# Patient Record
Sex: MALE | Race: WHITE | Employment: OTHER | URBAN - METROPOLITAN AREA
[De-identification: names, ages, dates, MRNs, and addresses within clinical notes are randomized per-mention and may not be internally consistent; named-entity substitution may affect disease eponyms.]

---

## 2017-04-05 ENCOUNTER — HOSPITAL ENCOUNTER (EMERGENCY)
Age: 62
Discharge: HOME | End: 2017-04-05
Attending: EMERGENCY MEDICINE
Payer: MEDICARE

## 2017-04-05 ENCOUNTER — DOCUMENTATION ONLY (OUTPATIENT)
Dept: ONCOLOGY | Age: 62
End: 2017-04-05

## 2017-04-05 VITALS
DIASTOLIC BLOOD PRESSURE: 83 MMHG | OXYGEN SATURATION: 98 % | RESPIRATION RATE: 19 BRPM | SYSTOLIC BLOOD PRESSURE: 142 MMHG | HEART RATE: 97 BPM | TEMPERATURE: 98 F

## 2017-04-05 DIAGNOSIS — F41.8 DEPRESSION WITH ANXIETY: ICD-10-CM

## 2017-04-05 DIAGNOSIS — F41.8 ANXIETY ASSOCIATED WITH DEPRESSION: Primary | ICD-10-CM

## 2017-04-05 DIAGNOSIS — C67.9 MALIGNANT NEOPLASM OF URINARY BLADDER, UNSPECIFIED SITE (HCC): ICD-10-CM

## 2017-04-05 LAB
ALBUMIN SERPL BCP-MCNC: 4.9 G/DL (ref 3.2–4.6)
ALBUMIN/GLOB SERPL: 1.1 {RATIO} (ref 1.2–3.5)
ALP SERPL-CCNC: 112 U/L (ref 50–136)
ALT SERPL-CCNC: 26 U/L (ref 12–65)
AMORPH CRY URNS QL MICRO: ABNORMAL
ANION GAP BLD CALC-SCNC: 14 MMOL/L (ref 7–16)
APPEARANCE UR: ABNORMAL
AST SERPL W P-5'-P-CCNC: 23 U/L (ref 15–37)
ATRIAL RATE: 100 BPM
BACTERIA URNS QL MICRO: ABNORMAL /HPF
BILIRUB SERPL-MCNC: 0.9 MG/DL (ref 0.2–1.1)
BILIRUB UR QL: ABNORMAL
BUN SERPL-MCNC: 15 MG/DL (ref 8–23)
CALCIUM SERPL-MCNC: 9.9 MG/DL (ref 8.3–10.4)
CALCULATED P AXIS, ECG09: 79 DEGREES
CALCULATED R AXIS, ECG10: 77 DEGREES
CALCULATED T AXIS, ECG11: 79 DEGREES
CASTS URNS QL MICRO: 0 /LPF
CHLORIDE SERPL-SCNC: 97 MMOL/L (ref 98–107)
CO2 SERPL-SCNC: 19 MMOL/L (ref 21–32)
COLOR UR: YELLOW
CREAT SERPL-MCNC: 1.25 MG/DL (ref 0.8–1.5)
CRYSTALS URNS QL MICRO: ABNORMAL /LPF
DIAGNOSIS, 93000: NORMAL
EPI CELLS #/AREA URNS HPF: ABNORMAL /HPF
ERYTHROCYTE [DISTWIDTH] IN BLOOD BY AUTOMATED COUNT: 13 % (ref 11.9–14.6)
GLOBULIN SER CALC-MCNC: 4.4 G/DL (ref 2.3–3.5)
GLUCOSE SERPL-MCNC: 86 MG/DL (ref 65–100)
GLUCOSE UR STRIP.AUTO-MCNC: NEGATIVE MG/DL
HCT VFR BLD AUTO: 47.9 % (ref 41.1–50.3)
HGB BLD-MCNC: 17 G/DL (ref 13.6–17.2)
HGB UR QL STRIP: ABNORMAL
KETONES UR QL STRIP.AUTO: 40 MG/DL
LEUKOCYTE ESTERASE UR QL STRIP.AUTO: ABNORMAL
MCH RBC QN AUTO: 35.3 PG (ref 26.1–32.9)
MCHC RBC AUTO-ENTMCNC: 35.5 G/DL (ref 31.4–35)
MCV RBC AUTO: 99.4 FL (ref 79.6–97.8)
MUCOUS THREADS URNS QL MICRO: 0 /LPF
NITRITE UR QL STRIP.AUTO: POSITIVE
OTHER OBSERVATIONS,UCOM: ABNORMAL
P-R INTERVAL, ECG05: 112 MS
PH UR STRIP: >9 [PH] (ref 5–9)
PLATELET # BLD AUTO: 347 K/UL (ref 150–450)
PMV BLD AUTO: 9.6 FL (ref 10.8–14.1)
POTASSIUM SERPL-SCNC: 4.3 MMOL/L (ref 3.5–5.1)
PROT SERPL-MCNC: 9.3 G/DL (ref 6.3–8.2)
PROT UR STRIP-MCNC: 30 MG/DL
Q-T INTERVAL, ECG07: 320 MS
QRS DURATION, ECG06: 76 MS
QTC CALCULATION (BEZET), ECG08: 412 MS
RBC # BLD AUTO: 4.82 M/UL (ref 4.23–5.67)
RBC #/AREA URNS HPF: ABNORMAL /HPF
SODIUM SERPL-SCNC: 130 MMOL/L (ref 136–145)
SP GR UR REFRACTOMETRY: <1.005 (ref 1–1.02)
TROPONIN I BLD-MCNC: 0 NG/ML (ref 0–0.08)
UROBILINOGEN UR QL STRIP.AUTO: 0.2 EU/DL (ref 0.2–1)
VENTRICULAR RATE, ECG03: 100 BPM
WBC # BLD AUTO: 9.2 K/UL (ref 4.3–11.1)
WBC URNS QL MICRO: ABNORMAL /HPF

## 2017-04-05 PROCEDURE — 96375 TX/PRO/DX INJ NEW DRUG ADDON: CPT | Performed by: EMERGENCY MEDICINE

## 2017-04-05 PROCEDURE — 99285 EMERGENCY DEPT VISIT HI MDM: CPT | Performed by: EMERGENCY MEDICINE

## 2017-04-05 PROCEDURE — 87186 SC STD MICRODIL/AGAR DIL: CPT | Performed by: EMERGENCY MEDICINE

## 2017-04-05 PROCEDURE — 74011250636 HC RX REV CODE- 250/636: Performed by: EMERGENCY MEDICINE

## 2017-04-05 PROCEDURE — 87086 URINE CULTURE/COLONY COUNT: CPT | Performed by: EMERGENCY MEDICINE

## 2017-04-05 PROCEDURE — 96361 HYDRATE IV INFUSION ADD-ON: CPT | Performed by: EMERGENCY MEDICINE

## 2017-04-05 PROCEDURE — 85027 COMPLETE CBC AUTOMATED: CPT | Performed by: EMERGENCY MEDICINE

## 2017-04-05 PROCEDURE — 80053 COMPREHEN METABOLIC PANEL: CPT | Performed by: EMERGENCY MEDICINE

## 2017-04-05 PROCEDURE — 96374 THER/PROPH/DIAG INJ IV PUSH: CPT | Performed by: EMERGENCY MEDICINE

## 2017-04-05 PROCEDURE — 93005 ELECTROCARDIOGRAM TRACING: CPT | Performed by: EMERGENCY MEDICINE

## 2017-04-05 PROCEDURE — 87088 URINE BACTERIA CULTURE: CPT | Performed by: EMERGENCY MEDICINE

## 2017-04-05 PROCEDURE — 81003 URINALYSIS AUTO W/O SCOPE: CPT | Performed by: EMERGENCY MEDICINE

## 2017-04-05 PROCEDURE — 84484 ASSAY OF TROPONIN QUANT: CPT

## 2017-04-05 PROCEDURE — 81015 MICROSCOPIC EXAM OF URINE: CPT | Performed by: EMERGENCY MEDICINE

## 2017-04-05 RX ORDER — SODIUM CHLORIDE 9 MG/ML
1000 INJECTION, SOLUTION INTRAVENOUS ONCE
Status: COMPLETED | OUTPATIENT
Start: 2017-04-05 | End: 2017-04-05

## 2017-04-05 RX ORDER — ONDANSETRON 2 MG/ML
4 INJECTION INTRAMUSCULAR; INTRAVENOUS
Status: COMPLETED | OUTPATIENT
Start: 2017-04-05 | End: 2017-04-05

## 2017-04-05 RX ORDER — SODIUM CHLORIDE 0.9 % (FLUSH) 0.9 %
5-10 SYRINGE (ML) INJECTION EVERY 8 HOURS
Status: DISCONTINUED | OUTPATIENT
Start: 2017-04-05 | End: 2017-04-05 | Stop reason: HOSPADM

## 2017-04-05 RX ORDER — LORAZEPAM 1 MG/1
1 TABLET ORAL
Qty: 21 TAB | Refills: 0 | Status: SHIPPED | OUTPATIENT
Start: 2017-04-05 | End: 2017-04-05 | Stop reason: SDUPTHER

## 2017-04-05 RX ORDER — SODIUM CHLORIDE 0.9 % (FLUSH) 0.9 %
5-10 SYRINGE (ML) INJECTION AS NEEDED
Status: DISCONTINUED | OUTPATIENT
Start: 2017-04-05 | End: 2017-04-05 | Stop reason: HOSPADM

## 2017-04-05 RX ORDER — LORAZEPAM 2 MG/ML
1 INJECTION INTRAMUSCULAR
Status: COMPLETED | OUTPATIENT
Start: 2017-04-05 | End: 2017-04-05

## 2017-04-05 RX ADMIN — LORAZEPAM 1 MG: 2 INJECTION, SOLUTION INTRAMUSCULAR; INTRAVENOUS at 10:47

## 2017-04-05 RX ADMIN — ONDANSETRON 4 MG: 2 INJECTION, SOLUTION INTRAMUSCULAR; INTRAVENOUS at 10:47

## 2017-04-05 RX ADMIN — SODIUM CHLORIDE 1000 ML: 900 INJECTION, SOLUTION INTRAVENOUS at 10:47

## 2017-04-05 NOTE — DISCHARGE INSTRUCTIONS

## 2017-04-05 NOTE — ED TRIAGE NOTES
Pt. Seen at his doctors office today. Pt. States he has severe anxiety problems and has been out of his PO ativan. Pt. States hx of Cancer, has a urostomy. States since chemo it \"messed with my brain. \" EMS reports extreme anxiety with hyperventilation and rapid muscle movements on their arrival. EMS gave 1mg Ativan. Pt. Still somewhat anxious in triage but states he feels better than he did. Pt. Calm and cooperative.

## 2017-04-05 NOTE — ED PROVIDER NOTES
HPI Comments: Ptaient with sever anxiety, worse today as his wife is admitted at this time and preparing for surgery. Sent from [facility] onc clinic for severe anxiety and treated with ativan pta. Also drinks daily-no etoh in 1 week but denies SI/HI or AVH. Patient is a 64 y.o. male presenting with anxiety. The history is provided by the patient. Anxiety    This is a recurrent problem. The current episode started 1 to 2 hours ago. The problem has been gradually improving. The problem occurs daily. The patient is experiencing no pain. Associated symptoms include shortness of breath. Pertinent negatives include no abdominal pain, no back pain, no cough, no fever, no headaches, no nausea, no numbness, no vomiting and no weakness. He has tried nothing for the symptoms. His past medical history does not include DVT or PE. Pertinent negatives include no cardiac stents and no CABG. Past Medical History:   Diagnosis Date    Bladder cancer ([facility] [address] 7/2014    Chronic neck pain     Chronic obstructive pulmonary disease (HCC)     Depression with anxiety     Hypertension     Osteoarthritis     Tobacco abuse        Past Surgical History:   Procedure Laterality Date    HX CERVICAL FUSION  4/14    Dr. 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       Social History Main Topics    Smoking status: Current Every Day Smoker     Packs/day: 1.00     Years: 45.00    Smokeless tobacco: Never Used    Alcohol use 14.0 oz/week     28 Cans of beer per week      Comment: 4/night of beer    Drug use: Yes     Special: Marijuana    Sexual activity: Not on file      Comment: recentlyey     Other Topics Concern    Not on file     Social History Narrative    Lives with girlfriend. His father is living in [place]. ALLERGIES: Codeine and Penicillins    Review of Systems   Constitutional: Negative for chills and fever. HENT: Negative for congestion, rhinorrhea and sore throat. Eyes: Negative for photophobia and redness. Respiratory: Positive for shortness of breath. Negative for cough. Cardiovascular: Negative for chest pain and leg swelling. Gastrointestinal: Negative for abdominal pain, diarrhea, nausea and vomiting. Endocrine: Negative for polydipsia and polyuria. Musculoskeletal: Negative for back pain and myalgias. Neurological: Negative for weakness, numbness and headaches. Psychiatric/Behavioral: Positive for dysphoric mood and sleep disturbance. Negative for hallucinations and suicidal ideas. The patient is nervous/anxious. Vitals:    04/05/17 1028   BP: (!) 157/100   Pulse: (!) 112   Resp: 20   Temp: 97.7 °F (36.5 °C)   SpO2: 98%            Physical Exam   Constitutional: He is oriented to person, place, and time. He appears well-developed and well-nourished. HENT:   Mouth/Throat: Oropharynx is clear and moist. No oropharyngeal exudate. Eyes: Conjunctivae are normal. Pupils are equal, round, and reactive to light. Neck: Neck supple. Cardiovascular: Normal rate, regular rhythm and normal heart sounds. Pulmonary/Chest: Effort normal and breath sounds normal.   Abdominal: Soft. Bowel sounds are normal. He exhibits no distension. There is no tenderness. There is no rebound and no guarding. Musculoskeletal: Normal range of motion. He exhibits no edema or tenderness. Lymphadenopathy:     He has no cervical adenopathy.    Neurological: He is alert and oriented to person, place, and time. Skin: Skin is warm and dry. Psychiatric: His speech is normal and behavior is normal. Judgment and thought content normal. His mood appears anxious. Nursing note and vitals reviewed. MDM  Number of Diagnoses or Management Options  Diagnosis management comments: Patient's urine is dark and concentrated. He reports not eating or drinking for several days. Patient has been hydrated. Ileostomy bag urine is nitrite positive aspect. The minimal leukocytosis or red blood cell presence. Will treat based on culture in the next couple of days. No fever or chills. Anxiety improved. EKG normal and nonischemic. Doubt acute coronary syndrome or pulmonary embolism. Doubt aortic dissection. Lungs are clear and no pneumothorax clinically.        Amount and/or Complexity of Data Reviewed  Clinical lab tests: ordered and reviewed (Results for orders placed or performed during the hospital encounter of 04/05/17  -CBC W/O DIFF       Result                                            Value                         Ref Range                       WBC                                               9.2                           4.3 - 11.1 K/uL                 RBC                                               4.82                          4.23 - 5.67 M/uL                HGB                                               17.0                          13.6 - 17.2 g/dL                HCT                                               47.9                          41.1 - 50.3 %                   MCV                                               99.4 (H)                      79.6 - 97.8 FL                  MCH                                               35.3 (H)                      26.1 - 32.9 PG                  MCHC                                              35.5 (H)                      31.4 - 35.0 g/dL                RDW                                               13.0 11.9 - 14.6 %                   PLATELET                                          347                           150 - 450 K/uL                  MPV                                               9.6 (L)                       10.8 - 14.1 FL             -METABOLIC PANEL, COMPREHENSIVE       Result                                            Value                         Ref Range                       Sodium                                            130 (L)                       136 - 145 mmol/L                Potassium                                         4.3                           3.5 - 5.1 mmol/L                Chloride                                          97 (L)                        98 - 107 mmol/L                 CO2                                               19 (L)                        21 - 32 mmol/L                  Anion gap                                         14                            7 - 16 mmol/L                   Glucose                                           86                            65 - 100 mg/dL                  BUN                                               15                            8 - 23 MG/DL                    Creatinine                                        1.25                          0.8 - 1.5 MG/DL                 GFR est AA                                        >60                           >60 ml/min/1.73m2               GFR est non-AA                                    >60                           >60 ml/min/1.73m2               Calcium                                           9.9                           8.3 - 10.4 MG/DL                Bilirubin, total                                  0.9                           0.2 - 1.1 MG/DL                 ALT (SGPT)                                        26                            12 - 65 U/L                     AST (SGOT)                                        23 15 - 37 U/L                     Alk. phosphatase                                  112                           50 - 136 U/L                    Protein, total                                    9.3 (H)                       6.3 - 8.2 g/dL                  Albumin                                           4.9 (H)                       3.2 - 4.6 g/dL                  Globulin                                          4.4 (H)                       2.3 - 3.5 g/dL                  A-G Ratio                                         1.1 (L)                       1.2 - 3.5                  -URINALYSIS W/ RFLX MICROSCOPIC       Result                                            Value                         Ref Range                       Color                                             YELLOW                                                        Appearance                                        HAZY                                                          Specific gravity                                  <1.005                        1.001 - 1.023                   pH (UA)                                           >9.0 (H)                      5.0 - 9.0                       Protein                                           30 (A)                        NEG mg/dL                       Glucose                                           NEGATIVE                      NEG mg/dL                       Ketone                                            40 (A)                        NEG mg/dL                       Bilirubin                                         SMALL (A)                     NEG                             Blood                                             TRACE (A)                     NEG                             Urobilinogen                                      0.2                           0.2 - 1.0 EU/dL                 Nitrites                                          POSITIVE (A)                  NEG Leukocyte Esterase                                SMALL (A)                     NEG                        -URINE MICROSCOPIC       Result                                            Value                         Ref Range                       WBC                                               3-5                           0 /hpf                          RBC                                               3-5                           0 /hpf                          Epithelial cells                                  0-3                           0 /hpf                          Bacteria                                          4+ (H)                        0 /hpf                          Casts                                             0                             0 /lpf                          Crystals                                          TRIPLE PHOSPHATE              0 /LPF                          Amorphous Crystals                                2+ (H)                        0                               Mucus                                             0                             0 /lpf                          Other observations                                RESULTS VERIFIED MANUALLY                                -POC TROPONIN-I       Result                                            Value                         Ref Range                       Troponin-I (POC)                                  0                             0.0 - 0.08 ng/ml           -EKG, 12 LEAD, INITIAL       Result                                            Value                         Ref Range                       Ventricular Rate                                  100                           BPM                             Atrial Rate                                       100                           BPM                             P-R Interval                                      112 ms                              QRS Duration                                      76                            ms                              Q-T Interval                                      320                           ms                              QTC Calculation (Bezet)                           412                           ms                              Calculated P Axis                                 79                            degrees                         Calculated R Axis                                 77                            degrees                         Calculated T Axis                                 79                            degrees                         Diagnosis                                                                                                   !! AGE AND GENDER SPECIFIC ECG ANALYSIS !!    Normal sinus rhythm   Normal ECG   No previous ECGs available     )      ED Course       Procedures

## 2017-04-08 LAB
BACTERIA SPEC CULT: ABNORMAL
SERVICE CMNT-IMP: ABNORMAL

## 2017-04-09 RX ORDER — SULFAMETHOXAZOLE AND TRIMETHOPRIM 800; 160 MG/1; MG/1
1 TABLET ORAL 2 TIMES DAILY
Qty: 20 TAB | Refills: 0 | Status: SHIPPED | OUTPATIENT
Start: 2017-04-09 | End: 2017-04-19

## 2017-04-09 NOTE — PROGRESS NOTES
Patient takes Celexa and Cipro can prolong the QT interval, so, I changed it to Bactrim DS one po bid x 10 days.

## 2017-04-09 NOTE — PROGRESS NOTES
I called patient and informed him of test results. He uses [facility] in [facility]. He is feeling pretty good other than having some diarrhea. Will e-scribe Cipro 500 mg one po bid x 10 days now.

## 2017-04-11 ENCOUNTER — HOSPITAL ENCOUNTER (OUTPATIENT)
Dept: CT IMAGING | Age: 62
Discharge: HOME | End: 2017-04-11
Attending: INTERNAL MEDICINE
Payer: MEDICARE

## 2017-04-11 DIAGNOSIS — C67.9 MALIGNANT NEOPLASM OF URINARY BLADDER, UNSPECIFIED SITE (HCC): ICD-10-CM

## 2017-04-11 PROCEDURE — 74011636320 HC RX REV CODE- 636/320: Performed by: INTERNAL MEDICINE

## 2017-04-11 PROCEDURE — 74177 CT ABD & PELVIS W/CONTRAST: CPT

## 2017-04-11 PROCEDURE — 74011000258 HC RX REV CODE- 258: Performed by: INTERNAL MEDICINE

## 2017-04-11 RX ORDER — SODIUM CHLORIDE 0.9 % (FLUSH) 0.9 %
10 SYRINGE (ML) INJECTION
Status: COMPLETED | OUTPATIENT
Start: 2017-04-11 | End: 2017-04-11

## 2017-04-11 RX ADMIN — DIATRIZOATE MEGLUMINE AND DIATRIZOATE SODIUM 30 ML: 660; 100 LIQUID ORAL; RECTAL at 11:45

## 2017-04-11 RX ADMIN — IOPAMIDOL 100 ML: 755 INJECTION, SOLUTION INTRAVENOUS at 11:45

## 2017-04-11 RX ADMIN — Medication 10 ML: at 11:45

## 2017-04-11 RX ADMIN — SODIUM CHLORIDE 100 ML: 900 INJECTION, SOLUTION INTRAVENOUS at 11:45

## 2017-04-19 ENCOUNTER — DOCUMENTATION ONLY (OUTPATIENT)
Dept: ONCOLOGY | Age: 62
End: 2017-04-19

## 2017-04-19 NOTE — PROGRESS NOTES
SW received referral from yD Dr. Shasta Litten to facilitate pt referral to psychiatrist. Phi Dwyer met with pt to introduce self and services. Pt verbalized agreement to referral for psychiatrist. Phi Dwyer completed referral form for [facility] and faxed it to [phone] with pt's treatment summary. SW provided pt with contact information for [address] services. Pt requested his appt information for MD appt. SW provided pt with copy of his upcoming appts. Pt verbalized appreciation. No other needs identified. SW provided pt with SW contact information and encouraged pt to call should any needs arise and pt verbalized understanding. SW intends to follow up PRN.

## 2017-04-20 ENCOUNTER — TELEPHONE (OUTPATIENT)
Dept: ONCOLOGY | Age: 62
End: 2017-04-20

## 2017-04-20 NOTE — TELEPHONE ENCOUNTER
SW received fax stating that [facility] was unable to accommodate pt referral. Ignacio Ilirrik called pt and relayed this information and offered referral to [facility]. Pt verbalized agreement and appreciation. SW coordinated with pt to meet with him at his next appt to provide contact information for [facility] and pt verbalized understanding. SW completed referral form and faxed it to [facility] at [phone]. MARLIN intends to follow up with pt at his appt on 4/26/17.

## 2017-04-26 ENCOUNTER — HOSPITAL ENCOUNTER (OUTPATIENT)
Dept: LAB | Age: 62
Discharge: HOME | End: 2017-04-26
Payer: MEDICARE

## 2017-04-26 ENCOUNTER — DOCUMENTATION ONLY (OUTPATIENT)
Dept: ONCOLOGY | Age: 62
End: 2017-04-26

## 2017-04-26 DIAGNOSIS — C67.9 MALIGNANT NEOPLASM OF URINARY BLADDER, UNSPECIFIED SITE (HCC): ICD-10-CM

## 2017-04-26 LAB
ALBUMIN SERPL BCP-MCNC: 3.7 G/DL (ref 3.2–4.6)
ALBUMIN/GLOB SERPL: 1 {RATIO} (ref 1.2–3.5)
ALP SERPL-CCNC: 99 U/L (ref 50–136)
ALT SERPL-CCNC: 27 U/L (ref 12–65)
ANION GAP BLD CALC-SCNC: 7 MMOL/L (ref 7–16)
AST SERPL W P-5'-P-CCNC: 18 U/L (ref 15–37)
BASOPHILS # BLD AUTO: 0.1 K/UL (ref 0–0.2)
BASOPHILS # BLD: 1 % (ref 0–2)
BILIRUB SERPL-MCNC: 0.2 MG/DL (ref 0.2–1.1)
BUN SERPL-MCNC: 8 MG/DL (ref 8–23)
CALCIUM SERPL-MCNC: 8.8 MG/DL (ref 8.3–10.4)
CHLORIDE SERPL-SCNC: 101 MMOL/L (ref 98–107)
CO2 SERPL-SCNC: 26 MMOL/L (ref 21–32)
CREAT SERPL-MCNC: 0.98 MG/DL (ref 0.8–1.5)
DIFFERENTIAL METHOD BLD: ABNORMAL
EOSINOPHIL # BLD: 0 K/UL (ref 0–0.8)
EOSINOPHIL NFR BLD: 0 % (ref 0.5–7.8)
ERYTHROCYTE [DISTWIDTH] IN BLOOD BY AUTOMATED COUNT: 12.9 % (ref 11.9–14.6)
GLOBULIN SER CALC-MCNC: 3.7 G/DL (ref 2.3–3.5)
GLUCOSE SERPL-MCNC: 105 MG/DL (ref 65–100)
HCT VFR BLD AUTO: 40.2 % (ref 41.1–50.3)
HGB BLD-MCNC: 13.5 G/DL (ref 13.6–17.2)
LYMPHOCYTES # BLD AUTO: 25 % (ref 13–44)
LYMPHOCYTES # BLD: 1.9 K/UL (ref 0.5–4.6)
MAGNESIUM SERPL-MCNC: 2.5 MG/DL (ref 1.8–2.4)
MCH RBC QN AUTO: 34.7 PG (ref 26.1–32.9)
MCHC RBC AUTO-ENTMCNC: 33.6 G/DL (ref 31.4–35)
MCV RBC AUTO: 103.3 FL (ref 79.6–97.8)
MONOCYTES # BLD: 0.4 K/UL (ref 0.1–1.3)
MONOCYTES NFR BLD AUTO: 6 % (ref 4–12)
NEUTS SEG # BLD: 4.9 K/UL (ref 1.7–8.2)
NEUTS SEG NFR BLD AUTO: 68 % (ref 43–78)
NRBC # BLD: 0 K/UL (ref 0–0.2)
PLATELET # BLD AUTO: 403 K/UL (ref 150–450)
PMV BLD AUTO: 8.7 FL (ref 10.8–14.1)
POTASSIUM SERPL-SCNC: 4.2 MMOL/L (ref 3.5–5.1)
PROT SERPL-MCNC: 7.4 G/DL (ref 6.3–8.2)
RBC # BLD AUTO: 3.89 M/UL (ref 4.23–5.67)
SODIUM SERPL-SCNC: 134 MMOL/L (ref 136–145)
WBC # BLD AUTO: 7.3 K/UL (ref 4.3–11.1)

## 2017-04-26 PROCEDURE — 80053 COMPREHEN METABOLIC PANEL: CPT | Performed by: INTERNAL MEDICINE

## 2017-04-26 PROCEDURE — 85025 COMPLETE CBC W/AUTO DIFF WBC: CPT | Performed by: INTERNAL MEDICINE

## 2017-04-26 PROCEDURE — 83735 ASSAY OF MAGNESIUM: CPT | Performed by: INTERNAL MEDICINE

## 2017-04-26 PROCEDURE — 36415 COLL VENOUS BLD VENIPUNCTURE: CPT | Performed by: INTERNAL MEDICINE

## 2017-04-26 NOTE — PROGRESS NOTES
SW followed up with pt regarding psychiatrist referral. Roberto Woods relayed to pt that [facility] was unable to accept patients at this time. Pt verbalized understanding. SW offered to facilitate scheduling a psychiatrist appt with [facility] Dr. Isidra Bradshaw and pt verbalized agreement. SW observed pt's psychotherapy appt with Pina and EDYTA. MARLIN called Dr. Rossana Sol office and facilitated scheduling a new patient appt for pt on 4/28/17 at 4:30 pm. SW relayed this to the pt and provided appt contact information. SW encouraged pt to call Dr. Rossana Sol office should he need to reschedule. Pt verbalized understanding. No other needs identified. SW intends to follow up PRN.

## 2017-05-03 ENCOUNTER — DOCUMENTATION ONLY (OUTPATIENT)
Dept: ONCOLOGY | Age: 62
End: 2017-05-03

## 2017-05-03 NOTE — PROGRESS NOTES
SW observed psychotherapy session with pt, Jonathan Hendricks and PsAdelina Torrez. Pt stated he did not attend his appt with psychiatrist Dr. Chloe Michael and did not request assistance rescheduling. SW encouraged pt to call should any needs arise. Pt verbalized understanding. SW intends to assist pt PRN.

## 2017-05-17 ENCOUNTER — DOCUMENTATION ONLY (OUTPATIENT)
Dept: ONCOLOGY | Age: 62
End: 2017-05-17

## 2017-05-17 NOTE — PROGRESS NOTES
SW completed psychotherapy visit with pt, Arlene Li and PsAdelina Hill. Pt had previously stated he had not attended his 2 scheduled appointments with psychiatrist Dr. Moise Carter. Pt did not attend his schedule psychotherapy appointment on 5/10/17 with Pina and LMFT. PsyD reviewed pts care plan and pt verbalized agreement to establish and continue treatment with Dr. Moise Carter as well as addiction treatment at the [facility]. SW presented pt treatment plan with contact information for these agencies and pt verbalized agreement to making his own appointments and attending those appointments as a condition of continuing psychotherapy at the [facility] with Pina, [facility] and [address] [facility] provided contact information for [facility] for psychosocial emergencies and pt verbalized understanding. Pt completed release of information for exchange of records between [facility] and Dr. Montey Runner office and between [facility] and [facility] verifying pt initial and ongoing engagement. Pt verbalized his intention and desire to meet psychotherapy treatment goals by engaging with additional resources. Pt verbalized understanding that scheduling his next psychotherapy appointment at the [facility] is dependent on him engaging with psychiatry and addiction treatment and verbalized agreement. SW provided pt with Triage number should he need any additional prescription refills. No other needs identified. Pt stated he would notify  Iman Huber when he has established care with psychiatry and addiction treatment. SW intends to follow up PRN. This note will not be viewable in [address]

## 2017-05-18 ENCOUNTER — DOCUMENTATION ONLY (OUTPATIENT)
Dept: ONCOLOGY | Age: 62
End: 2017-05-18

## 2017-05-18 NOTE — PROGRESS NOTES
PSYCHOLOGY PROGRESS NOTE      Name:  Bravo Frankel    Date of Service: 5/17/2017  Location of Service:   [facility]  Type of Service: Individual Psychotherapy  Duration:  60 minutes  Primary Diagnosis: Adjustment     Summary of Service: Blenda Claude Dr. Homero Mari,  [address] [place] [address], and Therapist Cinthya Bran met with the patient, reviewed the course of his treatment to date, and outlined a treatment plan for the patient going forward. The treatment team identified that the patient has benefitted to some degree from his ongoing therapy; however, the team acknowledged the necessity of using additional resources in order to progress in his treatment. Dr. Homero Mari, together with Margaret Kerr, informed the patient that in order to have another appointment here and to continue with productive therapy at the [facility] he must meet two conditions. First, he must initiate and attend appointments with a psychiatrist to assess and manage his medication needs. Psychiatrist Dr. Jackie Street contact information was provided to patient for that purpose. Second, the patient must initiate and attend appointments at an addiction treatment center. The [facility] contact information was also provided to the patient for that purpose. The treatment team stressed that the patient must provide verification to the [facility] that he has fulfilled these two conditions before he can make another therapy appointment with his [facility] treatment team.    Dr. Homero Mari indicated to the patient that he would like to continue to work with him and that he hopes that the patient elects to fulfill these necessary steps to permit their ongoing work. Will continue to meet with and be available to patient as scheduled and per patient's request.     Fanny Winchester. Tiffany Frankel, LMFT-STEVE  Marriage and Family Therapist    This note will not be viewable in [address]

## 2017-10-23 ENCOUNTER — HOSPITAL ENCOUNTER (OUTPATIENT)
Dept: CT IMAGING | Age: 62
Discharge: HOME | End: 2017-10-23
Attending: INTERNAL MEDICINE
Payer: MEDICARE

## 2017-10-23 DIAGNOSIS — C67.9 MALIGNANT NEOPLASM OF URINARY BLADDER, UNSPECIFIED SITE (HCC): ICD-10-CM

## 2017-10-23 LAB — CREAT BLD-MCNC: 0.9 MG/DL (ref 0.8–1.5)

## 2017-10-23 PROCEDURE — 74011636320 HC RX REV CODE- 636/320: Performed by: INTERNAL MEDICINE

## 2017-10-23 PROCEDURE — 74011000258 HC RX REV CODE- 258: Performed by: INTERNAL MEDICINE

## 2017-10-23 PROCEDURE — 74177 CT ABD & PELVIS W/CONTRAST: CPT

## 2017-10-23 PROCEDURE — 82565 ASSAY OF CREATININE: CPT

## 2017-10-23 RX ORDER — SODIUM CHLORIDE 0.9 % (FLUSH) 0.9 %
10 SYRINGE (ML) INJECTION
Status: COMPLETED | OUTPATIENT
Start: 2017-10-23 | End: 2017-10-23

## 2017-10-23 RX ADMIN — Medication 10 ML: at 09:49

## 2017-10-23 RX ADMIN — IOPAMIDOL 100 ML: 755 INJECTION, SOLUTION INTRAVENOUS at 09:49

## 2017-10-23 RX ADMIN — SODIUM CHLORIDE 100 ML: 900 INJECTION, SOLUTION INTRAVENOUS at 09:49

## 2017-10-23 RX ADMIN — DIATRIZOATE MEGLUMINE AND DIATRIZOATE SODIUM 15 ML: 660; 100 LIQUID ORAL; RECTAL at 09:49

## 2017-10-25 ENCOUNTER — HOSPITAL ENCOUNTER (OUTPATIENT)
Dept: LAB | Age: 62
Discharge: HOME | End: 2017-10-25
Payer: MEDICARE

## 2017-10-25 DIAGNOSIS — C67.9 MALIGNANT NEOPLASM OF URINARY BLADDER, UNSPECIFIED SITE (HCC): ICD-10-CM

## 2017-10-25 LAB
ALBUMIN SERPL-MCNC: 3.9 G/DL (ref 3.2–4.6)
ALBUMIN/GLOB SERPL: 0.9 {RATIO} (ref 1.2–3.5)
ALP SERPL-CCNC: 121 U/L (ref 50–136)
ALT SERPL-CCNC: 27 U/L (ref 12–65)
ANION GAP SERPL CALC-SCNC: 8 MMOL/L (ref 7–16)
AST SERPL-CCNC: 19 U/L (ref 15–37)
BASOPHILS # BLD: 0.1 K/UL (ref 0–0.2)
BASOPHILS NFR BLD: 1 % (ref 0–2)
BILIRUB SERPL-MCNC: 0.2 MG/DL (ref 0.2–1.1)
BUN SERPL-MCNC: 8 MG/DL (ref 8–23)
CALCIUM SERPL-MCNC: 9.2 MG/DL (ref 8.3–10.4)
CHLORIDE SERPL-SCNC: 97 MMOL/L (ref 98–107)
CO2 SERPL-SCNC: 24 MMOL/L (ref 21–32)
CREAT SERPL-MCNC: 0.88 MG/DL (ref 0.8–1.5)
DIFFERENTIAL METHOD BLD: ABNORMAL
EOSINOPHIL # BLD: 0.1 K/UL (ref 0–0.8)
EOSINOPHIL NFR BLD: 1 % (ref 0.5–7.8)
ERYTHROCYTE [DISTWIDTH] IN BLOOD BY AUTOMATED COUNT: 13.7 % (ref 11.9–14.6)
GLOBULIN SER CALC-MCNC: 4.5 G/DL (ref 2.3–3.5)
GLUCOSE SERPL-MCNC: 93 MG/DL (ref 65–100)
HCT VFR BLD AUTO: 42.6 % (ref 41.1–50.3)
HGB BLD-MCNC: 15 G/DL (ref 13.6–17.2)
LYMPHOCYTES # BLD: 2.4 K/UL (ref 0.5–4.6)
LYMPHOCYTES NFR BLD: 32 % (ref 13–44)
MAGNESIUM SERPL-MCNC: 2.3 MG/DL (ref 1.8–2.4)
MCH RBC QN AUTO: 35 PG (ref 26.1–32.9)
MCHC RBC AUTO-ENTMCNC: 35.2 G/DL (ref 31.4–35)
MCV RBC AUTO: 99.5 FL (ref 79.6–97.8)
MONOCYTES # BLD: 0.5 K/UL (ref 0.1–1.3)
MONOCYTES NFR BLD: 7 % (ref 4–12)
NEUTS SEG # BLD: 4.5 K/UL (ref 1.7–8.2)
NEUTS SEG NFR BLD: 59 % (ref 43–78)
NRBC # BLD: 0 K/UL (ref 0–0.2)
PLATELET # BLD AUTO: 477 K/UL (ref 150–450)
PMV BLD AUTO: 8.2 FL (ref 10.8–14.1)
POTASSIUM SERPL-SCNC: 4 MMOL/L (ref 3.5–5.1)
PROT SERPL-MCNC: 8.4 G/DL (ref 6.3–8.2)
RBC # BLD AUTO: 4.28 M/UL (ref 4.23–5.67)
SODIUM SERPL-SCNC: 129 MMOL/L (ref 136–145)
WBC # BLD AUTO: 7.5 K/UL (ref 4.3–11.1)

## 2017-10-25 PROCEDURE — 80053 COMPREHEN METABOLIC PANEL: CPT | Performed by: INTERNAL MEDICINE

## 2017-10-25 PROCEDURE — 85025 COMPLETE CBC W/AUTO DIFF WBC: CPT | Performed by: INTERNAL MEDICINE

## 2017-10-25 PROCEDURE — 36415 COLL VENOUS BLD VENIPUNCTURE: CPT | Performed by: INTERNAL MEDICINE

## 2017-10-25 PROCEDURE — 83735 ASSAY OF MAGNESIUM: CPT | Performed by: INTERNAL MEDICINE

## 2018-04-19 ENCOUNTER — HOSPITAL ENCOUNTER (OUTPATIENT)
Dept: CT IMAGING | Age: 63
Discharge: HOME | End: 2018-04-19
Attending: INTERNAL MEDICINE
Payer: MEDICARE

## 2018-04-19 VITALS — BODY MASS INDEX: 19.66 KG/M2 | WEIGHT: 118 LBS | HEIGHT: 65 IN

## 2018-04-19 DIAGNOSIS — C67.9 MALIGNANT NEOPLASM OF URINARY BLADDER, UNSPECIFIED SITE (HCC): ICD-10-CM

## 2018-04-19 LAB — CREAT BLD-MCNC: 0.9 MG/DL (ref 0.8–1.5)

## 2018-04-19 PROCEDURE — 74177 CT ABD & PELVIS W/CONTRAST: CPT

## 2018-04-19 PROCEDURE — 74011000258 HC RX REV CODE- 258: Performed by: INTERNAL MEDICINE

## 2018-04-19 PROCEDURE — 74011636320 HC RX REV CODE- 636/320: Performed by: INTERNAL MEDICINE

## 2018-04-19 PROCEDURE — 82565 ASSAY OF CREATININE: CPT

## 2018-04-19 RX ORDER — SODIUM CHLORIDE 0.9 % (FLUSH) 0.9 %
10 SYRINGE (ML) INJECTION
Status: COMPLETED | OUTPATIENT
Start: 2018-04-19 | End: 2018-04-19

## 2018-04-19 RX ADMIN — Medication 10 ML: at 09:25

## 2018-04-19 RX ADMIN — IOPAMIDOL 100 ML: 755 INJECTION, SOLUTION INTRAVENOUS at 09:25

## 2018-04-19 RX ADMIN — DIATRIZOATE MEGLUMINE AND DIATRIZOATE SODIUM 15 ML: 660; 100 LIQUID ORAL; RECTAL at 09:25

## 2018-04-19 RX ADMIN — SODIUM CHLORIDE 100 ML: 900 INJECTION, SOLUTION INTRAVENOUS at 09:25

## 2018-04-23 PROBLEM — N20.0 KIDNEY STONE ON LEFT SIDE: Status: ACTIVE | Noted: 2018-04-23

## 2018-04-24 ENCOUNTER — HOSPITAL ENCOUNTER (OUTPATIENT)
Dept: LAB | Age: 63
Discharge: HOME | End: 2018-04-24
Payer: MEDICARE

## 2018-04-24 DIAGNOSIS — C67.9 MALIGNANT NEOPLASM OF URINARY BLADDER, UNSPECIFIED SITE (HCC): ICD-10-CM

## 2018-04-24 LAB
ALBUMIN SERPL-MCNC: 3.7 G/DL (ref 3.2–4.6)
ALBUMIN/GLOB SERPL: 0.9 {RATIO} (ref 1.2–3.5)
ALP SERPL-CCNC: 94 U/L (ref 50–136)
ALT SERPL-CCNC: 28 U/L (ref 12–65)
ANION GAP SERPL CALC-SCNC: 8 MMOL/L (ref 7–16)
AST SERPL-CCNC: 19 U/L (ref 15–37)
BASOPHILS # BLD: 0.1 K/UL (ref 0–0.2)
BASOPHILS NFR BLD: 1 % (ref 0–2)
BILIRUB SERPL-MCNC: 0.2 MG/DL (ref 0.2–1.1)
BUN SERPL-MCNC: 9 MG/DL (ref 8–23)
CALCIUM SERPL-MCNC: 9.4 MG/DL (ref 8.3–10.4)
CHLORIDE SERPL-SCNC: 100 MMOL/L (ref 98–107)
CO2 SERPL-SCNC: 24 MMOL/L (ref 21–32)
CREAT SERPL-MCNC: 0.92 MG/DL (ref 0.8–1.5)
DIFFERENTIAL METHOD BLD: ABNORMAL
EOSINOPHIL # BLD: 0.1 K/UL (ref 0–0.8)
EOSINOPHIL NFR BLD: 2 % (ref 0.5–7.8)
ERYTHROCYTE [DISTWIDTH] IN BLOOD BY AUTOMATED COUNT: 13.7 % (ref 11.9–14.6)
GLOBULIN SER CALC-MCNC: 4.2 G/DL (ref 2.3–3.5)
GLUCOSE SERPL-MCNC: 86 MG/DL (ref 65–100)
HCT VFR BLD AUTO: 43.6 % (ref 41.1–50.3)
HGB BLD-MCNC: 15.1 G/DL (ref 13.6–17.2)
LYMPHOCYTES # BLD: 2.1 K/UL (ref 0.5–4.6)
LYMPHOCYTES NFR BLD: 33 % (ref 13–44)
MCH RBC QN AUTO: 34.1 PG (ref 26.1–32.9)
MCHC RBC AUTO-ENTMCNC: 34.6 G/DL (ref 31.4–35)
MCV RBC AUTO: 98.4 FL (ref 79.6–97.8)
MONOCYTES # BLD: 0.5 K/UL (ref 0.1–1.3)
MONOCYTES NFR BLD: 8 % (ref 4–12)
NEUTS SEG # BLD: 3.6 K/UL (ref 1.7–8.2)
NEUTS SEG NFR BLD: 57 % (ref 43–78)
NRBC # BLD: 0 K/UL (ref 0–0.2)
PLATELET # BLD AUTO: 469 K/UL (ref 150–450)
PMV BLD AUTO: 8.3 FL (ref 10.8–14.1)
POTASSIUM SERPL-SCNC: 4.2 MMOL/L (ref 3.5–5.1)
PROT SERPL-MCNC: 7.9 G/DL (ref 6.3–8.2)
RBC # BLD AUTO: 4.43 M/UL (ref 4.23–5.67)
SODIUM SERPL-SCNC: 132 MMOL/L (ref 136–145)
WBC # BLD AUTO: 6.3 K/UL (ref 4.3–11.1)

## 2018-04-24 PROCEDURE — 36415 COLL VENOUS BLD VENIPUNCTURE: CPT | Performed by: INTERNAL MEDICINE

## 2018-04-24 PROCEDURE — 85025 COMPLETE CBC W/AUTO DIFF WBC: CPT | Performed by: INTERNAL MEDICINE

## 2018-04-24 PROCEDURE — 80053 COMPREHEN METABOLIC PANEL: CPT | Performed by: INTERNAL MEDICINE

## 2018-05-08 ENCOUNTER — HOSPITAL ENCOUNTER (OUTPATIENT)
Dept: SURGERY | Age: 63
Discharge: HOME | End: 2018-05-08
Payer: MEDICARE

## 2018-05-08 VITALS
WEIGHT: 116 LBS | DIASTOLIC BLOOD PRESSURE: 89 MMHG | TEMPERATURE: 98 F | BODY MASS INDEX: 19.33 KG/M2 | RESPIRATION RATE: 16 BRPM | SYSTOLIC BLOOD PRESSURE: 140 MMHG | HEART RATE: 85 BPM | HEIGHT: 65 IN | OXYGEN SATURATION: 96 %

## 2018-05-08 LAB
ANION GAP SERPL CALC-SCNC: 8 MMOL/L (ref 7–16)
APTT PPP: 32.8 SEC (ref 23.2–35.3)
BUN SERPL-MCNC: 12 MG/DL (ref 8–23)
CALCIUM SERPL-MCNC: 9.5 MG/DL (ref 8.3–10.4)
CHLORIDE SERPL-SCNC: 102 MMOL/L (ref 98–107)
CO2 SERPL-SCNC: 25 MMOL/L (ref 21–32)
CREAT SERPL-MCNC: 1.01 MG/DL (ref 0.8–1.5)
ERYTHROCYTE [DISTWIDTH] IN BLOOD BY AUTOMATED COUNT: 14 % (ref 11.9–14.6)
GLUCOSE SERPL-MCNC: 102 MG/DL (ref 65–100)
HCT VFR BLD AUTO: 45.1 % (ref 41.1–50.3)
HGB BLD-MCNC: 15.4 G/DL (ref 13.6–17.2)
INR PPP: 0.9
MCH RBC QN AUTO: 33.5 PG (ref 26.1–32.9)
MCHC RBC AUTO-ENTMCNC: 34.1 G/DL (ref 31.4–35)
MCV RBC AUTO: 98 FL (ref 79.6–97.8)
PLATELET # BLD AUTO: 441 K/UL (ref 150–450)
PMV BLD AUTO: 9.3 FL (ref 10.8–14.1)
POTASSIUM SERPL-SCNC: 4.7 MMOL/L (ref 3.5–5.1)
PROTHROMBIN TIME: 12 SEC (ref 11.5–14.5)
RBC # BLD AUTO: 4.6 M/UL (ref 4.23–5.67)
SODIUM SERPL-SCNC: 135 MMOL/L (ref 136–145)
WBC # BLD AUTO: 6.8 K/UL (ref 4.3–11.1)

## 2018-05-08 PROCEDURE — 87086 URINE CULTURE/COLONY COUNT: CPT | Performed by: UROLOGY

## 2018-05-08 PROCEDURE — 80048 BASIC METABOLIC PNL TOTAL CA: CPT | Performed by: UROLOGY

## 2018-05-08 PROCEDURE — 87088 URINE BACTERIA CULTURE: CPT | Performed by: UROLOGY

## 2018-05-08 PROCEDURE — 87186 SC STD MICRODIL/AGAR DIL: CPT | Performed by: UROLOGY

## 2018-05-08 PROCEDURE — 85610 PROTHROMBIN TIME: CPT | Performed by: UROLOGY

## 2018-05-08 PROCEDURE — 85027 COMPLETE CBC AUTOMATED: CPT | Performed by: UROLOGY

## 2018-05-08 PROCEDURE — 85730 THROMBOPLASTIN TIME PARTIAL: CPT | Performed by: UROLOGY

## 2018-05-08 NOTE — PERIOP NOTES
Patient verified name, , and surgery as listed in [facility]. Patient provided medical/health information and PTA medications to the best of their ability. TYPE  CASE:ll  Orders per surgeon: Received  and dated  . Labs per surgeon:CBC,BMP,Urine culture, PT, PTT T&C. Labs per anesthesia protocol: None. EKG  :  2017 placed on chart. No cardiac hx    Patient provided with and instructed on education handouts including Guide to Surgery, blood transfusions, pain management, and hand hygiene for the family and community, and [facility] brochure. Antibacterial soap and Hibiclens instructions given per hospital policy. Instructed patient to continue previous medications as prescribed prior to surgery unless otherwise directed and to take the following medications the day of surgery according to anesthesia guidelines : Oxycodone if needed . Instructed patient to hold  the following medications: Vitamins. Original medication prescription bottles were not visualized during patient appointment. Patient teach back successful and patient demonstrates knowledge of instruction.

## 2018-05-09 NOTE — PERIOP NOTES
Reviewed preliminary urine culture results (abnormal). Left voice mail message for FOSTER at Dr. Francesco Matos office to notify.

## 2018-05-10 NOTE — PERIOP NOTES
Preliminary urine culture show gram - streptococcus, called to [facility] yesterday, awaiting final sensitivity patient not presently on antibiotics.  Patient wears and ileostomy bag,for urine and stated bag was just changed prior to coming to hospital on 5/8/2018

## 2018-05-11 LAB
BACTERIA SPEC CULT: ABNORMAL
BACTERIA SPEC CULT: ABNORMAL
SERVICE CMNT-IMP: ABNORMAL

## 2018-05-11 NOTE — PERIOP NOTES
Final urine culture results noted. Left voice mail message for [address] at Dr. Edith Samuels office to please have him review.

## 2018-05-14 ENCOUNTER — ANESTHESIA EVENT (OUTPATIENT)
Dept: SURGERY | Age: 63
DRG: 694 | End: 2018-05-14
Payer: MEDICARE

## 2018-05-14 ENCOUNTER — HOSPITAL ENCOUNTER (OUTPATIENT)
Dept: LAB | Age: 63
Discharge: HOME | DRG: 694 | End: 2018-05-14
Payer: MEDICARE

## 2018-05-14 PROCEDURE — 86923 COMPATIBILITY TEST ELECTRIC: CPT | Performed by: UROLOGY

## 2018-05-14 PROCEDURE — 86900 BLOOD TYPING SEROLOGIC ABO: CPT | Performed by: UROLOGY

## 2018-05-14 PROCEDURE — 36415 COLL VENOUS BLD VENIPUNCTURE: CPT | Performed by: UROLOGY

## 2018-05-15 ENCOUNTER — ANESTHESIA (OUTPATIENT)
Dept: SURGERY | Age: 63
DRG: 694 | End: 2018-05-15
Payer: MEDICARE

## 2018-05-15 ENCOUNTER — HOSPITAL ENCOUNTER (INPATIENT)
Age: 63
LOS: 2 days | Discharge: HOME | DRG: 694 | End: 2018-05-19
Attending: UROLOGY | Admitting: UROLOGY
Payer: MEDICARE

## 2018-05-15 ENCOUNTER — APPOINTMENT (OUTPATIENT)
Dept: GENERAL RADIOLOGY | Age: 63
DRG: 694 | End: 2018-05-15
Attending: UROLOGY
Payer: MEDICARE

## 2018-05-15 ENCOUNTER — HOSPITAL ENCOUNTER (OUTPATIENT)
Dept: INTERVENTIONAL RADIOLOGY/VASCULAR | Age: 63
Discharge: HOME | DRG: 694 | End: 2018-05-15
Attending: UROLOGY
Payer: MEDICARE

## 2018-05-15 VITALS
OXYGEN SATURATION: 98 % | DIASTOLIC BLOOD PRESSURE: 75 MMHG | TEMPERATURE: 97.8 F | SYSTOLIC BLOOD PRESSURE: 122 MMHG | RESPIRATION RATE: 18 BRPM | HEART RATE: 93 BPM

## 2018-05-15 DIAGNOSIS — N20.0 KIDNEY STONES: ICD-10-CM

## 2018-05-15 DIAGNOSIS — N23 KIDNEY PAIN: ICD-10-CM

## 2018-05-15 DIAGNOSIS — C67.9 MALIGNANT NEOPLASM OF URINARY BLADDER, UNSPECIFIED SITE (HCC): ICD-10-CM

## 2018-05-15 PROCEDURE — C1894 INTRO/SHEATH, NON-LASER: HCPCS

## 2018-05-15 PROCEDURE — C1769 GUIDE WIRE: HCPCS

## 2018-05-15 PROCEDURE — 76060000034 HC ANESTHESIA 1.5 TO 2 HR: Performed by: UROLOGY

## 2018-05-15 PROCEDURE — C1894 INTRO/SHEATH, NON-LASER: HCPCS | Performed by: UROLOGY

## 2018-05-15 PROCEDURE — 0TJ54ZZ INSPECTION OF KIDNEY, PERCUTANEOUS ENDOSCOPIC APPROACH: ICD-10-PCS | Performed by: UROLOGY

## 2018-05-15 PROCEDURE — C2617 STENT, NON-COR, TEM W/O DEL: HCPCS | Performed by: UROLOGY

## 2018-05-15 PROCEDURE — 77030010545: Performed by: UROLOGY

## 2018-05-15 PROCEDURE — C1769 GUIDE WIRE: HCPCS | Performed by: UROLOGY

## 2018-05-15 PROCEDURE — 74011636320 HC RX REV CODE- 636/320: Performed by: RADIOLOGY

## 2018-05-15 PROCEDURE — 76010000153 HC OR TIME 1.5 TO 2 HR: Performed by: UROLOGY

## 2018-05-15 PROCEDURE — 77030034850: Performed by: UROLOGY

## 2018-05-15 PROCEDURE — 74011000250 HC RX REV CODE- 250

## 2018-05-15 PROCEDURE — 74011250636 HC RX REV CODE- 250/636: Performed by: ANESTHESIOLOGY

## 2018-05-15 PROCEDURE — 74011250636 HC RX REV CODE- 250/636: Performed by: UROLOGY

## 2018-05-15 PROCEDURE — 77030019940 HC BLNKT HYPOTHRM STRY -B: Performed by: ANESTHESIOLOGY

## 2018-05-15 PROCEDURE — 76210000016 HC OR PH I REC 1 TO 1.5 HR: Performed by: UROLOGY

## 2018-05-15 PROCEDURE — C1725 CATH, TRANSLUMIN NON-LASER: HCPCS | Performed by: UROLOGY

## 2018-05-15 PROCEDURE — 74011250637 HC RX REV CODE- 250/637: Performed by: UROLOGY

## 2018-05-15 PROCEDURE — 0T9430Z DRAINAGE OF LEFT KIDNEY PELVIS WITH DRAINAGE DEVICE, PERCUTANEOUS APPROACH: ICD-10-PCS | Performed by: UROLOGY

## 2018-05-15 PROCEDURE — 87086 URINE CULTURE/COLONY COUNT: CPT | Performed by: UROLOGY

## 2018-05-15 PROCEDURE — 77030008477 HC STYL SATN SLP COVD -A: Performed by: ANESTHESIOLOGY

## 2018-05-15 PROCEDURE — 77030018836 HC SOL IRR NACL ICUM -A: Performed by: UROLOGY

## 2018-05-15 PROCEDURE — 77030008703 HC TU ET UNCUF COVD -A: Performed by: ANESTHESIOLOGY

## 2018-05-15 PROCEDURE — 77030002996 HC SUT SLK J&J -A: Performed by: UROLOGY

## 2018-05-15 PROCEDURE — 74011000258 HC RX REV CODE- 258: Performed by: UROLOGY

## 2018-05-15 PROCEDURE — C1726 CATH, BAL DIL, NON-VASCULAR: HCPCS | Performed by: UROLOGY

## 2018-05-15 PROCEDURE — 77030013058 HC DEV INFL ANGIO BSC -B: Performed by: UROLOGY

## 2018-05-15 PROCEDURE — 99218 HC RM OBSERVATION: CPT

## 2018-05-15 PROCEDURE — 77030020782 HC GWN BAIR PAWS FLX 3M -B: Performed by: ANESTHESIOLOGY

## 2018-05-15 PROCEDURE — 74011250636 HC RX REV CODE- 250/636: Performed by: RADIOLOGY

## 2018-05-15 PROCEDURE — 77030032490 HC SLV COMPR SCD KNE COVD -B: Performed by: UROLOGY

## 2018-05-15 PROCEDURE — 77030019927 HC TBNG IRR CYSTO BAXT -A: Performed by: UROLOGY

## 2018-05-15 PROCEDURE — 50433 PLMT NEPHROURETERAL CATHETER: CPT

## 2018-05-15 PROCEDURE — 77030003504 HC NDL BIOP TISS COOK -A

## 2018-05-15 PROCEDURE — 74011250636 HC RX REV CODE- 250/636

## 2018-05-15 PROCEDURE — 77030019908 HC STETH ESOPH SIMS -A: Performed by: ANESTHESIOLOGY

## 2018-05-15 PROCEDURE — 74011258636 HC RX REV CODE- 258/636: Performed by: UROLOGY

## 2018-05-15 PROCEDURE — 74011250637 HC RX REV CODE- 250/637: Performed by: ANESTHESIOLOGY

## 2018-05-15 PROCEDURE — 74011000250 HC RX REV CODE- 250: Performed by: RADIOLOGY

## 2018-05-15 PROCEDURE — 77030018673: Performed by: UROLOGY

## 2018-05-15 PROCEDURE — 0T774DZ DILATION OF LEFT URETER WITH INTRALUMINAL DEVICE, PERCUTANEOUS ENDOSCOPIC APPROACH: ICD-10-PCS | Performed by: UROLOGY

## 2018-05-15 PROCEDURE — 77030005518 HC CATH URETH FOL 2W BARD -B: Performed by: UROLOGY

## 2018-05-15 PROCEDURE — 77030004566 HC CATH ANGI DX TORCON COOK -B

## 2018-05-15 DEVICE — URETERAL STENT
Type: IMPLANTABLE DEVICE | Site: URETER | Status: FUNCTIONAL
Brand: PERCUFLEX™ PLUS

## 2018-05-15 RX ORDER — PROPOFOL 10 MG/ML
INJECTION, EMULSION INTRAVENOUS AS NEEDED
Status: DISCONTINUED | OUTPATIENT
Start: 2018-05-15 | End: 2018-05-15 | Stop reason: HOSPADM

## 2018-05-15 RX ORDER — CIPROFLOXACIN 2 MG/ML
400 INJECTION, SOLUTION INTRAVENOUS ONCE
Status: COMPLETED | OUTPATIENT
Start: 2018-05-15 | End: 2018-05-15

## 2018-05-15 RX ORDER — MIDAZOLAM HYDROCHLORIDE 1 MG/ML
2 INJECTION, SOLUTION INTRAMUSCULAR; INTRAVENOUS
Status: DISCONTINUED | OUTPATIENT
Start: 2018-05-15 | End: 2018-05-15 | Stop reason: HOSPADM

## 2018-05-15 RX ORDER — OXYCODONE AND ACETAMINOPHEN 10; 325 MG/1; MG/1
1 TABLET ORAL AS NEEDED
Status: DISCONTINUED | OUTPATIENT
Start: 2018-05-15 | End: 2018-05-15 | Stop reason: HOSPADM

## 2018-05-15 RX ORDER — ACETAMINOPHEN 10 MG/ML
INJECTION, SOLUTION INTRAVENOUS AS NEEDED
Status: DISCONTINUED | OUTPATIENT
Start: 2018-05-15 | End: 2018-05-15 | Stop reason: HOSPADM

## 2018-05-15 RX ORDER — ROCURONIUM BROMIDE 10 MG/ML
INJECTION, SOLUTION INTRAVENOUS AS NEEDED
Status: DISCONTINUED | OUTPATIENT
Start: 2018-05-15 | End: 2018-05-15 | Stop reason: HOSPADM

## 2018-05-15 RX ORDER — ONDANSETRON 2 MG/ML
4 INJECTION INTRAMUSCULAR; INTRAVENOUS
Status: DISCONTINUED | OUTPATIENT
Start: 2018-05-15 | End: 2018-05-19 | Stop reason: HOSPADM

## 2018-05-15 RX ORDER — DEXTROSE, SODIUM CHLORIDE, SODIUM LACTATE, POTASSIUM CHLORIDE, AND CALCIUM CHLORIDE 5; .6; .31; .03; .02 G/100ML; G/100ML; G/100ML; G/100ML; G/100ML
75 INJECTION, SOLUTION INTRAVENOUS CONTINUOUS
Status: DISCONTINUED | OUTPATIENT
Start: 2018-05-15 | End: 2018-05-18

## 2018-05-15 RX ORDER — OXYCODONE HYDROCHLORIDE 5 MG/1
10 TABLET ORAL
Status: COMPLETED | OUTPATIENT
Start: 2018-05-15 | End: 2018-05-15

## 2018-05-15 RX ORDER — OXYCODONE AND ACETAMINOPHEN 10; 325 MG/1; MG/1
1 TABLET ORAL
Status: DISCONTINUED | OUTPATIENT
Start: 2018-05-15 | End: 2018-05-19 | Stop reason: HOSPADM

## 2018-05-15 RX ORDER — ACETAMINOPHEN 325 MG/1
650 TABLET ORAL
Status: DISCONTINUED | OUTPATIENT
Start: 2018-05-15 | End: 2018-05-19 | Stop reason: HOSPADM

## 2018-05-15 RX ORDER — LORAZEPAM 1 MG/1
1 TABLET ORAL
Status: DISCONTINUED | OUTPATIENT
Start: 2018-05-15 | End: 2018-05-19 | Stop reason: HOSPADM

## 2018-05-15 RX ORDER — HYDROMORPHONE HYDROCHLORIDE 2 MG/ML
0.5 INJECTION, SOLUTION INTRAMUSCULAR; INTRAVENOUS; SUBCUTANEOUS
Status: COMPLETED | OUTPATIENT
Start: 2018-05-15 | End: 2018-05-15

## 2018-05-15 RX ORDER — SODIUM CHLORIDE 0.9 % (FLUSH) 0.9 %
5-10 SYRINGE (ML) INJECTION AS NEEDED
Status: DISCONTINUED | OUTPATIENT
Start: 2018-05-15 | End: 2018-05-15 | Stop reason: HOSPADM

## 2018-05-15 RX ORDER — NALOXONE HYDROCHLORIDE 0.4 MG/ML
0.4 INJECTION, SOLUTION INTRAMUSCULAR; INTRAVENOUS; SUBCUTANEOUS AS NEEDED
Status: DISCONTINUED | OUTPATIENT
Start: 2018-05-15 | End: 2018-05-19 | Stop reason: HOSPADM

## 2018-05-15 RX ORDER — DOCUSATE SODIUM 100 MG/1
100 CAPSULE, LIQUID FILLED ORAL 2 TIMES DAILY
Status: DISCONTINUED | OUTPATIENT
Start: 2018-05-15 | End: 2018-05-19 | Stop reason: HOSPADM

## 2018-05-15 RX ORDER — OXYCODONE HYDROCHLORIDE 5 MG/1
5 TABLET ORAL
Status: DISCONTINUED | OUTPATIENT
Start: 2018-05-15 | End: 2018-05-15 | Stop reason: HOSPADM

## 2018-05-15 RX ORDER — HYDROMORPHONE HYDROCHLORIDE 2 MG/ML
0.5 INJECTION, SOLUTION INTRAMUSCULAR; INTRAVENOUS; SUBCUTANEOUS
Status: DISCONTINUED | OUTPATIENT
Start: 2018-05-15 | End: 2018-05-15 | Stop reason: ALTCHOICE

## 2018-05-15 RX ORDER — ONDANSETRON 2 MG/ML
INJECTION INTRAMUSCULAR; INTRAVENOUS AS NEEDED
Status: DISCONTINUED | OUTPATIENT
Start: 2018-05-15 | End: 2018-05-15 | Stop reason: HOSPADM

## 2018-05-15 RX ORDER — MORPHINE SULFATE 10 MG/ML
5 INJECTION, SOLUTION INTRAMUSCULAR; INTRAVENOUS
Status: DISCONTINUED | OUTPATIENT
Start: 2018-05-15 | End: 2018-05-18

## 2018-05-15 RX ORDER — NEOSTIGMINE METHYLSULFATE 1 MG/ML
INJECTION INTRAVENOUS AS NEEDED
Status: DISCONTINUED | OUTPATIENT
Start: 2018-05-15 | End: 2018-05-15 | Stop reason: HOSPADM

## 2018-05-15 RX ORDER — DIPHENHYDRAMINE HYDROCHLORIDE 50 MG/ML
50 INJECTION, SOLUTION INTRAMUSCULAR; INTRAVENOUS ONCE
Status: COMPLETED | OUTPATIENT
Start: 2018-05-15 | End: 2018-05-15

## 2018-05-15 RX ORDER — DIPHENHYDRAMINE HCL 25 MG
25 CAPSULE ORAL
Status: DISCONTINUED | OUTPATIENT
Start: 2018-05-15 | End: 2018-05-19 | Stop reason: HOSPADM

## 2018-05-15 RX ORDER — FENTANYL CITRATE 50 UG/ML
12.5-5 INJECTION, SOLUTION INTRAMUSCULAR; INTRAVENOUS
Status: DISCONTINUED | OUTPATIENT
Start: 2018-05-15 | End: 2018-05-15

## 2018-05-15 RX ORDER — CIPROFLOXACIN 500 MG/1
500 TABLET ORAL EVERY 12 HOURS
Status: DISCONTINUED | OUTPATIENT
Start: 2018-05-15 | End: 2018-05-19 | Stop reason: HOSPADM

## 2018-05-15 RX ORDER — SODIUM CHLORIDE 0.9 % (FLUSH) 0.9 %
5-10 SYRINGE (ML) INJECTION EVERY 8 HOURS
Status: DISCONTINUED | OUTPATIENT
Start: 2018-05-15 | End: 2018-05-18

## 2018-05-15 RX ORDER — GLYCOPYRROLATE 0.2 MG/ML
INJECTION INTRAMUSCULAR; INTRAVENOUS AS NEEDED
Status: DISCONTINUED | OUTPATIENT
Start: 2018-05-15 | End: 2018-05-15 | Stop reason: HOSPADM

## 2018-05-15 RX ORDER — DEXAMETHASONE SODIUM PHOSPHATE 4 MG/ML
INJECTION, SOLUTION INTRA-ARTICULAR; INTRALESIONAL; INTRAMUSCULAR; INTRAVENOUS; SOFT TISSUE AS NEEDED
Status: DISCONTINUED | OUTPATIENT
Start: 2018-05-15 | End: 2018-05-15 | Stop reason: HOSPADM

## 2018-05-15 RX ORDER — CIPROFLOXACIN 2 MG/ML
400 INJECTION, SOLUTION INTRAVENOUS ONCE
Status: DISCONTINUED | OUTPATIENT
Start: 2018-05-15 | End: 2018-05-15 | Stop reason: HOSPADM

## 2018-05-15 RX ORDER — LIDOCAINE HYDROCHLORIDE 20 MG/ML
2-20 INJECTION, SOLUTION EPIDURAL; INFILTRATION; INTRACAUDAL; PERINEURAL ONCE
Status: COMPLETED | OUTPATIENT
Start: 2018-05-15 | End: 2018-05-15

## 2018-05-15 RX ORDER — MIDAZOLAM HYDROCHLORIDE 1 MG/ML
.5-2 INJECTION, SOLUTION INTRAMUSCULAR; INTRAVENOUS
Status: DISCONTINUED | OUTPATIENT
Start: 2018-05-15 | End: 2018-05-15

## 2018-05-15 RX ORDER — SODIUM CHLORIDE 0.9 % (FLUSH) 0.9 %
5-10 SYRINGE (ML) INJECTION AS NEEDED
Status: DISCONTINUED | OUTPATIENT
Start: 2018-05-15 | End: 2018-05-19 | Stop reason: HOSPADM

## 2018-05-15 RX ORDER — LIDOCAINE HYDROCHLORIDE 20 MG/ML
INJECTION, SOLUTION EPIDURAL; INFILTRATION; INTRACAUDAL; PERINEURAL AS NEEDED
Status: DISCONTINUED | OUTPATIENT
Start: 2018-05-15 | End: 2018-05-15 | Stop reason: HOSPADM

## 2018-05-15 RX ORDER — FENTANYL CITRATE 50 UG/ML
INJECTION, SOLUTION INTRAMUSCULAR; INTRAVENOUS AS NEEDED
Status: DISCONTINUED | OUTPATIENT
Start: 2018-05-15 | End: 2018-05-15 | Stop reason: HOSPADM

## 2018-05-15 RX ORDER — SODIUM CHLORIDE, SODIUM LACTATE, POTASSIUM CHLORIDE, CALCIUM CHLORIDE 600; 310; 30; 20 MG/100ML; MG/100ML; MG/100ML; MG/100ML
75 INJECTION, SOLUTION INTRAVENOUS CONTINUOUS
Status: DISCONTINUED | OUTPATIENT
Start: 2018-05-15 | End: 2018-05-15 | Stop reason: HOSPADM

## 2018-05-15 RX ADMIN — GENTAMICIN SULFATE 120 MG: 40 INJECTION, SOLUTION INTRAMUSCULAR; INTRAVENOUS at 10:56

## 2018-05-15 RX ADMIN — HYDROMORPHONE HYDROCHLORIDE 0.5 MG: 2 INJECTION, SOLUTION INTRAMUSCULAR; INTRAVENOUS; SUBCUTANEOUS at 16:51

## 2018-05-15 RX ADMIN — MIDAZOLAM HYDROCHLORIDE 1 MG: 1 INJECTION, SOLUTION INTRAMUSCULAR; INTRAVENOUS at 13:42

## 2018-05-15 RX ADMIN — SODIUM CHLORIDE, SODIUM LACTATE, POTASSIUM CHLORIDE, AND CALCIUM CHLORIDE 75 ML/HR: 600; 310; 30; 20 INJECTION, SOLUTION INTRAVENOUS at 10:56

## 2018-05-15 RX ADMIN — HYDROMORPHONE HYDROCHLORIDE 0.5 MG: 2 INJECTION, SOLUTION INTRAMUSCULAR; INTRAVENOUS; SUBCUTANEOUS at 16:38

## 2018-05-15 RX ADMIN — OXYCODONE HYDROCHLORIDE 10 MG: 5 TABLET ORAL at 16:51

## 2018-05-15 RX ADMIN — FENTANYL CITRATE 50 MCG: 50 INJECTION, SOLUTION INTRAMUSCULAR; INTRAVENOUS at 13:29

## 2018-05-15 RX ADMIN — MORPHINE SULFATE 5 MG: 10 INJECTION INTRAMUSCULAR; INTRAVENOUS; SUBCUTANEOUS at 23:41

## 2018-05-15 RX ADMIN — SODIUM CHLORIDE, SODIUM LACTATE, POTASSIUM CHLORIDE, CALCIUM CHLORIDE, AND DEXTROSE MONOHYDRATE 75 ML/HR: 600; 310; 30; 20; 5 INJECTION, SOLUTION INTRAVENOUS at 20:37

## 2018-05-15 RX ADMIN — PROPOFOL 200 MG: 10 INJECTION, EMULSION INTRAVENOUS at 14:34

## 2018-05-15 RX ADMIN — ACETAMINOPHEN 1000 MG: 10 INJECTION, SOLUTION INTRAVENOUS at 16:13

## 2018-05-15 RX ADMIN — HYDROMORPHONE HYDROCHLORIDE 0.5 MG: 2 INJECTION, SOLUTION INTRAMUSCULAR; INTRAVENOUS; SUBCUTANEOUS at 16:23

## 2018-05-15 RX ADMIN — LIDOCAINE HYDROCHLORIDE 60 MG: 20 INJECTION, SOLUTION EPIDURAL; INFILTRATION; INTRACAUDAL; PERINEURAL at 13:42

## 2018-05-15 RX ADMIN — LIDOCAINE HYDROCHLORIDE 60 MG: 20 INJECTION, SOLUTION EPIDURAL; INFILTRATION; INTRACAUDAL; PERINEURAL at 14:34

## 2018-05-15 RX ADMIN — Medication 10 ML: at 20:37

## 2018-05-15 RX ADMIN — HYDROMORPHONE HYDROCHLORIDE 0.5 MG: 2 INJECTION, SOLUTION INTRAMUSCULAR; INTRAVENOUS; SUBCUTANEOUS at 16:46

## 2018-05-15 RX ADMIN — GLYCOPYRROLATE 0.2 MG: 0.2 INJECTION INTRAMUSCULAR; INTRAVENOUS at 15:57

## 2018-05-15 RX ADMIN — FENTANYL CITRATE 50 MCG: 50 INJECTION, SOLUTION INTRAMUSCULAR; INTRAVENOUS at 13:42

## 2018-05-15 RX ADMIN — FENTANYL CITRATE 25 MCG: 50 INJECTION, SOLUTION INTRAMUSCULAR; INTRAVENOUS at 15:28

## 2018-05-15 RX ADMIN — NEOSTIGMINE METHYLSULFATE 1.5 MG: 1 INJECTION INTRAVENOUS at 15:57

## 2018-05-15 RX ADMIN — FENTANYL CITRATE 25 MCG: 50 INJECTION, SOLUTION INTRAMUSCULAR; INTRAVENOUS at 15:22

## 2018-05-15 RX ADMIN — HYDROMORPHONE HYDROCHLORIDE 0.5 MG: 2 INJECTION, SOLUTION INTRAMUSCULAR; INTRAVENOUS; SUBCUTANEOUS at 17:01

## 2018-05-15 RX ADMIN — ROCURONIUM BROMIDE 35 MG: 10 INJECTION, SOLUTION INTRAVENOUS at 14:36

## 2018-05-15 RX ADMIN — OXYCODONE HYDROCHLORIDE AND ACETAMINOPHEN 1 TABLET: 10; 325 TABLET ORAL at 19:04

## 2018-05-15 RX ADMIN — SODIUM BICARBONATE 1 ML: 0.2 INJECTION, SOLUTION INTRAVENOUS at 13:42

## 2018-05-15 RX ADMIN — CIPROFLOXACIN 400 MG: 2 INJECTION, SOLUTION INTRAVENOUS at 13:22

## 2018-05-15 RX ADMIN — DEXAMETHASONE SODIUM PHOSPHATE 10 MG: 4 INJECTION, SOLUTION INTRA-ARTICULAR; INTRALESIONAL; INTRAMUSCULAR; INTRAVENOUS; SOFT TISSUE at 15:03

## 2018-05-15 RX ADMIN — FENTANYL CITRATE 50 MCG: 50 INJECTION, SOLUTION INTRAMUSCULAR; INTRAVENOUS at 16:07

## 2018-05-15 RX ADMIN — HYDROMORPHONE HYDROCHLORIDE 0.5 MG: 2 INJECTION, SOLUTION INTRAMUSCULAR; INTRAVENOUS; SUBCUTANEOUS at 17:06

## 2018-05-15 RX ADMIN — ONDANSETRON 4 MG: 2 INJECTION INTRAMUSCULAR; INTRAVENOUS at 15:48

## 2018-05-15 RX ADMIN — FENTANYL CITRATE 100 MCG: 50 INJECTION, SOLUTION INTRAMUSCULAR; INTRAVENOUS at 14:34

## 2018-05-15 RX ADMIN — HYDROMORPHONE HYDROCHLORIDE 0.5 MG: 2 INJECTION, SOLUTION INTRAMUSCULAR; INTRAVENOUS; SUBCUTANEOUS at 16:56

## 2018-05-15 RX ADMIN — DOCUSATE SODIUM 100 MG: 100 CAPSULE, LIQUID FILLED ORAL at 19:04

## 2018-05-15 RX ADMIN — MORPHINE SULFATE 5 MG: 10 INJECTION INTRAMUSCULAR; INTRAVENOUS; SUBCUTANEOUS at 20:36

## 2018-05-15 RX ADMIN — HYDROMORPHONE HYDROCHLORIDE 0.5 MG: 2 INJECTION, SOLUTION INTRAMUSCULAR; INTRAVENOUS; SUBCUTANEOUS at 16:31

## 2018-05-15 RX ADMIN — CIPROFLOXACIN 500 MG: 500 TABLET, FILM COATED ORAL at 20:36

## 2018-05-15 RX ADMIN — MIDAZOLAM HYDROCHLORIDE 2 MG: 1 INJECTION, SOLUTION INTRAMUSCULAR; INTRAVENOUS at 13:29

## 2018-05-15 RX ADMIN — MIDAZOLAM HYDROCHLORIDE 0.5 MG: 1 INJECTION, SOLUTION INTRAMUSCULAR; INTRAVENOUS at 13:53

## 2018-05-15 RX ADMIN — IOPAMIDOL 10 ML: 612 INJECTION, SOLUTION INTRAVENOUS at 13:58

## 2018-05-15 RX ADMIN — FENTANYL CITRATE 25 MCG: 50 INJECTION, SOLUTION INTRAMUSCULAR; INTRAVENOUS at 13:53

## 2018-05-15 RX ADMIN — DIPHENHYDRAMINE HYDROCHLORIDE 50 MG: 50 INJECTION, SOLUTION INTRAMUSCULAR; INTRAVENOUS at 13:23

## 2018-05-15 NOTE — BRIEF OP NOTE
BRIEF OPERATIVE NOTE    Date of Procedure: 5/15/2018   Preoperative Diagnosis: Kidney stone [N20.0]  Postoperative Diagnosis: left renal stone  Procedure(s):  NEPHROLITHOTOMY PERCUTANEOUS LEFT/ PT DOES NOT HAVE BLADDER  Surgeon(s) and Role:     * Iron Angulo MD - Primary         Surgical Assistant:     Surgical Staff:  Circ-1: Tutu Santana RN  Circ-Relief: Enoc Mehta  Scrub Tech-1: Shu Mendes  Scrub Tech-Relief: Lalita Limb  Event Time In   Incision Start 1515   Incision Close 1549     Anesthesia: General   Estimated Blood Loss: min  Specimens: * No specimens in log *   Findings:    Complications:   Implants:   Implant Name Type Inv.  Item Serial No.  Lot No. LRB No. Used Action   [employer]M [id number] -- [address] [id number]   [employer]M [id number] -- [address] [id number] Left 1 Implanted

## 2018-05-15 NOTE — PROGRESS NOTES
IR Nurse Pre-Procedure Checklist Part 2          Consent signed: Yes    H&P complete:  Yes    Antibiotics: Yes    Airway/Mallampati Done: Yes    Shaved:  No    Pregnancy Form:Not applicable    Patient Position: Yes    MD Side: Yes     Biopsy Worksheet: Not applicable    Specimen Medium: Not applicable

## 2018-05-15 NOTE — PROGRESS NOTES
TRANSFER - OUT REPORT:    Verbal report given to Sylvie SHIELDS(name) on Conchita Burgess  being transferred to IR recovery(unit) for routine post - op       Report consisted of patients Situation, Background, Assessment and   Recommendations(SBAR). Information from the following report(s) Procedure Summary and MAR was reviewed with the receiving nurse. Lines:   Venous Access Device Upper chest (subclavicular area, right (Active)       Peripheral IV 05/15/18 Right Wrist (Active)   Site Assessment Clean, dry, & intact 5/15/2018 10:48 AM   Phlebitis Assessment 0 5/15/2018 10:48 AM   Infiltration Assessment 0 5/15/2018 10:48 AM   Dressing Status Clean, dry, & intact 5/15/2018 10:48 AM   Dressing Type Tape;Transparent 5/15/2018 10:48 AM   Hub Color/Line Status Green; Infusing 5/15/2018 10:48 AM        Opportunity for questions and clarification was provided.       Patient transported with:   Registered Nurse

## 2018-05-15 NOTE — ANESTHESIA POSTPROCEDURE EVALUATION
Post-Anesthesia Evaluation and Assessment    Patient: Rowan Peñaloza MRN: [id number]  SSN: [id number]    YOB: 1955  Age: 58 y.o. Sex: male       Cardiovascular Function/Vital Signs  Visit Vitals    /74    Pulse 75    Temp 36.7 °C (98.1 °F)    Resp 20    Ht 5' 5\" (1.651 m)    Wt 50.6 kg (111 lb 9 oz)    SpO2 94%    BMI 18.56 kg/m2       Patient is status post general anesthesia for Procedure(s):  LEFT NEPHROSCOPY, ATTEMPTED LEFT PERCUTANEOUS NEPHROLITOMY, NEPHROGRAM AND LEFT URETERAL STENT PLACEMENT. Nausea/Vomiting: None    Postoperative hydration reviewed and adequate. Pain:  Pain Scale 1: Numeric (0 - 10) (05/15/18 1706)  Pain Intensity 1: 7 (05/15/18 1706)   Managed    Neurological Status:   Neuro (WDL): Exceptions to WDL (05/15/18 1653)  Neuro  Neurologic State: Alert (05/15/18 1653)  Orientation Level: Oriented X4 (05/15/18 1653)  Cognition: Appropriate decision making; Appropriate for age attention/concentration; Appropriate safety awareness; Follows commands (05/15/18 1653)  Speech: Clear (05/15/18 1653)  LUE Motor Response: Purposeful (05/15/18 1653)  LLE Motor Response: Purposeful (05/15/18 1653)  RUE Motor Response: Purposeful (05/15/18 1653)  RLE Motor Response: Purposeful (05/15/18 1653)   At baseline    Mental Status and Level of Consciousness: Arousable    Pulmonary Status:   O2 Device: Nasal cannula (05/15/18 1653)   Adequate oxygenation and airway patent    Complications related to anesthesia: None    Post-anesthesia assessment completed.  No concerns    Signed By: Destini Rowland MD     May 15, 2018

## 2018-05-15 NOTE — H&P
Department of Interventional Radiology  ([phone])    History and Physical    Patient:  Jeremy Delcid MRN:  [id number]  SSN:  [id number]    YOB: 1955  Age:  58 y.o. Sex:  male      Primary Care Provider:  PROVIDER UNKNOWN  Referring Physician:  Sujit Tapia MD    Subjective:     Chief Complaint: Left Nephrolithiasis. History of the Present Illness: The patient is a 58 y.o. male who presents with left kidney stone, hydronephrosis, malaise, flu-like symptoms. History of left UVJ stenosis secondary to bladder cancer. Cystectomy w ileal conduit creation a few years ago. He is upset because he has wanted pain medicine since arrival to the hospital today, but feels that he hasn't received any. I told him that we would be giving him sedation medicine just as soon as possible and he is agreeable with this. Past Medical History:   Diagnosis Date    Bladder cancer ([facility] [address] 7/2014    Chronic neck pain     has 2 rods in neck    Chronic obstructive pulmonary disease (HCC)     smoker for 50 years    Depression     Depression with anxiety     Hypertension     takes no medication    Kidney stones     Osteoarthritis     Tobacco abuse     Weight loss, unintentional      Past Surgical History:   Procedure Laterality Date    HX CERVICAL FUSION  4/14    Dr. 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     Current Facility-Administered Medications   Medication Dose Route Frequency    midazolam (VERSED) injection 0.5-2 mg  0.5-2 mg IntraVENous Multiple    fentaNYL citrate (PF) injection 12.5-50 mcg  12.5-50 mcg IntraVENous Multiple    lidocaine (PF) (XYLOCAINE) 20 mg/mL (2 %) injection  mg  2-20 mL SubCUTAneous ONCE    sodium bicarbonate (4%) (NEUT) injection 1-2 mL  1-2 mL SubCUTAneous ONCE    iopamidol (ISOVUE 300) 61 % contrast injection 2-200 mL  2-200 mL IntraCATHeter RAD ONCE     No current outpatient prescriptions on file. Facility-Administered Medications Ordered in Other Encounters   Medication Dose Route Frequency    ciprofloxacin (CIPRO) 400 mg IVPB (premix)  400 mg IntraVENous ONCE    lactated Ringers infusion  75 mL/hr IntraVENous CONTINUOUS    midazolam (VERSED) injection 2 mg  2 mg IntraVENous ONCE PRN        Allergies   Allergen Reactions    Codeine Hives    Penicillins Rash and Itching     Years ago       Family History   Problem Relation Age of Onset    Cancer Mother     Lung Disease Mother     Cancer Father     No Known Problems Sister     Cancer Sister      Social History   Substance Use Topics    Smoking status: Current Every Day Smoker     Packs/day: 1.00     Years: 50.00    Smokeless tobacco: Never Used    Alcohol use 14.0 oz/week     28 Cans of beer per week      Comment: 4/night of beer        Objective:       Physical Examination:    Vitals:    05/15/18 1100 05/15/18 1219   BP:  (!) 145/95   Pulse: 98 94   Resp: 20 17   Temp: 97.9 °F (36.6 °C)    SpO2: 95% 94%     Blood pressure (!) 145/95, pulse 94, temperature 97.9 °F (36.6 °C), resp. rate 17, SpO2 94 %. General:  alert, cooperative, no distress  Heart:  regular rate and rhythm, S1, S2 normal, no murmur, click, rub or gallop  Lungs:  clear to auscultation bilaterally. .   Abdomen:  soft, nondistended, normal bowel sounds.     RLQ urostomy  Neuro:  normal without focal findings    Laboratory:     Lab Results   Component Value Date/Time    Sodium 135 (L) 05/08/2018 10:31 AM    Sodium 132 (L) 04/24/2018 03:45 PM    Potassium 4.7 05/08/2018 10:31 AM    Potassium 4.2 04/24/2018 03:45 PM    Chloride 102 05/08/2018 10:31 AM    Chloride 100 04/24/2018 03:45 PM    CO2 25 05/08/2018 10:31 AM    CO2 24 04/24/2018 03:45 PM    Anion gap 8 05/08/2018 10:31 AM    Anion gap 8 04/24/2018 03:45 PM    Glucose 102 (H) 05/08/2018 10:31 AM    Glucose 86 04/24/2018 03:45 PM    BUN 12 05/08/2018 10:31 AM    BUN 9 04/24/2018 03:45 PM    Creatinine 1.01 05/08/2018 10:31 AM    Creatinine 0.92 04/24/2018 03:45 PM    GFR est AA >60 05/08/2018 10:31 AM    GFR est AA >60 04/24/2018 03:45 PM    GFR est non-AA >60 05/08/2018 10:31 AM    GFR est non-AA >60 04/24/2018 03:45 PM    Calcium 9.5 05/08/2018 10:31 AM    Calcium 9.4 04/24/2018 03:45 PM    Magnesium 2.3 10/25/2017 10:51 AM    Magnesium 2.5 (H) 04/26/2017 12:05 PM    Albumin 3.7 04/24/2018 03:45 PM    Albumin 3.9 10/25/2017 10:51 AM    Protein, total 7.9 04/24/2018 03:45 PM    Protein, total 8.4 (H) 10/25/2017 10:51 AM    Globulin 4.2 (H) 04/24/2018 03:45 PM    Globulin 4.5 (H) 10/25/2017 10:51 AM    A-G Ratio 0.9 (L) 04/24/2018 03:45 PM    A-G Ratio 0.9 (L) 10/25/2017 10:51 AM    AST (SGOT) 19 04/24/2018 03:45 PM    AST (SGOT) 19 10/25/2017 10:51 AM    ALT (SGPT) 28 04/24/2018 03:45 PM    ALT (SGPT) 27 10/25/2017 10:51 AM     Lab Results   Component Value Date/Time    WBC 6.8 05/08/2018 10:31 AM    WBC 6.3 04/24/2018 03:45 PM    HGB 15.4 05/08/2018 10:31 AM    HGB 15.1 04/24/2018 03:45 PM    HCT 45.1 05/08/2018 10:31 AM    HCT 43.6 04/24/2018 03:45 PM    PLATELET 991 12/00/8756 10:31 AM    PLATELET 485 (H) 19/72/3903 03:45 PM     Lab Results   Component Value Date/Time    aPTT 32.8 05/08/2018 10:31 AM    aPTT 30.5 04/20/2016 10:20 AM    Prothrombin time 12.0 05/08/2018 10:31 AM    Prothrombin time 9.7 04/20/2016 10:20 AM    INR 0.9 05/08/2018 10:31 AM    INR 0.9 04/20/2016 10:20 AM       Assessment:     Symptomatic Left nephrolithiasis. Plan:     Planned Procedure:  L PCN for stone surgery. Sedation. Risks, benefits, and alternatives reviewed with patient and he agrees to proceed with the procedure.       Signed By: Emry Halsted, MD     May 15, 2018

## 2018-05-15 NOTE — PROCEDURES
Interventional Radiology Brief Procedure Note    Patient: Elisa Villarreal MRN: [id number]  SSN: [id number]    YOB: 1955  Age: 58 y.o. Sex: male      Date of Procedure: 5/15/2018    Pre-Procedure Diagnosis: Left nephrolithiasis. Post-Procedure Diagnosis: SAME    Procedure(s): Percutaneous Nephro-ureteral sheath/catheter Placement    Brief Description of Procedure: [employer] access. Performed By: Vandana Jay MD     Assistants: None    Anesthesia: Moderate Sedation    Estimated Blood Loss: Less than 10ml    Specimens: Urine to Micro    Implants: See Above    Findings: Stones in the lower pole calyces. Mild hydronephrosis. Widely patent ureteral anastomosis. Complications: None    Recommendations: Transfer to Pre-Op area.        Follow Up: Dr Deonte Lyons    Signed By: Vandana Jay MD     May 15, 2018

## 2018-05-15 NOTE — PROGRESS NOTES
Pre op nurse stated pt may go straight to OR desk before recovery complete. Pt being transferred to OR desk at present.

## 2018-05-15 NOTE — IP AVS SNAPSHOT
Sophia Stephenson 
 
 
 [address] [facility] 
[phone] Patient: Marin Rodriguez MRN: [id number] IEX:8/07/6695 About your hospitalization You were admitted on:  May 15, 2018 You last received care in the:  [facility] 6 MED SURG You were discharged on:  May 19, 2018 Why you were hospitalized Your primary diagnosis was:  Not on File Your diagnoses also included:  Stone, Kidney, Kidney Stone Follow-up Information Follow up With Details Comments Contact Info Vicente Presley MD  Primary Care MD ; Gopi Marshall Every is now at [facility], [place]. [address] 
[phone] Darwin Davila MD  Office to call with appointment information [address] [facility] [address] [facility] [address] [phone] Your Scheduled Appointments Tuesday May 29, 2018 11:00 AM EDT Office Visit with Vicente Presley MD  
[address]) [address] [facility] [address] [phone] Discharge Orders None A check marcy indicates which time of day the medication should be taken. My Medications START taking these medications Instructions Each Dose to Equal  
 Morning Noon Evening Bedtime  
 nicotine 21 mg/24 hr  
Commonly known as:  [place] Clam Your last dose was: This morning 5/19 Your next dose is:  Tomorrow morning 5/20  
   
 1 Patch by TransDERmal route daily for 30 days. 1 Patch  
    
  
   
   
   
  
 terazosin 5 mg capsule Commonly known as:  HYTRIN Start taking on:  5/20/2018 Your last dose was: This morning 5/19 Your next dose is:  Tomorrow morning 5/20 Take 1 Cap by mouth daily. 5 mg CONTINUE taking these medications Instructions Each Dose to Equal  
 Morning Noon Evening Bedtime  
 nitrofurantoin 50 mg capsule Commonly known as:  MACRODANTIN  
 Your next dose is: This afternoon and evening 5/19 Take 1 tablet three times daily. Ostomy Supplies 2 1/4 \" Misc Commonly known as:  NEW IMAGE SKIN BARRIER  
   
 1 Units by Does Not Apply route as needed. 1 Units  
    
   
   
   
  
 oxyCODONE-acetaminophen 5-325 mg per tablet Commonly known as:  PERCOCET Your last dose was:  5/18 Your next dose is: Take today if needed Take 1 Tab by mouth every six (6) hours as needed for Pain. Max Daily Amount: 4 Tabs. 1 Tab Urostomy Pouch 2 1/4 \" (9\") Misc  
   
 1 Units by Does Not Apply route as needed. 1 Units Where to Get Your Medications These medications were sent to [address] [place] [address] [address] [facility] [address] Phone:  [phone]  
  nicotine 21 mg/24 hr  
 terazosin 5 mg capsule Information on where to get these meds will be given to you by the nurse or doctor. ! Ask your nurse or doctor about these medications  
  oxyCODONE-acetaminophen 5-325 mg per tablet Opioid Education Prescription Opioids: What You Need to Know: 
 
Prescription opioids can be used to help relieve moderate-to-severe pain and are often prescribed following a surgery or injury, or for certain health conditions. These medications can be an important part of treatment but also come with serious risks. Opioids are strong pain medicines. Examples include hydrocodone, oxycodone, fentanyl, and morphine. Heroin is an example of an illegal opioid. It is important to work with your health care provider to make sure you are getting the safest, most effective care. WHAT ARE THE RISKS AND SIDE EFFECTS OF OPIOID USE? Prescription opioids carry serious risks of addiction and overdose, especially with prolonged use.   An opioid overdose, often marked by slow breathing, can cause sudden death. The use of prescription opioids can have a number of side effects as well, even when taken as directed. · Tolerance-meaning you might need to take more of a medication for the same pain relief · Physical dependence-meaning you have symptoms of withdrawal when the medication is stopped. Withdrawal symptoms can include nausea, sweating, chills, diarrhea, stomach cramps, and muscle aches. Withdrawal can last up to several weeks, depending on which drug you took and how long you took it. · Increased sensitivity to pain · Constipation · Nausea, vomiting, and dry mouth · Sleepiness and dizziness · Confusion · Depression · Low levels of testosterone that can result in lower sex drive, energy, and strength · Itching and sweating RISKS ARE GREATER WITH:      
· History of drug misuse, substance use disorder, or overdose · Mental health conditions (such as depression or anxiety) · Sleep apnea · Older age (72 years or older) · Pregnancy Avoid alcohol while taking prescription opioids. Also, unless specifically advised by your health care provider, medications to avoid include: · Benzodiazepines (such as Xanax or Valium) · Muscle relaxants (such as Soma or Flexeril) · Hypnotics (such as Ambien or Lunesta) · Other prescription opioids KNOW YOUR OPTIONS Talk to your health care provider about ways to manage your pain that don't involve prescription opioids. Some of these options may actually work better and have fewer risks and side effects. Options may include: 
· Pain relievers such as acetaminophen, ibuprofen, and naproxen · Some medications that are also used for depression or seizures · Physical therapy and exercise · Counseling to help patients learn how to cope better with triggers of pain and stress. · Application of heat or cold compress · Massage therapy · Relaxation techniques Be Informed Make sure you know the name of your medication, how much and how often to take it, and its potential risks & side effects. IF YOU ARE PRESCRIBED OPIOIDS FOR PAIN: 
· Never take opioids in greater amounts or more often than prescribed. Remember the goal is not to be pain-free but to manage your pain at a tolerable level. · Follow up with your primary care provider to: · Work together to create a plan on how to manage your pain. · Talk about ways to help manage your pain that don't involve prescription opioids. · Talk about any and all concerns and side effects. · Help prevent misuse and abuse. · Never sell or share prescription opioids · Help prevent misuse and abuse. · Store prescription opioids in a secure place and out of reach of others (this may include visitors, children, friends, and family). · Safely dispose of unused/unwanted prescription opioids: Find your community drug take-back program or your pharmacy mail-back program, or flush them down the toilet, following guidance from the Food and Drug Administration (www.fda.gov/Drugs/ResourcesForYou). · Visit www.cdc.gov/drugoverdose to learn about the risks of opioid abuse and overdose. · If you believe you may be struggling with addiction, tell your health care provider and ask for guidance or call [facility] at 1-254-845-QAHJ. Discharge Instructions Report to Dr Deonte Lyons temp 100.5 or greater , redness, swelling or drainage from incision Report to Dr Deonte Lyons if have decreased urine output from urostomy Dressing:  may remove dressing tomorrow May shower but no tub bathing No heavy lifting or strenuous activity No driving until directed by doctor Notify surgeon of  pain not controlled by pain medication Kidney Stone: Care Instructions Your Care Instructions Kidney stones are formed when salts, minerals, and other substances normally found in the urine clump together. They can be as small as grains of sand or, rarely, as large as golf balls. While the stone is traveling through the ureter, which is the tube that carries urine from the kidney to the bladder, you will probably feel pain. The pain may be mild or very severe. You may also have some blood in your urine. As soon as the stone reaches the bladder, any intense pain should go away. If a stone is too large to pass on its own, you may need a medical procedure to help you pass the stone. The doctor has checked you carefully, but problems can develop later. If you notice any problems or new symptoms, get medical treatment right away. Follow-up care is a key part of your treatment and safety. Be sure to make and go to all appointments, and call your doctor if you are having problems. It's also a good idea to know your test results and keep a list of the medicines you take. How can you care for yourself at home? · Drink plenty of fluids, enough so that your urine is light yellow or clear like water. If you have kidney, heart, or liver disease and have to limit fluids, talk with your doctor before you increase the amount of fluids you drink. · Take pain medicines exactly as directed. Call your doctor if you think you are having a problem with your medicine. ¨ If the doctor gave you a prescription medicine for pain, take it as prescribed. ¨ If you are not taking a prescription pain medicine, ask your doctor if you can take an over-the-counter medicine. Read and follow all instructions on the label. · Your doctor may ask you to strain your urine so that you can collect your kidney stone when it passes. You can use a kitchen strainer or a tea strainer to catch the stone. Store it in a plastic bag until you see your doctor again. Preventing future kidney stones Some changes in your diet may help prevent kidney stones.  Depending on the cause of your stones, your doctor may recommend that you: · Drink plenty of fluids, enough so that your urine is light yellow or clear like water. If you have kidney, heart, or liver disease and have to limit fluids, talk with your doctor before you increase the amount of fluids you drink. · Limit coffee, tea, and alcohol. Also avoid grapefruit juice. · Do not take more than the recommended daily dose of vitamins C and D. 
· Avoid antacids such as Gaviscon, Maalox, Mylanta, or Tums. · Limit the amount of salt (sodium) in your diet. · Eat a balanced diet that is not too high in protein. · Limit foods that are high in a substance called oxalate, which can cause kidney stones. These foods include dark green vegetables, rhubarb, chocolate, wheat bran, nuts, cranberries, and beans. When should you call for help? Call your doctor now or seek immediate medical care if: 
? · You cannot keep down fluids. ? · Your pain gets worse. ? · You have a fever or chills. ? · You have new or worse pain in your back just below your rib cage (the flank area). ? · You have new or more blood in your urine. ? Watch closely for changes in your health, and be sure to contact your doctor if: 
? · You do not get better as expected. Where can you learn more? Go to http://maksim-yasir.info/. Enter L045 in the search box to learn more about \"Kidney Stone: Care Instructions. \" Current as of: May 12, 2017 Content Version: 11.4 © 5178-8629 [employer]. Care instructions adapted under license by [employer] (which disclaims liability or warranty for this information). If you have questions about a medical condition or this instruction, always ask your healthcare professional. [address] any warranty or liability for your use of this information. DISCHARGE SUMMARY from Nurse PATIENT INSTRUCTIONS: 
 
 After general anesthesia or intravenous sedation, for 24 hours or while taking prescription Narcotics: · Limit your activities · Do not drive and operate hazardous machinery · Do not make important personal or business decisions · Do  not drink alcoholic beverages · If you have not urinated within 8 hours after discharge, please contact your surgeon on call. Report the following to your surgeon: 
· Excessive pain, swelling, redness or odor of or around the surgical area · Temperature over 100.5 · Nausea and vomiting lasting longer than 4 hours or if unable to take medications · Any signs of decreased circulation or nerve impairment to extremity: change in color, persistent  numbness, tingling, coldness or increase pain · Any questions What to do at Home: 
Recommended activity: Activity as tolerated, If you experience any of the following symptoms see dsicharge instructions, please follow up with urologist. 
 
*  Please give a list of your current medications to your Primary Care Provider. *  Please update this list whenever your medications are discontinued, doses are 
    changed, or new medications (including over-the-counter products) are added. *  Please carry medication information at all times in case of emergency situations. These are general instructions for a healthy lifestyle: No smoking/ No tobacco products/ Avoid exposure to second hand smoke Surgeon General's Warning:  Quitting smoking now greatly reduces serious risk to your health. Obesity, smoking, and sedentary lifestyle greatly increases your risk for illness A healthy diet, regular physical exercise & weight monitoring are important for maintaining a healthy lifestyle You may be retaining fluid if you have a history of heart failure or if you experience any of the following symptoms:  Weight gain of 3 pounds or more overnight or 5 pounds in a week, increased swelling in our hands or feet or shortness of breath while lying flat in bed. Please call your doctor as soon as you notice any of these symptoms; do not wait until your next office visit. Recognize signs and symptoms of STROKE: 
 
F-face looks uneven A-arms unable to move or move unevenly S-speech slurred or non-existent T-time-call 911 as soon as signs and symptoms begin-DO NOT go Back to bed or wait to see if you get better-TIME IS BRAIN. Warning Signs of HEART ATTACK Call 911 if you have these symptoms: 
? Chest discomfort. Most heart attacks involve discomfort in the center of the chest that lasts more than a few minutes, or that goes away and comes back. It can feel like uncomfortable pressure, squeezing, fullness, or pain. ? Discomfort in other areas of the upper body. Symptoms can include pain or discomfort in one or both arms, the back, neck, jaw, or stomach. ? Shortness of breath with or without chest discomfort. ? Other signs may include breaking out in a cold sweat, nausea, or lightheadedness. Don't wait more than five minutes to call [address]! Fast action can save your life. Calling 911 is almost always the fastest way to get lifesaving treatment. Emergency Medical Services staff can begin treatment when they arrive  up to an hour sooner than if someone gets to the hospital by car. The discharge information has been reviewed with the patient. The patient verbalized understanding. Discharge medications reviewed with the patient and appropriate educational materials and side effects teaching were provided. ___________________________________________________________________________________________________________________________________ ACO Transitions of Care Introducing [employer] [address] Abigail Bran offers a voluntary care coordination program to provide high quality service and care to [facility] fee-for-service beneficiaries. Blossom Torres was designed to help you enhance your health and well-being through the following services: ? Transitions of Care  support for individuals who are transitioning from one care setting to another (example: Hospital to home). ? Chronic and Complex Care Coordination  support for individuals and caregivers of those with serious or chronic illnesses or with more than one chronic (ongoing) condition and those who take a number of different medications. If you meet specific medical criteria, a [address] [place] [address] Rd may call you directly to coordinate your care with your primary care physician and your other care providers. For questions about the [facility] programs, please, contact your physicians office. For general questions or additional information about Accountable Care Organizations: 
Please visit www.medicare.gov/acos. html or call 1-800-MEDICARE (1-491.665.3735) TTY users should call 5-236.708.8411. Introducing [facility] & HEALTH SERVICES! Sumi Patten introduces [employer] patient portal. Now you can access parts of your medical record, email your doctor's office, and request medication refills online. 1. In your internet browser, go to [email or web address] 2. Click on the First Time User? Click Here link in the Sign In box. You will see the New Member Sign Up page. 3. Enter your [employer] Tech Access Code exactly as it appears below. You will not need to use this code after youve completed the sign-up process. If you do not sign up before the expiration date, you must request a new code. · [employer] Access Code: [email or web address] Expires: 7/1/2018 11:21 AM 
 
4. Enter the last four digits of your Social Security Number (xxxx) and Date of Birth (mm/dd/yyyy) as indicated and click Submit. You will be taken to the next sign-up page. 5. Create a [employer] Tech ID.  This will be your [employer] login ID and cannot be changed, so think of one that is secure and easy to remember. 6. Create a Purple Blue Bo password. You can change your password at any time. 7. Enter your Password Reset Question and Answer. This can be used at a later time if you forget your password. 8. Enter your e-mail address. You will receive e-mail notification when new information is available in [address] 9. Click Sign Up. You can now view and download portions of your medical record. 10. Click the Download Summary menu link to download a portable copy of your medical information. If you have questions, please visit the Frequently Asked Questions section of the [employer] Bo website. Remember, [employer] Bo is NOT to be used for urgent needs. For medical emergencies, dial 911. Now available from your iPhone and Android! Introducing Kirit Schroeder As a [facility]or[facility] patient, I wanted to make you aware of our electronic visit tool called Kirit Schroeder. [employer]/ImpulseFlyer allows you to connect within minutes with a medical provider 24 hours a day, seven days a week via a mobile device or tablet or logging into a secure website from your computer. You can access Kirit Schroeder from anywhere in the [place]. A virtual visit might be right for you when you have a simple condition and feel like you just dont want to get out of bed, or cant get away from work for an appointment, when your regular [facility] provider is not available (evenings, weekends or holidays), or when youre out of town and need minor care. Electronic visits cost only $49 and if the [employer]/7 provider determines a prescription is needed to treat your condition, one can be electronically transmitted to a nearby pharmacy*. Please take a moment to enroll today if you have not already done so. The enrollment process is free and takes just a few minutes.   To enroll, please download the [employer]/ImpulseFlyer carole to your tablet or phone, or visit [email or web address] to enroll on your computer. And, as an [address] [place] [address] patient with a [employer] account, the results of your visits will be scanned into your electronic medical record and your primary care provider will be able to view the scanned results. We urge you to continue to see your regular [facility] provider for your ongoing medical care. And while your primary care provider may not be the one available when you seek a [facility] virtual visit, the peace of mind you get from getting a real diagnosis real time can be priceless. For more information on AGC, view our Frequently Asked Questions (FAQs) at [email or web address]. Sincerely, 
 
Elver Condon MD 
Chief Medical Officer [facility] Abigail Bran *:  certain medications cannot be prescribed via AGC Unresulted Labs-Please follow up with your PCP about these lab tests Order Current Status NC XR TECHNOLOGIST SERVICE In process Providers Seen During Your Hospitalization Provider Specialty Primary office phone Quita Tinajero MD Urology [phone] Your Primary Care Physician (PCP) Primary Care Physician Office Phone Office Fax UNKNOWN, PROVIDER ** None ** ** None ** You are allergic to the following Allergen Reactions Codeine Hives Penicillins Rash Itching Years ago Recent Documentation Height Weight BMI Smoking Status 1.651 m 50.6 kg 18.56 kg/m2 Current Every Day Smoker Emergency Contacts Name Discharge Info Relation Home Work Mobile Mindy Shepard DISCHARGE CAREGIVER [3] Friend [5] [phone] Patient Belongings The following personal items are in your possession at time of discharge: 
  Dental Appliances: None  Visual Aid: None      Home Medications: None   Jewelry: None  Clothing: Shirt, Pants, Undergarments, Footwear    Other Valuables: None Please provide this summary of care documentation to your next provider. Signatures-by signing, you are acknowledging that this After Visit Summary has been reviewed with you and you have received a copy. Patient Signature:  ____________________________________________________________ Date:  ____________________________________________________________  
  
Franca Piety Provider Signature:  ____________________________________________________________ Date:  ____________________________________________________________

## 2018-05-15 NOTE — ANESTHESIA PREPROCEDURE EVALUATION
Anesthetic History   No history of anesthetic complications            Review of Systems / Medical History  Patient summary reviewed and pertinent labs reviewed    Pulmonary    COPD: moderate      Smoker         Neuro/Psych         Psychiatric history     Cardiovascular                       GI/Hepatic/Renal         Renal disease: stones      Comments: S/p cystectomy and ileal conduit Endo/Other        Arthritis     Other Findings   Comments: Heavy ETOH use daily           Physical Exam    Airway  Mallampati: II  TM Distance: > 6 cm  Neck ROM: normal range of motion   Mouth opening: Normal     Cardiovascular    Rhythm: regular           Dental         Pulmonary                 Abdominal  GI exam deferred       Other Findings            Anesthetic Plan    ASA: 3  Anesthesia type: general          Induction: Intravenous  Anesthetic plan and risks discussed with: Patient

## 2018-05-15 NOTE — IP AVS SNAPSHOT
[address] 
[phone] Patient: Grecia Helton MRN: [id number] LMU:1/66/0473 A check marcy indicates which time of day the medication should be taken. My Medications START taking these medications Instructions Each Dose to Equal  
 Morning Noon Evening Bedtime  
 nicotine 21 mg/24 hr  
Commonly known as:  Severiano Treviño Your last dose was: This morning 5/19 Your next dose is:  Tomorrow morning 5/20  
   
 1 Patch by TransDERmal route daily for 30 days. 1 Patch  
    
  
   
   
   
  
 terazosin 5 mg capsule Commonly known as:  HYTRIN Start taking on:  5/20/2018 Your last dose was: This morning 5/19 Your next dose is:  Tomorrow morning 5/20 Take 1 Cap by mouth daily. 5 mg CONTINUE taking these medications Instructions Each Dose to Equal  
 Morning Noon Evening Bedtime  
 nitrofurantoin 50 mg capsule Commonly known as:  MACRODANTIN Your next dose is: This afternoon and evening 5/19 Take 1 tablet three times daily. Ostomy Supplies 2 1/4 \" Misc Commonly known as:  NEW IMAGE SKIN BARRIER  
   
 1 Units by Does Not Apply route as needed. 1 Units  
    
   
   
   
  
 oxyCODONE-acetaminophen 5-325 mg per tablet Commonly known as:  PERCOCET Your last dose was:  5/18 Your next dose is: Take today if needed Take 1 Tab by mouth every six (6) hours as needed for Pain. Max Daily Amount: 4 Tabs. 1 Tab Urostomy Pouch 2 1/4 \" (9\") Misc  
   
 1 Units by Does Not Apply route as needed. 1 Units Where to Get Your Medications These medications were sent to [address] [place], [place] [address] [facility] [address]   Phone:  [phone]  
  nicotine 21 mg/24 hr  
 terazosin 5 mg capsule Information on where to get these meds will be given to you by the nurse or doctor. ! Ask your nurse or doctor about these medications  
  oxyCODONE-acetaminophen 5-325 mg per tablet

## 2018-05-15 NOTE — PERIOP NOTES
TRANSFER - OUT REPORT:    Verbal report given to CORNELIUS Maza on Jeremy Delcid  being transferred to [facility] for routine post - op       Report consisted of patients Situation, Background, Assessment and   Recommendations(SBAR). Information from the following report(s) SBAR, OR Summary, Procedure Summary and MAR was reviewed with the receiving nurse. Lines:   Venous Access Device Upper chest (subclavicular area, right (Active)       Peripheral IV 05/15/18 Right Wrist (Active)   Site Assessment Clean, dry, & intact 5/15/2018  4:53 PM   Phlebitis Assessment 0 5/15/2018  4:53 PM   Infiltration Assessment 0 5/15/2018  4:53 PM   Dressing Status Clean, dry, & intact 5/15/2018  4:53 PM   Dressing Type Transparent 5/15/2018  4:53 PM   Hub Color/Line Status Infusing;Green 5/15/2018  4:53 PM   Alcohol Cap Used No 5/15/2018  4:53 PM        Opportunity for questions and clarification was provided. Patient transported with:   O2 @ 2 liters    VTE prophylaxis orders have been written for Jeremy Delcid. Patient and family given floor number and nurses name. Family updated re: pt status after security code verified.

## 2018-05-16 ENCOUNTER — APPOINTMENT (OUTPATIENT)
Dept: GENERAL RADIOLOGY | Age: 63
DRG: 694 | End: 2018-05-16
Attending: UROLOGY
Payer: MEDICARE

## 2018-05-16 PROCEDURE — 74011250636 HC RX REV CODE- 250/636: Performed by: UROLOGY

## 2018-05-16 PROCEDURE — 74018 RADEX ABDOMEN 1 VIEW: CPT

## 2018-05-16 PROCEDURE — 74011250637 HC RX REV CODE- 250/637: Performed by: UROLOGY

## 2018-05-16 PROCEDURE — 99218 HC RM OBSERVATION: CPT

## 2018-05-16 RX ADMIN — OXYCODONE HYDROCHLORIDE AND ACETAMINOPHEN 1 TABLET: 10; 325 TABLET ORAL at 07:48

## 2018-05-16 RX ADMIN — Medication 10 ML: at 13:42

## 2018-05-16 RX ADMIN — Medication 10 ML: at 06:14

## 2018-05-16 RX ADMIN — MORPHINE SULFATE 5 MG: 10 INJECTION INTRAMUSCULAR; INTRAVENOUS; SUBCUTANEOUS at 12:32

## 2018-05-16 RX ADMIN — ONDANSETRON 4 MG: 2 INJECTION INTRAMUSCULAR; INTRAVENOUS at 20:32

## 2018-05-16 RX ADMIN — OXYCODONE HYDROCHLORIDE AND ACETAMINOPHEN 1 TABLET: 10; 325 TABLET ORAL at 17:35

## 2018-05-16 RX ADMIN — MORPHINE SULFATE 5 MG: 10 INJECTION INTRAMUSCULAR; INTRAVENOUS; SUBCUTANEOUS at 08:56

## 2018-05-16 RX ADMIN — Medication 10 ML: at 22:00

## 2018-05-16 RX ADMIN — CIPROFLOXACIN 500 MG: 500 TABLET, FILM COATED ORAL at 08:56

## 2018-05-16 RX ADMIN — OXYCODONE HYDROCHLORIDE AND ACETAMINOPHEN 1 TABLET: 10; 325 TABLET ORAL at 11:49

## 2018-05-16 RX ADMIN — CIPROFLOXACIN 500 MG: 500 TABLET, FILM COATED ORAL at 20:32

## 2018-05-16 RX ADMIN — MORPHINE SULFATE 5 MG: 10 INJECTION INTRAMUSCULAR; INTRAVENOUS; SUBCUTANEOUS at 04:20

## 2018-05-16 RX ADMIN — LORAZEPAM 1 MG: 1 TABLET ORAL at 00:41

## 2018-05-16 RX ADMIN — DOCUSATE SODIUM 100 MG: 100 CAPSULE, LIQUID FILLED ORAL at 08:56

## 2018-05-16 RX ADMIN — DOCUSATE SODIUM 100 MG: 100 CAPSULE, LIQUID FILLED ORAL at 17:35

## 2018-05-16 NOTE — PROGRESS NOTES
Problem: Falls - Risk of  Goal: *Absence of Falls  Document Aisha Fall Risk and appropriate interventions in the flowsheet.    Outcome: Progressing Towards Goal  Fall Risk Interventions:  Mobility Interventions: Patient to call before getting OOB         Medication Interventions: Patient to call before getting OOB    Elimination Interventions: Call light in reach

## 2018-05-16 NOTE — OP NOTES
[address]  OPERATIVE REPORT    Vic Do  MR#: [id number]  : 1955  ACCOUNT #: [de-identified]   DATE OF SERVICE: 05/15/2018    PREOPERATIVE DIAGNOSIS:  History of a cystectomy with ileal conduit urinary diversion with left flank pain and a left lower pole branch renal calculi. POSTOPERATIVE DIAGNOSIS:  History of a cystectomy with ileal conduit urinary diversion with left flank pain and a left lower pole branch renal calculi. PROCEDURE PERFORMED:  Percutaneous attempt at nephrostolithotomy with left ureteral stent placement and nephrostomy tube placement. SURGEON:  Anita Bradley MD    ASSISTANT:  None. ESTIMATED BLOOD LOSS:  Minimal.    ANESTHESIA:  General.    SPECIMENS REMOVED:  None. COMPLICATIONS:  Inability to get to the lower pole stone in the yuko due to angulation. FINDINGS:  Per dictation. IMPLANTS: A #7-Mongolian 26 cm Percuflex stent in the left ureter. PROCEDURE INDICATION:  A 77-year-old male who had a cystectomy and ileal conduit after neoadjuvant chemotherapy for bladder cancer, no evidence of recurrent cancer. The patient has developed left flank pain. He has had a stone in the left lower pole of the kidney that is somewhat branched, difficult to see on KUB and he is complaining of pain on that side. It was elected to do a percutaneous procedure. PROCEDURE:  The patient was taken to the operating room suite and underwent general anesthesia. After undergoing placement of an upper caliceal access sheath and wire, the patient was prepped with the prep and covered with the drape. The patient had the ureteral catheter used to pass a guidewire down into the ileal conduit. The other sheath was used to pass another guidewire, so there were 2 guidewires down in the ureter and coiled in the ileal conduit. The patient had the access sheath removed.   An incision was made on the skin and the balloon dilator was used to dilate the tract. The patient had the sheath passed over the balloon, it hung up initially on the edge of the rib and eventually went down into the renal pelvis. The nephroscope was used to pass down into the renal pelvis. There was not a significant amount of bleeding. The stone was poorly calcified and difficult to see on the x-ray and on the percutaneous nephrostomy tube placement images. The lower pole yuko was angled such that the tip of the rigid scope would not pass into this area. Therefore, a flexible scope was used. This would not pass through the infundibulum either and at this point since the stone was difficult to see and there was no access, the scope was removed and through the guidewire, a #7-Romanian 26 cm Percuflex stent was passed down into the ileal conduit. During the procedure, contrast was used to opacify the collecting system. The ureter was open all the way down to the ileal conduit and there was minimal extravasation. The lower pole yuko was not filled out completely due to the poorly calcified stones within it. The patient had the guidewire removed and the ureteral stent was left in place. The patient then had a #20 Western Jennifer Councill tip Helm catheter passed into the renal pelvis, 2 mL of dilute contrast was placed in the balloon, this was sutured at the skin with a 2-0 silk and was draining pink-tinged urine. The patient tolerated the procedure well. PLAN:  Since we could get to the lower pole stone with the scope, we might need another access from the lower pole or we could leave the ureteral stent in place and do a lithotripsy after the nephrostomy tube comes out,  then he would need a cystoscopy or an endoscopy through the ileal conduit to remove the stent from the left ureter. He will keep the stent in the left ureter at this point.   We will also obtain a CT urogram to further assess the placement of the stone in the lower pole of the kidney and the placement of the stent in the ileal conduit.       MD EMA Doll / MN  D: 05/15/2018 16:21     T: 05/15/2018 16:52  JOB #: [id number]

## 2018-05-16 NOTE — PROGRESS NOTES
Hourly rounds complete this shift, dsg changed by day shift nurse, states dsg was soaked. Patient was also later found out of bed with conteh attached on opposite side of bed. c/o L flank pain 10/10, pain meds given. Bed in low, locked position, call light and bedside table within reach,  all needs met. Will continue to monitor Report to day shift nurse.

## 2018-05-16 NOTE — PROGRESS NOTES
Initial visit by  to convey care and concern and encourage patient that  services are available if desired. No needs were voiced during the visit. Provided business card for future reference.      Taz Torres 68  Board Certified

## 2018-05-16 NOTE — PROGRESS NOTES
Pain on POD#! With KUB showing tubes in great position. I can not see lower pole stone. Will plug nephrostomy and remove it in the morning if he does well. He will need left renal ESWL after some healing of kidney.

## 2018-05-17 PROBLEM — N20.0 KIDNEY STONE: Status: ACTIVE | Noted: 2018-05-17

## 2018-05-17 LAB
BACTERIA SPEC CULT: NORMAL
SERVICE CMNT-IMP: NORMAL

## 2018-05-17 PROCEDURE — 65270000029 HC RM PRIVATE

## 2018-05-17 PROCEDURE — 74011250637 HC RX REV CODE- 250/637: Performed by: UROLOGY

## 2018-05-17 PROCEDURE — 74011258636 HC RX REV CODE- 258/636: Performed by: UROLOGY

## 2018-05-17 PROCEDURE — 99218 HC RM OBSERVATION: CPT

## 2018-05-17 PROCEDURE — 74011250636 HC RX REV CODE- 250/636: Performed by: UROLOGY

## 2018-05-17 RX ADMIN — Medication 10 ML: at 13:55

## 2018-05-17 RX ADMIN — Medication 10 ML: at 05:08

## 2018-05-17 RX ADMIN — MORPHINE SULFATE 5 MG: 10 INJECTION INTRAMUSCULAR; INTRAVENOUS; SUBCUTANEOUS at 08:08

## 2018-05-17 RX ADMIN — CIPROFLOXACIN 500 MG: 500 TABLET, FILM COATED ORAL at 08:12

## 2018-05-17 RX ADMIN — SODIUM CHLORIDE, SODIUM LACTATE, POTASSIUM CHLORIDE, CALCIUM CHLORIDE, AND DEXTROSE MONOHYDRATE 75 ML/HR: 600; 310; 30; 20; 5 INJECTION, SOLUTION INTRAVENOUS at 08:11

## 2018-05-17 RX ADMIN — Medication 10 ML: at 22:00

## 2018-05-17 RX ADMIN — DOCUSATE SODIUM 100 MG: 100 CAPSULE, LIQUID FILLED ORAL at 17:26

## 2018-05-17 RX ADMIN — DOCUSATE SODIUM 100 MG: 100 CAPSULE, LIQUID FILLED ORAL at 08:12

## 2018-05-17 RX ADMIN — MORPHINE SULFATE 5 MG: 10 INJECTION INTRAMUSCULAR; INTRAVENOUS; SUBCUTANEOUS at 12:50

## 2018-05-17 RX ADMIN — MORPHINE SULFATE 5 MG: 10 INJECTION INTRAMUSCULAR; INTRAVENOUS; SUBCUTANEOUS at 22:57

## 2018-05-17 RX ADMIN — LORAZEPAM 1 MG: 1 TABLET ORAL at 03:11

## 2018-05-17 RX ADMIN — CIPROFLOXACIN 500 MG: 500 TABLET, FILM COATED ORAL at 22:31

## 2018-05-17 RX ADMIN — MORPHINE SULFATE 5 MG: 10 INJECTION INTRAMUSCULAR; INTRAVENOUS; SUBCUTANEOUS at 18:46

## 2018-05-17 RX ADMIN — OXYCODONE HYDROCHLORIDE AND ACETAMINOPHEN 1 TABLET: 10; 325 TABLET ORAL at 04:19

## 2018-05-17 NOTE — PROGRESS NOTES
Changed dressing on patient. Completely saturated. Uses 4X4's, ABD pad, Tegaderm and tape. Patient states \"now passing gas. \"  Will continue to monitor patient.

## 2018-05-17 NOTE — PHYSICIAN ADVISORY
Letter of Determination: Upgrade from Observation to Inpatient Status    This patient was originally hospitalized as Outpatient Status with Observation Services on 5/15/2018 for post procedure monitoring after percutaneous nephro-ureteral sheath placement. This patient now meets for Inpatient Admission in accordance with CMS regulation Section 43 .3. Specifically, patient's stay is now over Two Midnights and was medically necessary. 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 Consistent with CMS guidelines, patient meets for inpatient status. It is our recommendation that this patient's hospitalization status should be upgraded from OBSERVATION to INPATIENT status.      The final decision regarding the patient's hospitalization status depends on the attending physician's judgement.     Jigar Hendrix MD, ADRIANO,   Physician Martin Aguiar.

## 2018-05-17 NOTE — PROGRESS NOTES
Changed dressing from where nephrostomy tube was removed. Dressing saturated with urine. Used 4x4's, ABD pad, Tegaderm and tape. Will continue to monitor patient.

## 2018-05-17 NOTE — PROGRESS NOTES
Changed dressing. Saturated with urine. Used 4x4's, ABD pad, Tegaderm and tape. Will continue to monitor patient.

## 2018-05-17 NOTE — PROGRESS NOTES
Care Management Interventions  PCP Verified by CM: Yes ([address])  Transition of Care Consult (CM Consult): Discharge Planning  Physical Therapy Consult: No  Occupational Therapy Consult: No  Current Support Network: Other (Lives with sig other)  Plan discussed with Pt/Family/Caregiver: Yes  Freedom of Choice Offered: Yes  Discharge Location  Discharge Placement: Home     Met with patient at the bedside. Turned out to be a rather lengthy talk as patient has fears and anxiety and depression. He is alert and oriented times four. Independent at baseline. Lives with his sig other who is ten years older and has her own health problems and \"beginning dementia\". Patient asking about [facility] assistance and the process has been started with Rafael Graham in the business office. Asking for a nicotine patch as he is used to smoking and feeling antsy. Discussed his prior relationship with the cancer [facility] psychologist but did not want me to re-refer. Has estranged children. Very sensitive and worried about what the staff think about him. Tires to not be a burden. Listened. Offered support. Will follow up on nicotine patch. Will ask therapy to see him in the am as he is shaky and has some pain. Case management following. Will offer HH if indicated. Expecting stent to be pulled prior to [place]. Left a message with Dr. Danielito Puentes  (not [facility] as written above) office for a follow up with PCP as patient had lost contact with him. Kranthi Ansari

## 2018-05-17 NOTE — PROGRESS NOTES
Problem: Falls - Risk of  Goal: *Absence of Falls  Document Aisha Fall Risk and appropriate interventions in the flowsheet.    Outcome: Progressing Towards Goal  Fall Risk Interventions:  Mobility Interventions: Patient to call before getting OOB         Medication Interventions: Patient to call before getting OOB, Teach patient to arise slowly    Elimination Interventions: Call light in reach

## 2018-05-17 NOTE — PROGRESS NOTES
Hourly round completed throughout the shift. Patient is sleeping. Bed in lower position and call light/ personal items within reach. Will continue to monitor and give bed side shift report to on coming day shift nurse.

## 2018-05-18 ENCOUNTER — APPOINTMENT (OUTPATIENT)
Dept: CT IMAGING | Age: 63
DRG: 694 | End: 2018-05-18
Attending: UROLOGY
Payer: MEDICARE

## 2018-05-18 LAB
ABO + RH BLD: NORMAL
BLD PROD TYP BPU: NORMAL
BLD PROD TYP BPU: NORMAL
BLOOD GROUP ANTIBODIES SERPL: NORMAL
BPU ID: NORMAL
BPU ID: NORMAL
CROSSMATCH RESULT,%XM: NORMAL
CROSSMATCH RESULT,%XM: NORMAL
SPECIMEN EXP DATE BLD: NORMAL
STATUS OF UNIT,%ST: NORMAL
STATUS OF UNIT,%ST: NORMAL
UNIT DIVISION, %UDIV: 0
UNIT DIVISION, %UDIV: 0

## 2018-05-18 PROCEDURE — 74011250637 HC RX REV CODE- 250/637: Performed by: UROLOGY

## 2018-05-18 PROCEDURE — 74011250636 HC RX REV CODE- 250/636: Performed by: UROLOGY

## 2018-05-18 PROCEDURE — 65270000029 HC RM PRIVATE

## 2018-05-18 PROCEDURE — 74011258636 HC RX REV CODE- 258/636: Performed by: UROLOGY

## 2018-05-18 PROCEDURE — 74176 CT ABD & PELVIS W/O CONTRAST: CPT

## 2018-05-18 RX ORDER — POLYETHYLENE GLYCOL 3350 17 G/17G
17 POWDER, FOR SOLUTION ORAL ONCE
Status: COMPLETED | OUTPATIENT
Start: 2018-05-18 | End: 2018-05-18

## 2018-05-18 RX ORDER — TERAZOSIN 5 MG/1
5 CAPSULE ORAL DAILY
Status: DISCONTINUED | OUTPATIENT
Start: 2018-05-18 | End: 2018-05-19 | Stop reason: HOSPADM

## 2018-05-18 RX ORDER — IBUPROFEN 200 MG
1 TABLET ORAL DAILY
Status: DISCONTINUED | OUTPATIENT
Start: 2018-05-18 | End: 2018-05-19 | Stop reason: HOSPADM

## 2018-05-18 RX ORDER — ADHESIVE BANDAGE
30 BANDAGE TOPICAL DAILY PRN
Status: DISCONTINUED | OUTPATIENT
Start: 2018-05-18 | End: 2018-05-19 | Stop reason: HOSPADM

## 2018-05-18 RX ORDER — MORPHINE SULFATE 10 MG/ML
5 INJECTION, SOLUTION INTRAMUSCULAR; INTRAVENOUS
Status: DISCONTINUED | OUTPATIENT
Start: 2018-05-18 | End: 2018-05-19 | Stop reason: HOSPADM

## 2018-05-18 RX ORDER — IBUPROFEN 200 MG
1 TABLET ORAL DAILY
Status: DISCONTINUED | OUTPATIENT
Start: 2018-05-19 | End: 2018-05-19 | Stop reason: HOSPADM

## 2018-05-18 RX ORDER — TERAZOSIN 5 MG/1
5 CAPSULE ORAL DAILY
Status: DISCONTINUED | OUTPATIENT
Start: 2018-05-19 | End: 2018-05-18

## 2018-05-18 RX ADMIN — POLYETHYLENE GLYCOL (3350) 17 G: 17 POWDER, FOR SOLUTION ORAL at 15:54

## 2018-05-18 RX ADMIN — MORPHINE SULFATE 5 MG: 10 INJECTION INTRAMUSCULAR; INTRAVENOUS; SUBCUTANEOUS at 04:32

## 2018-05-18 RX ADMIN — MORPHINE SULFATE 5 MG: 10 INJECTION INTRAMUSCULAR; INTRAVENOUS; SUBCUTANEOUS at 14:48

## 2018-05-18 RX ADMIN — SODIUM CHLORIDE, SODIUM LACTATE, POTASSIUM CHLORIDE, CALCIUM CHLORIDE, AND DEXTROSE MONOHYDRATE 75 ML/HR: 600; 310; 30; 20; 5 INJECTION, SOLUTION INTRAVENOUS at 12:29

## 2018-05-18 RX ADMIN — Medication 10 ML: at 06:00

## 2018-05-18 RX ADMIN — DOCUSATE SODIUM 100 MG: 100 CAPSULE, LIQUID FILLED ORAL at 08:12

## 2018-05-18 RX ADMIN — CIPROFLOXACIN 500 MG: 500 TABLET, FILM COATED ORAL at 20:39

## 2018-05-18 RX ADMIN — DOCUSATE SODIUM 100 MG: 100 CAPSULE, LIQUID FILLED ORAL at 17:56

## 2018-05-18 RX ADMIN — OXYCODONE HYDROCHLORIDE AND ACETAMINOPHEN 1 TABLET: 10; 325 TABLET ORAL at 11:26

## 2018-05-18 RX ADMIN — CIPROFLOXACIN 500 MG: 500 TABLET, FILM COATED ORAL at 08:12

## 2018-05-18 RX ADMIN — LORAZEPAM 1 MG: 1 TABLET ORAL at 16:14

## 2018-05-18 RX ADMIN — TERAZOSIN HYDROCHLORIDE 5 MG: 5 CAPSULE ORAL at 17:56

## 2018-05-18 RX ADMIN — MAGNESIUM HYDROXIDE 30 ML: 400 SUSPENSION ORAL at 13:17

## 2018-05-18 NOTE — ROUTINE PROCESS
Change dressing using 4x4 's ABD pad,Tegaderm and tape. Dressing was saturated with urine. Will continue to monitor.

## 2018-05-18 NOTE — PROGRESS NOTES
END OF SHIFT NOTE:    INTAKE/OUTPUT  05/17 0701 - 05/18 0700  In: -   Out: 225 [Urine:225]  Voiding: NO - Ostomy, and drainage from left flank. Dressing changed 4 times this shift. Catheter: NO  Color: clear  Drain:   Urinary Ostomy 05/15/18 Right  Abdomen (Active)   Drainage Color Yellow 5/18/2018  7:00 PM   Site Assessment Clean, dry, intact 5/18/2018  7:00 PM   Dressing Status Clean, dry, & intact 5/18/2018  7:00 PM   Urine Output (mL) 225 ml 5/17/2018  2:07 PM               DIET  regular    Flatus: Patient does have flatus present. Stool:  1 occurrences. Characteristics:  Stool Assessment  Stool Appearance:  (unseen)    Ambulating  Yes    Emesis: 0 occurrences.     Characteristics:          VITAL SIGNS  Patient Vitals for the past 12 hrs:   Temp Pulse Resp BP SpO2   05/18/18 1906 98.2 °F (36.8 °C) (!) 105 20 146/82 91 %   05/18/18 1756 - 93 - (!) 150/95 -   05/18/18 1439 98.1 °F (36.7 °C) 94 20 (!) 176/100 92 %   05/18/18 1052 98.5 °F (36.9 °C) 97 22 (!) 156/93 92 %       Pain Assessment  Pain Intensity 1: 5 (05/18/18 1900)  Pain Location 1: Abdomen  Pain Intervention(s) 1: Medication (see MAR)  Patient Stated Pain Goal: 0            Carin Gallo RN

## 2018-05-18 NOTE — PROGRESS NOTES
Post left pnl still with left lower pole stone         Stent removed from left nephrostomy tract and with less bleeding. Will give laxative and nicotine patch.   Consider D//C tomorrow

## 2018-05-18 NOTE — PROGRESS NOTES
Changes dressing using 4x4 's ABD pad,Tegaderm and tape> Dressing was saturated with urine.  Will continue to monitor

## 2018-05-18 NOTE — PROGRESS NOTES
Patient states he has not taken BP meds in a long time. Patient complaining of pain in lower abd. of gas pains. Ativan given PO to relax.     Visit Vitals    BP (!) 176/100 (BP 1 Location: Left arm, BP Patient Position: At rest)    Pulse 94    Temp 98.1 °F (36.7 °C)    Resp 20    Ht 5' 5\" (1.651 m)    Wt 50.6 kg (111 lb 9 oz)    SpO2 92%    BMI 18.56 kg/m2

## 2018-05-18 NOTE — PROGRESS NOTES
Rounding done every hour and PRN while patient in room. Patient resting in room. No signs or symptoms of distress. No changes in status. Patient denies any further needs or pain at this time.   Patient BP responding to Hytrin 5MG PO  Visit Vitals    /82 (BP 1 Location: Left arm, BP Patient Position: At rest)    Pulse (!) 105    Temp 98.2 °F (36.8 °C)    Resp 20    Ht 5' 5\" (1.651 m)    Wt 50.6 kg (111 lb 9 oz)    SpO2 91%    BMI 18.56 kg/m2

## 2018-05-18 NOTE — PROGRESS NOTES
Hourly round completed throughout the shift. Complain of pain, medicated per Mars. Bed in lower position and call light/ personal items within reach. Will continue to monitor and give bed side shift report to on coming day shift nurse.

## 2018-05-18 NOTE — PROGRESS NOTES
Fernanda Young visited with patient.     Isaias Villatoro,  Staff   C: [phone]  /  [email or web address]

## 2018-05-18 NOTE — PROGRESS NOTES
Changes dressing using 4x4 's ABD pad,Tegaderm and tape. Dressing was saturated with urine.  Will continue to monitor

## 2018-05-18 NOTE — PROGRESS NOTES
Dressing saturated on back. New dressing applied with 2 ABD pads and 4x4s. Secured with tape. Patient states that he has not had a bowel movement since being here. Abd bloated. Gave prune juice to start.

## 2018-05-19 VITALS
SYSTOLIC BLOOD PRESSURE: 130 MMHG | TEMPERATURE: 97.8 F | OXYGEN SATURATION: 94 % | RESPIRATION RATE: 20 BRPM | DIASTOLIC BLOOD PRESSURE: 86 MMHG | BODY MASS INDEX: 18.59 KG/M2 | HEIGHT: 65 IN | WEIGHT: 111.56 LBS | HEART RATE: 93 BPM

## 2018-05-19 PROCEDURE — 74011250637 HC RX REV CODE- 250/637: Performed by: UROLOGY

## 2018-05-19 RX ORDER — IBUPROFEN 200 MG
1 TABLET ORAL DAILY
Qty: 30 PATCH | Refills: 1 | Status: SHIPPED | OUTPATIENT
Start: 2018-05-19 | End: 2018-06-11

## 2018-05-19 RX ORDER — TERAZOSIN 5 MG/1
5 CAPSULE ORAL DAILY
Qty: 30 CAP | Refills: 1 | Status: SHIPPED | OUTPATIENT
Start: 2018-05-20 | End: 2018-06-22

## 2018-05-19 RX ORDER — OXYCODONE AND ACETAMINOPHEN 5; 325 MG/1; MG/1
1 TABLET ORAL
Qty: 25 TAB | Refills: 0 | Status: SHIPPED | OUTPATIENT
Start: 2018-05-19 | End: 2018-05-29

## 2018-05-19 RX ADMIN — TERAZOSIN HYDROCHLORIDE 5 MG: 5 CAPSULE ORAL at 09:33

## 2018-05-19 RX ADMIN — CIPROFLOXACIN 500 MG: 500 TABLET, FILM COATED ORAL at 09:33

## 2018-05-19 RX ADMIN — DOCUSATE SODIUM 100 MG: 100 CAPSULE, LIQUID FILLED ORAL at 09:33

## 2018-05-19 NOTE — PROGRESS NOTES
Pt seems to be doing very well. Minimal pain and his left flank dressing is bone dry. Will discharge home today.

## 2018-05-19 NOTE — DISCHARGE INSTRUCTIONS
Report to Dr Shimon Avendano temp 100.5 or greater , redness, swelling or drainage from incision    Report to Dr Shimon Avendano if have decreased urine output from urostomy    Dressing:  may remove dressing tomorrow     May shower but no tub bathing    No heavy lifting or strenuous activity    No driving until directed by doctor    Notify surgeon of  pain not controlled by pain medication          Kidney Stone: Care Instructions  Your Care Instructions    Kidney stones are formed when salts, minerals, and other substances normally found in the urine clump together. They can be as small as grains of sand or, rarely, as large as golf balls. While the stone is traveling through the ureter, which is the tube that carries urine from the kidney to the bladder, you will probably feel pain. The pain may be mild or very severe. You may also have some blood in your urine. As soon as the stone reaches the bladder, any intense pain should go away. If a stone is too large to pass on its own, you may need a medical procedure to help you pass the stone. The doctor has checked you carefully, but problems can develop later. If you notice any problems or new symptoms, get medical treatment right away. Follow-up care is a key part of your treatment and safety. Be sure to make and go to all appointments, and call your doctor if you are having problems. It's also a good idea to know your test results and keep a list of the medicines you take. How can you care for yourself at home? · Drink plenty of fluids, enough so that your urine is light yellow or clear like water. If you have kidney, heart, or liver disease and have to limit fluids, talk with your doctor before you increase the amount of fluids you drink. · Take pain medicines exactly as directed. Call your doctor if you think you are having a problem with your medicine. ¨ If the doctor gave you a prescription medicine for pain, take it as prescribed.   ¨ If you are not taking a prescription pain medicine, ask your doctor if you can take an over-the-counter medicine. Read and follow all instructions on the label. · Your doctor may ask you to strain your urine so that you can collect your kidney stone when it passes. You can use a kitchen strainer or a tea strainer to catch the stone. Store it in a plastic bag until you see your doctor again. Preventing future kidney stones  Some changes in your diet may help prevent kidney stones. Depending on the cause of your stones, your doctor may recommend that you:  · Drink plenty of fluids, enough so that your urine is light yellow or clear like water. If you have kidney, heart, or liver disease and have to limit fluids, talk with your doctor before you increase the amount of fluids you drink. · Limit coffee, tea, and alcohol. Also avoid grapefruit juice. · Do not take more than the recommended daily dose of vitamins C and D.  · Avoid antacids such as Gaviscon, Maalox, Mylanta, or Tums. · Limit the amount of salt (sodium) in your diet. · Eat a balanced diet that is not too high in protein. · Limit foods that are high in a substance called oxalate, which can cause kidney stones. These foods include dark green vegetables, rhubarb, chocolate, wheat bran, nuts, cranberries, and beans. When should you call for help? Call your doctor now or seek immediate medical care if:  ? · You cannot keep down fluids. ? · Your pain gets worse. ? · You have a fever or chills. ? · You have new or worse pain in your back just below your rib cage (the flank area). ? · You have new or more blood in your urine. ? Watch closely for changes in your health, and be sure to contact your doctor if:  ? · You do not get better as expected. Where can you learn more? Go to http://maksim-yasir.info/. Enter Q329 in the search box to learn more about \"Kidney Stone: Care Instructions. \"  Current as of:  May 12, 2017  Content Version: 11.4  © 6988-3485 [employer]. Care instructions adapted under license by [employer] (which disclaims liability or warranty for this information). If you have questions about a medical condition or this instruction, always ask your healthcare professional. [address] any warranty or liability for your use of this information. DISCHARGE SUMMARY from Nurse    PATIENT INSTRUCTIONS:    After general anesthesia or intravenous sedation, for 24 hours or while taking prescription Narcotics:  · Limit your activities  · Do not drive and operate hazardous machinery  · Do not make important personal or business decisions  · Do  not drink alcoholic beverages  · If you have not urinated within 8 hours after discharge, please contact your surgeon on call. Report the following to your surgeon:  · Excessive pain, swelling, redness or odor of or around the surgical area  · Temperature over 100.5  · Nausea and vomiting lasting longer than 4 hours or if unable to take medications  · Any signs of decreased circulation or nerve impairment to extremity: change in color, persistent  numbness, tingling, coldness or increase pain  · Any questions    What to do at Home:  Recommended activity: Activity as tolerated,     If you experience any of the following symptoms see dsicharge instructions, please follow up with urologist.    *  Please give a list of your current medications to your Primary Care Provider. *  Please update this list whenever your medications are discontinued, doses are      changed, or new medications (including over-the-counter products) are added. *  Please carry medication information at all times in case of emergency situations. These are general instructions for a healthy lifestyle:    No smoking/ No tobacco products/ Avoid exposure to second hand smoke  Surgeon General's Warning:  Quitting smoking now greatly reduces serious risk to your health.     Obesity, smoking, and sedentary lifestyle greatly increases your risk for illness    A healthy diet, regular physical exercise & weight monitoring are important for maintaining a healthy lifestyle    You may be retaining fluid if you have a history of heart failure or if you experience any of the following symptoms:  Weight gain of 3 pounds or more overnight or 5 pounds in a week, increased swelling in our hands or feet or shortness of breath while lying flat in bed. Please call your doctor as soon as you notice any of these symptoms; do not wait until your next office visit. Recognize signs and symptoms of STROKE:    F-face looks uneven    A-arms unable to move or move unevenly    S-speech slurred or non-existent    T-time-call 911 as soon as signs and symptoms begin-DO NOT go       Back to bed or wait to see if you get better-TIME IS BRAIN. Warning Signs of HEART ATTACK     Call 911 if you have these symptoms:   Chest discomfort. Most heart attacks involve discomfort in the center of the chest that lasts more than a few minutes, or that goes away and comes back. It can feel like uncomfortable pressure, squeezing, fullness, or pain.  Discomfort in other areas of the upper body. Symptoms can include pain or discomfort in one or both arms, the back, neck, jaw, or stomach.  Shortness of breath with or without chest discomfort.  Other signs may include breaking out in a cold sweat, nausea, or lightheadedness. Don't wait more than five minutes to call 911 - MINUTES MATTER! Fast action can save your life. Calling 911 is almost always the fastest way to get lifesaving treatment. Emergency Medical Services staff can begin treatment when they arrive -- up to an hour sooner than if someone gets to the hospital by car. The discharge information has been reviewed with the patient. The patient verbalized understanding.   Discharge medications reviewed with the patient and appropriate educational materials and side effects teaching were provided.   ___________________________________________________________________________________________________________________________________

## 2018-05-21 ENCOUNTER — PATIENT OUTREACH (OUTPATIENT)
Dept: CASE MANAGEMENT | Age: 63
End: 2018-05-21

## 2018-05-21 NOTE — PROGRESS NOTES
This note will not be viewable in [address] Information received via [address] [facility] for [facility] program S/P discharge 5/19/18. Outreach call #1 made to home number. No answer, no VM set up, unable to leave message Will plan follow up call within 24 hours.

## 2018-05-22 ENCOUNTER — PATIENT OUTREACH (OUTPATIENT)
Dept: CASE MANAGEMENT | Age: 63
End: 2018-05-22

## 2018-05-22 NOTE — PROGRESS NOTES
This note will not be viewable in [address] JINNY Outreach Call #2 made to home and or cell number. No answer, no VM set up, unable to leave message. Will plan next follow up in about 5 days.

## 2018-05-24 ENCOUNTER — PATIENT OUTREACH (OUTPATIENT)
Dept: CASE MANAGEMENT | Age: 63
End: 2018-05-24

## 2018-05-24 NOTE — PROGRESS NOTES
This note will not be viewable in [address] JINNY Calls x 3 made to home and or cell numbers. No answer received and no VM set up.  Unable to reach patient for [facility].

## 2018-05-29 PROBLEM — N20.0 KIDNEY STONE: Status: RESOLVED | Noted: 2018-05-17 | Resolved: 2018-05-29

## 2018-05-29 PROBLEM — N20.0 STONE, KIDNEY: Status: RESOLVED | Noted: 2018-05-15 | Resolved: 2018-05-29

## 2018-06-03 NOTE — DISCHARGE SUMMARY
.  Discharge Summary     Patient: Yun Villa MRN: [id number]  SSN: [id number]    YOB: 1955  Age: 58 y.o. Sex: male      Allergies: Codeine and Penicillins    Admit Date: 5/15/2018    Discharge Date: 6/3/2018     * Admission Diagnoses:  Kidney stone [N20.0]     * Discharge Diagnoses:   Hospital Problems as of 5/19/2018  Date Reviewed: 5/15/2018          Codes Class Noted - Resolved POA    Kidney stone ICD-10-CM: N20.0  ICD-9-CM: 592.0  5/17/2018 - Present Unknown        Stone, kidney ICD-10-CM: N20.0  ICD-9-CM: 592.0  5/15/2018 - Present Unknown               * Procedures for this admission:   Procedure(s):  LEFT NEPHROSCOPY, ATTEMPTED LEFT PERCUTANEOUS NEPHROLITOMY, NEPHROGRAM AND LEFT URETERAL STENT PLACEMENT      * Disposition: Home    * Discharged Condition: fair    * Hospital Course:  Could not get access to lower pole stone and will need ESWL in future    Discharge Medications:   Discharge Medication List as of 5/19/2018 12:53 PM      START taking these medications    Details   nicotine (NICODERM CQ) 21 mg/24 hr 1 Patch by TransDERmal route daily for 30 days. , Normal, Disp-30 Patch, R-1      terazosin (HYTRIN) 5 mg capsule Take 1 Cap by mouth daily. , Normal, Disp-30 Cap, R-1         CONTINUE these medications which have CHANGED    Details   oxyCODONE-acetaminophen (PERCOCET) 5-325 mg per tablet Take 1 Tab by mouth every six (6) hours as needed for Pain. Max Daily Amount: 4 Tabs., Print, Disp-25 Tab, R-0         CONTINUE these medications which have NOT CHANGED    Details   nitrofurantoin (MACRODANTIN) 50 mg capsule Take 1 tablet three times daily. , Normal, Disp-21 Cap, R-0      Urostomy Pouch 2 1/4 \" (9\") misc 1 Units by Does Not Apply route as needed. , Print, Disp-10 Units, R-99      Ostomy Supplies (NEW IMAGE SKIN BARRIER) 2 1/4 \" misc 1 Units by Does Not Apply route as needed. , Print, Disp-10 Each, R-99              * Follow-up Care/Patient Instructions:   Activity: Activity as tolerated  Diet: Regular Diet  Wound Care: nephrostomy dressing    Follow-up Information     Follow up With Details Comments Contact Info    Wayne Means MD  Primary Care MD ; Meri Marshall is now at [facility].       [address] [address]      Jody Jason MD  Office to call with appointment information [address] [facility]  [phone]             Signed By: Hieu Dolan MD     Margoth 3, 2018

## 2018-06-11 PROBLEM — R06.02 SHORTNESS OF BREATH: Status: ACTIVE | Noted: 2018-06-11

## 2018-06-25 ENCOUNTER — ANESTHESIA EVENT (OUTPATIENT)
Dept: SURGERY | Age: 63
End: 2018-06-25
Payer: MEDICARE

## 2018-06-25 RX ORDER — SODIUM CHLORIDE 0.9 % (FLUSH) 0.9 %
5-10 SYRINGE (ML) INJECTION EVERY 8 HOURS
Status: CANCELLED | OUTPATIENT
Start: 2018-06-25

## 2018-06-25 RX ORDER — SODIUM CHLORIDE 0.9 % (FLUSH) 0.9 %
5-10 SYRINGE (ML) INJECTION AS NEEDED
Status: CANCELLED | OUTPATIENT
Start: 2018-06-25

## 2018-06-26 ENCOUNTER — ANESTHESIA (OUTPATIENT)
Dept: SURGERY | Age: 63
End: 2018-06-26
Payer: MEDICARE

## 2018-06-26 ENCOUNTER — HOSPITAL ENCOUNTER (OUTPATIENT)
Age: 63
Setting detail: OUTPATIENT SURGERY
Discharge: HOME | End: 2018-06-26
Attending: UROLOGY | Admitting: UROLOGY
Payer: MEDICARE

## 2018-06-26 VITALS
TEMPERATURE: 97 F | OXYGEN SATURATION: 96 % | WEIGHT: 110.19 LBS | BODY MASS INDEX: 18.36 KG/M2 | HEIGHT: 65 IN | HEART RATE: 73 BPM | DIASTOLIC BLOOD PRESSURE: 85 MMHG | SYSTOLIC BLOOD PRESSURE: 144 MMHG | RESPIRATION RATE: 16 BRPM

## 2018-06-26 DIAGNOSIS — N20.0 KIDNEY STONE ON LEFT SIDE: Primary | ICD-10-CM

## 2018-06-26 PROCEDURE — 76210000020 HC REC RM PH II FIRST 0.5 HR: Performed by: UROLOGY

## 2018-06-26 PROCEDURE — 76060000033 HC ANESTHESIA 1 TO 1.5 HR: Performed by: UROLOGY

## 2018-06-26 PROCEDURE — 74011000258 HC RX REV CODE- 258: Performed by: UROLOGY

## 2018-06-26 PROCEDURE — 77030020143 HC AIRWY LARYN INTUB CGAS -A: Performed by: ANESTHESIOLOGY

## 2018-06-26 PROCEDURE — 74011250636 HC RX REV CODE- 250/636: Performed by: ANESTHESIOLOGY

## 2018-06-26 PROCEDURE — 74011250637 HC RX REV CODE- 250/637: Performed by: ANESTHESIOLOGY

## 2018-06-26 PROCEDURE — 74011250636 HC RX REV CODE- 250/636

## 2018-06-26 PROCEDURE — 76210000063 HC OR PH I REC FIRST 0.5 HR: Performed by: UROLOGY

## 2018-06-26 PROCEDURE — 74011250636 HC RX REV CODE- 250/636: Performed by: UROLOGY

## 2018-06-26 PROCEDURE — 50590 FRAGMENTING OF KIDNEY STONE: CPT | Performed by: UROLOGY

## 2018-06-26 PROCEDURE — 77030020782 HC GWN BAIR PAWS FLX 3M -B: Performed by: ANESTHESIOLOGY

## 2018-06-26 PROCEDURE — 76010000161 HC OR TIME 1 TO 1.5 HR INTENSV-TIER 1: Performed by: UROLOGY

## 2018-06-26 PROCEDURE — 77030032490 HC SLV COMPR SCD KNE COVD -B: Performed by: UROLOGY

## 2018-06-26 PROCEDURE — 74011000250 HC RX REV CODE- 250

## 2018-06-26 RX ORDER — LIDOCAINE HYDROCHLORIDE 10 MG/ML
0.1 INJECTION INFILTRATION; PERINEURAL AS NEEDED
Status: DISCONTINUED | OUTPATIENT
Start: 2018-06-26 | End: 2018-06-26 | Stop reason: HOSPADM

## 2018-06-26 RX ORDER — ALBUTEROL SULFATE 0.83 MG/ML
2.5 SOLUTION RESPIRATORY (INHALATION) AS NEEDED
Status: DISCONTINUED | OUTPATIENT
Start: 2018-06-26 | End: 2018-06-26 | Stop reason: HOSPADM

## 2018-06-26 RX ORDER — PROPOFOL 10 MG/ML
INJECTION, EMULSION INTRAVENOUS AS NEEDED
Status: DISCONTINUED | OUTPATIENT
Start: 2018-06-26 | End: 2018-06-26 | Stop reason: HOSPADM

## 2018-06-26 RX ORDER — ACETAMINOPHEN 500 MG
1000 TABLET ORAL ONCE
Status: COMPLETED | OUTPATIENT
Start: 2018-06-26 | End: 2018-06-26

## 2018-06-26 RX ORDER — ONDANSETRON 2 MG/ML
INJECTION INTRAMUSCULAR; INTRAVENOUS AS NEEDED
Status: DISCONTINUED | OUTPATIENT
Start: 2018-06-26 | End: 2018-06-26 | Stop reason: HOSPADM

## 2018-06-26 RX ORDER — MIDAZOLAM HYDROCHLORIDE 1 MG/ML
2 INJECTION, SOLUTION INTRAMUSCULAR; INTRAVENOUS
Status: COMPLETED | OUTPATIENT
Start: 2018-06-26 | End: 2018-06-26

## 2018-06-26 RX ORDER — ONDANSETRON 2 MG/ML
4 INJECTION INTRAMUSCULAR; INTRAVENOUS
Status: DISCONTINUED | OUTPATIENT
Start: 2018-06-26 | End: 2018-06-26 | Stop reason: HOSPADM

## 2018-06-26 RX ORDER — TAMSULOSIN HYDROCHLORIDE 0.4 MG/1
0.4 CAPSULE ORAL DAILY
Qty: 7 CAP | Refills: 0 | Status: SHIPPED | OUTPATIENT
Start: 2018-06-26 | End: 2018-08-08

## 2018-06-26 RX ORDER — SODIUM CHLORIDE 0.9 % (FLUSH) 0.9 %
5-10 SYRINGE (ML) INJECTION AS NEEDED
Status: DISCONTINUED | OUTPATIENT
Start: 2018-06-26 | End: 2018-06-26 | Stop reason: HOSPADM

## 2018-06-26 RX ORDER — OXYCODONE AND ACETAMINOPHEN 7.5; 325 MG/1; MG/1
1 TABLET ORAL
Qty: 20 TAB | Refills: 0 | Status: SHIPPED | OUTPATIENT
Start: 2018-06-26 | End: 2018-07-25

## 2018-06-26 RX ORDER — FENTANYL CITRATE 50 UG/ML
INJECTION, SOLUTION INTRAMUSCULAR; INTRAVENOUS AS NEEDED
Status: DISCONTINUED | OUTPATIENT
Start: 2018-06-26 | End: 2018-06-26 | Stop reason: HOSPADM

## 2018-06-26 RX ORDER — LIDOCAINE HYDROCHLORIDE 20 MG/ML
INJECTION, SOLUTION EPIDURAL; INFILTRATION; INTRACAUDAL; PERINEURAL AS NEEDED
Status: DISCONTINUED | OUTPATIENT
Start: 2018-06-26 | End: 2018-06-26 | Stop reason: HOSPADM

## 2018-06-26 RX ORDER — CIPROFLOXACIN 2 MG/ML
400 INJECTION, SOLUTION INTRAVENOUS ONCE
Status: DISCONTINUED | OUTPATIENT
Start: 2018-06-26 | End: 2018-06-26 | Stop reason: HOSPADM

## 2018-06-26 RX ORDER — HYDROMORPHONE HYDROCHLORIDE 2 MG/ML
0.5 INJECTION, SOLUTION INTRAMUSCULAR; INTRAVENOUS; SUBCUTANEOUS
Status: DISCONTINUED | OUTPATIENT
Start: 2018-06-26 | End: 2018-06-26 | Stop reason: HOSPADM

## 2018-06-26 RX ORDER — SODIUM CHLORIDE, SODIUM LACTATE, POTASSIUM CHLORIDE, CALCIUM CHLORIDE 600; 310; 30; 20 MG/100ML; MG/100ML; MG/100ML; MG/100ML
1000 INJECTION, SOLUTION INTRAVENOUS CONTINUOUS
Status: DISCONTINUED | OUTPATIENT
Start: 2018-06-26 | End: 2018-06-26 | Stop reason: HOSPADM

## 2018-06-26 RX ADMIN — MIDAZOLAM HYDROCHLORIDE 2 MG: 1 INJECTION, SOLUTION INTRAMUSCULAR; INTRAVENOUS at 11:53

## 2018-06-26 RX ADMIN — FENTANYL CITRATE 50 MCG: 50 INJECTION, SOLUTION INTRAMUSCULAR; INTRAVENOUS at 13:54

## 2018-06-26 RX ADMIN — ACETAMINOPHEN 1000 MG: 500 TABLET, FILM COATED ORAL at 11:49

## 2018-06-26 RX ADMIN — FENTANYL CITRATE 50 MCG: 50 INJECTION, SOLUTION INTRAMUSCULAR; INTRAVENOUS at 14:07

## 2018-06-26 RX ADMIN — PROPOFOL 150 MG: 10 INJECTION, EMULSION INTRAVENOUS at 13:54

## 2018-06-26 RX ADMIN — GENTAMICIN SULFATE 160 MG: 40 INJECTION, SOLUTION INTRAMUSCULAR; INTRAVENOUS at 11:48

## 2018-06-26 RX ADMIN — SODIUM CHLORIDE, SODIUM LACTATE, POTASSIUM CHLORIDE, AND CALCIUM CHLORIDE: 600; 310; 30; 20 INJECTION, SOLUTION INTRAVENOUS at 14:07

## 2018-06-26 RX ADMIN — ONDANSETRON 4 MG: 2 INJECTION INTRAMUSCULAR; INTRAVENOUS at 14:40

## 2018-06-26 RX ADMIN — SODIUM CHLORIDE, SODIUM LACTATE, POTASSIUM CHLORIDE, AND CALCIUM CHLORIDE 1000 ML: 600; 310; 30; 20 INJECTION, SOLUTION INTRAVENOUS at 11:48

## 2018-06-26 RX ADMIN — FENTANYL CITRATE 50 MCG: 50 INJECTION, SOLUTION INTRAMUSCULAR; INTRAVENOUS at 13:57

## 2018-06-26 RX ADMIN — LIDOCAINE HYDROCHLORIDE 80 MG: 20 INJECTION, SOLUTION EPIDURAL; INFILTRATION; INTRACAUDAL; PERINEURAL at 13:54

## 2018-06-26 NOTE — DISCHARGE INSTRUCTIONS
Laser Lithotripsy: What to Expect at [address] lithotripsy is a way to treat kidney stones. This treatment uses a laser to break kidney stones into tiny pieces. For several hours after the procedure you may have a burning feeling when you urinate. You may feel the urge to go even if you don't need to. This feeling should go away within a day. Drinking a lot of water can help. Your doctor also may advise you to take medicine that numbs the burning. This medicine is called phenazopyridine. It is available by prescription and over the counter. Brand names include Pyridium and Uristat. Your doctor may prescribe an antibiotic. This will help prevent an infection. You may have some blood in your urine for 2 or 3 days. Your doctor may have placed a small tube inside one of your ureters. Ureters are the tubes that connect the kidneys to the bladder. The small tube the doctor may have placed is called a stent. It may help the stone fragments pass through your body. Your doctor may remove the stent in a few weeks. Most stone fragments that are not removed pass out of the body within 24 hours. But sometimes it can take many weeks. If you have a large stone, you may need to come back for more treatments. This care sheet gives you a general idea about how long it will take for you to recover. But each person recovers at a different pace. Follow the steps below to feel better as quickly as possible. How can you care for yourself at home? Activity  ? · Rest as much as you need to after you go home. ? · You may do your regular activities. But avoid hard exercise or sports for about a week or until there is no blood in your urine. Diet  ? · You can eat your normal diet after lithotripsy. ? · Continue to drink plenty of fluids, enough so that your urine is light yellow or clear like water.  If you have kidney, heart, or liver disease and have to limit fluids, talk with your doctor before you increase the amount of fluids you drink. Medicines  ? · Your doctor will tell you if and when you can restart your medicines. He or she will also give you instructions about taking any new medicines. ? · If you take blood thinners, such as warfarin (Coumadin), clopidogrel (Plavix), or aspirin, be sure to talk to your doctor. He or she will tell you if and when to start taking those medicines again. Make sure that you understand exactly what your doctor wants you to do. ? · If you take medicine to stop the burning when you urinate, take it exactly as recommended. Call your doctor if you think you are having a problem with your medicine. This medicine may color your urine orange or red. This is normal. You will get more details on the specific medicine your doctor recommends. ? · If your doctor prescribed antibiotics, take them as directed. Do not stop taking them just because you feel better. You need to take the full course of antibiotics. ? · Be safe with medicines. Read and follow all instructions on the label. ¨ If the doctor gave you a prescription medicine for pain, take it as prescribed. ¨ If you are not taking a prescription pain medicine, ask your doctor if you can take acetaminophen (Tylenol). Do not take ibuprofen (Advil, Motrin) or naproxen (Aleve) or similar medicines unless your doctor tells you to. ¨ Do not take two or more pain medicines at the same time unless the doctor told you to. Many pain medicines have acetaminophen, which is Tylenol. Too much acetaminophen (Tylenol) can be harmful. ? Heat  ? · Take a warm bath. This may soothe the burning. Other instructions  ? · Urinate through the strainer the doctor gives you. Save any stone pieces, including those that look like sand or gravel. Take these to your doctor. This will help your doctor find the cause of your stones. Follow-up care is a key part of your treatment and safety.  Be sure to make and go to all appointments, and call your doctor if you are having problems. It's also a good idea to know your test results and keep a list of the medicines you take. When should you call for help? Call 911 anytime you think you may need emergency care. For example, call if:  ? · You passed out (lost consciousness). ? · You have chest pain, are short of breath, or cough up blood. ?Call your doctor now or seek immediate medical care if:  ? · You have pain that does not get better after you take pain medicine. ? · You have new or more blood clots in your urine. (It is normal for the urine to be pink for a few days.)   ? · You cannot urinate. ? · You have symptoms of a urinary tract infection. These may include:  ¨ Pain or burning when you urinate. ¨ A frequent need to urinate without being able to pass much urine. ¨ Pain in the flank, which is just below the rib cage and above the waist on either side of the back. ¨ Blood in the urine. ¨ A fever. ? · You are sick to your stomach or cannot drink fluids. ? · You have signs of a blood clot in your leg (called a deep vein thrombosis), such as:  ¨ Pain in the calf, back of the knee, thigh, or groin. ¨ Redness and swelling in your leg. ? Watch closely for any changes in your health, and be sure to contact your doctor if you have any problems. Where can you learn more? Go to http://maksim-yasir.info/. Enter Q239 in the search box to learn more about \"Laser Lithotripsy: What to Expect at Home. \"  Current as of: May 12, 2017  Content Version: 11.4  © 0865-3174 [employer]. Care instructions adapted under license by [employer] (which disclaims liability or warranty for this information). If you have questions about a medical condition or this instruction, always ask your healthcare professional. [place] [address] any warranty or liability for your use of this information. ACTIVITY  · As tolerated and as directed by your doctor. · Bathe or shower as directed by your doctor. DIET  · Clear liquids until no nausea or vomiting; then light diet for the first day. · Advance to regular diet on second day, unless your doctor orders otherwise. · If nausea and vomiting continues, call your doctor. PAIN  · Take pain medication as directed by your doctor. · Call your doctor if pain is NOT relieved by medication. · DO NOT take aspirin of blood thinners unless directed by your doctor. DRESSING CARE       CALL YOUR DOCTOR IF   · Excessive bleeding that does not stop after holding pressure over the area  · Temperature of 101 degrees F or above  · Excessive redness, swelling or bruising, and/ or green or yellow, smelly discharge from incision    AFTER ANESTHESIA   · For the first 24 hours: DO NOT Drive, Drink alcoholic beverages, or Make important decisions. · Be aware of dizziness following anesthesia and while taking pain medication. APPOINTMENT DATE/ TIME    YOUR DOCTOR'S PHONE NUMBER       DISCHARGE SUMMARY from Nurse    PATIENT INSTRUCTIONS:    After general anesthesia or intravenous sedation, for 24 hours or while taking prescription Narcotics:  · Limit your activities  · Do not drive and operate hazardous machinery  · Do not make important personal or business decisions  · Do  not drink alcoholic beverages  · If you have not urinated within 8 hours after discharge, please contact your surgeon on call. *  Please give a list of your current medications to your Primary Care Provider. *  Please update this list whenever your medications are discontinued, doses are      changed, or new medications (including over-the-counter products) are added. *  Please carry medication information at all times in case of emergency situations.       These are general instructions for a healthy lifestyle:    No smoking/ No tobacco products/ Avoid exposure to second hand smoke    Surgeon General's Warning:  Quitting smoking now greatly reduces serious risk to your health. Obesity, smoking, and sedentary lifestyle greatly increases your risk for illness    A healthy diet, regular physical exercise & weight monitoring are important for maintaining a healthy lifestyle    You may be retaining fluid if you have a history of heart failure or if you experience any of the following symptoms:  Weight gain of 3 pounds or more overnight or 5 pounds in a week, increased swelling in our hands or feet or shortness of breath while lying flat in bed. Please call your doctor as soon as you notice any of these symptoms; do not wait until your next office visit. Recognize signs and symptoms of STROKE:    F-face looks uneven    A-arms unable to move or move unevenly    S-speech slurred or non-existent    T-time-call 911 as soon as signs and symptoms begin-DO NOT go       Back to bed or wait to see if you get better-TIME IS BRAIN.

## 2018-06-26 NOTE — OP NOTES
[facility] REPORT    Antwon Ariza  MR#: [id number]  : 1955  ACCOUNT #: [de-identified]   DATE OF SERVICE: 2018    PREOPERATIVE DIAGNOSIS:  Previous cystectomy for bladder cancer with recent attempted percutaneous nephrostolithotomy for a left lower pole branched renal stone. POSTOPERATIVE DIAGNOSIS:  Previous cystectomy for bladder cancer with recent attempted percutaneous nephrostolithotomy for a left lower pole branched renal stone. PROCEDURE:  Left lower pole renal extracorporeal shockwave therapy. SURGEON:  MARV Moreira MD    ASSISTANT:  None. ANESTHESIA:  General.    ESTIMATED BLOOD LOSS:  None. SPECIMENS REMOVED:  None. FINDINGS:  Per dictation. COMPLICATIONS:  None. IMPLANTS:  None. PROCEDURE IN DETAIL:  The patient was taken to the operating room suite, underwent general anesthesia, placed in the supine position. Using fluoroscopic monitoring, the stone in the left lower pole of the kidney was identified and blasted with a total of 3000 shocks at a maximum setting with some apparent pulverization of the stone. The patient had had an attempted percutaneous procedure and a lot of the pieces had been dislodged at that point and there was still some remaining lower pole stone. It was elected to do this lithotripsy. Patient was sent to the recovery in stable condition, will go home on Percocet and tamsulosin and follow up in the office in about 3 weeks for a KUB.       MD EMA Kessler /   D: 2018 14:57     T: 2018 15:33  JOB #: [id number]

## 2018-06-26 NOTE — ANESTHESIA PREPROCEDURE EVALUATION
Anesthetic History   No history of anesthetic complications            Review of Systems / Medical History  Patient summary reviewed and pertinent labs reviewed    Pulmonary    COPD: moderate      Smoker         Neuro/Psych         Psychiatric history     Cardiovascular                    Comments: Had a long discussion surrounding his SOB with exertion, scheduled for Stress test by cards. No active angina, orthopnea, diaphoresis, or radiating jaw or shoulder pain. This has not been sudden onset but he has noticed that this summer he is quicker to have exertional SOB that previous summer. This likely is somewhat related to his longstanding COPD and continued smoking. We discussed his potential cardiac risk in this low risk procedure and given that the stones does cause him significant episodic pain we will proceed, treating him as if he has significant CAD (avoiding tachycardia and hypotension). He is not on a beta blocker or asprin.     GI/Hepatic/Renal         Renal disease: stones      Comments: S/p cystectomy and ileal conduit Endo/Other        Arthritis     Other Findings   Comments: Heavy ETOH use daily           Physical Exam    Airway  Mallampati: II  TM Distance: > 6 cm  Neck ROM: normal range of motion   Mouth opening: Normal     Cardiovascular    Rhythm: regular        Pertinent negatives: No murmur and JVD   Dental  No notable dental hx       Pulmonary  Breath sounds clear to auscultation               Abdominal  GI exam deferred       Other Findings            Anesthetic Plan    ASA: 3  Anesthesia type: general          Induction: Intravenous  Anesthetic plan and risks discussed with: Patient      LMA general

## 2018-06-26 NOTE — IP AVS SNAPSHOT
[address] [address] 
[phone] Patient: Jessica Pollack MRN: [id number] UYZ:[id number] A check marcy indicates which time of day the medication should be taken. My Medications START taking these medications Instructions Each Dose to Equal  
 Morning Noon Evening Bedtime  
 oxyCODONE-acetaminophen 7.5-325 mg per tablet Commonly known as:  PERCOCET 7.5 Your last dose was: Your next dose is: Take 1 Tab by mouth every four (4) hours as needed for Pain. Max Daily Amount: 6 Tabs. 1 Tab  
    
   
   
   
  
 tamsulosin 0.4 mg capsule Commonly known as:  FLOMAX Your last dose was: Your next dose is: Take 1 Cap by mouth daily. Indications: Urolithiasis 0.4 mg  
    
   
   
   
  
  
CONTINUE taking these medications Instructions Each Dose to Equal  
 Morning Noon Evening Bedtime  
 amLODIPine 5 mg tablet Commonly known as:  Caralee Dupes Your last dose was: Your next dose is: Take 1 Tab by mouth daily. 5 mg  
    
   
   
   
  
 budesonide-formoterol 160-4.5 mcg/actuation Hfaa Commonly known as:  SYMBICORT Your last dose was: Your next dose is: Take 2 Puffs by inhalation two (2) times a day. 2 Puff Ostomy Supplies 2 1/4 \" Misc Commonly known as:  NEW IMAGE SKIN BARRIER Your last dose was: Your next dose is:    
   
   
 1 Units by Does Not Apply route as needed. 1 Units Where to Get Your Medications These medications were sent to [address] [place] [address] Phone:  [phone]  
  tamsulosin 0.4 mg capsule Information on where to get these meds will be given to you by the nurse or doctor. ! Ask your nurse or doctor about these medications  
  oxyCODONE-acetaminophen 7.5-325 mg per tablet

## 2018-06-26 NOTE — ANESTHESIA POSTPROCEDURE EVALUATION
Post-Anesthesia Evaluation and Assessment    Patient: Rain Lynn MRN: [id number]  SSN: [id number]    YOB: 1955  Age: 58 y.o. Sex: male       Cardiovascular Function/Vital Signs  Visit Vitals    /85    Pulse 73    Temp 36.1 °C (97 °F)    Resp 16    Ht 5' 5\" (1.651 m)    Wt 50 kg (110 lb 3 oz)    SpO2 96%    BMI 18.34 kg/m2       Patient is status post general anesthesia for Procedure(s):  LEFT LOWER POLE LITHOTRIPSY. Nausea/Vomiting: None    Postoperative hydration reviewed and adequate. Pain:  Pain Scale 1: Numeric (0 - 10) (06/26/18 1508)  Pain Intensity 1: 0 (06/26/18 1508)   Managed    Neurological Status:   Neuro (WDL): Within Defined Limits (06/26/18 1508)   At baseline    Mental Status and Level of Consciousness: Arousable    Pulmonary Status:   O2 Device: Room air (06/26/18 1510)   Adequate oxygenation and airway patent    Complications related to anesthesia: None    Post-anesthesia assessment completed.  No concerns    Signed By: Gisel Rene MD     June 26, 2018

## 2018-06-26 NOTE — IP AVS SNAPSHOT
[address] [address] 
[phone] Patient: Meek Wood MRN: [id number] NPS:[id number] About your hospitalization You were admitted on:  June 26, 2018 You last received care in the:  [facility] PACU You were discharged on:  June 26, 2018 Why you were hospitalized Your primary diagnosis was:  Not on File Follow-up Information Follow up With Details Comments Contact Info Shilpi Cantu MD   [address] [facility] [address],[address] 
[phone] Quita Tinajero MD Follow up in 3 week(s) kub- 3 weeks [address] [facility] [address] [place] [address] 
[phone] Your Scheduled Appointments Thursday June 28, 2018  7:15 AM EDT  
CARDIOLITE with [address] [facility] OFFICE ([address] [place] [address]) 2 Alex[address]  
[phone] 1. Please DO NOT take any medication the morning of your procedure unless instructed to do so by your physician. PLEASE BRING ALL OF YOUR MEDICINE WITH YOU IN THE ORIGINAL BOTTLES. 2. Please DO NOT EAT OR DRINK ANYTHING PAST MIDNIGHT the night before your test - NOT EVEN A SIP OF WATER!  3. AVOID ANY FOOD AND DRINKS WITH CAFFEINE FOR AT LEAST 24 HOURS BEFORE YOUR TEST, even if it states 'caffeine-free or decaffeinated' - THIS INCLUDES ALL CHOCOLATE! 4. Wear comfortable, warm, loose fitting clothes as well as comfortable shoes. No metal buttons or snaps on the chest area. Please bring a sweater as it is cool in the testing area. 5. You must be able to recline with your arms above your head for 15 minutes for two sets of pictures. If you are not able to do so, please inform our staff prior to your procedure. 6. If you need to cancel your appointment, please call our office at [phone] [address]elfino) or  [phone] Ivy Corea) AT LEAST [address] to your appointment.  Should you fail to contact the office, you may be charged for material ordered for your procedure. 7. If you do not have insurance, you should immediately contact our patient  at [phone] in order to make arrangements for the payment of this service. If you are scheduled for a 2-Day Study, please be prepared to stay at least 2 1/2 hours each day. If your study is ordered for 1-Day, please be prepared to stay for up to 5 hours to complete the study. Bring reading material to help pass the time. Please do not bring any valuables. You must remain fasting until you are completely finished with your procedure. **PLEASE ARRIVE 15 MINUTES EARLY FOR YOUR APPOINTMENT.**  
  
    
 Tuesday July 10, 2018 10:20 AM EDT  
(Arrive by 9:50 AM) New [facility] with [facility]) [address] [place] [address] [facility]  
[phone] Friday July 20, 2018  9:30 AM EDT  
ECHOCARDIOGRAM with MARY ECHO [address] [facility] CARDIOLOGY [place] OFFICE ([address] [place] [address]) 2 Alex[address] Gandhi [address]  
[phone] Friday July 20, 2018  3:00 PM EDT Office Visit with Hieu Dolan MD  
[facility] 48 ([facility] [place] [address]) [address] [facility] [address]  
[phone] Wednesday July 25, 2018  8:45 AM EDT  
CONSULT with Paola Eddy DO  
[facility] OFFICE ([address] [place] [address]) [address]  
[phone] Discharge Orders None A check marcy indicates which time of day the medication should be taken. My Medications START taking these medications Instructions Each Dose to Equal  
 Morning Noon Evening Bedtime  
 oxyCODONE-acetaminophen 7.5-325 mg per tablet Commonly known as:  PERCOCET 7.5 Your last dose was: Your next dose is: Take 1 Tab by mouth every four (4) hours as needed for Pain. Max Daily Amount: 6 Tabs. 1 Tab  
    
   
   
   
  
 tamsulosin 0.4 mg capsule Commonly known as:  FLOMAX Your last dose was: Your next dose is: Take 1 Cap by mouth daily. Indications: Urolithiasis 0.4 mg  
    
   
   
   
  
  
CONTINUE taking these medications Instructions Each Dose to Equal  
 Morning Noon Evening Bedtime  
 amLODIPine 5 mg tablet Commonly known as:  Tomas de Castro Riggs Your last dose was: Your next dose is: Take 1 Tab by mouth daily. 5 mg  
    
   
   
   
  
 budesonide-formoterol 160-4.5 mcg/actuation Hfaa Commonly known as:  SYMBICORT Your last dose was: Your next dose is: Take 2 Puffs by inhalation two (2) times a day. 2 Puff Ostomy Supplies 2 1/4 \" Misc Commonly known as:  NEW IMAGE SKIN BARRIER Your last dose was: Your next dose is:    
   
   
 1 Units by Does Not Apply route as needed. 1 Units Where to Get Your Medications These medications [facility] [address] [place] [place] [address] [address] Phone:  [phone]  
  tamsulosin 0.4 mg capsule Information on where to get these meds will be given to you by the nurse or doctor. ! Ask your nurse or doctor about these medications  
  oxyCODONE-acetaminophen 7.5-325 mg per tablet Opioid Education Prescription Opioids: What You Need to Know: 
 
Prescription opioids can be used to help relieve moderate-to-severe pain and are often prescribed following a surgery or injury, or for certain health conditions. These medications can be an important part of treatment but also come with serious risks. Opioids are strong pain medicines.  Examples include hydrocodone, oxycodone, fentanyl, and morphine. Heroin is an example of an illegal opioid. It is important to work with your health care provider to make sure you are getting the safest, most effective care. WHAT ARE THE RISKS AND SIDE EFFECTS OF OPIOID USE? Prescription opioids carry serious risks of addiction and overdose, especially with prolonged use. An opioid overdose, often marked by slow breathing, can cause sudden death. The use of prescription opioids can have a number of side effects as well, even when taken as directed. · Tolerance-meaning you might need to take more of a medication for the same pain relief · Physical dependence-meaning you have symptoms of withdrawal when the medication is stopped. Withdrawal symptoms can include nausea, sweating, chills, diarrhea, stomach cramps, and muscle aches. Withdrawal can last up to several weeks, depending on which drug you took and how long you took it. · Increased sensitivity to pain · Constipation · Nausea, vomiting, and dry mouth · Sleepiness and dizziness · Confusion · Depression · Low levels of testosterone that can result in lower sex drive, energy, and strength · Itching and sweating RISKS ARE GREATER WITH:      
· History of drug misuse, substance use disorder, or overdose · Mental health conditions (such as depression or anxiety) · Sleep apnea · Older age (72 years or older) · Pregnancy Avoid alcohol while taking prescription opioids. Also, unless specifically advised by your health care provider, medications to avoid include: · Benzodiazepines (such as Xanax or Valium) · Muscle relaxants (such as Soma or Flexeril) · Hypnotics (such as Ambien or Lunesta) · Other prescription opioids KNOW YOUR OPTIONS Talk to your health care provider about ways to manage your pain that don't involve prescription opioids. Some of these options may actually work better and have fewer risks and side effects. Options may include: · Pain relievers such as acetaminophen, ibuprofen, and naproxen · Some medications that are also used for depression or seizures · Physical therapy and exercise · Counseling to help patients learn how to cope better with triggers of pain and stress. · Application of heat or cold compress · Massage therapy · Relaxation techniques Be Informed Make sure you know the name of your medication, how much and how often to take it, and its potential risks & side effects. IF YOU ARE PRESCRIBED OPIOIDS FOR PAIN: 
· Never take opioids in greater amounts or more often than prescribed. Remember the goal is not to be pain-free but to manage your pain at a tolerable level. · Follow up with your primary care provider to: · Work together to create a plan on how to manage your pain. · Talk about ways to help manage your pain that don't involve prescription opioids. · Talk about any and all concerns and side effects. · Help prevent misuse and abuse. · Never sell or share prescription opioids · Help prevent misuse and abuse. · Store prescription opioids in a secure place and out of reach of others (this may include visitors, children, friends, and family). · Safely dispose of unused/unwanted prescription opioids: Find your community drug take-back program or your pharmacy mail-back program, or flush them down the toilet, following guidance from the Food and Drug Administration (www.fda.gov/Drugs/ResourcesForYou). · Visit www.cdc.gov/drugoverdose to learn about the risks of opioid abuse and overdose. · If you believe you may be struggling with addiction, tell your health care provider and ask for guidance or call [employer] at 7-072-063-ESNC. Discharge Instructions Laser Lithotripsy: What to Expect at [facility] Your Recovery Laser lithotripsy is a way to treat kidney stones.  This treatment uses a laser to break kidney stones into tiny pieces. For several hours after the procedure you may have a burning feeling when you urinate. You may feel the urge to go even if you don't need to. This feeling should go away within a day. Drinking a lot of water can help. Your doctor also may advise you to take medicine that numbs the burning. This medicine is called phenazopyridine. It is available by prescription and over the counter. Brand names include Pyridium and Uristat. Your doctor may prescribe an antibiotic. This will help prevent an infection. You may have some blood in your urine for 2 or 3 days. Your doctor may have placed a small tube inside one of your ureters. Ureters are the tubes that connect the kidneys to the bladder. The small tube the doctor may have placed is called a stent. It may help the stone fragments pass through your body. Your doctor may remove the stent in a few weeks. Most stone fragments that are not removed pass out of the body within 24 hours. But sometimes it can take many weeks. If you have a large stone, you may need to come back for more treatments. This care sheet gives you a general idea about how long it will take for you to recover. But each person recovers at a different pace. Follow the steps below to feel better as quickly as possible. How can you care for yourself at home? Activity ? · Rest as much as you need to after you go home. ? · You may do your regular activities. But avoid hard exercise or sports for about a week or until there is no blood in your urine. Diet ? · You can eat your normal diet after lithotripsy. ? · Continue to drink plenty of fluids, enough so that your urine is light yellow or clear like water. If you have kidney, heart, or liver disease and have to limit fluids, talk with your doctor before you increase the amount of fluids you drink. Medicines ? · Your doctor will tell you if and when you can restart your medicines. He or she will also give you instructions about taking any new medicines. ? · If you take blood thinners, such as warfarin (Coumadin), clopidogrel (Plavix), or aspirin, be sure to talk to your doctor. He or she will tell you if and when to start taking those medicines again. Make sure that you understand exactly what your doctor wants you to do. ? · If you take medicine to stop the burning when you urinate, take it exactly as recommended. Call your doctor if you think you are having a problem with your medicine. This medicine may color your urine orange or red. This is normal. You will get more details on the specific medicine your doctor recommends. ? · If your doctor prescribed antibiotics, take them as directed. Do not stop taking them just because you feel better. You need to take the full course of antibiotics. ? · Be safe with medicines. Read and follow all instructions on the label. ¨ If the doctor gave you a prescription medicine for pain, take it as prescribed. ¨ If you are not taking a prescription pain medicine, ask your doctor if you can take acetaminophen (Tylenol). Do not take ibuprofen (Advil, Motrin) or naproxen (Aleve) or similar medicines unless your doctor tells you to. ¨ Do not take two or more pain medicines at the same time unless the doctor told you to. Many pain medicines have acetaminophen, which is Tylenol. Too much acetaminophen (Tylenol) can be harmful. ? Heat ? · Take a warm bath. This may soothe the burning. Other instructions ? · Urinate through the strainer the doctor gives you. Save any stone pieces, including those that look like sand or gravel. Take these to your doctor. This will help your doctor find the cause of your stones. Follow-up care is a key part of your treatment and safety. Be sure to make and go to all appointments, and call your doctor if you are having problems.  It's also a good idea to know your test results and keep a list of the medicines you take. When should you call for help? Call 911 anytime you think you may need emergency care. For example, call if: 
? · You passed out (lost consciousness). ? · You have chest pain, are short of breath, or cough up blood. ?Call your doctor now or seek immediate medical care if: 
? · You have pain that does not get better after you take pain medicine. ? · You have new or more blood clots in your urine. (It is normal for the urine to be pink for a few days.) ? · You cannot urinate. ? · You have symptoms of a urinary tract infection. These may include: 
¨ Pain or burning when you urinate. ¨ A frequent need to urinate without being able to pass much urine. ¨ Pain in the flank, which is just below the rib cage and above the waist on either side of the back. ¨ Blood in the urine. ¨ A fever. ? · You are sick to your stomach or cannot drink fluids. ? · You have signs of a blood clot in your leg (called a deep vein thrombosis), such as: 
¨ Pain in the calf, back of the knee, thigh, or groin. ¨ Redness and swelling in your leg. ? Watch closely for any changes in your health, and be sure to contact your doctor if you have any problems. Where can you learn more? Go to http://maksim-yasir.info/. Enter Q239 in the search box to learn more about \"Laser Lithotripsy: What to Expect at Home. \" Current as of: May 12, 2017 Content Version: 11.4 © 4444-3217 Healthwise, Incorporated. Care instructions adapted under license by [employer] (which disclaims liability or warranty for this information). If you have questions about a medical condition or this instruction, always ask your healthcare professional. [place] [address] any warranty or liability for your use of this information. ACTIVITY · As tolerated and as directed by your doctor. · Bathe or shower as directed by your doctor. DIET · Clear liquids until no nausea or vomiting; then light diet for the first day. · Advance to regular diet on second day, unless your doctor orders otherwise. · If nausea and vomiting continues, call your doctor. PAIN 
· Take pain medication as directed by your doctor. · Call your doctor if pain is NOT relieved by medication. · DO NOT take aspirin of blood thinners unless directed by your doctor. DRESSING CARE  
 
 
CALL YOUR DOCTOR IF  
· Excessive bleeding that does not stop after holding pressure over the area · Temperature of 101 degrees F or above · Excessive redness, swelling or bruising, and/ or green or yellow, smelly discharge from incision AFTER ANESTHESIA · For the first 24 hours: DO NOT Drive, Drink alcoholic beverages, or Make important decisions. · Be aware of dizziness following anesthesia and while taking pain medication. APPOINTMENT DATE/ TIME 
 
YOUR DOCTOR'S PHONE NUMBER  
 
 
DISCHARGE SUMMARY from Nurse PATIENT INSTRUCTIONS: 
 
After general anesthesia or intravenous sedation, for 24 hours or while taking prescription Narcotics: · Limit your activities · Do not drive and operate hazardous machinery · Do not make important personal or business decisions · Do  not drink alcoholic beverages · If you have not urinated within 8 hours after discharge, please contact your surgeon on call. *  Please give a list of your current medications to your Primary Care Provider. *  Please update this list whenever your medications are discontinued, doses are 
    changed, or new medications (including over-the-counter products) are added. *  Please carry medication information at all times in case of emergency situations. These are general instructions for a healthy lifestyle: No smoking/ No tobacco products/ Avoid exposure to second hand smoke Surgeon General's Warning:  Quitting smoking now greatly reduces serious risk to your health. Obesity, smoking, and sedentary lifestyle greatly increases your risk for illness A healthy diet, regular physical exercise & weight monitoring are important for maintaining a healthy lifestyle You may be retaining fluid if you have a history of heart failure or if you experience any of the following symptoms:  Weight gain of 3 pounds or more overnight or 5 pounds in a week, increased swelling in our hands or feet or shortness of breath while lying flat in bed. Please call your doctor as soon as you notice any of these symptoms; do not wait until your next office visit. Recognize signs and symptoms of STROKE: 
 
F-face looks uneven A-arms unable to move or move unevenly S-speech slurred or non-existent T-time-call 911 as soon as signs and symptoms begin-DO NOT go Back to bed or wait to see if you get better-TIME IS BRAIN. ACO Transitions of Care Introducing [employer] [address] Abigail Bran offers a voluntary care coordination program to provide high quality service and care to [facility] fee-for-service beneficiaries. Molly Cockayne was designed to help you enhance your health and well-being through the following services: ? Transitions of Care  support for individuals who are transitioning from one care setting to another (example: Hospital to home). ? Chronic and Complex Care Coordination  support for individuals and caregivers of those with serious or chronic illnesses or with more than one chronic (ongoing) condition and those who take a number of different medications. If you meet specific medical criteria, a [facility] [address] may call you directly to coordinate your care with your primary care physician and your other care providers. For questions about the [facility] programs, please, contact your physicians office.  
 
For general questions or additional information about Accountable Care Organizations: 
Please visit www.medicare.gov/acos. html or call 1-800-MEDICARE (8-382.139.9692) TTY users should call 9-770.903.8673. Introducing [facility]! [facility] introduces AmberPoint patient portal. Now you can access parts of your medical record, email your doctor's office, and request medication refills online. 1. In your internet browser, go to https://[employer] Corn[employer]. [employer] 2. Click on the First Time User? Click Here link in the Sign In box. You will see the New Member Sign Up page. 3. Enter your AmberPoint Access Code exactly as it appears below. You will not need to use this code after youve completed the sign-up process. If you do not sign up before the expiration date, you must request a new code. · AmberPoint Access Code: [email or web address] Expires: 7/1/2018 11:21 AM 
 
4. Enter the last four digits of your Social Security Number (xxxx) and Date of Birth (mm/dd/yyyy) as indicated and click Submit. You will be taken to the next sign-up page. 5. Create a AmberPoint ID. This will be your AmberPoint login ID and cannot be changed, so think of one that is secure and easy to remember. 6. Create a AmberPoint password. You can change your password at any time. 7. Enter your Password Reset Question and Answer. This can be used at a later time if you forget your password. 8. Enter your e-mail address. You will receive e-mail notification when new information is available in [address] 9. Click Sign Up. You can now view and download portions of your medical record. 10. Click the Download Summary menu link to download a portable copy of your medical information. If you have questions, please visit the Frequently Asked Questions section of the AmberPoint website. Remember, AmberPoint is NOT to be used for urgent needs. For medical emergencies, dial 911. Now available from your iPhone and Android! Introducing Kirit Schroeder As a Nancy Dent patient, I wanted to make you aware of our electronic visit tool called Kirit Schroeder. Nancy mPort/7 allows you to connect within minutes with a medical provider 24 hours a day, seven days a week via a mobile device or tablet or logging into a secure website from your computer. You can access Kirit Lowfin from anywhere in the [place]. A virtual visit might be right for you when you have a simple condition and feel like you just dont want to get out of bed, or cant get away from work for an appointment, when your regular Nancy Dent provider is not available (evenings, weekends or holidays), or when youre out of town and need minor care. Electronic visits cost only $49 and if the Nancy BrewsterGrid2020/7 provider determines a prescription is needed to treat your condition, one can be electronically transmitted to a nearby pharmacy*. Please take a moment to enroll today if you have not already done so. The enrollment process is free and takes just a few minutes. To enroll, please download the GlySure/netFactor carole to your tablet or phone, or visit [email or web address] to enroll on your computer. And, as an [address] [place] [address] patient with a Next audience account, the results of your visits will be scanned into your electronic medical record and your primary care provider will be able to view the scanned results. We urge you to continue to see your regular Nancy Dent provider for your ongoing medical care. And while your primary care provider may not be the one available when you seek a Kirit Schroeder virtual visit, the peace of mind you get from getting a real diagnosis real time can be priceless. For more information on Kirit Schroeder, view our Frequently Asked Questions (FAQs) at [email or web address]. Sincerely, 
 
Lina Guevara MD 
Chief Medical Officer Brigid8 Abigail Bran *:  certain medications cannot be prescribed via Kirit Schroeder Providers Seen During Your Hospitalization Provider Specialty Primary office phone Milli Edwards MD Urology [phone] Your Primary Care Physician (PCP) Primary Care Physician Office Phone Office Fax Cheo Lopes, [address] [facility] [phone] You are allergic to the following Allergen Reactions Codeine Hives Penicillins Rash Itching Years ago Recent Documentation Height Weight BMI Smoking Status 1.651 m 50 kg 18.34 kg/m2 Current Every Day Smoker Emergency Contacts Name Discharge Info Relation Home Work Mobile Mindy Shepard DISCHARGE CAREGIVER [3] Friend [5] [phone] Patient Belongings The following personal items are in your possession at time of discharge: 
  Dental Appliances: None         Home Medications: None   Jewelry: None  Clothing: Footwear, Pants, Shirt, Socks, Undergarments    Other Valuables: None  Personal Items Sent to Safe: none Please provide this summary of care documentation to your next provider. Signatures-by signing, you are acknowledging that this After Visit Summary has been reviewed with you and you have received a copy. Patient Signature:  ____________________________________________________________ Date:  ____________________________________________________________  
  
Gearldine Moulds Provider Signature:  ____________________________________________________________ Date:  ____________________________________________________________

## 2018-06-26 NOTE — PERIOP NOTES
Preoperative flank skin condition: clear  Lead shield: yes  Patient ear protection: yes  Gel applied to patient's flank: yes  Starting power level: 3  Shock start time (first  fluoroscopy): 0547  Shock stop time (last lithotripsy shock): 1443  Ending power level: 9  Total shocks: 3000  Total fluoroscopy time: 10:42  Postoperative flank skin condition: pink

## 2018-06-26 NOTE — BRIEF OP NOTE
BRIEF OPERATIVE NOTE    Date of Procedure: 6/26/2018   Preoperative Diagnosis: Kidney stone [N20.0]  Postoperative Diagnosis: Kidney stone [N20.0]    Procedure(s):  LEFT LOWER POLE LITHOTRIPSY  Surgeon(s) and Role:     * Aleksandr Starks MD - Primary         Surgical Assistant:     Surgical Staff:  Circ-1: Aries Arellano RN  Radiology Technician: Drake Mcneil RT, R, CT  Event Time In   Incision Start 1403   Incision Close 1444     Anesthesia: General   Estimated Blood Loss:   Specimens: * No specimens in log *   Findings:    Complications:   Implants: * No implants in log *

## 2018-07-10 ENCOUNTER — HOSPITAL ENCOUNTER (OUTPATIENT)
Dept: GENERAL RADIOLOGY | Age: 63
Discharge: HOME | End: 2018-07-10
Payer: MEDICARE

## 2018-07-10 DIAGNOSIS — J44.9 CHRONIC OBSTRUCTIVE PULMONARY DISEASE, UNSPECIFIED COPD TYPE (HCC): ICD-10-CM

## 2018-07-10 PROCEDURE — 71046 X-RAY EXAM CHEST 2 VIEWS: CPT

## 2018-07-25 ENCOUNTER — HOSPITAL ENCOUNTER (OUTPATIENT)
Dept: LAB | Age: 63
Discharge: HOME | End: 2018-07-25
Payer: MEDICARE

## 2018-07-25 DIAGNOSIS — R94.39 ABNORMAL CARDIOVASCULAR STRESS TEST: ICD-10-CM

## 2018-07-25 DIAGNOSIS — R06.02 SHORTNESS OF BREATH: ICD-10-CM

## 2018-07-25 LAB
ANION GAP SERPL CALC-SCNC: 8 MMOL/L
BASOPHILS # BLD: 0 K/UL (ref 0–0.2)
BASOPHILS NFR BLD: 0 % (ref 0–2)
BUN SERPL-MCNC: 14 MG/DL (ref 8–23)
CALCIUM SERPL-MCNC: 9 MG/DL (ref 8.3–10.4)
CHLORIDE SERPL-SCNC: 102 MMOL/L (ref 98–107)
CO2 SERPL-SCNC: 26 MMOL/L (ref 21–32)
CREAT SERPL-MCNC: 1.2 MG/DL (ref 0.8–1.5)
DIFFERENTIAL METHOD BLD: ABNORMAL
EOSINOPHIL # BLD: 0 K/UL (ref 0–0.8)
EOSINOPHIL NFR BLD: 0 % (ref 0.5–7.8)
ERYTHROCYTE [DISTWIDTH] IN BLOOD BY AUTOMATED COUNT: 14.3 % (ref 11.9–14.6)
GLUCOSE SERPL-MCNC: 84 MG/DL (ref 65–100)
HCT VFR BLD AUTO: 42.7 % (ref 41.1–50.3)
HGB BLD-MCNC: 14.5 G/DL (ref 13.6–17.2)
INR PPP: 0.9
LYMPHOCYTES # BLD: 1.4 K/UL (ref 0.5–4.6)
LYMPHOCYTES NFR BLD: 13 % (ref 13–44)
MCH RBC QN AUTO: 34.4 PG (ref 26.1–32.9)
MCHC RBC AUTO-ENTMCNC: 34 G/DL (ref 31.4–35)
MCV RBC AUTO: 101.4 FL (ref 79.6–97.8)
MONOCYTES # BLD: 1.1 K/UL (ref 0.1–1.3)
MONOCYTES NFR BLD: 10 % (ref 4–12)
NEUTS SEG # BLD: 8.3 K/UL (ref 1.7–8.2)
NEUTS SEG NFR BLD: 77 % (ref 43–78)
PLATELET # BLD AUTO: 404 K/UL (ref 150–450)
PMV BLD AUTO: 9.3 FL (ref 10.8–14.1)
POTASSIUM SERPL-SCNC: 3.8 MMOL/L (ref 3.5–5.1)
PROTHROMBIN TIME: 11.9 SEC (ref 11.5–14.5)
RBC # BLD AUTO: 4.21 M/UL (ref 4.23–5.67)
SODIUM SERPL-SCNC: 136 MMOL/L (ref 136–145)
WBC # BLD AUTO: 10.7 K/UL (ref 4.3–11.1)

## 2018-07-25 PROCEDURE — 80048 BASIC METABOLIC PNL TOTAL CA: CPT | Performed by: INTERNAL MEDICINE

## 2018-07-25 PROCEDURE — 85610 PROTHROMBIN TIME: CPT | Performed by: INTERNAL MEDICINE

## 2018-07-25 PROCEDURE — 85025 COMPLETE CBC W/AUTO DIFF WBC: CPT | Performed by: INTERNAL MEDICINE

## 2018-07-25 PROCEDURE — 36415 COLL VENOUS BLD VENIPUNCTURE: CPT | Performed by: INTERNAL MEDICINE

## 2018-08-08 RX ORDER — ASPIRIN 81 MG/1
81 TABLET ORAL DAILY
COMMUNITY
End: 2019-12-05

## 2018-08-08 NOTE — PROGRESS NOTES
Patient pre-assessment complete for [facility] poss with Dr Thierno Rice scheduled for 18 at 8am, arrival time 6am. Patient verified using . Patient instructed to bring all home medications in labeled bottles on the day of procedure. NPO status reinforced. Patient informed to take a full dose aspirin 325mg  or 81 mg x 4 on the day of procedure. Instructed they can take all other medications excluding vitamins & supplements. Patient verbalizes understanding of all instructions & denies any questions at this time.

## 2018-08-09 ENCOUNTER — HOSPITAL ENCOUNTER (OUTPATIENT)
Dept: CARDIAC CATH/INVASIVE PROCEDURES | Age: 63
Discharge: HOME | End: 2018-08-09
Attending: INTERNAL MEDICINE | Admitting: INTERNAL MEDICINE
Payer: MEDICARE

## 2018-08-09 VITALS
SYSTOLIC BLOOD PRESSURE: 147 MMHG | WEIGHT: 120 LBS | BODY MASS INDEX: 19.99 KG/M2 | HEIGHT: 65 IN | OXYGEN SATURATION: 100 % | RESPIRATION RATE: 17 BRPM | DIASTOLIC BLOOD PRESSURE: 80 MMHG | HEART RATE: 93 BPM | TEMPERATURE: 97.8 F

## 2018-08-09 LAB
ATRIAL RATE: 100 BPM
CALCULATED P AXIS, ECG09: 70 DEGREES
CALCULATED R AXIS, ECG10: 72 DEGREES
CALCULATED T AXIS, ECG11: 72 DEGREES
DIAGNOSIS, 93000: NORMAL
P-R INTERVAL, ECG05: 120 MS
Q-T INTERVAL, ECG07: 338 MS
QRS DURATION, ECG06: 80 MS
QTC CALCULATION (BEZET), ECG08: 436 MS
VENTRICULAR RATE, ECG03: 100 BPM

## 2018-08-09 PROCEDURE — 75716 ARTERY X-RAYS ARMS/LEGS: CPT

## 2018-08-09 PROCEDURE — 74011250636 HC RX REV CODE- 250/636

## 2018-08-09 PROCEDURE — 74011636320 HC RX REV CODE- 636/320: Performed by: INTERNAL MEDICINE

## 2018-08-09 PROCEDURE — 99152 MOD SED SAME PHYS/QHP 5/>YRS: CPT

## 2018-08-09 PROCEDURE — C1769 GUIDE WIRE: HCPCS

## 2018-08-09 PROCEDURE — 93459 L HRT ART/GRFT ANGIO: CPT

## 2018-08-09 PROCEDURE — 74011250637 HC RX REV CODE- 250/637: Performed by: INTERNAL MEDICINE

## 2018-08-09 PROCEDURE — 74011250636 HC RX REV CODE- 250/636: Performed by: INTERNAL MEDICINE

## 2018-08-09 PROCEDURE — C1894 INTRO/SHEATH, NON-LASER: HCPCS

## 2018-08-09 PROCEDURE — 93005 ELECTROCARDIOGRAM TRACING: CPT | Performed by: INTERNAL MEDICINE

## 2018-08-09 PROCEDURE — 93567 NJX CAR CTH SPRVLV AORTGRPHY: CPT

## 2018-08-09 PROCEDURE — 76937 US GUIDE VASCULAR ACCESS: CPT

## 2018-08-09 PROCEDURE — 77030004534 HC CATH ANGI DX INFN CARD -A

## 2018-08-09 PROCEDURE — 77030004559 HC CATH ANGI DX SUPT CARD -B

## 2018-08-09 PROCEDURE — 99153 MOD SED SAME PHYS/QHP EA: CPT

## 2018-08-09 PROCEDURE — 77030013687 HC GD NDL BARD -B

## 2018-08-09 PROCEDURE — 93458 L HRT ARTERY/VENTRICLE ANGIO: CPT

## 2018-08-09 PROCEDURE — 77030004558 HC CATH ANGI DX SUPR TORQ CARD -A

## 2018-08-09 RX ORDER — ACETAMINOPHEN 325 MG/1
650 TABLET ORAL
Status: DISCONTINUED | OUTPATIENT
Start: 2018-08-09 | End: 2018-08-09 | Stop reason: HOSPADM

## 2018-08-09 RX ORDER — SODIUM CHLORIDE 0.9 % (FLUSH) 0.9 %
5-10 SYRINGE (ML) INJECTION EVERY 8 HOURS
Status: DISCONTINUED | OUTPATIENT
Start: 2018-08-09 | End: 2018-08-09 | Stop reason: HOSPADM

## 2018-08-09 RX ORDER — MIDAZOLAM HYDROCHLORIDE 1 MG/ML
.5-5 INJECTION, SOLUTION INTRAMUSCULAR; INTRAVENOUS
Status: DISCONTINUED | OUTPATIENT
Start: 2018-08-09 | End: 2018-08-09 | Stop reason: HOSPADM

## 2018-08-09 RX ORDER — LIDOCAINE HYDROCHLORIDE 10 MG/ML
5-40 INJECTION INFILTRATION; PERINEURAL
Status: DISCONTINUED | OUTPATIENT
Start: 2018-08-09 | End: 2018-08-09 | Stop reason: HOSPADM

## 2018-08-09 RX ORDER — GUAIFENESIN 100 MG/5ML
81-324 LIQUID (ML) ORAL ONCE
Status: DISCONTINUED | OUTPATIENT
Start: 2018-08-09 | End: 2018-08-09 | Stop reason: HOSPADM

## 2018-08-09 RX ORDER — SODIUM CHLORIDE 9 MG/ML
75 INJECTION, SOLUTION INTRAVENOUS CONTINUOUS
Status: DISCONTINUED | OUTPATIENT
Start: 2018-08-09 | End: 2018-08-09 | Stop reason: HOSPADM

## 2018-08-09 RX ORDER — FENTANYL CITRATE 50 UG/ML
25-100 INJECTION, SOLUTION INTRAMUSCULAR; INTRAVENOUS
Status: DISCONTINUED | OUTPATIENT
Start: 2018-08-09 | End: 2018-08-09 | Stop reason: HOSPADM

## 2018-08-09 RX ORDER — HEPARIN SODIUM 200 [USP'U]/100ML
3 INJECTION, SOLUTION INTRAVENOUS CONTINUOUS
Status: DISCONTINUED | OUTPATIENT
Start: 2018-08-09 | End: 2018-08-09 | Stop reason: HOSPADM

## 2018-08-09 RX ORDER — DIAZEPAM 5 MG/1
5 TABLET ORAL ONCE
Status: COMPLETED | OUTPATIENT
Start: 2018-08-09 | End: 2018-08-09

## 2018-08-09 RX ORDER — SODIUM CHLORIDE 0.9 % (FLUSH) 0.9 %
5-10 SYRINGE (ML) INJECTION AS NEEDED
Status: DISCONTINUED | OUTPATIENT
Start: 2018-08-09 | End: 2018-08-09 | Stop reason: HOSPADM

## 2018-08-09 RX ORDER — HYDROCODONE BITARTRATE AND ACETAMINOPHEN 5; 325 MG/1; MG/1
1 TABLET ORAL
Status: DISCONTINUED | OUTPATIENT
Start: 2018-08-09 | End: 2018-08-09 | Stop reason: HOSPADM

## 2018-08-09 RX ORDER — ONDANSETRON 2 MG/ML
4 INJECTION INTRAMUSCULAR; INTRAVENOUS
Status: DISCONTINUED | OUTPATIENT
Start: 2018-08-09 | End: 2018-08-09 | Stop reason: HOSPADM

## 2018-08-09 RX ADMIN — MIDAZOLAM HYDROCHLORIDE 2 MG: 1 INJECTION, SOLUTION INTRAMUSCULAR; INTRAVENOUS at 08:21

## 2018-08-09 RX ADMIN — SODIUM CHLORIDE 75 ML/HR: 900 INJECTION, SOLUTION INTRAVENOUS at 07:21

## 2018-08-09 RX ADMIN — LIDOCAINE HYDROCHLORIDE 10 ML: 10 INJECTION, SOLUTION INFILTRATION; PERINEURAL at 08:45

## 2018-08-09 RX ADMIN — DIAZEPAM 5 MG: 5 TABLET ORAL at 07:21

## 2018-08-09 RX ADMIN — FENTANYL CITRATE 50 MCG: 50 INJECTION, SOLUTION INTRAMUSCULAR; INTRAVENOUS at 08:15

## 2018-08-09 RX ADMIN — IOPAMIDOL 155 ML: 755 INJECTION, SOLUTION INTRAVENOUS at 08:45

## 2018-08-09 RX ADMIN — HEPARIN SODIUM 3 ML/HR: 5000 INJECTION, SOLUTION INTRAVENOUS; SUBCUTANEOUS at 08:45

## 2018-08-09 RX ADMIN — MIDAZOLAM HYDROCHLORIDE 2 MG: 1 INJECTION, SOLUTION INTRAMUSCULAR; INTRAVENOUS at 08:15

## 2018-08-09 NOTE — PROGRESS NOTES
Patient and family shown open heart surgery video; time allowed for questions and concerns. Patient and family verbalize understanding of video.

## 2018-08-09 NOTE — CONSULTS
Davonte Benton. MD Lisa Robledo. MD Demarcus Hercules         8/9/2018 1955    REFERRING PHYSICIAN:  Dr. Noelle Arellano:  The patient is a 58 y.o. male who was seen in the office by Dr. Ney Bran for worsening HINDS and fatigue. A stress test showed inferior defect with mild reversibility. He underwent LHC today that showed severe multivessel disease. Pt states he occasionally has a dull chest pain that resolves without treatment but fatigue and dyspnea have worsened for some time. He has history of COPD and continues to smoke about 1 pack per day. In the past, he smoked up to 3 packs per day. He has smoked since the age of 15. He has history of bladder cancer as well. He had a cystectomy and ileal conduit urinary diversion.         Risk factors include tobacco abuse/COPD, HTN, Dyslipidemia    ** No history of stroke, TIA, prior cardiac surgery, prior vascular surgery, anesthetic complication, DVT or PE, GI bleeding       Past Medical History:   Diagnosis Date    Bladder cancer ([facility] [address] 07/2014    Cystectomy with ileo conduit urinary diversion ----and chemo    Chronic neck pain     Chronic obstructive pulmonary disease (HCC)     daily inhaler    Depression with anxiety     History of neck surgery     Hypertension     controlled with med    Kidney stones     only one at present    Osteoarthritis     Staghorn renal calculus     Left sided    Status post ileal conduit ([facility] [address] 2014    Tobacco use disorder, continuous        Past Surgical History:   Procedure Laterality Date    HX CERVICAL FUSION  2014    Dr. Selena Lau    HX HEMORRHOIDECTOMY      HX KNEE ARTHROSCOPY Left          HX UROLOGICAL      benign tumor removed from L testicle      HX UROLOGICAL  2015    TURBT    HX UROLOGICAL Left     Nephrostomy    HX VASCULAR ACCESS Right 2014    Port, removed 2016       Family History   Problem Relation Age of Onset    Cancer Mother     Lung Disease Mother  Cancer Father     No Known Problems Sister     Cancer Sister        Social History     Social History    Marital status:      Spouse name: N/A    Number of children: 2    Years of education: N/A     Occupational History    Unemployed, 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 Social History Main Topics    Smoking status: Current Every Day Smoker     Packs/day: 1.00     Years: 50.00    Smokeless tobacco: Never Used    Alcohol use 14.0 oz/week     28 Cans of beer per week      Comment: - QUIT 7/2018    Drug use: No      Comment: QUIT a few months ago    Sexual activity: Not Currently     Other Topics Concern    Not on file     Social History Narrative    Lives with girlfriend. His father is living in [place]. Allergies   Allergen Reactions    Codeine Hives    Penicillins Rash and Itching     Years ago       No current facility-administered medications on file prior to encounter. Current Outpatient Prescriptions on File Prior to Encounter   Medication Sig Dispense Refill    tiotropium bromide (SPIRIVA RESPIMAT) 2.5 mcg/actuation inhaler Take 2 Puffs by inhalation daily. 1 Inhaler 0    budesonide-formoterol (SYMBICORT) 160-4.5 mcg/actuation HFAA Take 2 Puffs by inhalation two (2) times a day. 1 Inhaler 3    amLODIPine (NORVASC) 5 mg tablet Take 1 Tab by mouth daily. 30 Tab 3    Ostomy Supplies (NEW IMAGE SKIN BARRIER) 2 1/4 \" misc 1 Units by Does Not Apply route as needed. 10 Each 99       REVIEW OF SYSTEMS:  Review of Systems   Constitution: Positive for malaise/fatigue. Negative for chills, fever, weakness, weight gain and weight loss. HENT: Negative for ear pain, hearing loss, nosebleeds, sore throat and tinnitus. Eyes: Negative for blurred vision, vision loss in left eye and vision loss in right eye. Cardiovascular: Positive for dyspnea on exertion. Negative for chest pain, leg swelling, near-syncope, orthopnea, palpitations, paroxysmal nocturnal dyspnea and syncope. Respiratory: Positive for shortness of breath. Negative for cough, hemoptysis, sputum production and wheezing. Endocrine: Negative for cold intolerance, heat intolerance and polydipsia. Hematologic/Lymphatic: Does not bruise/bleed easily. Skin: Negative for color change and rash. Musculoskeletal: Negative for back pain, joint pain, joint swelling and myalgias. Gastrointestinal: Negative for abdominal pain, constipation, diarrhea, dysphagia, heartburn, hematemesis, melena, nausea and vomiting. Genitourinary: Negative for dysuria, frequency, hematuria and urgency. Neurological: Negative for difficulty with concentration, dizziness, headaches, light-headedness, numbness, paresthesias, seizures and vertigo. Psychiatric/Behavioral: Negative for altered mental status and depression. Physical Exam  Vitals:    08/09/18 0931 08/09/18 0936 08/09/18 0940 08/09/18 0944   BP: 127/79 145/87 126/78 127/77   Pulse: 87 93 82 77   Resp:       Temp:       SpO2: 98% 97% 98% 98%   Weight:       Height:           Physical Exam:  General: Well Developed, Well Nourished, No Acute Distress  HEENT: Normocephalic, pupils equal and round, no scleral icterus  Neck: supple, no JVD  Chest wall: No deformity  Heart: S1S2 with RRR without murmurs or gallops  Lungs: Clear throughout auscultation bilaterally without adventitious sounds  Vascular: Pulses are full and equal at the radial arteries and the popliteal arteries bilaterally. There is no venous stasis disease.   Abd: soft, nontender, nondistended, with good bowel sounds, no pulsatile masses  Ext: warm, no edema, calves supple/nontender, pulses 2+ bilaterally  Skin: warm and dry, no rashes, cyanosis, jaundice, ecchymoses or evidence of skin breakdown  Psychiatric: Normal mood and affect  Neurologic: Alert and oriented X 3, no focal deficit noted      Lab Results   Component Value Date/Time    Cholesterol, total 224 (H) 05/29/2018 11:51 AM    HDL Cholesterol 58 05/29/2018 11:51 AM    LDL, calculated 135 (H) 05/29/2018 11:51 AM    VLDL, calculated 31 05/29/2018 11:51 AM    Triglyceride 154 (H) 05/29/2018 11:51 AM       Assessment:     Active Problems:    * CAD, multivessel    HTN  COPD  Tobacco abuse  Dyslipidemia      Plan:     Pt was initially stating that he was unsure if he wanted to have CABG surgery. He stated that he might as well die. He stated he did not have any family or anyone that cared about him. After further discussion about the severity of his CAD, he stated he would proceed with CABG surgery as long as he could go home today. He was found to be asleep when I returned to the room for further discussion. Shortly after this, I was asked by nursing staff to return to the room. The patient as standing in the room and demanding to leave. He was told multiple times that his bedrest period was not up yet. I informed him that he was at high risk of bleeding from his groin site that could potentially be fatal. He repeatedly said, \"I am done. \" He asked for his IV to be removed. He stated he was not willing to stay for remaining bedrest. He would not provide an answer about why he wanted to leave. He continued to state, \"I am done\" and ask his wife for the keys. I informed the patient of his risks of leaving the hospital before his appropriate bedrest was completed including death. He voiced understanding of these risks but refused to stay. His wife was apologetic for his behavior. She was informed of his risk of bleeding and potential death. She was informed to hold pressure on the groin site and call 911 if she noted any bleeding. She stated, \"I don't think he will come back. \" He walked away from staff and fled down the hallway toward the main entrance. Landy Pool RN was able to convince him to turn around to talk to Dr. Lilian Morales. Dr. Lilian Morales informed patient of his risks of leaving the hospital including possible death.  He continued to refuse to stay and walked away towards the entrance of the hospital.     Elias Troy PA-C

## 2018-08-09 NOTE — PROGRESS NOTES
TRANSFER - OUT REPORT:    Verbal report given to CORNELIUS Camara(name) on Enrigue Stain  being transferred to cpru(unit) for routine progression of care       Report consisted of patients Situation, Background, Assessment and   Recommendations(SBAR). Information from the following report(s) SBAR was reviewed with the receiving nurse. is allergic to codeine and penicillins. Opportunity for questions and clarification was provided. Procedure Summary:Pt had LHC via R groin, 6 fr sheath left in place and covered with clear dsg.     Med Administration    Versed:  4 mg  Fentanyl: 50 mcg  Visit Vitals    /79 (BP 1 Location: Left arm, BP Patient Position: Supine)    Pulse 88    Temp 97.8 °F (36.6 °C)    Resp 17    Ht 5' 5\" (1.651 m)    Wt 54.4 kg (120 lb)    SpO2 99%    BMI 19.97 kg/m2     Past Medical History:   Diagnosis Date    Bladder cancer ([facility] [address] 07/2014    Cystectomy with ileo conduit urinary diversion ----and chemo    Chronic neck pain     Chronic obstructive pulmonary disease (HCC)     daily inhaler    Depression with anxiety     History of neck surgery     Hypertension     controlled with med    Kidney stones     only one at present    Osteoarthritis     Staghorn renal calculus     Left sided    Status post ileal conduit ([facility] [address] 2014    Tobacco use disorder, continuous            Peripheral IV 08/09/18 Left Antecubital (Active)

## 2018-08-09 NOTE — PROCEDURES
[address]    Phoebe Montemayor  MR#: [id number]  : 1955  ACCOUNT #: [de-identified]   DATE OF SERVICE: 2018    PROCEDURE:   1. Left heart catheterization, selective coronary aortography, left ventriculogram via the right femoral approach. 2.  Abdominal Aortogram   3. Left subclavian Angiogram.      INDICATION:    1. Severe dyspnea with exertion concerning for anginal equivalent in a patient with abnormal stress test showing olegario-infarct ischemia of the inferior wall concerning for coronary artery disease. Cardiac catheterization arranged by Dr. Angelica Trorez. 2.  Assess patency of Left subclavian and LIMA for CABG conduit  3. Difficulty with entering abdominal aorta from right femoral artery. Concerned about peripheral vascular disease/obstruction. TECHNICAL FACTORS:  After informed consent was obtained, the patient was brought to cardiac catheterization lab. The right femoral artery was prepped and draped in usual sterile fashion. Using [facility] ultrasound, the right common femoral artery was identified. A static image was placed on the chart. Under direct ultrasound visualization, the right common femoral artery was entered. A 6-[place] arterial sheath was placed without difficulty. There was difficulty entering the abdominal aorta and ultimately, the common iliac was traversed with a Versacore wire. A JL4 catheter was then used to selectively engage the ostium of left main coronary artery and a 3DRC catheter used to selectively engage the ostium of the right coronary artery. Selective injections in various projections were performed. The [facility] catheter was then used to engage the left subclavian. Left subclavian angiography in the PAN projection was performed with visualization of the left internal mammary artery. At this point, a pigtail catheter was advanced and used to cross the aortic valve and enter the left ventricle. Hemodynamic measurements and left ventriculogram were obtained. Left ventricular aortic pressure gradient was obtained by pullback technique. The pigtail catheter was then placed in the abdominal aorta. Using digital subtraction angiography with 15 mL per second for 30 mL, abdominal aorta, iliac angiogram and femoral angiogram were performed. The sheath was sutured in place at the conclusion of procedure given his vascular disease. No closure device was utilized. CONTRAST:  Isovue 155. SEDATION:  The patient received 4 mg Versed, 50 mcg of fentanyl. Sedation start time was 8:15 a.m. with the procedure completion time of 8:46 a.m. Sedation was administered by Lucia Yin RN, under my supervision. HEMODYNAMICS:  1. Aortic pressure 120/79 with a mean of 92.  2.  Left ventricular end diastolic pressure 22.  3.  There is no significant gradient across the aortic valve. ANGIOGRAPHIC RESULTS:  1. Left main coronary artery: Moderately calcified in the ostial segment with less than 10% stenosis. 2.  LAD:  Heavily calcified. Contains 80% proximal stenosis and 70% mid stenosis. The distal vessel is patent with 20% luminal irregularities. 3.  First diagonal artery:  Medium caliber vessel. 20-30% proximal stenosis. 4.  Left circumflex:  Medium caliber codominant vessel. Diffusely diseased. 60% mid stenosis. 5.  First obtuse marginal artery:  Medium caliber vessel. 70% proximal stenosis. 6.  Second obtuse marginal artery:  Small caliber vessel. Patent. 7.  Left posterolateral branches 1 and 2:  Small caliber vessels. Patent. 8.  Right coronary artery:  A medium caliber codominant vessel. Heavily calcified. 90% proximal, 100% mid occlusion. There is left to right collateralization of a very small PDA. Unsure if this is a graftable vessel. 9.  Left ventriculogram performed in PAN projection shows preserved LV ejection fraction with EF 55-60%. Mild inferior hypokinesis.   Aortic root is not dilated. 10.  Left subclavian angiography shows a patent left subclavian with diffuse 20-30% proximal stenosis. There appears to be a 60-70% ostial vertebral stenosis. LIMA is medium caliber and widely patent. 11.  Abdominal aortogram in the AP projection showed the distal abdominal aorta to be nondilated. The right common iliac contains a shelf-like lesion of around 70% with associated calcification in the mid segment. There is a 60% distal stenosis. The right internal iliac is very small caliber with a 50% stenosis. The right external iliac contains a 40-50% stenosis. The right common femoral artery is patent. The left common iliac contains 20-30% stenosis distally. The left internal iliac is difficult to visualize and may be occluded. The left external iliac appears to be patent. Left common femoral is patent with a high bifurcation. There is diffuse calcification of the ostium of the SFA with 60-70% stenosis. CONCLUSION:  1.  Multivessel coronary artery disease. 2.  Preserved left ventricular systolic function with segmental wall motion abnormalities consistent with ischemic cardiomyopathy. 3.  Patent left subclavian LIMA. 4.  Right iliac disease as described above. Nonobstructive disease in the left iliac and femoral system.       MD JOSE L Palacio / BRUCE  D: 08/09/2018 08:54     T: 08/09/2018 09:20  JOB #: [id number]  CC: Jasmin Alcantara MD

## 2018-08-09 NOTE — PROGRESS NOTES
Report received from [facility] Cath Lab RN. Procedural findings communicated. Intra procedural  medication administration reviewed. Progression of care discussed. Patient received into CPRU [place] 6 post LHC with 6 fr sheath intact/patent.     Access site without bleeding or swelling yes    Dressing dry and intact yes    Patient instructed to limit movement to right lower extremity    Routine post procedural vital signs and site assessment initiated yes

## 2018-08-09 NOTE — PROGRESS NOTES
Patient out of bed against medical advice; patient told \"you can bleed to death from your access site if you do not complete your bedrest, please get back in bed\" patient dressing and refuses to return to bed; Magda NAVARRO comes to room while patient is dressing and advices patient of danger of not completing bedrest and high risk of bleeding to death without completing recommended bedrest. Patient continue to dress ignoring conversation without any response to staff. Dr. Sagrario England paged and on the way to talk to patient; patient allowed Lolly Browning RN to remove the IV; patient walked out [facility] RN followed patient to front entrance after patient refuses wheelchair multiple times; Magda NAVARRO and Dr. Sagrario England meets patient and [facility] RN with family member in [facility]. Dr. Sagrario England and Magda NAVARRO both ask patient to return to room and complete bedrest; Dr. Sagrario England explained to patient the danger of not completing bedrest and that he could start bleeding from RFA site and bleed to death. Patient states \"I'm going I'm done\" and left. Patient left AMA.

## 2018-08-09 NOTE — PROCEDURES
Patient left against medical advice prior to bedrest completion. Nurse and myself both pleaded with patient to wait and discussed risk of bleeding and potential death. Patient refused to stay and left prior to any paperwork being filled out.        Ashleigh Pierce MD

## 2018-08-09 NOTE — DISCHARGE INSTRUCTIONS
HEART CATHETERIZATION/ANGIOGRAPHY DISCHARGE INSTRUCTIONS    1. Check puncture site frequently for swelling or bleeding. If there is any bleeding, lie down and apply pressure over the area with a clean towel or washcloth. If bleeding does not stop quickly, call 911 and hold pressure until EMS arrives. Notify your doctor for any redness, swelling, drainage, or oozing from the puncture site. Notify your doctor for any fever or chills. 2. If the extremity becomes cold, numb, or painful call Dr. Anitha Clark at [phone].  3. Activity should be limited for the next 48 hours. Climb stairs as little as possible and avoid any stooping, bending, or strenuous activity for 48 hours. No heavy lifting (anything over 10 pounds) for 3 days. 4. You may resume your usual diet. Drink more fluids than usual.  5. Have a responsible person drive you home and stay with you for at least 24 hours after your heart catheterization/angiography. 6. Remove bandage from your right groin in 24 hours. You may shower in 24 hours. No tub baths, hot tubs, or swimming for 1 week. Do not place any lotions, creams, powders, or ointments over puncture site for 1 week. Keep your right groin site clean, dry, and open to air until site heals. 7. Follow the written instructions given to you today by Dr. Frances Becker  regarding your open heart surgery. I have read the above instructions and have had the opportunity to ask questions.       Patient: ________________________   Date: 8/9/2018    Witness: _______________________   Date: 8/9/2018

## 2018-08-09 NOTE — PROGRESS NOTES
Patient received to [address] [place] [address] room # 12  Ambulatory from [facility]. Patient scheduled for [facility] today with Dr Becky Cordero. Procedure reviewed & questions answered, voiced good understanding consent obtained & placed on chart. All medications and medical history reviewed. Will prep patient per orders. Patient & family updated on plan of care.       The patient has a fraility score of 3-MANAGING WELL, based on ability to perform ADLs with complaints of shortness of breath

## 2018-08-09 NOTE — PROCEDURES
Brief Cardiac Procedure Note    Patient: Harshad Cai MRN: [id number]  SSN: [id number]    YOB: 1955  Age: 58 y.o. Sex: male      Date of Procedure: 8/9/2018     Pre-procedure Diagnosis: Typical Angina    Post-procedure Diagnosis: Multi-vessel CAD. Procedure: Left Heart Catheterization    Brief Description of Procedure: See above    Performed By: Domo Heranndez MD     Assistants: None    Anesthesia: Moderate Sedation    Estimated Blood Loss: Less than 10 mL      Specimens: None    Implants: None    Findings: Multivessel CAD    Complications: None    Recommendations: CTS evaluation for CABG evaluation.   Graft Targets:   LAD, D1, OM1 possible PDA    Signed By: Domo Hernandez MD     August 9, 2018 71F with a Hx of a symptomatic incisional hernia 2/2 a laparoscopic SBR for a closed loop obstruction s/p ventral hernia repair with mesh.    1.) Postop Care  - ADAT  - IVF until tolerating PO  - Pain/nausea control PRN  - SCDs/SQH  - Abdominal binder  - EMMANUEL monitoring    2.) Dispo  - DC home today  - plan for discharge tm with EMMANUEL and follow up with Dr. West in 1 week. 71F with a Hx of a symptomatic incisional hernia 2/2 a laparoscopic SBR for a closed loop obstruction s/p incisional hernia repair with mesh. 9/26    1.) Postop Care  - ADAT  - IVF until tolerating PO  - Pain/nausea control PRN  - SCDs/SQH  - Abdominal binder  - EMMANUEL monitoring    2.) Dispo  - DC home today  - plan for discharge tm with EMMANUEL and follow up with Dr. West in 1 week. 71F with a Hx of a symptomatic incisional hernia 2/2 a laparoscopic SBR for a closed loop obstruction s/p incisional hernia repair with mesh. 9/26    1.) Postop Care  - ADAT  -hep lock  - Pain/nausea control PRN  - SCDs/SQH  - Abdominal binder  - EMMANUEL monitoring    2.) Dispo  - DC home today  - plan for discharge today with EMMANUEL and follow up with Dr. West in 1 week.

## 2018-08-09 NOTE — PROGRESS NOTES
Right posterior tibial pulse was +1 and right dorsalis pedis pulse was +1 prior to sheath removal. 6FR arterial sheath removed from right groin using sterile technique at 0906. Manual pressure held over site for 24 minutes. Hemostasis achieved at 0933. Right groin without bleeding without hematoma. Sterile gauze placed over site and secured with tegaderm. 5lb sandbag placed over site. Patient instructed to keep right leg straight and still and head on pillow. Patient verbalizes understanding.   Right posterior tibial pulse was +1 and right dorsalis pedis pulse was +1 after sheath removal.

## 2018-08-13 PROBLEM — I25.119 ATHEROSCLEROSIS OF NATIVE CORONARY ARTERY OF NATIVE HEART WITH ANGINA PECTORIS (HCC): Status: ACTIVE | Noted: 2018-08-13

## 2018-08-13 PROBLEM — E78.2 MIXED HYPERLIPIDEMIA: Status: ACTIVE | Noted: 2018-08-13

## 2018-09-06 ENCOUNTER — ANESTHESIA EVENT (OUTPATIENT)
Dept: SURGERY | Age: 63
DRG: 236 | End: 2018-09-06
Payer: MEDICARE

## 2018-09-14 ENCOUNTER — HOSPITAL ENCOUNTER (OUTPATIENT)
Dept: SURGERY | Age: 63
Discharge: HOME | End: 2018-09-14
Payer: MEDICARE

## 2018-09-14 ENCOUNTER — HOSPITAL ENCOUNTER (OUTPATIENT)
Dept: ULTRASOUND IMAGING | Age: 63
Discharge: HOME | End: 2018-09-14
Attending: THORACIC SURGERY (CARDIOTHORACIC VASCULAR SURGERY)
Payer: MEDICARE

## 2018-09-14 ENCOUNTER — HOSPITAL ENCOUNTER (OUTPATIENT)
Dept: GENERAL RADIOLOGY | Age: 63
Discharge: HOME | End: 2018-09-14
Attending: THORACIC SURGERY (CARDIOTHORACIC VASCULAR SURGERY)
Payer: MEDICARE

## 2018-09-14 VITALS
BODY MASS INDEX: 19.69 KG/M2 | HEIGHT: 65 IN | HEART RATE: 101 BPM | DIASTOLIC BLOOD PRESSURE: 90 MMHG | WEIGHT: 118.19 LBS | TEMPERATURE: 98.2 F | OXYGEN SATURATION: 98 % | SYSTOLIC BLOOD PRESSURE: 161 MMHG | RESPIRATION RATE: 16 BRPM

## 2018-09-14 LAB
ALBUMIN SERPL-MCNC: 4 G/DL (ref 3.2–4.6)
ANION GAP SERPL CALC-SCNC: 7 MMOL/L (ref 7–16)
APPEARANCE UR: CLEAR
APTT PPP: 35 SEC (ref 23.2–35.3)
ATRIAL RATE: 84 BPM
BACTERIA SPEC CULT: NORMAL
BACTERIA URNS QL MICRO: 0 /HPF
BASOPHILS # BLD: 0 K/UL (ref 0–0.2)
BASOPHILS NFR BLD: 0 % (ref 0–2)
BILIRUB SERPL-MCNC: 0.5 MG/DL (ref 0.2–1.1)
BILIRUB UR QL: NEGATIVE
BUN SERPL-MCNC: 14 MG/DL (ref 8–23)
CALCIUM SERPL-MCNC: 9.3 MG/DL (ref 8.3–10.4)
CALCULATED P AXIS, ECG09: 70 DEGREES
CALCULATED R AXIS, ECG10: 62 DEGREES
CALCULATED T AXIS, ECG11: 73 DEGREES
CASTS URNS QL MICRO: ABNORMAL /LPF
CHLORIDE SERPL-SCNC: 105 MMOL/L (ref 98–107)
CO2 SERPL-SCNC: 25 MMOL/L (ref 21–32)
COLOR UR: YELLOW
CREAT SERPL-MCNC: 0.96 MG/DL (ref 0.8–1.5)
DIAGNOSIS, 93000: NORMAL
DIFFERENTIAL METHOD BLD: ABNORMAL
EOSINOPHIL # BLD: 0.1 K/UL (ref 0–0.8)
EOSINOPHIL NFR BLD: 1 % (ref 0.5–7.8)
EPI CELLS #/AREA URNS HPF: ABNORMAL /HPF
ERYTHROCYTE [DISTWIDTH] IN BLOOD BY AUTOMATED COUNT: 13.7 %
EST. AVERAGE GLUCOSE BLD GHB EST-MCNC: 105 MG/DL
GLUCOSE SERPL-MCNC: 101 MG/DL (ref 65–100)
GLUCOSE UR STRIP.AUTO-MCNC: NEGATIVE MG/DL
HBA1C MFR BLD: 5.3 % (ref 4.8–6)
HCT VFR BLD AUTO: 43.9 % (ref 41.1–50.3)
HGB BLD-MCNC: 14.7 G/DL (ref 13.6–17.2)
HGB UR QL STRIP: ABNORMAL
IMM GRANULOCYTES # BLD: 0 K/UL (ref 0–0.5)
IMM GRANULOCYTES NFR BLD AUTO: 0 % (ref 0–5)
INR PPP: 1
KETONES UR QL STRIP.AUTO: NEGATIVE MG/DL
LEUKOCYTE ESTERASE UR QL STRIP.AUTO: ABNORMAL
LYMPHOCYTES # BLD: 1.6 K/UL (ref 0.5–4.6)
LYMPHOCYTES NFR BLD: 18 % (ref 13–44)
MCH RBC QN AUTO: 34.6 PG (ref 26.1–32.9)
MCHC RBC AUTO-ENTMCNC: 33.5 G/DL (ref 31.4–35)
MCV RBC AUTO: 103.3 FL (ref 79.6–97.8)
MONOCYTES # BLD: 0.8 K/UL (ref 0.1–1.3)
MONOCYTES NFR BLD: 9 % (ref 4–12)
NEUTS SEG # BLD: 6.6 K/UL (ref 1.7–8.2)
NEUTS SEG NFR BLD: 72 % (ref 43–78)
NITRITE UR QL STRIP.AUTO: NEGATIVE
NRBC # BLD: 0 K/UL (ref 0–0.2)
P-R INTERVAL, ECG05: 118 MS
PH UR STRIP: 6.5 [PH] (ref 5–9)
PLATELET # BLD AUTO: 432 K/UL (ref 150–450)
PMV BLD AUTO: 8.8 FL (ref 9.4–12.3)
POTASSIUM SERPL-SCNC: 4.3 MMOL/L (ref 3.5–5.1)
PROT UR STRIP-MCNC: NEGATIVE MG/DL
PROTHROMBIN TIME: 12.2 SEC (ref 11.5–14.5)
Q-T INTERVAL, ECG07: 354 MS
QRS DURATION, ECG06: 82 MS
QTC CALCULATION (BEZET), ECG08: 418 MS
RBC # BLD AUTO: 4.25 M/UL (ref 4.23–5.6)
RBC #/AREA URNS HPF: ABNORMAL /HPF
SERVICE CMNT-IMP: NORMAL
SODIUM SERPL-SCNC: 137 MMOL/L (ref 136–145)
SP GR UR REFRACTOMETRY: 1.01 (ref 1–1.02)
UROBILINOGEN UR QL STRIP.AUTO: 0.2 EU/DL (ref 0.2–1)
VENTRICULAR RATE, ECG03: 84 BPM
WBC # BLD AUTO: 9.2 K/UL (ref 4.3–11.1)
WBC URNS QL MICRO: ABNORMAL /HPF

## 2018-09-14 PROCEDURE — 94010 BREATHING CAPACITY TEST: CPT

## 2018-09-14 PROCEDURE — 87641 MR-STAPH DNA AMP PROBE: CPT

## 2018-09-14 PROCEDURE — 82247 BILIRUBIN TOTAL: CPT

## 2018-09-14 PROCEDURE — 85610 PROTHROMBIN TIME: CPT

## 2018-09-14 PROCEDURE — 85025 COMPLETE CBC W/AUTO DIFF WBC: CPT

## 2018-09-14 PROCEDURE — 77030027138 HC INCENT SPIROMETER -A

## 2018-09-14 PROCEDURE — 93005 ELECTROCARDIOGRAM TRACING: CPT | Performed by: THORACIC SURGERY (CARDIOTHORACIC VASCULAR SURGERY)

## 2018-09-14 PROCEDURE — 81001 URINALYSIS AUTO W/SCOPE: CPT

## 2018-09-14 PROCEDURE — 71046 X-RAY EXAM CHEST 2 VIEWS: CPT

## 2018-09-14 PROCEDURE — 80048 BASIC METABOLIC PNL TOTAL CA: CPT

## 2018-09-14 PROCEDURE — 82040 ASSAY OF SERUM ALBUMIN: CPT

## 2018-09-14 PROCEDURE — 83036 HEMOGLOBIN GLYCOSYLATED A1C: CPT

## 2018-09-14 PROCEDURE — 93880 EXTRACRANIAL BILAT STUDY: CPT

## 2018-09-14 PROCEDURE — 85730 THROMBOPLASTIN TIME PARTIAL: CPT

## 2018-09-14 NOTE — ANESTHESIA PREPROCEDURE EVALUATION
Anesthetic History No history of anesthetic complications Review of Systems / Medical History Patient summary reviewed and pertinent labs reviewed Pulmonary COPD (no home O2): moderate Smoker Neuro/Psych Psychiatric history Cardiovascular Hypertension CAD (80% LAD, 60% LCx, 70% OM1, 90% RCA) and PAD Exercise tolerance: >4 METS 
  
GI/Hepatic/Renal 
  
 
 
Renal disease: stones Comments: S/p cystectomy and ileal conduit Endo/Other Arthritis and cancer (bladder now with illeal conduit) Other Findings Comments: Heavy ETOH use daily Physical Exam 
 
Airway Mallampati: II 
TM Distance: 4 - 6 cm Neck ROM: normal range of motion Mouth opening: Normal 
 
 Cardiovascular Regular rate and rhythm,  S1 and S2 normal,  no murmur, click, rub, or gallop Rhythm: regular Rate: normal 
 
 
Pertinent negatives: No murmur and JVD Dental 
No notable dental hx Pulmonary Breath sounds clear to auscultation Abdominal 
GI exam deferred Other Findings Anesthetic Plan ASA: 4 Anesthesia type: general 
 
Monitoring Plan: Arterial line, BIS, Jackson-Anahy and MARY Post procedure ventilation Induction: Intravenous Anesthetic plan and risks discussed with: Patient

## 2018-09-14 NOTE — PERIOP NOTES
Patient verified name, , and surgery as listed in [facility]. Patient provided medical/health information and PTA medications to the best of their ability. TYPE  CASE: lll Labs per surgeon: CBC, BMP, PT, PTT, HA1C, CXR, CArotid U/S, U/A, MRSA swab, Total Bili, Albumin, T&C on , FEV1, 
Labs per anesthesia protocol: None additional 
EKG:  Today and 2018 on chart, Cardiac Cath 2018 on chart, Echo 2018 on chart. All reviewed and acceptable per Dr. Nieves Davila. Patient provided with and instructed on educaitonal handouts including Heart Guide to Surgery, blood transfusions, pain management, central line infection prevention, being on a ventilator and hand hygiene for the family and community, and [facility] brochure. Instructed that family must be present in building at all times. Incentive spirometry completed with return demonstration. FEV1 completed as ordered. Patient viewed pre-operative DVD. Instructed on chest-xray procedure and carotid ultrasound. Instructed on type and cross match procedure and not to remove green blood bank bracelet. Instructed on medications- Amiodarone, Lopressor and Mupirocin with Rx called into  -[id number]. Mupirocin ointment to be used the night before surgery and the am of surgery. Hibiclens and instructions given per hospital policy. Instructed patient to continue previous medications as prescribed prior to surgery unless otherwise directed and to take the following medications the day of surgery according to anesthesia guidelines : Amlodipine, ASA 81 mg, Symbicort inhaler, Spiriva inhaler . Instructed patient to hold  the following medications: Vitamins. Original medication prescription bottles were not visualized during patient appointment. Patient teach back successful and patient demonstrates knowledge of instruction.

## 2018-09-14 NOTE — PERIOP NOTES
Recent Results (from the past 12 hour(s)) CBC WITH AUTOMATED DIFF Collection Time: 09/14/18  8:18 AM  
Result Value Ref Range WBC 9.2 4.3 - 11.1 K/uL  
 RBC 4.25 4.23 - 5.6 M/uL  
 HGB 14.7 13.6 - 17.2 g/dL HCT 43.9 41.1 - 50.3 % .3 (H) 79.6 - 97.8 FL  
 MCH 34.6 (H) 26.1 - 32.9 PG  
 MCHC 33.5 31.4 - 35.0 g/dL  
 RDW 13.7 % PLATELET 555 750 - 636 K/uL MPV 8.8 (L) 9.4 - 12.3 FL ABSOLUTE NRBC 0.00 0.0 - 0.2 K/uL  
 DF AUTOMATED NEUTROPHILS 72 43 - 78 % LYMPHOCYTES 18 13 - 44 % MONOCYTES 9 4.0 - 12.0 % EOSINOPHILS 1 0.5 - 7.8 % BASOPHILS 0 0.0 - 2.0 % IMMATURE GRANULOCYTES 0 0.0 - 5.0 %  
 ABS. NEUTROPHILS 6.6 1.7 - 8.2 K/UL  
 ABS. LYMPHOCYTES 1.6 0.5 - 4.6 K/UL  
 ABS. MONOCYTES 0.8 0.1 - 1.3 K/UL  
 ABS. EOSINOPHILS 0.1 0.0 - 0.8 K/UL  
 ABS. BASOPHILS 0.0 0.0 - 0.2 K/UL  
 ABS. IMM. GRANS. 0.0 0.0 - 0.5 K/UL METABOLIC PANEL, BASIC Collection Time: 09/14/18  8:18 AM  
Result Value Ref Range Sodium 137 136 - 145 mmol/L Potassium 4.3 3.5 - 5.1 mmol/L Chloride 105 98 - 107 mmol/L  
 CO2 25 21 - 32 mmol/L Anion gap 7 7 - 16 mmol/L Glucose 101 (H) 65 - 100 mg/dL BUN 14 8 - 23 MG/DL Creatinine 0.96 0.8 - 1.5 MG/DL  
 GFR est AA >60 >60 ml/min/1.73m2 GFR est non-AA >60 >60 ml/min/1.73m2 Calcium 9.3 8.3 - 10.4 MG/DL PROTHROMBIN TIME + INR Collection Time: 09/14/18  8:18 AM  
Result Value Ref Range Prothrombin time 12.2 11.5 - 14.5 sec INR 1.0    
PTT Collection Time: 09/14/18  8:18 AM  
Result Value Ref Range aPTT 35.0 23.2 - 35.3 SEC ALBUMIN Collection Time: 09/14/18  8:18 AM  
Result Value Ref Range Albumin 4.0 3.2 - 4.6 g/dL BILIRUBIN, TOTAL Collection Time: 09/14/18  8:18 AM  
Result Value Ref Range Bilirubin, total 0.5 0.2 - 1.1 MG/DL  
HEMOGLOBIN A1C WITH EAG Collection Time: 09/14/18  8:18 AM  
Result Value Ref Range Hemoglobin A1c 5.3 4.8 - 6.0 % Est. average glucose 105 mg/dL URINALYSIS W/ RFLX MICROSCOPIC Collection Time: 09/14/18  8:18 AM  
Result Value Ref Range Color YELLOW Appearance CLEAR Specific gravity 1.011 1.001 - 1.023    
 pH (UA) 6.5 5.0 - 9.0 Protein NEGATIVE  NEG mg/dL Glucose NEGATIVE  mg/dL Ketone NEGATIVE  NEG mg/dL Bilirubin NEGATIVE  NEG Blood TRACE (A) NEG Urobilinogen 0.2 0.2 - 1.0 EU/dL Nitrites NEGATIVE  NEG Leukocyte Esterase SMALL (A) NEG    
 WBC 5-10 0 /hpf  
 RBC 0-3 0 /hpf Epithelial cells 0-3 0 /hpf Bacteria 0 0 /hpf Casts 0-3 0 /lpf MSSA/MRSA SC BY PCR, NASAL SWAB Collection Time: 09/14/18  8:18 AM  
Result Value Ref Range Special Requests: NO SPECIAL REQUESTS Culture result:     
  SA target not detected. A MRSA NEGATIVE, SA NEGATIVE test result does not preclude MRSA or SA nasal colonization.

## 2018-09-17 ENCOUNTER — HOSPITAL ENCOUNTER (OUTPATIENT)
Dept: LAB | Age: 63
Discharge: HOME | DRG: 236 | End: 2018-09-17
Payer: MEDICARE

## 2018-09-17 PROCEDURE — 86901 BLOOD TYPING SEROLOGIC RH(D): CPT

## 2018-09-17 PROCEDURE — 36415 COLL VENOUS BLD VENIPUNCTURE: CPT

## 2018-09-17 PROCEDURE — 86923 COMPATIBILITY TEST ELECTRIC: CPT

## 2018-09-17 RX ORDER — DIPHENHYDRAMINE HYDROCHLORIDE 50 MG/ML
12.5 INJECTION, SOLUTION INTRAMUSCULAR; INTRAVENOUS ONCE
Status: CANCELLED | OUTPATIENT
Start: 2018-09-17 | End: 2018-09-17

## 2018-09-17 RX ORDER — NALOXONE HYDROCHLORIDE 0.4 MG/ML
0.1 INJECTION, SOLUTION INTRAMUSCULAR; INTRAVENOUS; SUBCUTANEOUS AS NEEDED
Status: CANCELLED | OUTPATIENT
Start: 2018-09-17 | End: 2018-09-17

## 2018-09-17 RX ORDER — ACETAMINOPHEN 500 MG
500 TABLET ORAL ONCE
Status: CANCELLED | OUTPATIENT
Start: 2018-09-17 | End: 2018-09-17

## 2018-09-17 RX ORDER — SODIUM CHLORIDE, SODIUM LACTATE, POTASSIUM CHLORIDE, CALCIUM CHLORIDE 600; 310; 30; 20 MG/100ML; MG/100ML; MG/100ML; MG/100ML
75 INJECTION, SOLUTION INTRAVENOUS CONTINUOUS
Status: CANCELLED | OUTPATIENT
Start: 2018-09-17

## 2018-09-17 RX ORDER — SODIUM CHLORIDE 0.9 % (FLUSH) 0.9 %
5-10 SYRINGE (ML) INJECTION AS NEEDED
Status: CANCELLED | OUTPATIENT
Start: 2018-09-17

## 2018-09-17 RX ORDER — ONDANSETRON 2 MG/ML
4 INJECTION INTRAMUSCULAR; INTRAVENOUS ONCE
Status: CANCELLED | OUTPATIENT
Start: 2018-09-17 | End: 2018-09-17

## 2018-09-18 ENCOUNTER — ANESTHESIA (OUTPATIENT)
Dept: SURGERY | Age: 63
DRG: 236 | End: 2018-09-18
Payer: MEDICARE

## 2018-09-18 ENCOUNTER — HOSPITAL ENCOUNTER (INPATIENT)
Age: 63
LOS: 5 days | Discharge: HOME HEALTH | DRG: 236 | End: 2018-09-23
Attending: THORACIC SURGERY (CARDIOTHORACIC VASCULAR SURGERY) | Admitting: THORACIC SURGERY (CARDIOTHORACIC VASCULAR SURGERY)
Payer: MEDICARE

## 2018-09-18 ENCOUNTER — APPOINTMENT (OUTPATIENT)
Dept: GENERAL RADIOLOGY | Age: 63
DRG: 236 | End: 2018-09-18
Attending: THORACIC SURGERY (CARDIOTHORACIC VASCULAR SURGERY)
Payer: MEDICARE

## 2018-09-18 DIAGNOSIS — F17.209 TOBACCO USE DISORDER, CONTINUOUS: ICD-10-CM

## 2018-09-18 DIAGNOSIS — Z87.891 PERSONAL HISTORY OF TOBACCO USE, PRESENTING HAZARDS TO HEALTH: ICD-10-CM

## 2018-09-18 DIAGNOSIS — J44.9 CHRONIC OBSTRUCTIVE PULMONARY DISEASE, UNSPECIFIED COPD TYPE (HCC): ICD-10-CM

## 2018-09-18 DIAGNOSIS — J93.83 OTHER PNEUMOTHORAX: ICD-10-CM

## 2018-09-18 DIAGNOSIS — R09.02 HYPOXEMIA: ICD-10-CM

## 2018-09-18 DIAGNOSIS — Z99.11 ENCOUNTER FOR WEANING FROM VENTILATOR (HCC): ICD-10-CM

## 2018-09-18 DIAGNOSIS — Z95.1 S/P CABG X 3: ICD-10-CM

## 2018-09-18 LAB
ANION GAP SERPL CALC-SCNC: 6 MMOL/L (ref 7–16)
APTT PPP: 36 SEC (ref 23.2–35.3)
ARTERIAL PATENCY WRIST A: ABNORMAL
ATRIAL RATE: 105 BPM
BASE DEFICIT BLD-SCNC: 1 MMOL/L
BASE DEFICIT BLD-SCNC: 2 MMOL/L
BASE DEFICIT BLD-SCNC: 3 MMOL/L
BASE DEFICIT BLD-SCNC: 4 MMOL/L
BASE DEFICIT BLDA-SCNC: 5.3 MMOL/L (ref 0–2)
BASE DEFICIT BLDA-SCNC: 8.9 MMOL/L (ref 0–2)
BASE DEFICIT BLDA-SCNC: 9.7 MMOL/L (ref 0–2)
BASE EXCESS BLD CALC-SCNC: 0 MMOL/L
BDY SITE: ABNORMAL
BUN SERPL-MCNC: 12 MG/DL (ref 8–23)
CA-I BLD-MCNC: 1.01 MMOL/L (ref 1.12–1.32)
CA-I BLD-MCNC: 1.01 MMOL/L (ref 1.12–1.32)
CA-I BLD-MCNC: 1.14 MMOL/L (ref 1.12–1.32)
CA-I BLD-MCNC: 1.16 MMOL/L (ref 1.12–1.32)
CA-I BLD-MCNC: 1.17 MMOL/L (ref 1.12–1.32)
CA-I BLD-SCNC: 1.14 MMOL/L (ref 1–1.3)
CALCIUM SERPL-MCNC: 7.5 MG/DL (ref 8.3–10.4)
CALCULATED P AXIS, ECG09: 77 DEGREES
CALCULATED R AXIS, ECG10: 73 DEGREES
CALCULATED T AXIS, ECG11: 104 DEGREES
CHLORIDE BLDA-SCNC: 109 MMOL/L (ref 98–106)
CHLORIDE SERPL-SCNC: 110 MMOL/L (ref 98–107)
CO2 SERPL-SCNC: 23 MMOL/L (ref 21–32)
COHGB MFR BLD: 0.3 % (ref 0.5–1.5)
COHGB MFR BLD: 0.3 % (ref 0.5–1.5)
COHGB MFR BLD: 1 % (ref 0.5–1.5)
CREAT SERPL-MCNC: 1.02 MG/DL (ref 0.8–1.5)
DIAGNOSIS, 93000: NORMAL
DO-HGB BLD-MCNC: 1 % (ref 0–5)
DO-HGB BLD-MCNC: 5 % (ref 0–5)
DO-HGB BLD-MCNC: 6 % (ref 0–5)
ERYTHROCYTE [DISTWIDTH] IN BLOOD BY AUTOMATED COUNT: 13.8 %
FIBRINOGEN PPP-MCNC: 255 MG/DL (ref 190–501)
FIO2 ON VENT: 35 %
FIO2 ON VENT: 35 %
FIO2 ON VENT: 60 %
GLUCOSE BLD STRIP.AUTO-MCNC: 111 MG/DL (ref 65–100)
GLUCOSE BLD STRIP.AUTO-MCNC: 116 MG/DL (ref 65–100)
GLUCOSE BLD STRIP.AUTO-MCNC: 118 MG/DL (ref 65–100)
GLUCOSE BLD STRIP.AUTO-MCNC: 124 MG/DL (ref 65–100)
GLUCOSE BLD STRIP.AUTO-MCNC: 130 MG/DL (ref 65–100)
GLUCOSE BLD STRIP.AUTO-MCNC: 131 MG/DL (ref 65–100)
GLUCOSE BLD STRIP.AUTO-MCNC: 139 MG/DL (ref 65–100)
GLUCOSE BLD STRIP.AUTO-MCNC: 155 MG/DL (ref 65–100)
GLUCOSE BLD STRIP.AUTO-MCNC: 65 MG/DL (ref 65–100)
GLUCOSE BLD STRIP.AUTO-MCNC: 92 MG/DL (ref 65–100)
GLUCOSE BLD STRIP.AUTO-MCNC: 99 MG/DL (ref 65–100)
GLUCOSE BLD STRIP.AUTO-MCNC: 99 MG/DL (ref 65–100)
GLUCOSE SERPL-MCNC: 126 MG/DL (ref 65–100)
HCO3 BLD-SCNC: 23 MMOL/L (ref 22–26)
HCO3 BLD-SCNC: 23.3 MMOL/L (ref 22–26)
HCO3 BLD-SCNC: 23.7 MMOL/L (ref 22–26)
HCO3 BLD-SCNC: 23.8 MMOL/L (ref 22–26)
HCO3 BLD-SCNC: 26.2 MMOL/L (ref 22–26)
HCO3 BLDA-SCNC: 17 MMOL/L (ref 22–26)
HCO3 BLDA-SCNC: 19 MMOL/L (ref 22–26)
HCO3 BLDA-SCNC: 21 MMOL/L (ref 22–26)
HCT VFR BLD AUTO: 26.1 % (ref 41.1–50.3)
HCT VFR BLD AUTO: 28.7 % (ref 41.1–50.3)
HCT VFR BLD AUTO: 33.7 % (ref 41.1–50.3)
HGB BLD-MCNC: 11 G/DL (ref 13.6–17.2)
HGB BLD-MCNC: 8.4 G/DL (ref 13.6–17.2)
HGB BLD-MCNC: 9.4 G/DL (ref 13.6–17.2)
HGB BLDMV-MCNC: 10.6 GM/DL (ref 11.7–15)
HGB BLDMV-MCNC: 10.8 GM/DL (ref 11.7–15)
HGB BLDMV-MCNC: 12.3 GM/DL (ref 11.7–15)
INR PPP: 1.4
MAGNESIUM SERPL-MCNC: 2.6 MG/DL (ref 1.8–2.4)
MAGNESIUM SERPL-MCNC: 3 MG/DL (ref 1.8–2.4)
MAGNESIUM SERPL-MCNC: 3.9 MG/DL (ref 1.8–2.4)
MCH RBC QN AUTO: 34.7 PG (ref 26.1–32.9)
MCHC RBC AUTO-ENTMCNC: 32.6 G/DL (ref 31.4–35)
MCV RBC AUTO: 106.3 FL (ref 79.6–97.8)
METHGB MFR BLD: 0.5 % (ref 0–1.5)
METHGB MFR BLD: 0.5 % (ref 0–1.5)
METHGB MFR BLD: 0.6 % (ref 0–1.5)
NRBC # BLD: 0 K/UL (ref 0–0.2)
OXYHGB MFR BLDA: 93 % (ref 94–97)
OXYHGB MFR BLDA: 94.3 % (ref 94–97)
OXYHGB MFR BLDA: 97.1 % (ref 94–97)
P-R INTERVAL, ECG05: 118 MS
PCO2 BLD: 40.6 MMHG (ref 35–45)
PCO2 BLD: 40.6 MMHG (ref 35–45)
PCO2 BLD: 47.1 MMHG (ref 35–45)
PCO2 BLD: 47.1 MMHG (ref 35–45)
PCO2 BLD: 59.5 MMHG (ref 35–45)
PCO2 BLDA: 37 MMHG (ref 35–45)
PCO2 BLDA: 41 MMHG (ref 35–45)
PCO2 BLDA: 49 MMHG (ref 35–45)
PEEP RESPIRATORY: 8 CM[H2O]
PH BLD: 7.21 [PH] (ref 7.35–7.45)
PH BLD: 7.3 [PH] (ref 7.35–7.45)
PH BLD: 7.35 [PH] (ref 7.35–7.45)
PH BLD: 7.36 [PH] (ref 7.35–7.45)
PH BLD: 7.38 [PH] (ref 7.35–7.45)
PH BLDA: 7.21 [PH] (ref 7.35–7.45)
PH BLDA: 7.27 [PH] (ref 7.35–7.45)
PH BLDA: 7.32 [PH] (ref 7.35–7.45)
PLATELET # BLD AUTO: 243 K/UL (ref 150–450)
PMV BLD AUTO: 9.3 FL (ref 9.4–12.3)
PO2 BLD: 254 MMHG (ref 80–105)
PO2 BLD: 326 MMHG (ref 80–105)
PO2 BLD: 329 MMHG (ref 80–105)
PO2 BLD: 355 MMHG (ref 80–105)
PO2 BLD: 383 MMHG (ref 80–105)
PO2 BLDA: 222 MMHG (ref 80–105)
PO2 BLDA: 82 MMHG (ref 80–105)
PO2 BLDA: 86 MMHG (ref 80–105)
POTASSIUM BLD-SCNC: 4.1 MMOL/L (ref 3.5–5.1)
POTASSIUM BLD-SCNC: 4.3 MMOL/L (ref 3.5–5.1)
POTASSIUM BLD-SCNC: 4.7 MMOL/L (ref 3.5–5.1)
POTASSIUM BLD-SCNC: 5.7 MMOL/L (ref 3.5–5.1)
POTASSIUM BLD-SCNC: 6.5 MMOL/L (ref 3.5–5.1)
POTASSIUM BLDA-SCNC: 4.41 MMOL/L (ref 3.5–5.3)
POTASSIUM SERPL-SCNC: 3.7 MMOL/L (ref 3.5–5.1)
POTASSIUM SERPL-SCNC: 4.3 MMOL/L (ref 3.5–5.1)
POTASSIUM SERPL-SCNC: 4.4 MMOL/L (ref 3.5–5.1)
PROTHROMBIN TIME: 16.7 SEC (ref 11.5–14.5)
Q-T INTERVAL, ECG07: 352 MS
QRS DURATION, ECG06: 86 MS
QTC CALCULATION (BEZET), ECG08: 465 MS
RBC # BLD AUTO: 3.17 M/UL (ref 4.23–5.6)
SAO2 % BLD: 100 % (ref 95–98)
SAO2 % BLD: 94 % (ref 92–98.5)
SAO2 % BLD: 95 % (ref 92–98.5)
SAO2 % BLD: 99 % (ref 92–98.5)
SERVICE CMNT-IMP: ABNORMAL
SODIUM BLD-SCNC: 134 MMOL/L (ref 136–145)
SODIUM BLD-SCNC: 135 MMOL/L (ref 136–145)
SODIUM BLD-SCNC: 137 MMOL/L (ref 136–145)
SODIUM BLD-SCNC: 138 MMOL/L (ref 136–145)
SODIUM BLD-SCNC: 138 MMOL/L (ref 136–145)
SODIUM BLDA-SCNC: 136.9 MMOL/L (ref 135–148)
SODIUM SERPL-SCNC: 139 MMOL/L (ref 136–145)
VENTILATION MODE VENT: ABNORMAL
VENTRICULAR RATE, ECG03: 105 BPM
WBC # BLD AUTO: 15.6 K/UL (ref 4.3–11.1)

## 2018-09-18 PROCEDURE — 99223 1ST HOSP IP/OBS HIGH 75: CPT | Performed by: INTERNAL MEDICINE

## 2018-09-18 PROCEDURE — P9045 ALBUMIN (HUMAN), 5%, 250 ML: HCPCS

## 2018-09-18 PROCEDURE — 77030002987 HC SUT PROL J&J -B: Performed by: THORACIC SURGERY (CARDIOTHORACIC VASCULAR SURGERY)

## 2018-09-18 PROCEDURE — 77030008477 HC STYL SATN SLP COVD -A: Performed by: NURSE ANESTHETIST, CERTIFIED REGISTERED

## 2018-09-18 PROCEDURE — 77030006689 HC BLD OPHTH BVR BD -A: Performed by: THORACIC SURGERY (CARDIOTHORACIC VASCULAR SURGERY)

## 2018-09-18 PROCEDURE — 77030009355 HC CRDPLG DEL SET QUES -C: Performed by: THORACIC SURGERY (CARDIOTHORACIC VASCULAR SURGERY)

## 2018-09-18 PROCEDURE — 77030005518 HC CATH URETH FOL 2W BARD -B: Performed by: THORACIC SURGERY (CARDIOTHORACIC VASCULAR SURGERY)

## 2018-09-18 PROCEDURE — 74011250636 HC RX REV CODE- 250/636: Performed by: THORACIC SURGERY (CARDIOTHORACIC VASCULAR SURGERY)

## 2018-09-18 PROCEDURE — 77030003037 HC SUT WRE STRNOTMY AEMC -B: Performed by: THORACIC SURGERY (CARDIOTHORACIC VASCULAR SURGERY)

## 2018-09-18 PROCEDURE — 85027 COMPLETE CBC AUTOMATED: CPT

## 2018-09-18 PROCEDURE — 77030002520 HC INSRT CLMP LATIS STLTH AMR -B: Performed by: THORACIC SURGERY (CARDIOTHORACIC VASCULAR SURGERY)

## 2018-09-18 PROCEDURE — 93005 ELECTROCARDIOGRAM TRACING: CPT | Performed by: THORACIC SURGERY (CARDIOTHORACIC VASCULAR SURGERY)

## 2018-09-18 PROCEDURE — 77030020263 HC SOL INJ SOD CL0.9% LFCR 1000ML

## 2018-09-18 PROCEDURE — 77030018836 HC SOL IRR NACL ICUM -A: Performed by: THORACIC SURGERY (CARDIOTHORACIC VASCULAR SURGERY)

## 2018-09-18 PROCEDURE — 85014 HEMATOCRIT: CPT

## 2018-09-18 PROCEDURE — 36592 COLLECT BLOOD FROM PICC: CPT

## 2018-09-18 PROCEDURE — 77030010512 HC APPL CLP LIG J&J -C: Performed by: THORACIC SURGERY (CARDIOTHORACIC VASCULAR SURGERY)

## 2018-09-18 PROCEDURE — P9045 ALBUMIN (HUMAN), 5%, 250 ML: HCPCS | Performed by: THORACIC SURGERY (CARDIOTHORACIC VASCULAR SURGERY)

## 2018-09-18 PROCEDURE — 74011250636 HC RX REV CODE- 250/636

## 2018-09-18 PROCEDURE — 77030025646 HC AUTOTRNSFUS KT TERU -C: Performed by: THORACIC SURGERY (CARDIOTHORACIC VASCULAR SURGERY)

## 2018-09-18 PROCEDURE — 74011000250 HC RX REV CODE- 250

## 2018-09-18 PROCEDURE — 77030002996 HC SUT SLK J&J -A: Performed by: THORACIC SURGERY (CARDIOTHORACIC VASCULAR SURGERY)

## 2018-09-18 PROCEDURE — 77030025827 HC BG BLD DNR AUTLG MEDT -A: Performed by: THORACIC SURGERY (CARDIOTHORACIC VASCULAR SURGERY)

## 2018-09-18 PROCEDURE — 85610 PROTHROMBIN TIME: CPT

## 2018-09-18 PROCEDURE — 02HQ32Z INSERTION OF MONITORING DEVICE INTO RIGHT PULMONARY ARTERY, PERCUTANEOUS APPROACH: ICD-10-PCS | Performed by: ANESTHESIOLOGY

## 2018-09-18 PROCEDURE — 82803 BLOOD GASES ANY COMBINATION: CPT

## 2018-09-18 PROCEDURE — 021109W BYPASS CORONARY ARTERY, TWO ARTERIES FROM AORTA WITH AUTOLOGOUS VENOUS TISSUE, OPEN APPROACH: ICD-10-PCS | Performed by: THORACIC SURGERY (CARDIOTHORACIC VASCULAR SURGERY)

## 2018-09-18 PROCEDURE — 77030005401 HC CATH RAD ARRO -A: Performed by: NURSE ANESTHETIST, CERTIFIED REGISTERED

## 2018-09-18 PROCEDURE — 5A1221Z PERFORMANCE OF CARDIAC OUTPUT, CONTINUOUS: ICD-10-PCS | Performed by: THORACIC SURGERY (CARDIOTHORACIC VASCULAR SURGERY)

## 2018-09-18 PROCEDURE — 02100Z9 BYPASS CORONARY ARTERY, ONE ARTERY FROM LEFT INTERNAL MAMMARY, OPEN APPROACH: ICD-10-PCS | Performed by: THORACIC SURGERY (CARDIOTHORACIC VASCULAR SURGERY)

## 2018-09-18 PROCEDURE — 65610000006 HC RM INTENSIVE CARE

## 2018-09-18 PROCEDURE — 74011000250 HC RX REV CODE- 250: Performed by: THORACIC SURGERY (CARDIOTHORACIC VASCULAR SURGERY)

## 2018-09-18 PROCEDURE — 77030012390 HC DRN CHST BTL GTNG -B: Performed by: THORACIC SURGERY (CARDIOTHORACIC VASCULAR SURGERY)

## 2018-09-18 PROCEDURE — 77030003010 HC SUT SURG STL J&J -B: Performed by: THORACIC SURGERY (CARDIOTHORACIC VASCULAR SURGERY)

## 2018-09-18 PROCEDURE — 76060000040 HC ANESTHESIA 4.5 TO 5 HR: Performed by: THORACIC SURGERY (CARDIOTHORACIC VASCULAR SURGERY)

## 2018-09-18 PROCEDURE — 80051 ELECTROLYTE PANEL: CPT

## 2018-09-18 PROCEDURE — 77030002986 HC SUT PROL J&J -A: Performed by: THORACIC SURGERY (CARDIOTHORACIC VASCULAR SURGERY)

## 2018-09-18 PROCEDURE — 86580 TB INTRADERMAL TEST: CPT | Performed by: THORACIC SURGERY (CARDIOTHORACIC VASCULAR SURGERY)

## 2018-09-18 PROCEDURE — 77030031139 HC SUT VCRL2 J&J -A: Performed by: THORACIC SURGERY (CARDIOTHORACIC VASCULAR SURGERY)

## 2018-09-18 PROCEDURE — 77030018729 HC ELECTRD DEFIB PAD CARD -B: Performed by: THORACIC SURGERY (CARDIOTHORACIC VASCULAR SURGERY)

## 2018-09-18 PROCEDURE — 36600 WITHDRAWAL OF ARTERIAL BLOOD: CPT

## 2018-09-18 PROCEDURE — 77030014007 HC SPNG HEMSTAT J&J -B: Performed by: THORACIC SURGERY (CARDIOTHORACIC VASCULAR SURGERY)

## 2018-09-18 PROCEDURE — 77030010514 HC APPL CLP LIG COVD -B: Performed by: THORACIC SURGERY (CARDIOTHORACIC VASCULAR SURGERY)

## 2018-09-18 PROCEDURE — 74011250636 HC RX REV CODE- 250/636: Performed by: ANESTHESIOLOGY

## 2018-09-18 PROCEDURE — 06BQ0ZZ EXCISION OF LEFT SAPHENOUS VEIN, OPEN APPROACH: ICD-10-PCS | Performed by: THORACIC SURGERY (CARDIOTHORACIC VASCULAR SURGERY)

## 2018-09-18 PROCEDURE — 77030020751 HC FLTR TBNG TRNSFUS HAEM -A: Performed by: THORACIC SURGERY (CARDIOTHORACIC VASCULAR SURGERY)

## 2018-09-18 PROCEDURE — 77030020782 HC GWN BAIR PAWS FLX 3M -B: Performed by: NURSE ANESTHETIST, CERTIFIED REGISTERED

## 2018-09-18 PROCEDURE — 77030010813: Performed by: THORACIC SURGERY (CARDIOTHORACIC VASCULAR SURGERY)

## 2018-09-18 PROCEDURE — 77030018548 HC SUT ETHBND2 J&J -B: Performed by: THORACIC SURGERY (CARDIOTHORACIC VASCULAR SURGERY)

## 2018-09-18 PROCEDURE — C1769 GUIDE WIRE: HCPCS | Performed by: THORACIC SURGERY (CARDIOTHORACIC VASCULAR SURGERY)

## 2018-09-18 PROCEDURE — 77030008703 HC TU ET UNCUF COVD -A: Performed by: NURSE ANESTHETIST, CERTIFIED REGISTERED

## 2018-09-18 PROCEDURE — 82962 GLUCOSE BLOOD TEST: CPT

## 2018-09-18 PROCEDURE — 77030034927 HC PK PROC CPB INSPIRE PERF LIVA -F: Performed by: THORACIC SURGERY (CARDIOTHORACIC VASCULAR SURGERY)

## 2018-09-18 PROCEDURE — 77030015758: Performed by: THORACIC SURGERY (CARDIOTHORACIC VASCULAR SURGERY)

## 2018-09-18 PROCEDURE — 85730 THROMBOPLASTIN TIME PARTIAL: CPT

## 2018-09-18 PROCEDURE — 74011250637 HC RX REV CODE- 250/637: Performed by: THORACIC SURGERY (CARDIOTHORACIC VASCULAR SURGERY)

## 2018-09-18 PROCEDURE — P9047 ALBUMIN (HUMAN), 25%, 50ML: HCPCS

## 2018-09-18 PROCEDURE — 82947 ASSAY GLUCOSE BLOOD QUANT: CPT

## 2018-09-18 PROCEDURE — 83735 ASSAY OF MAGNESIUM: CPT

## 2018-09-18 PROCEDURE — 77030019908 HC STETH ESOPH SIMS -A: Performed by: NURSE ANESTHETIST, CERTIFIED REGISTERED

## 2018-09-18 PROCEDURE — 77030005537 HC CATH URETH BARD -A: Performed by: THORACIC SURGERY (CARDIOTHORACIC VASCULAR SURGERY)

## 2018-09-18 PROCEDURE — C1729 CATH, DRAINAGE: HCPCS | Performed by: THORACIC SURGERY (CARDIOTHORACIC VASCULAR SURGERY)

## 2018-09-18 PROCEDURE — 77030013797 HC KT TRNSDUC PRSSR EDWD -A: Performed by: THORACIC SURGERY (CARDIOTHORACIC VASCULAR SURGERY)

## 2018-09-18 PROCEDURE — 77030013794 HC KT TRNSDUC BLD EDWD -B: Performed by: NURSE ANESTHETIST, CERTIFIED REGISTERED

## 2018-09-18 PROCEDURE — 74011000258 HC RX REV CODE- 258: Performed by: THORACIC SURGERY (CARDIOTHORACIC VASCULAR SURGERY)

## 2018-09-18 PROCEDURE — B24BZZ4 ULTRASONOGRAPHY OF HEART WITH AORTA, TRANSESOPHAGEAL: ICD-10-PCS | Performed by: THORACIC SURGERY (CARDIOTHORACIC VASCULAR SURGERY)

## 2018-09-18 PROCEDURE — 84295 ASSAY OF SERUM SODIUM: CPT

## 2018-09-18 PROCEDURE — 77030013861 HC PNCH AORT CLNCUT QUES -B: Performed by: THORACIC SURGERY (CARDIOTHORACIC VASCULAR SURGERY)

## 2018-09-18 PROCEDURE — 77010033711 HC HIGH FLOW OXYGEN

## 2018-09-18 PROCEDURE — C1751 CATH, INF, PER/CENT/MIDLINE: HCPCS | Performed by: NURSE ANESTHETIST, CERTIFIED REGISTERED

## 2018-09-18 PROCEDURE — 77030018547 HC SUT ETHBND1 J&J -B: Performed by: THORACIC SURGERY (CARDIOTHORACIC VASCULAR SURGERY)

## 2018-09-18 PROCEDURE — 03HY32Z INSERTION OF MONITORING DEVICE INTO UPPER ARTERY, PERCUTANEOUS APPROACH: ICD-10-PCS | Performed by: ANESTHESIOLOGY

## 2018-09-18 PROCEDURE — 77030018571 HC SUT PROL1 J&J -B: Performed by: THORACIC SURGERY (CARDIOTHORACIC VASCULAR SURGERY)

## 2018-09-18 PROCEDURE — 77030034888 HC SUT PROL 2 J&J -B: Performed by: THORACIC SURGERY (CARDIOTHORACIC VASCULAR SURGERY)

## 2018-09-18 PROCEDURE — 74011000258 HC RX REV CODE- 258

## 2018-09-18 PROCEDURE — 84132 ASSAY OF SERUM POTASSIUM: CPT

## 2018-09-18 PROCEDURE — 71045 X-RAY EXAM CHEST 1 VIEW: CPT

## 2018-09-18 PROCEDURE — 80048 BASIC METABOLIC PNL TOTAL CA: CPT

## 2018-09-18 PROCEDURE — 76010000199 HC CV SURG 4.5 TO 5 HR INTENSV-TIER 1: Performed by: THORACIC SURGERY (CARDIOTHORACIC VASCULAR SURGERY)

## 2018-09-18 PROCEDURE — 74011000302 HC RX REV CODE- 302: Performed by: THORACIC SURGERY (CARDIOTHORACIC VASCULAR SURGERY)

## 2018-09-18 PROCEDURE — 94002 VENT MGMT INPAT INIT DAY: CPT

## 2018-09-18 PROCEDURE — 77030013292 HC BOWL MX PRSM J&J -A: Performed by: NURSE ANESTHETIST, CERTIFIED REGISTERED

## 2018-09-18 PROCEDURE — 77030020407 HC IV BLD WRMR ST 3M -A: Performed by: NURSE ANESTHETIST, CERTIFIED REGISTERED

## 2018-09-18 PROCEDURE — 77030010939 HC CLP LIG TELE -B: Performed by: THORACIC SURGERY (CARDIOTHORACIC VASCULAR SURGERY)

## 2018-09-18 PROCEDURE — 77030009995: Performed by: THORACIC SURGERY (CARDIOTHORACIC VASCULAR SURGERY)

## 2018-09-18 PROCEDURE — 77030002970 HC SUT PLEDG TELE -A: Performed by: THORACIC SURGERY (CARDIOTHORACIC VASCULAR SURGERY)

## 2018-09-18 PROCEDURE — 77030003034 HC SUT WRE CRD J&J -B: Performed by: THORACIC SURGERY (CARDIOTHORACIC VASCULAR SURGERY)

## 2018-09-18 PROCEDURE — 77030008771 HC TU NG SALEM SUMP -A: Performed by: NURSE ANESTHETIST, CERTIFIED REGISTERED

## 2018-09-18 PROCEDURE — 77030002966 HC SUT PDS J&J -A: Performed by: THORACIC SURGERY (CARDIOTHORACIC VASCULAR SURGERY)

## 2018-09-18 PROCEDURE — 76010000155 HC AUTO TRANSFUSION/CELL SAVER: Performed by: THORACIC SURGERY (CARDIOTHORACIC VASCULAR SURGERY)

## 2018-09-18 PROCEDURE — 74011636637 HC RX REV CODE- 636/637: Performed by: THORACIC SURGERY (CARDIOTHORACIC VASCULAR SURGERY)

## 2018-09-18 PROCEDURE — 77030016564 HC BLD STRNL SAW4 CNMD -B: Performed by: THORACIC SURGERY (CARDIOTHORACIC VASCULAR SURGERY)

## 2018-09-18 PROCEDURE — 77030020751 HC FLTR TBNG TRNSFUS HAEM -A: Performed by: NURSE ANESTHETIST, CERTIFIED REGISTERED

## 2018-09-18 PROCEDURE — 85384 FIBRINOGEN ACTIVITY: CPT

## 2018-09-18 PROCEDURE — 77030016687: Performed by: THORACIC SURGERY (CARDIOTHORACIC VASCULAR SURGERY)

## 2018-09-18 PROCEDURE — 74011250637 HC RX REV CODE- 250/637: Performed by: ANESTHESIOLOGY

## 2018-09-18 RX ORDER — CHLORHEXIDINE GLUCONATE 1.2 MG/ML
10 RINSE ORAL 2 TIMES DAILY
Status: DISCONTINUED | OUTPATIENT
Start: 2018-09-18 | End: 2018-09-19 | Stop reason: ALTCHOICE

## 2018-09-18 RX ORDER — MIDAZOLAM HYDROCHLORIDE 1 MG/ML
1 INJECTION, SOLUTION INTRAMUSCULAR; INTRAVENOUS
Status: DISCONTINUED | OUTPATIENT
Start: 2018-09-18 | End: 2018-09-19

## 2018-09-18 RX ORDER — EPHEDRINE SULFATE 50 MG/ML
INJECTION, SOLUTION INTRAVENOUS AS NEEDED
Status: DISCONTINUED | OUTPATIENT
Start: 2018-09-18 | End: 2018-09-18 | Stop reason: HOSPADM

## 2018-09-18 RX ORDER — CEFAZOLIN SODIUM 1 G/3ML
INJECTION, POWDER, FOR SOLUTION INTRAMUSCULAR; INTRAVENOUS AS NEEDED
Status: DISCONTINUED | OUTPATIENT
Start: 2018-09-18 | End: 2018-09-18 | Stop reason: HOSPADM

## 2018-09-18 RX ORDER — SODIUM CHLORIDE 0.9 % (FLUSH) 0.9 %
5-10 SYRINGE (ML) INJECTION AS NEEDED
Status: DISCONTINUED | OUTPATIENT
Start: 2018-09-18 | End: 2018-09-19

## 2018-09-18 RX ORDER — METOPROLOL TARTRATE 25 MG/1
25 TABLET, FILM COATED ORAL EVERY 12 HOURS
Status: DISCONTINUED | OUTPATIENT
Start: 2018-09-19 | End: 2018-09-19

## 2018-09-18 RX ORDER — DEXTROSE, SODIUM CHLORIDE, AND POTASSIUM CHLORIDE 5; .45; .15 G/100ML; G/100ML; G/100ML
25 INJECTION INTRAVENOUS CONTINUOUS
Status: DISCONTINUED | OUTPATIENT
Start: 2018-09-18 | End: 2018-09-19

## 2018-09-18 RX ORDER — CEFAZOLIN SODIUM/WATER 2 G/20 ML
2 SYRINGE (ML) INTRAVENOUS EVERY 8 HOURS
Status: COMPLETED | OUTPATIENT
Start: 2018-09-18 | End: 2018-09-19

## 2018-09-18 RX ORDER — LIDOCAINE HYDROCHLORIDE 20 MG/ML
INJECTION, SOLUTION EPIDURAL; INFILTRATION; INTRACAUDAL; PERINEURAL AS NEEDED
Status: DISCONTINUED | OUTPATIENT
Start: 2018-09-18 | End: 2018-09-18 | Stop reason: HOSPADM

## 2018-09-18 RX ORDER — ATORVASTATIN CALCIUM 40 MG/1
80 TABLET, FILM COATED ORAL
Status: DISCONTINUED | OUTPATIENT
Start: 2018-09-18 | End: 2018-09-19

## 2018-09-18 RX ORDER — SODIUM CHLORIDE 0.9 % (FLUSH) 0.9 %
5-10 SYRINGE (ML) INJECTION EVERY 8 HOURS
Status: DISCONTINUED | OUTPATIENT
Start: 2018-09-18 | End: 2018-09-18 | Stop reason: HOSPADM

## 2018-09-18 RX ORDER — DEXTROSE 50 % IN WATER (D50W) INTRAVENOUS SYRINGE
25 AS NEEDED
Status: DISCONTINUED | OUTPATIENT
Start: 2018-09-18 | End: 2018-09-19

## 2018-09-18 RX ORDER — OXYCODONE AND ACETAMINOPHEN 5; 325 MG/1; MG/1
1 TABLET ORAL
Status: DISCONTINUED | OUTPATIENT
Start: 2018-09-18 | End: 2018-09-23 | Stop reason: HOSPADM

## 2018-09-18 RX ORDER — ALBUTEROL SULFATE 0.83 MG/ML
2.5 SOLUTION RESPIRATORY (INHALATION)
Status: DISCONTINUED | OUTPATIENT
Start: 2018-09-18 | End: 2018-09-19

## 2018-09-18 RX ORDER — ROCURONIUM BROMIDE 10 MG/ML
INJECTION, SOLUTION INTRAVENOUS AS NEEDED
Status: DISCONTINUED | OUTPATIENT
Start: 2018-09-18 | End: 2018-09-18 | Stop reason: HOSPADM

## 2018-09-18 RX ORDER — CEFAZOLIN SODIUM/WATER 2 G/20 ML
2 SYRINGE (ML) INTRAVENOUS
Status: COMPLETED | OUTPATIENT
Start: 2018-09-18 | End: 2018-09-18

## 2018-09-18 RX ORDER — GUAIFENESIN 100 MG/5ML
81 LIQUID (ML) ORAL
Status: COMPLETED | OUTPATIENT
Start: 2018-09-18 | End: 2018-09-18

## 2018-09-18 RX ORDER — KETOROLAC TROMETHAMINE 30 MG/ML
30 INJECTION, SOLUTION INTRAMUSCULAR; INTRAVENOUS
Status: COMPLETED | OUTPATIENT
Start: 2018-09-18 | End: 2018-09-18

## 2018-09-18 RX ORDER — MUPIROCIN 20 MG/G
OINTMENT TOPICAL 2 TIMES DAILY
Status: DISCONTINUED | OUTPATIENT
Start: 2018-09-18 | End: 2018-09-19

## 2018-09-18 RX ORDER — MIDAZOLAM HYDROCHLORIDE 1 MG/ML
INJECTION, SOLUTION INTRAMUSCULAR; INTRAVENOUS AS NEEDED
Status: DISCONTINUED | OUTPATIENT
Start: 2018-09-18 | End: 2018-09-18 | Stop reason: HOSPADM

## 2018-09-18 RX ORDER — FAMOTIDINE 20 MG/1
20 TABLET, FILM COATED ORAL 2 TIMES DAILY
Status: DISCONTINUED | OUTPATIENT
Start: 2018-09-19 | End: 2018-09-18

## 2018-09-18 RX ORDER — KETOROLAC TROMETHAMINE 15 MG/ML
15 INJECTION, SOLUTION INTRAMUSCULAR; INTRAVENOUS EVERY 6 HOURS
Status: DISCONTINUED | OUTPATIENT
Start: 2018-09-19 | End: 2018-09-19

## 2018-09-18 RX ORDER — HEPARIN SODIUM 1000 [USP'U]/ML
INJECTION, SOLUTION INTRAVENOUS; SUBCUTANEOUS AS NEEDED
Status: DISCONTINUED | OUTPATIENT
Start: 2018-09-18 | End: 2018-09-18 | Stop reason: HOSPADM

## 2018-09-18 RX ORDER — ALBUMIN HUMAN 50 G/1000ML
SOLUTION INTRAVENOUS AS NEEDED
Status: DISCONTINUED | OUTPATIENT
Start: 2018-09-18 | End: 2018-09-18 | Stop reason: HOSPADM

## 2018-09-18 RX ORDER — MIDAZOLAM HYDROCHLORIDE 1 MG/ML
2 INJECTION, SOLUTION INTRAMUSCULAR; INTRAVENOUS
Status: COMPLETED | OUTPATIENT
Start: 2018-09-18 | End: 2018-09-18

## 2018-09-18 RX ORDER — GUAIFENESIN 100 MG/5ML
81 LIQUID (ML) ORAL DAILY
Status: DISCONTINUED | OUTPATIENT
Start: 2018-09-19 | End: 2018-09-19

## 2018-09-18 RX ORDER — PROTAMINE SULFATE 10 MG/ML
INJECTION, SOLUTION INTRAVENOUS AS NEEDED
Status: DISCONTINUED | OUTPATIENT
Start: 2018-09-18 | End: 2018-09-18 | Stop reason: HOSPADM

## 2018-09-18 RX ORDER — FAMOTIDINE 20 MG/1
20 TABLET, FILM COATED ORAL 2 TIMES DAILY
Status: DISCONTINUED | OUTPATIENT
Start: 2018-09-18 | End: 2018-09-19

## 2018-09-18 RX ORDER — SODIUM CHLORIDE 9 MG/ML
25 INJECTION, SOLUTION INTRAVENOUS CONTINUOUS
Status: DISCONTINUED | OUTPATIENT
Start: 2018-09-18 | End: 2018-09-19

## 2018-09-18 RX ORDER — LIDOCAINE HYDROCHLORIDE 10 MG/ML
0.1 INJECTION INFILTRATION; PERINEURAL AS NEEDED
Status: DISCONTINUED | OUTPATIENT
Start: 2018-09-18 | End: 2018-09-18 | Stop reason: HOSPADM

## 2018-09-18 RX ORDER — AMIODARONE HYDROCHLORIDE 200 MG/1
200 TABLET ORAL EVERY 12 HOURS
Status: DISCONTINUED | OUTPATIENT
Start: 2018-09-18 | End: 2018-09-19

## 2018-09-18 RX ORDER — LIDOCAINE HCL/PF 100 MG/5ML
50-100 SYRINGE (ML) INTRAVENOUS
Status: DISCONTINUED | OUTPATIENT
Start: 2018-09-18 | End: 2018-09-19

## 2018-09-18 RX ORDER — SODIUM CHLORIDE 0.9 % (FLUSH) 0.9 %
5-10 SYRINGE (ML) INJECTION AS NEEDED
Status: DISCONTINUED | OUTPATIENT
Start: 2018-09-18 | End: 2018-09-18 | Stop reason: HOSPADM

## 2018-09-18 RX ORDER — MAGNESIUM SULFATE 1 G/100ML
1 INJECTION INTRAVENOUS AS NEEDED
Status: DISCONTINUED | OUTPATIENT
Start: 2018-09-18 | End: 2018-09-19

## 2018-09-18 RX ORDER — POTASSIUM CHLORIDE 7.45 MG/ML
10 INJECTION INTRAVENOUS AS NEEDED
Status: DISCONTINUED | OUTPATIENT
Start: 2018-09-18 | End: 2018-09-19

## 2018-09-18 RX ORDER — SODIUM CHLORIDE 9 MG/ML
INJECTION, SOLUTION INTRAVENOUS
Status: DISCONTINUED | OUTPATIENT
Start: 2018-09-18 | End: 2018-09-18 | Stop reason: HOSPADM

## 2018-09-18 RX ORDER — MORPHINE SULFATE 10 MG/ML
3-5 INJECTION, SOLUTION INTRAMUSCULAR; INTRAVENOUS
Status: DISCONTINUED | OUTPATIENT
Start: 2018-09-18 | End: 2018-09-19

## 2018-09-18 RX ORDER — SODIUM CHLORIDE, SODIUM LACTATE, POTASSIUM CHLORIDE, CALCIUM CHLORIDE 600; 310; 30; 20 MG/100ML; MG/100ML; MG/100ML; MG/100ML
INJECTION, SOLUTION INTRAVENOUS
Status: DISCONTINUED | OUTPATIENT
Start: 2018-09-18 | End: 2018-09-18 | Stop reason: HOSPADM

## 2018-09-18 RX ORDER — PROPOFOL 10 MG/ML
0-50 VIAL (ML) INTRAVENOUS
Status: DISCONTINUED | OUTPATIENT
Start: 2018-09-18 | End: 2018-09-19

## 2018-09-18 RX ORDER — AMIODARONE HYDROCHLORIDE 200 MG/1
600 TABLET ORAL ONCE
Status: ON HOLD | COMMUNITY
End: 2018-09-23

## 2018-09-18 RX ORDER — ALBUMIN HUMAN 50 G/1000ML
SOLUTION INTRAVENOUS
Status: DISCONTINUED | OUTPATIENT
Start: 2018-09-18 | End: 2018-09-18 | Stop reason: HOSPADM

## 2018-09-18 RX ORDER — SODIUM CHLORIDE 0.9 % (FLUSH) 0.9 %
5-10 SYRINGE (ML) INJECTION EVERY 8 HOURS
Status: DISCONTINUED | OUTPATIENT
Start: 2018-09-18 | End: 2018-09-19

## 2018-09-18 RX ORDER — NITROGLYCERIN 20 MG/100ML
INJECTION INTRAVENOUS
Status: DISCONTINUED | OUTPATIENT
Start: 2018-09-18 | End: 2018-09-18 | Stop reason: HOSPADM

## 2018-09-18 RX ORDER — VECURONIUM BROMIDE FOR INJECTION 1 MG/ML
INJECTION, POWDER, LYOPHILIZED, FOR SOLUTION INTRAVENOUS AS NEEDED
Status: DISCONTINUED | OUTPATIENT
Start: 2018-09-18 | End: 2018-09-18 | Stop reason: HOSPADM

## 2018-09-18 RX ORDER — SUFENTANIL CITRATE 50 UG/ML
INJECTION EPIDURAL; INTRAVENOUS AS NEEDED
Status: DISCONTINUED | OUTPATIENT
Start: 2018-09-18 | End: 2018-09-18 | Stop reason: HOSPADM

## 2018-09-18 RX ORDER — SODIUM BICARBONATE 1 MEQ/ML
50 SYRINGE (ML) INTRAVENOUS AS NEEDED
Status: DISCONTINUED | OUTPATIENT
Start: 2018-09-18 | End: 2018-09-19

## 2018-09-18 RX ORDER — NALOXONE HYDROCHLORIDE 0.4 MG/ML
0.4 INJECTION, SOLUTION INTRAMUSCULAR; INTRAVENOUS; SUBCUTANEOUS AS NEEDED
Status: DISCONTINUED | OUTPATIENT
Start: 2018-09-18 | End: 2018-09-19

## 2018-09-18 RX ORDER — PAPAVERINE HYDROCHLORIDE 30 MG/ML
INJECTION INTRAMUSCULAR; INTRAVENOUS AS NEEDED
Status: DISCONTINUED | OUTPATIENT
Start: 2018-09-18 | End: 2018-09-18 | Stop reason: HOSPADM

## 2018-09-18 RX ORDER — FENTANYL CITRATE 50 UG/ML
100 INJECTION, SOLUTION INTRAMUSCULAR; INTRAVENOUS AS NEEDED
Status: DISCONTINUED | OUTPATIENT
Start: 2018-09-18 | End: 2018-09-18 | Stop reason: HOSPADM

## 2018-09-18 RX ORDER — SODIUM CHLORIDE, SODIUM LACTATE, POTASSIUM CHLORIDE, CALCIUM CHLORIDE 600; 310; 30; 20 MG/100ML; MG/100ML; MG/100ML; MG/100ML
1000 INJECTION, SOLUTION INTRAVENOUS CONTINUOUS
Status: DISCONTINUED | OUTPATIENT
Start: 2018-09-18 | End: 2018-09-18 | Stop reason: HOSPADM

## 2018-09-18 RX ORDER — ETOMIDATE 2 MG/ML
INJECTION INTRAVENOUS AS NEEDED
Status: DISCONTINUED | OUTPATIENT
Start: 2018-09-18 | End: 2018-09-18 | Stop reason: HOSPADM

## 2018-09-18 RX ORDER — NITROGLYCERIN 20 MG/100ML
10-100 INJECTION INTRAVENOUS
Status: DISCONTINUED | OUTPATIENT
Start: 2018-09-18 | End: 2018-09-19

## 2018-09-18 RX ADMIN — MIDAZOLAM HYDROCHLORIDE 2 MG: 1 INJECTION, SOLUTION INTRAMUSCULAR; INTRAVENOUS at 07:01

## 2018-09-18 RX ADMIN — SODIUM CHLORIDE: 9 INJECTION, SOLUTION INTRAVENOUS at 07:59

## 2018-09-18 RX ADMIN — CHLORHEXIDINE GLUCONATE 10 ML: 1.2 RINSE ORAL at 13:31

## 2018-09-18 RX ADMIN — SODIUM CHLORIDE 25 ML/HR: 900 INJECTION, SOLUTION INTRAVENOUS at 13:58

## 2018-09-18 RX ADMIN — AMIODARONE HYDROCHLORIDE 200 MG: 200 TABLET ORAL at 21:18

## 2018-09-18 RX ADMIN — CHLORHEXIDINE GLUCONATE 10 ML: 1.2 RINSE ORAL at 18:58

## 2018-09-18 RX ADMIN — PROTAMINE SULFATE 200 MG: 10 INJECTION, SOLUTION INTRAVENOUS at 11:10

## 2018-09-18 RX ADMIN — SODIUM CHLORIDE, SODIUM LACTATE, POTASSIUM CHLORIDE, CALCIUM CHLORIDE: 600; 310; 30; 20 INJECTION, SOLUTION INTRAVENOUS at 07:29

## 2018-09-18 RX ADMIN — ATORVASTATIN CALCIUM 80 MG: 40 TABLET, FILM COATED ORAL at 21:18

## 2018-09-18 RX ADMIN — ETOMIDATE 14 MG: 2 INJECTION INTRAVENOUS at 07:38

## 2018-09-18 RX ADMIN — SUFENTANIL CITRATE 50 MCG: 50 INJECTION EPIDURAL; INTRAVENOUS at 08:34

## 2018-09-18 RX ADMIN — SUFENTANIL CITRATE 20 MCG: 50 INJECTION EPIDURAL; INTRAVENOUS at 07:42

## 2018-09-18 RX ADMIN — KETOROLAC TROMETHAMINE 30 MG: 30 INJECTION, SOLUTION INTRAMUSCULAR at 18:43

## 2018-09-18 RX ADMIN — SUFENTANIL CITRATE 50 MCG: 50 INJECTION EPIDURAL; INTRAVENOUS at 07:44

## 2018-09-18 RX ADMIN — DEXTROSE MONOHYDRATE, SODIUM CHLORIDE, AND POTASSIUM CHLORIDE 25 ML/HR: 50; 4.5; 1.49 INJECTION, SOLUTION INTRAVENOUS at 12:15

## 2018-09-18 RX ADMIN — NITROGLYCERIN 10 MCG/MIN: 20 INJECTION INTRAVENOUS at 07:38

## 2018-09-18 RX ADMIN — ROCURONIUM BROMIDE 50 MG: 10 INJECTION, SOLUTION INTRAVENOUS at 10:38

## 2018-09-18 RX ADMIN — CEFAZOLIN SODIUM 2 G: 1 INJECTION, POWDER, FOR SOLUTION INTRAMUSCULAR; INTRAVENOUS at 11:30

## 2018-09-18 RX ADMIN — SODIUM CHLORIDE, SODIUM LACTATE, POTASSIUM CHLORIDE, AND CALCIUM CHLORIDE 1000 ML: 600; 310; 30; 20 INJECTION, SOLUTION INTRAVENOUS at 07:01

## 2018-09-18 RX ADMIN — MORPHINE SULFATE 5 MG: 10 INJECTION INTRAMUSCULAR; INTRAVENOUS; SUBCUTANEOUS at 12:30

## 2018-09-18 RX ADMIN — VECURONIUM BROMIDE FOR INJECTION 2 MG: 1 INJECTION, POWDER, LYOPHILIZED, FOR SOLUTION INTRAVENOUS at 08:35

## 2018-09-18 RX ADMIN — MORPHINE SULFATE 5 MG: 10 INJECTION INTRAMUSCULAR; INTRAVENOUS; SUBCUTANEOUS at 16:05

## 2018-09-18 RX ADMIN — POTASSIUM CHLORIDE 10 MEQ: 10 INJECTION, SOLUTION INTRAVENOUS at 23:09

## 2018-09-18 RX ADMIN — MIDAZOLAM HYDROCHLORIDE 3 MG: 1 INJECTION, SOLUTION INTRAMUSCULAR; INTRAVENOUS at 10:38

## 2018-09-18 RX ADMIN — LIDOCAINE HYDROCHLORIDE 50 MG: 20 INJECTION, SOLUTION EPIDURAL; INFILTRATION; INTRACAUDAL; PERINEURAL at 07:38

## 2018-09-18 RX ADMIN — ASPIRIN 81 MG 81 MG: 81 TABLET ORAL at 07:00

## 2018-09-18 RX ADMIN — VECURONIUM BROMIDE FOR INJECTION 4 MG: 1 INJECTION, POWDER, LYOPHILIZED, FOR SOLUTION INTRAVENOUS at 10:09

## 2018-09-18 RX ADMIN — SUFENTANIL CITRATE 50 MCG: 50 INJECTION EPIDURAL; INTRAVENOUS at 08:57

## 2018-09-18 RX ADMIN — MORPHINE SULFATE 3 MG: 10 INJECTION INTRAMUSCULAR; INTRAVENOUS; SUBCUTANEOUS at 20:40

## 2018-09-18 RX ADMIN — MIDAZOLAM HYDROCHLORIDE 2 MG: 1 INJECTION, SOLUTION INTRAMUSCULAR; INTRAVENOUS at 07:29

## 2018-09-18 RX ADMIN — EPHEDRINE SULFATE 5 MG: 50 INJECTION, SOLUTION INTRAVENOUS at 08:04

## 2018-09-18 RX ADMIN — MIDAZOLAM 1 MG: 1 INJECTION INTRAMUSCULAR; INTRAVENOUS at 17:44

## 2018-09-18 RX ADMIN — MUPIROCIN: 20 OINTMENT TOPICAL at 21:18

## 2018-09-18 RX ADMIN — ALBUMIN HUMAN 250 ML: 50 SOLUTION INTRAVENOUS at 11:32

## 2018-09-18 RX ADMIN — SUFENTANIL CITRATE 50 MCG: 50 INJECTION EPIDURAL; INTRAVENOUS at 10:38

## 2018-09-18 RX ADMIN — Medication 10 ML: at 21:18

## 2018-09-18 RX ADMIN — SODIUM CHLORIDE 2 UNITS/HR: 900 INJECTION, SOLUTION INTRAVENOUS at 17:13

## 2018-09-18 RX ADMIN — VECURONIUM BROMIDE FOR INJECTION 2 MG: 1 INJECTION, POWDER, LYOPHILIZED, FOR SOLUTION INTRAVENOUS at 08:57

## 2018-09-18 RX ADMIN — Medication 10 ML: at 13:31

## 2018-09-18 RX ADMIN — MIDAZOLAM 1 MG: 1 INJECTION INTRAMUSCULAR; INTRAVENOUS at 16:39

## 2018-09-18 RX ADMIN — ALBUMIN HUMAN 250 ML: 50 SOLUTION INTRAVENOUS at 11:39

## 2018-09-18 RX ADMIN — ROCURONIUM BROMIDE 50 MG: 10 INJECTION, SOLUTION INTRAVENOUS at 07:38

## 2018-09-18 RX ADMIN — FAMOTIDINE 20 MG: 20 TABLET, FILM COATED ORAL at 21:33

## 2018-09-18 RX ADMIN — SODIUM BICARBONATE 50 MEQ: 84 INJECTION, SOLUTION INTRAVENOUS at 16:05

## 2018-09-18 RX ADMIN — SODIUM CHLORIDE: 9 INJECTION, SOLUTION INTRAVENOUS at 11:33

## 2018-09-18 RX ADMIN — HEPARIN SODIUM 20000 UNITS: 1000 INJECTION, SOLUTION INTRAVENOUS; SUBCUTANEOUS at 09:40

## 2018-09-18 RX ADMIN — OXYCODONE AND ACETAMINOPHEN 1 TABLET: 5; 325 TABLET ORAL at 22:15

## 2018-09-18 RX ADMIN — NITROGLYCERIN 10 MCG/MIN: 20 INJECTION INTRAVENOUS at 21:00

## 2018-09-18 RX ADMIN — TUBERCULIN PURIFIED PROTEIN DERIVATIVE 5 UNITS: 5 INJECTION, SOLUTION INTRADERMAL at 21:21

## 2018-09-18 RX ADMIN — Medication 2 G: at 20:11

## 2018-09-18 RX ADMIN — Medication 2 G: at 08:15

## 2018-09-18 NOTE — ANESTHESIA PROCEDURE NOTES
[facility] 
Date/Time: 9/18/2018 12:05 PM 
Ordering Provider: Divya Adams 
 
Procedure Details: probe placement, image aquisition & interpretation Site marked, Timeout performed, 08:00 Risks and benefits discussed with the patient and plans are to proceed Procedure Note Performed by: Selina Salcido Authorized by: Selina Salcido Indications: assessment of surgical repair Modalities: 2D, CF Probe Type: biplane Insertion: atraumatic Patient Status: intubated and sedated Echocardiographic and Doppler Measurements Aorta  Size  Diam(cm)  Dissection PlaqueThick(mm)  Plaque Mobile Ascending normal   0-3 No  
 Arch Descending normal  No 0-3 No  
 
 
 
 Valves  Annulus  Stenosis  Area/Grad  Regurg  Leaflet Morph  Leaflet Motion Aortic normal none  0 normal normal  
 Mitral normal none  1+ normal normal  
 Tricuspid Atria  Size  SEC (smoke)  Thrombus  Tumor  Device Rt Atrium normal No No No Yes Lt Atrium normal No No No   
 
Interatrial Septum Morphology: patent foramen ovale Interventricular Septum Morphology: normal 
 
Ventricle  Cavity Size  Cavity Dimension Hypertrophy  Thrombus  Gloal FXN  EF  
 RV normal    normal   
 LV normal    normal   
 
 
Regional Function 
(1 = normal, 2 = mildly hypokinetic, 3 = severely hypokinetic, 4 = akinetic, 5 = dyskinetic) LAV - [place] ME LAV = 0  ME LAV = 90  ME LAV = 130 Basal Sept:1 Basal Ant:1 Basal Post:1 Mid Sept:1 Mid Ant:1 Mid Post:1 Apical Sept:1 Apical Ant:1 Basal Ant Sept:1 Basal Lat:1 Basal Inf:1 Mid Ant Sept:1 Mid Lat:1 Mid Inf:1 Apical Lat:1 Apical Inf:1 Post Intervention Follow-up Study Ventricular Global Function: unchanged Ventricular Regional Function: unchanged Valve  Function  Regurgitation  Area Aortic no change Mitral no change Tricuspid Prosthetic Complications: None Comments: Basic MARY exam used for hemodynamic monitoring

## 2018-09-18 NOTE — PROGRESS NOTES
Respiratory Mechanics completed and are as follows: 
Weaning Parameters Spontaneous Breathing Trial Complete: Yes Resp Rate Observed: 18 Ve: 7.7 VT: 410 RSBI: 43 NIF: -20 [place] Agitation Sedation Scale (RASS): Alert and calm Patient extubated to an Airvo 40 L/40%. Patient is able to communicate and is negative for stridor. Breath sounds are clear and diminished. No complications with extubation. Leslie Pappas

## 2018-09-18 NOTE — IP AVS SNAPSHOT
[address] [facility] 
 
 
 2[address] [facility] [address] 
[phone] Patient: Maddi Chun MRN: [id number] JANAY:[id number] About your hospitalization You were admitted on:  September 18, 2018 You last received care in the:  [facility] 2 CV STEPDOWN You were discharged on:  September 23, 2018 Why you were hospitalized Your primary diagnosis was:  S/P Cabg X 3 Your diagnoses also included:  Cad (Coronary Artery Disease), Chronic Obstructive Pulmonary Disease (Hcc), Encounter For Weaning From Ventilator (Hcc), Personal History Of Tobacco Use, Presenting Hazards To Health, Other Pneumothorax, Hypoxemia Follow-up Information Follow up With Details Comments Contact Info [address] [facility]  They will call you within 24 hours. [address]  [address] [id number] [phone] [facility] On 10/8/2018 Your orientation to Cardiac Rehab is scheduled for Monday, Oct 8th at 2:00pm. Please arrive at 1:45pm to check in with Registration in the [place] before coming to your appt on the second floor. Please bring your completed paperwork. [address] [place] [address] [id number] 
[phone] Arminda Begum MD   [address] [place] [address] 
[phone] Mino Collins MD  please call Monday 9/24/18 to schedule yourr follow up appointment with Dr. Verna Klein [address] [facility] CARDIOVASC THORACIC [address] [place] [id number] [phone] [facility]  Follow follow up with [facility] in 2-3 weeks with CXR. will call you monday if  you do not hear from them please call office [address] [place] [address] [facility] 
[phone] [facility] CARDIOLOGY  please call Martha Farooq 9/24/18 to schedule a one week (TC7) follow up appointment. [address] [place] [address] [address] [facility] 
[phone] Your Scheduled Appointments Monday October 08, 2018  2:00 PM EDT  
ORIENTATION with Daniella Kapadia RN  
[facility] ([address]) [address] [facility] [place] 187 [facility]  
[phone] Wednesday October 17, 2018  8:15 AM EDT Office Visit with Ambrose Bundy MD  
[address]) [address] 187 [facility] [address] [phone] Friday October 26, 2018  8:30 AM EDT  
LAB with [address]  
1808 [facility] OUTREACH INSURANCE [facility] Mary Jiang [address] 187 [facility]  
[phone] Friday October 26, 2018  9:00 AM EDT Follow Up with Nita Foote MD  
[facility] MAGDA/ [address] [facility] [address]  
[phone] Discharge Orders None A check marcy indicates which time of day the medication should be taken. My Medications START taking these medications Instructions Each Dose to Equal  
 Morning Noon Evening Bedtime  
 traMADol 50 mg tablet Commonly known as:  ULTRAM  
Your last dose was:  9/26/18 at 0900 Take 1 Tab by mouth every six (6) hours as needed. Max Daily Amount: 200 mg. Indications: Pain 50 mg CHANGE how you take these medications Instructions Each Dose to Equal  
 Morning Noon Evening Bedtime  
 amiodarone 200 mg tablet Commonly known as:  CORDARONE What changed:  how much to take Your next dose is:  Tomorrow  9/24/18 Notes to Patient:  Take 1 tab once daily for 30 days Take 1 Tab by mouth once for 1 dose. Indications: PREVENTION OF A. FIB POST CARDIO-THORACIC SURGERY  
 200 mg  
    
  
   
   
   
  
 metoprolol tartrate 50 mg tablet Commonly known as:  LOPRESSOR What changed:   
- medication strength 
- how much to take - when to take this Your next dose is: Today 9/23/18 Take 1 Tab by mouth every twelve (12) hours. 50 mg CONTINUE taking these medications Instructions Each Dose to Equal  
 Morning Noon Evening Bedtime  
 amLODIPine 5 mg tablet Commonly known as:  Zander Rout Your next dose is:  Tomorrow 9/24/18 Take 1 Tab by mouth daily. 5 mg  
    
  
   
   
   
  
 aspirin delayed-release 81 mg tablet Your next dose is:  Tomorrow 9/24/18 Take 81 mg by mouth daily. 81 mg  
    
  
   
   
   
  
 atorvastatin 40 mg tablet Commonly known as:  LIPITOR Your next dose is: Tonight 9/23/18 Take 1 Tab by mouth daily. 40 mg  
    
   
   
   
  
  
 budesonide-formoterol 160-4.5 mcg/actuation Hfaa Commonly known as:  SYMBICORT Take 2 Puffs by inhalation two (2) times a day. 2 Puff Ostomy Supplies 2 1/4 \" Misc Commonly known as:  NEW IMAGE SKIN BARRIER  
   
 1 Units by Does Not Apply route as needed. 1 Units  
    
   
   
   
  
 tiotropium bromide 2.5 mcg/actuation inhaler Commonly known as:  Jamse Fred Take 2 Puffs by inhalation daily. 2 Puff STOP taking these medications BACTROBAN NASAL 2 % nasal ointment Generic drug:  mupirocin calcium Where to Get Your Medications These medications were sent to [address] [place] [address] Amanda Winters [facility] [address] Phone:  [phone]  
  amiodarone 200 mg tablet  
 metoprolol tartrate 50 mg tablet Information on where to get these meds will be given to you by the nurse or doctor. ! Ask your nurse or doctor about these medications  
  traMADol 50 mg tablet Opioid Education  Prescription Opioids: What You Need to Know: 
 
Prescription opioids can be used to help relieve moderate-to-severe pain and are often prescribed following a surgery or injury, or for certain health conditions. These medications can be an important part of treatment but also come with serious risks. Opioids are strong pain medicines. Examples include hydrocodone, oxycodone, fentanyl, and morphine. Heroin is an example of an illegal opioid. It is important to work with your health care provider to make sure you are getting the safest, most effective care. WHAT ARE THE RISKS AND SIDE EFFECTS OF OPIOID USE? Prescription opioids carry serious risks of addiction and overdose, especially with prolonged use. An opioid overdose, often marked by slow breathing, can cause sudden death. The use of prescription opioids can have a number of side effects as well, even when taken as directed. · Tolerance-meaning you might need to take more of a medication for the same pain relief · Physical dependence-meaning you have symptoms of withdrawal when the medication is stopped. Withdrawal symptoms can include nausea, sweating, chills, diarrhea, stomach cramps, and muscle aches. Withdrawal can last up to several weeks, depending on which drug you took and how long you took it. · Increased sensitivity to pain · Constipation · Nausea, vomiting, and dry mouth · Sleepiness and dizziness · Confusion · Depression · Low levels of testosterone that can result in lower sex drive, energy, and strength · Itching and sweating RISKS ARE GREATER WITH:      
· History of drug misuse, substance use disorder, or overdose · Mental health conditions (such as depression or anxiety) · Sleep apnea · Older age (72 years or older) · Pregnancy Avoid alcohol while taking prescription opioids. Also, unless specifically advised by your health care provider, medications to avoid include: · Benzodiazepines (such as Xanax or Valium) · Muscle relaxants (such as Soma or Flexeril) · Hypnotics (such as Ambien or Lunesta) · Other prescription opioids KNOW YOUR OPTIONS Talk to your health care provider about ways to manage your pain that don't involve prescription opioids. Some of these options may actually work better and have fewer risks and side effects. Consult your physician before adding or stopping any medications, treatments, or physical activity. Options may include: 
· Pain relievers such as acetaminophen, ibuprofen, and naproxen · Some medications that are also used for depression or seizures · Physical therapy and exercise · Counseling to help patients learn how to cope better with triggers of pain and stress. · Application of heat or cold compress · Massage therapy · Relaxation techniques Be Informed Make sure you know the name of your medication, how much and how often to take it, and its potential risks & side effects. IF YOU ARE PRESCRIBED OPIOIDS FOR PAIN: 
· Never take opioids in greater amounts or more often than prescribed. Remember the goal is not to be pain-free but to manage your pain at a tolerable level. · Follow up with your primary care provider to: · Work together to create a plan on how to manage your pain. · Talk about ways to help manage your pain that don't involve prescription opioids. · Talk about any and all concerns and side effects. · Help prevent misuse and abuse. · Never sell or share prescription opioids · Help prevent misuse and abuse. · Store prescription opioids in a secure place and out of reach of others (this may include visitors, children, friends, and family). · Safely dispose of unused/unwanted prescription opioids: Find your community drug take-back program or your pharmacy mail-back program, or flush them down the toilet, following guidance from the Food and Drug Administration (www.fda.gov/Drugs/ResourcesForYou). · Visit www.cdc.gov/drugoverdose to learn about the risks of opioid abuse and overdose.  
· If you believe you may be struggling with addiction, tell your health care provider and ask for guidance or call Melina Coleman Sarah at 5-510-775-HELP. Discharge Instructions Coronary Artery Bypass Graft: What to Expect at Home Your Recovery Coronary artery bypass graft (CABG) is surgery to treat coronary artery disease. The surgery helps blood make a detour, or bypass, around one or more narrowed or blocked coronary arteries. Coronary arteries are the blood vessels that bring blood to the heart. Your doctor did the surgery through a cut, called an incision, in your chest. 
You will feel tired and sore for the first few weeks after surgery. You may have some brief, sharp pains on either side of your chest. Your chest, shoulders, and upper back may ache. The incision in your chest and the area where the healthy vein was taken may be sore or swollen. These symptoms usually get better after 4 to 6 weeks. You will probably be able to do many of your usual activities after 4 to 6 weeks. But for 2 to 3 months you will not be able to lift heavy objects or do activities that strain your chest or upper arm muscles. At first you may notice that you get tired easily and need to rest often. It may take 1 to 2 months to get your energy back. Some people find that they are more emotional after this surgery. You may cry easily or show emotion in ways that are unusual for you. This is common and may last for up to a year. Some people get depressed after CABG surgery. Talk with your doctor if you have sadness that continues or you are concerned about how you are feeling. Treatment and other support can help you feel better. Even though the surgery may improve your symptoms, you will still need to make changes in your lifestyle to lower your risk of a heart attack or stroke. It will be important to eat a heart-healthy diet, get regular exercise, not smoke, take your heart medicines, and reduce stress. You will likely start a cardiac rehabilitation (rehab) program in the hospital. You will continue with this rehab program after you go home to help you recover and prevent problems with your heart. Talk to your doctor about whether rehab is right for you. This care sheet gives you a general idea about how long it will take for you to recover. But each person recovers at a different pace. Follow the steps below to get better as quickly as possible. How can you care for yourself at home? Activity 
  · Rest when you feel tired. Getting enough sleep will help you recover. Try to sleep on your back for 4 to 6 weeks while your breastbone (sternum) heals. This usually takes about 4 to 6 weeks.  
  · Try to walk each day. Start by walking a little more than you did the day before. Bit by bit, increase the amount you walk. Walking boosts blood flow and helps prevent pneumonia and constipation.  
  · Avoid strenuous activities, such as bicycle riding, jogging, weight lifting, or heavy aerobic exercise, until your doctor says it is okay.  
  · For 3 months, avoid activities that strain your chest or upper arm muscles. This includes pushing a  or vacuum, mopping floors, or swinging a golf club or tennis racquet.  
  · For 2 to 3 months, avoid lifting anything that would make you strain. This may include a child, heavy grocery bags and milk containers, a heavy briefcase or backpack, or cat litter or dog food bags.  
  · Hold a pillow firmly over your chest incision when you cough or take deep breaths. This will support your chest and reduce your pain.  
  · Do breathing exercises at home as instructed by your doctor. This will help prevent pneumonia.  
  · Ask your doctor when you can drive again.  
  · You will probably need to take 4 to 12 weeks off from work. It depends on the type of work you do and how you feel.  
  · You may shower as usual. Pat the incision dry.  Do not take a bath for the first 3 weeks, or until your doctor tells you it is okay.  
  · Do not swim or use a hot tub for at least 1 month, or until your doctor says it is okay.  
  · Ask your doctor when it is okay for you to have sex. Diet 
  · Eat a heart-healthy diet. If you have not been eating this way, talk to your doctor. You also may want to talk to a dietitian. A dietitian can help you learn about healthy foods.  
  · Drink plenty of fluids (unless your doctor tells you not to).  
  · You may notice that your bowel movements are not regular right after your surgery. This is common. Try to avoid constipation and straining with bowel movements. You may want to take a fiber supplement every day. If you have not had a bowel movement after a couple of days, ask your doctor about taking a mild laxative. Medicines 
  · Your doctor will tell you if and when you can restart your medicines. He or she will also give you instructions about taking any new medicines.  
  · If you take blood thinners, such as warfarin (Coumadin), clopidogrel (Plavix), or aspirin, be sure to talk to your doctor. He or she will tell you if and when to start taking those medicines again. Make sure that you understand exactly what your doctor wants you to do.  
  · Your doctor may give you medicines to prevent blood clots, keep your heartbeat steady, and lower your blood pressure and cholesterol. Take your medicines exactly as prescribed. Call your doctor if you think you are having a problem with your medicine.  
  · Be safe with medicines. Take pain medicines exactly as directed. ¨ If the doctor gave you a prescription medicine for pain, take it as prescribed. ¨ If you are not taking a prescription pain medicine, ask your doctor if you can take an over-the-counter medicine. ¨ Do not take aspirin, ibuprofen (Advil, Motrin), naproxen (Aleve), or other nonsteroidal anti-inflammatory drugs (NSAIDs) unless your doctor says it is okay.   · If you think your pain medicine is making you sick to your stomach: 
¨ Take your medicine after meals (unless your doctor has told you not to). ¨ Ask your doctor for a different pain medicine.  
  · If your doctor prescribed antibiotics, take them as directed. Do not stop taking them just because you feel better. You need to take the full course of antibiotics. Incision care 
  · If you have strips of tape on the incisions the doctor made, leave the tape on for a week or until it falls off.  
  · Wash the area daily with warm, soapy water, and pat it dry. Don't use hydrogen peroxide or alcohol, which can slow healing. You may cover the area with a gauze bandage if it weeps or rubs against clothing. Change the bandage every day.  
  · Keep the area clean and dry.  
  · If you have an incision in your leg: ¨ Wear support stockings on your legs during the day for the first 2 weeks. You can take the stockings off at night while you sleep. ¨ Raise your legs above the level of your heart whenever you lay down for the first 4 to 6 weeks. Other instructions 
  · Keep track of your weight. Weigh yourself every day at the same time of day, on the same scale, in the same amount of clothing. A sudden increase in weight can be a sign of a problem with your heart. Tell your doctor if you suddenly gain weight, such as 3 pounds or more in 2 to 3 days.  
  · Do not smoke. Smoking can make it harder for you to recover and it will raise the chances of your arteries narrowing again. If you need help quitting, talk to your doctor about stop-smoking programs and medicines. These can increase your chances of quitting for good. Follow-up care is a key part of your treatment and safety. Be sure to make and go to all appointments, and call your doctor if you are having problems. It's also a good idea to know your test results and keep a list of the medicines you take. When should you call for help? Call 911 anytime you think you may need emergency care. For example, call if: 
  · You passed out (lost consciousness).  
  · You have severe trouble breathing.  
  · You have sudden chest pain and shortness of breath, or you cough up blood.  
  · You have severe pain in your chest.  
  · You have symptoms of a heart attack. These may include: ¨ Chest pain or pressure, or a strange feeling in the chest. 
¨ Sweating. ¨ Shortness of breath. ¨ Nausea or vomiting. ¨ Pain, pressure, or a strange feeling in the back, neck, jaw, or upper belly or in one or both shoulders or arms. ¨ Lightheadedness or sudden weakness. ¨ A fast or irregular heartbeat. After you call 911, the  may tell you to chew 1 adult-strength or 2 to 4 low-dose aspirin. Wait for an ambulance. Do not try to drive yourself.  
  · You have angina symptoms (such as chest pain or pressure) that do not go away with rest or are not getting better within 5 minutes after you take a dose of nitroglycerin.  
 Call your doctor now or seek immediate medical care if: 
  · You have pain that does not get better after you take pain medicine.  
  · You have a fever over 100°F.  
  · You have loose stitches, or your incision comes open.  
  · Bright red blood has soaked through the bandage over your incision.  
  · You have signs of infection, such as: 
¨ Increased pain, swelling, warmth, or redness. ¨ Red streaks leading from the incision. ¨ Pus draining from the incision. ¨ Swollen lymph nodes in your neck, armpits, or groin. ¨ A fever.  
  · You have signs of a blood clot in a leg. If you had a vein removed from your leg, you may have tenderness and swelling while your leg heals. But signs of a blood clot may be in a different part of your leg and may include: 
¨ Pain in your calf, back of the knee, thigh, or groin. ¨ Redness and swelling in your leg or groin.  
  · Your heartbeat feels very fast or slow, skips beats, or flutters.   · You are dizzy or lightheaded, or you feel like you may faint.  
  · You have new or increased shortness of breath.  
 Watch closely for changes in your health, and be sure to contact your doctor if: 
  · You gain weight suddenly, such as 3 pounds or more in 2 to 3 days.  
  · You have increased swelling in your legs, ankles, or feet.  
  · You have any concerns about your incision.  
  · You feel very sad or have other signs of depression, such as trouble sleeping or eating.  
  · You have questions about diet, exercise, quitting smoking, or stress reduction after surgery. Where can you learn more? Go to http://maksim-yasir.info/. Enter F759 in the search box to learn more about \"Coronary Artery Bypass Graft: What to Expect at Home. \" Current as of: December 6, 2017 Content Version: 11.7 © 1989-6518 [employer]. Care instructions adapted under license by [employer] (which disclaims liability or warranty for this information). If you have questions about a medical condition or this instruction, always ask your healthcare professional. [place] [address] any warranty or liability for your use of this information. Reducing Heart Attack Risk With Daily Medicine: Care Instructions Your Care Instructions Heart disease is the number one cause of death. If you are at risk for heart disease, there are many medicines that can reduce your risk. These include: · ACE inhibitors or ARBs. These are types of blood pressure medicines. They can reduce the risk of heart attacks and strokes if you are at high risk. · Statin medicines. These lower cholesterol. They can also reduce the risk of heart disease and strokes. · Aspirin and other antiplatelets. These prevent blood clots. They can help certain people lower their risk of a heart attack or stroke. · Beta-blocker medicines.  These are a type of blood pressure and heart medicine. They can reduce the chance of early death if you have had a heart attack. All medicines can cause side effects. So it is important to understand the pros and cons of any medicine you take. It is also important to take your medicines exactly as your doctor tells you to. Follow-up care is a key part of your treatment and safety. Be sure to make and go to all appointments, and call your doctor if you are having problems. It's also a good idea to know your test results and keep a list of the medicines you take. ACE inhibitors ACE (angiotensin-converting enzyme) inhibitors are used for three main reasons. They lower blood pressure, protect the kidneys, and prevent heart attacks and strokes. Examples include benazepril (Lotensin), lisinopril (Prinivil, Zestril), and ramipril (Altace). An angiotensin II receptor blocker (ARB) may be used instead of an ACE inhibitor. ARBs help you in the same ways as ACE inhibitors. Examples include candesartan (Atacand), irbesartan (Avapro), losartan (Cozaar). Before you start taking an ACE inhibitor or an ARB, make sure your doctor knows if: 
· You are taking a water pill (diuretic). · You are taking potassium pills or using salt substitutes. · You are pregnant or breastfeeding. · You have had a kidney transplant or other kidney problems. ACE inhibitors and ARBs can cause side effects. Call your doctor right away if you have: · Trouble breathing. · Swelling in your face, head, neck, or tongue. · Dizziness or lightheadedness. · A dry cough. Statins Statins lower cholesterol. Examples include atorvastatin (Lipitor), lovastatin (Mevacor), pravastatin (Pravachol), and simvastatin (Zocor). Before you start taking a statin, make sure your doctor knows if: 
· You have had a kidney transplant or other kidney problems. · You have liver disease. · You take any other prescription medicine, over-the-counter medicine, vitamins, supplements, or herbal remedies. · You are pregnant or breastfeeding. Statins can cause side effects. Call your doctor right away if you have: · New, severe muscle aches. · Brown urine. Aspirin Taking an aspirin every day can lower your risk for a heart attack. A heart attack occurs when a blood vessel in the heart gets blocked. When this happens, oxygen can't get to the heart muscle, and part of the heart dies. Aspirin can help prevent blood clots that can block the blood vessels. Talk to your doctor before you start taking aspirin every day. He or she may recommend that you take one low-dose aspirin (81 mg) tablet each day, with a meal and a full glass of water. Taking aspirin isn't right for everyone. This is because it can cause serious bleeding. And you may not be able to use aspirin if you: 
· Have asthma. · Have an ulcer or other stomach problem. · Take some other medicine (called a blood thinner) that prevents blood clots. · Are allergic to aspirin. Before having a surgery or procedure, tell your doctor or dentist that you take aspirin. He or she will tell you if you should stop taking aspirin beforehand. Make sure that you understand exactly what your doctor wants you to do. Aspirin can cause side effects. Call your doctor right away if you have: · Unusual bleeding or bruising. · Nausea, vomiting, or heartburn. · Black or bloody stools. Beta-blockers Beta-blockers are used for three main reasons. They lower blood pressure, relieve angina symptoms (such as chest pain or pressure), and reduce the chances of a second heart attack. They include atenolol (Tenormin), carvedilol (Coreg), and metoprolol (Lopressor). Before you start taking a beta-blocker, make sure your doctor knows if you have: · Severe asthma or frequent asthma attacks. · A very slow pulse (less than 55 beats a minute). Beta-blockers can cause side effects. Call your doctor right away if you have: · Wheezing or trouble breathing. · Dizziness or lightheadedness. · Asthma that gets worse. When should you call for help? Watch closely for changes in your health, and be sure to contact your doctor if you have any problems. Where can you learn more? Go to http://maksim-yasir.info/. Enter R428 in the search box to learn more about \"Reducing Heart Attack Risk With Daily Medicine: Care Instructions. \" Current as of: December 6, 2017 Content Version: 11.7 © 7153-2813 tuul. Care instructions adapted under license by MiniTime (which disclaims liability or warranty for this information). If you have questions about a medical condition or this instruction, always ask your healthcare professional. [address] any warranty or liability for your use of this information. Heart-Healthy Diet: Care Instructions Your Care Instructions A heart-healthy diet has lots of vegetables, fruits, nuts, beans, and whole grains, and is low in salt. It limits foods that are high in saturated fat, such as meats, cheeses, and fried foods. It may be hard to change your diet, but even small changes can lower your risk of heart attack and heart disease. Follow-up care is a key part of your treatment and safety. Be sure to make and go to all appointments, and call your doctor if you are having problems. It's also a good idea to know your test results and keep a list of the medicines you take. How can you care for yourself at home? Watch your portions · Learn what a serving is. A \"serving\" and a \"portion\" are not always the same thing. Make sure that you are not eating larger portions than are recommended. For example, a serving of pasta is ½ cup. A serving size of meat is 2 to 3 ounces. A 3-ounce serving is about the size of a deck of cards. Measure serving sizes until you are good at [place]" them.  Keep in mind that restaurants often serve portions that are 2 or 3 times the size of one serving. · To keep your energy level up and keep you from feeling hungry, eat often but in smaller portions. · Eat only the number of calories you need to stay at a healthy weight. If you need to lose weight, eat fewer calories than your body burns (through exercise and other physical activity). Eat more fruits and vegetables · Eat a variety of fruit and vegetables every day. Dark green, deep orange, red, or yellow fruits and vegetables are especially good for you. Examples include spinach, carrots, peaches, and berries. · Keep carrots, celery, and other veggies handy for snacks. Buy fruit that is in season and store it where you can see it so that you will be tempted to eat it. · Cook dishes that have a lot of veggies in them, such as stir-fries and soups. Limit saturated and trans fat · Read food labels, and try to avoid saturated and trans fats. They increase your risk of heart disease. Trans fat is found in many processed foods such as cookies and crackers. · Use olive or canola oil when you cook. Try cholesterol-lowering spreads, such as Benecol or Take Control. · Bake, broil, grill, or steam foods instead of frying them. · Choose lean meats instead of high-fat meats such as hot dogs and sausages. Cut off all visible fat when you prepare meat. · Eat fish, skinless poultry, and meat alternatives such as soy products instead of high-fat meats. Soy products, such as tofu, may be especially good for your heart. · Choose low-fat or fat-free milk and dairy products. Eat fish · Eat at least two servings of fish a week. Certain fish, such as salmon and tuna, contain omega-3 fatty acids, which may help reduce your risk of heart attack. Eat foods high in fiber · Eat a variety of grain products every day. Include whole-grain foods that have lots of fiber and nutrients. Examples of whole-grain foods include oats, whole wheat bread, and brown rice. · Buy whole-grain breads and cereals, instead of white bread or pastries. Limit salt and sodium · Limit how much salt and sodium you eat to help lower your blood pressure. · Taste food before you salt it. Add only a little salt when you think you need it. With time, your taste buds will adjust to less salt. · Eat fewer snack items, fast foods, and other high-salt, processed foods. Check food labels for the amount of sodium in packaged foods. · Choose low-sodium versions of canned goods (such as soups, vegetables, and beans). Limit sugar · Limit drinks and foods with added sugar. These include candy, desserts, and soda pop. Limit alcohol · Limit alcohol to no more than 2 drinks a day for men and 1 drink a day for women. Too much alcohol can cause health problems. When should you call for help? Watch closely for changes in your health, and be sure to contact your doctor if: 
  · You would like help planning heart-healthy meals. Where can you learn more? Go to http://maksim-yasir.info/. Enter V137 in the search box to learn more about \"Heart-Healthy Diet: Care Instructions. \" Current as of: December 6, 2017 Content Version: 11.7 © 8203-3836 Yeapoo. Care instructions adapted under license by Nudge (which disclaims liability or warranty for this information). If you have questions about a medical condition or this instruction, always ask your healthcare professional. [place] [address] any warranty or liability for your use of this information. Stopping Smoking: Care Instructions Your Care Instructions Cigarette smokers crave the nicotine in cigarettes. Giving it up is much harder than simply changing a habit. Your body has to stop craving the nicotine. It is hard to quit, but you can do it. There are many tools that people use to quit smoking. You may find that combining tools works best for you. There are several steps to quitting. First you get ready to quit. Then you get support to help you. After that, you learn new skills and behaviors to become a nonsmoker. For many people, a necessary step is getting and using medicine. Your doctor will help you set up the plan that best meets your needs. You may want to attend a smoking cessation program to help you quit smoking. When you choose a program, look for one that has proven success. Ask your doctor for ideas. You will greatly increase your chances of success if you take medicine as well as get counseling or join a cessation program. 
Some of the changes you feel when you first quit tobacco are uncomfortable. Your body will miss the nicotine at first, and you may feel short-tempered and grumpy. You may have trouble sleeping or concentrating. Medicine can help you deal with these symptoms. You may struggle with changing your smoking habits and rituals. The last step is the tricky one: Be prepared for the smoking urge to continue for a time. This is a lot to deal with, but keep at it. You will feel better. Follow-up care is a key part of your treatment and safety. Be sure to make and go to all appointments, and call your doctor if you are having problems. It's also a good idea to know your test results and keep a list of the medicines you take. How can you care for yourself at home? · Ask your family, friends, and coworkers for support. You have a better chance of quitting if you have help and support. · Join a support group, such as Nicotine Anonymous, for people who are trying to quit smoking. · Consider signing up for a smoking cessation program, such as the American Lung Association's Freedom from Smoking program. 
· Get text messaging support. Go to the website at www.smokefree. gov to sign up for the [facility] program. 
· Set a quit date.  Pick your date carefully so that it is not right in the middle of a big deadline or stressful time. Once you quit, do not even take a puff. Get rid of all ashtrays and lighters after your last cigarette. Clean your house and your clothes so that they do not smell of smoke. · Learn how to be a nonsmoker. Think about ways you can avoid those things that make you reach for a cigarette. ¨ Avoid situations that put you at greatest risk for smoking. For some people, it is hard to have a drink with friends without smoking. For others, they might skip a coffee break with coworkers who smoke. ¨ Change your daily routine. Take a different route to work or eat a meal in a different place. · Cut down on stress. Calm yourself or release tension by doing an activity you enjoy, such as reading a book, taking a hot bath, or gardening. · Talk to your doctor or pharmacist about nicotine replacement therapy, which replaces the nicotine in your body. You still get nicotine but you do not use tobacco. Nicotine replacement products help you slowly reduce the amount of nicotine you need. These products come in several forms, many of them available over-the-counter: ¨ Nicotine patches ¨ Nicotine gum and lozenges ¨ Nicotine inhaler · Ask your doctor about bupropion (Wellbutrin) or varenicline (Chantix), which are prescription medicines. They do not contain nicotine. They help you by reducing withdrawal symptoms, such as stress and anxiety. · Some people find hypnosis, acupuncture, and massage helpful for ending the smoking habit. · Eat a healthy diet and get regular exercise. Having healthy habits will help your body move past its craving for nicotine. · Be prepared to keep trying. Most people are not successful the first few times they try to quit. Do not get mad at yourself if you smoke again. Make a list of things you learned and think about when you want to try again, such as next week, next month, or next year. Where can you learn more? Go to http://maksim-yasir.info/. Enter V101 in the search box to learn more about \"Stopping Smoking: Care Instructions. \" Current as of: November 29, 2017 Content Version: 11.7 © 4141-8350 ImmuneWorks. Care instructions adapted under license by [employer] (which disclaims liability or warranty for this information). If you have questions about a medical condition or this instruction, always ask your healthcare professional. [place] [address] any warranty or liability for your use of this information. DISCHARGE SUMMARY from Nurse PATIENT INSTRUCTIONS: 
 
 
F-face looks uneven A-arms unable to move or move unevenly S-speech slurred or non-existent T-time-call 911 as soon as signs and symptoms begin-DO NOT go Back to bed or wait to see if you get better-TIME IS BRAIN. Warning Signs of HEART ATTACK Call 911 if you have these symptoms: 
? Chest discomfort. Most heart attacks involve discomfort in the center of the chest that lasts more than a few minutes, or that goes away and comes back. It can feel like uncomfortable pressure, squeezing, fullness, or pain. ? Discomfort in other areas of the upper body. Symptoms can include pain or discomfort in one or both arms, the back, neck, jaw, or stomach. ? Shortness of breath with or without chest discomfort. ? Other signs may include breaking out in a cold sweat, nausea, or lightheadedness. Don't wait more than five minutes to call [address]! Fast action can save your life. Calling 911 is almost always the fastest way to get lifesaving treatment. Emergency Medical Services staff can begin treatment when they arrive  up to an hour sooner than if someone gets to the hospital by car. The discharge information has been reviewed with the {PATIENT PARENT GUARDIAN:47853}. The {PATIENT PARENT GUARDIAN:32956} verbalized understanding. Discharge medications reviewed with the {Oneal meds reviewed ETTQ:43599} and appropriate educational materials and side effects teaching were provided. ___________________________________________________________________________________________________________________________________ ACO Transitions of Care Introducing [place] [address] Abigail Bran offers a voluntary care coordination program to provide high quality service and care to [facility] fee-for-service beneficiaries. Prasadimelda Villasenor was designed to help you enhance your health and well-being through the following services: ? Transitions of Care  support for individuals who are transitioning from one care setting to another (example: Hospital to home). ? Chronic and Complex Care Coordination  support for individuals and caregivers of those with serious or chronic illnesses or with more than one chronic (ongoing) condition and those who take a number of different medications. If you meet specific medical criteria, a [facility] [address] may call you directly to coordinate your care with your primary care physician and your other care providers. For questions about the [facility] programs, please, contact your physicians office. For general questions or additional information about Accountable Care Organizations: 
Please visit www.medicare.gov/acos. html or call 1-800-MEDICARE (0-253.150.1664) TTY users should call 9-102.795.2116. [employer] [address] Announcement We are excited to announce that we are making your provider's discharge notes available to you in GigstarterharHulafrog. You will see these notes when they are completed and signed by the physician that discharged you from your recent hospital stay.   If you have any questions or concerns about any information you see in Gigstarterhart, please call the [employer] Information Department where you were seen or reach out to your Primary Care Provider for more information about your plan of care. Introducing Kirit Schroeder As a [employer] patient, I wanted to make you aware of our electronic visit tool called Kirit Schroeder. ShopKeep POS allows you to connect within minutes with a medical provider 24 hours a day, seven days a week via a mobile device or tablet or logging into a secure website from your computer. You can access Kirit Schroeder from anywhere in the [place]. A virtual visit might be right for you when you have a simple condition and feel like you just dont want to get out of bed, or cant get away from work for an appointment, when your regular [employer] provider is not available (evenings, weekends or holidays), or when youre out of town and need minor care. Electronic visits cost only $49 and if the [email or web address] provider determines a prescription is needed to treat your condition, one can be electronically transmitted to a nearby pharmacy*. Please take a moment to enroll today if you have not already done so. The enrollment process is free and takes just a few minutes. To enroll, please download the [email or web address]V. carole to your tablet or phone, or visit [email or web address] to enroll on your computer. And, as an [address] [place] [address] patient with a [employer] account, the results of your visits will be scanned into your electronic medical record and your primary care provider will be able to view the scanned results. We urge you to continue to see your regular [employer] provider for your ongoing medical care. And while your primary care provider may not be the one available when you seek a Kirit Schroeder virtual visit, the peace of mind you get from getting a real diagnosis real time can be priceless.    
 
For more information on Kirit Bahenabrettfin, view our Frequently Asked Questions (FAQs) at [email or web address]. Sincerely, 
 
Amita Hicks MD 
Chief Medical Officer Renée Bran *:  certain medications cannot be prescribed via [facility] Unresulted Labs-Please follow up with your PCP about these lab tests Order Current Status BLOOD GAS, ARTERIAL In process Providers Seen During Your Hospitalization Provider Specialty Primary office phone Edenilson Mares MD Cardiothoracic Surgery [phone] Immunizations Administered for This Admission Name Date  
 TB Skin Test (PPD) Intradermal  Deferred (), 9/18/2018 Your Primary Care Physician (PCP) Primary Care Physician Office Phone Office Fax Justine File, [address] [facility] [place] [phone] You are allergic to the following Allergen Reactions Codeine Hives Penicillins Rash Itching Years ago Recent Documentation Height Weight BMI Smoking Status 1.651 m 53.6 kg 19.67 kg/m2 Current Every Day Smoker Emergency Contacts Name Discharge Info Relation Home Work Mobile DevynMindy DISCHARGE CAREGIVER [3] Friend [5] [phone] Patient Belongings The following personal items are in your possession at time of discharge: 
  Dental Appliances: None  Visual Aid: None   Hearing Aids/Status: Does not own  Home Medications: None   Jewelry: None  Clothing: Shirt, Pants, Undergarments, Footwear    Other Valuables: None Discharge Instructions Attachments/References CARDIAC REHABILITATION (ENGLISH) TRAMADOL (BY MOUTH) (ENGLISH) OXYGEN THERAPY (ENGLISH) Patient Handouts Cardiac Rehabilitation: Care Instructions Your Care Instructions Cardiac rehabilitation is a program for people who have a heart problem, such as a heart attack, heart failure, or a heart valve disease.  The program includes exercise, lifestyle changes, education, and emotional support. Cardiac rehab can help you improve the quality of your life through better overall health. It can help you lose weight and feel better about yourself. On your cardiac rehab team, you may have your doctor, a nurse specialist, a dietitian, and a physical therapist. They will design your cardiac rehab program specifically for you. You will learn how to reduce your risk for heart problems, how to manage stress, and how to eat a heart-healthy diet. By the end of the program, you will be ready to maintain a healthier lifestyle on your own. Follow-up care is a key part of your treatment and safety. Be sure to make and go to all appointments, and call your doctor if you are having problems. It's also a good idea to know your test results and keep a list of the medicines you take. How can you care for yourself at home? · Take your medicines exactly as prescribed. Call your doctor if you think you are having a problem with your medicine. You will get more details on the specific medicines your doctor prescribes. · Weigh yourself every day if your doctor tells you to. Watch for sudden weight gain. Weigh yourself on the same scale with the same amount of clothing at the same time of day. · Plan your meals so that you are eating heart-healthy foods. ¨ Eat a variety of foods daily. Fresh fruits and vegetables and whole-grains are good choices. ¨ Limit your fat intake, especially saturated and trans fat. ¨ Limit salt (sodium). ¨ Increase fiber in your diet. ¨ Limit alcohol. · Learn how to take your pulse so that you can track your heart rate during exercise. · Always check with your doctor before you begin a new exercise program. 
· Warm up before you exercise and cool down afterward for at least 15 minutes each. This will help your heart gradually prepare for and recover from exercise and avoid pushing your heart too hard. · Stop exercising if you have any unusual discomfort, such as chest pain. · Do not smoke. Smoking can make heart problems worse. If you need help quitting, talk to your doctor about stop-smoking programs and medicines. These can increase your chances of quitting for good. When should you call for help? Call 911 anytime you think you may need emergency care. For example, call if: 
  · You have severe trouble breathing.  
  · You cough up pink, foamy mucus and you have trouble breathing.  
  · You have symptoms of a heart attack. These may include: ¨ Chest pain or pressure, or a strange feeling in the chest. 
¨ Sweating. ¨ Shortness of breath. ¨ Nausea or vomiting. ¨ Pain, pressure, or a strange feeling in the back, neck, jaw, or upper belly or in one or both shoulders or arms. ¨ Lightheadedness or sudden weakness. ¨ A fast or irregular heartbeat. After you call 911, the  may tell you to chew 1 adult-strength or 2 to 4 low-dose aspirin. Wait for an ambulance. Do not try to drive yourself.  
  · You have angina symptoms (such as chest pain or pressure) that do not go away with rest or are not getting better within 5 minutes after you take a dose of nitroglycerin.  
  · You have symptoms of a stroke. These may include: 
¨ Sudden numbness, tingling, weakness, or loss of movement in your face, arm, or leg, especially on only one side of your body. ¨ Sudden vision changes. ¨ Sudden trouble speaking. ¨ Sudden confusion or trouble understanding simple statements. ¨ Sudden problems with walking or balance. ¨ A sudden, severe headache that is different from past headaches.  
  · You passed out (lost consciousness).  
 Call your doctor now or seek immediate medical care if: 
  · You have new or increased shortness of breath.  
  · You are dizzy or lightheaded, or you feel like you may faint.  
  · You gain weight suddenly, such as more than 2 to 3 pounds in a day or 5 pounds in a week. (Your doctor may suggest a different range of weight gain.)   · You have increased swelling in your legs, ankles, or feet.  
 Watch closely for changes in your health, and be sure to contact your doctor if you have any problems. Where can you learn more? Go to http://maksim-yasir.info/. Enter Y169 in the search box to learn more about \"Cardiac Rehabilitation: Care Instructions. \" Current as of: December 6, 2017 Content Version: 11.7 © 8488-9123 Microtask. Care instructions adapted under license by Needle HR (which disclaims liability or warranty for this information). If you have questions about a medical condition or this instruction, always ask your healthcare professional. [address] any warranty or liability for your use of this information. Tramadol (By mouth) Tramadol (TRAM-a-dol) Treats moderate to severe pain. This medicine is a narcotic pain reliever. Brand Name(s): ConZip, FusePaq Synapryn, Ultram, Ultram ER, traMADol HCl There may be other brand names for this medicine. When This Medicine Should Not Be Used: This medicine is not right for everyone. Do not use if you had an allergic reaction to tramadol or other narcotic medicine, or if you have stomach or bowel blockage (including paralytic ileus). How to Use This Medicine:  
Long Acting Capsule, Liquid, Tablet, Dissolving Tablet, Long Acting Tablet · Take your medicine as directed. Your dose may need to be changed several times to find what works best for you. · Make sure your hands are dry before you handle the disintegrating tablet. Peel back the foil from the blister pack, then remove the tablet. Do not push the tablet through the foil. Place the tablet in your mouth. After it has melted, swallow or take a drink of water. · Swallow the extended-release tablet whole. Do not crush, break, or chew it. · Drink plenty of liquids to help avoid constipation. · This medicine should come with a Medication Guide. Ask your pharmacist for a copy if you do not have one. · Missed dose: Take a dose as soon as you remember. If it is almost time for your next dose, wait until then and take a regular dose. Do not take extra medicine to make up for a missed dose. · Store the medicine in a closed container at room temperature, away from heat, moisture, and direct light. Drugs and Foods to Avoid: Ask your doctor or pharmacist before using any other medicine, including over-the-counter medicines, vitamins, and herbal products. · Do not use this medicine if you are using or have used an MAO inhibitor within the past 14 days. · Some medicines can affect how tramadol works. Tell your doctor if you are using any of the following: ¨ Carbamazepine, cyclobenzaprine, digoxin, erythromycin, ketoconazole, lithium, mirtazapine, phenytoin, promethazine, rifampin, ritonavir, quinidine, or trazodone ¨ Blood thinner (including warfarin) ¨ Diuretic (water pill) ¨ Medicine to treat depression (including bupropion, fluoxetine, paroxetine, quinidine) ¨ Phenothiazine medicine ¨ Triptan medicine for migraine headaches · Tell your doctor if you use anything else that makes you sleepy. Some examples are allergy medicine, narcotic pain medicine, and alcohol. Tell your doctor if you are using a muscle relaxer. · Do not drink alcohol while you are using this medicine. Warnings While Using This Medicine: · Tell your doctor if you are pregnant or breastfeeding, or if you have kidney disease, liver disease (including cirrhosis), gallstones, lung or breathing problems, pancreas problems, or a history of head injury, seizures, drug addiction, or depression or similar emotional problems. Tell your doctor if you have phenylketonuria. · This medicine may cause the following problems: 
¨ High risk of overdose, which can lead to death ¨ Respiratory depression (serious breathing problem that can be life-threatening) ¨ Serotonin syndrome (when used with certain medicines) ¨ Unusual change in mood or behavior · This medicine may make you dizzy, drowsy, or lightheaded. Do not drive or doing anything else that could be dangerous until you know how this medicine affects you. · This medicine can be habit-forming. Do not use more than your prescribed dose. Call your doctor if you think your medicine is not working. · Do not stop using this medicine suddenly. Your doctor will need to slowly decrease your dose before you stop it completely. · Tell any doctor or dentist who treats you that you are using this medicine. · This medicine may cause constipation, especially with long-term use. Ask your doctor if you should use a laxative to prevent and treat constipation. · This medicine could cause infertility. Talk with your doctor before using this medicine if you plan to have children. · Keep all medicine out of the reach of children. Never share your medicine with anyone. Possible Side Effects While Using This Medicine:  
Call your doctor right away if you notice any of these side effects: · Allergic reaction: Itching or hives, swelling in your face or hands, swelling or tingling in your mouth or throat, chest tightness, trouble breathing · Anxiety, restlessness, fast heartbeat, fever, sweating, muscle spasms, nausea, vomiting, diarrhea, seeing or hearing things that are not there · Blistering, peeling, red skin rash · Blue lips, fingernails, or skin · Extreme dizziness, drowsiness, or weakness, shallow breathing, slow heartbeat, seizures, and cold, clammy skin · Lightheadedness, dizziness, fainting · Seizures · Unusual mood or behavior, thoughts of killing yourself or others · Trouble breathing If you notice these less serious side effects, talk with your doctor: · Constipation, loss of appetite, stomach upset · Dry mouth 
· Headache If you notice other side effects that you think are caused by this medicine, tell your doctor. Call your doctor for medical advice about side effects. You may report side effects to FDA at 5-973-ANL-0793 © 2017 [address] Information is for End User's use only and may not be sold, redistributed or otherwise used for commercial purposes. The above information is an  only. It is not intended as medical advice for individual conditions or treatments. Talk to your doctor, nurse or pharmacist before following any medical regimen to see if it is safe and effective for you. Oxygen Therapy: Care Instructions Your Care Instructions Oxygen therapy helps you get more oxygen into your lungs and bloodstream. You may use it if you have a disease that makes it hard to breathe, such as COPD, pulmonary fibrosis (scarring of the lungs), or heart failure. Oxygen therapy can make it easier for you to breathe and can reduce your heart's workload. Some people need extra oxygen all the time. Others need it from time to time throughout the day or overnight. A doctor will prescribe how much oxygen you need and how often to use it. To breathe the oxygen, most people use a nasal cannula (say \"FABIENNE-yuh-maribell\"). This is a thin tube with two prongs that fit just inside your nose. People who need a lot of oxygen may need to use a mask that fits over the nose and mouth. Follow-up care is a key part of your treatment and safety. Be sure to make and go to all appointments, and call your doctor if you are having problems. It's also a good idea to know your test results and keep a list of the medicines you take. How can you care for yourself at home? To help yourself · Using oxygen may dry out your nose or lips. Use water-based lubricants on your lips or nostrils. Do not use an oil-based product like petroleum jelly.  
· If you use a nasal cannula, the tubing may rub under your nostrils and around your ears. To keep your skin from getting sore, tuck some gauze under the tubing. Use a water-based lotion on rubbed areas. · Do not use alcohol or take drugs that relax you, because they will slow your breathing rate. · Keep track of how much oxygen is in the tank, and reorder before it runs out. If a holiday is coming up or you expect bad weather, order in advance or make your regular order larger. · You may need extra oxygen when you travel to high altitudes or travel by plane. Ask your doctor about this. · If you are getting oxygen directly to your windpipe through an opening in your neck, your doctor will teach you how to care for the equipment. To make sure oxygen is flowing · Check the flow by holding your mask or cannula up to your ear and listening for the \"hiss\" of airflow. · If you have a nasal cannula, dip the prongs in a glass of water. If you see bubbles, oxygen is coming through. · Check your pressure gauge or contents indicator. · If you use an oxygen concentrator, make sure it is turned on and plugged in. If you use a cylinder, make sure the valve is open. · Look for kinks, blockages, or water in the tubing. Be sure the tubing is connected to the oxygen source. · Do not change your oxygen flow rate. Your doctor sets this at the correct level. Higher flow rates usually do not help and can increase the risk of harmful carbon dioxide buildup in the blood. To be safe · Do not leave cords or tubing running across an area where you or someone else may trip on it. · Do not let oxygen containers get hot. Store them in a cool place where there is airflow. Do not leave them in a car trunk or a hot vehicle. · Keep oxygen containers upright. Make sure they do not fall over and get damaged. Try securing the tanks in a sturdy container or securing them with a rope or a chain. · Watch for signs of oxygen leaks.  If you hear a loud hissing from your container or if it empties too fast, stay away from the container. Open windows right away and call the company that brought the oxygen system to your home. · Do not use oxygen around anything that could spark or easily cause a fire. ¨ Do not smoke or let others smoke while you are using oxygen. Put up \"no smoking\" signs in your home. ¨ Do not use oxygen near open flames, such as candles, fireplaces, gas stoves, or hot water heaters. Do not use it near electric razors, hair dryers, heating pads, or anything that may spark. ¨ Keep a working fire extinguisher in your home where it is easy to get to. ¨ If a fire starts, turn off the oxygen right away and leave the house. ¨ If you have an oxygen concentrator, do not use it if the cord looks damaged. Do not use an extension cord to plug it in. Do not plug it into an outlet that has other appliances plugged into it. To care for the equipment · Follow the directions that come with the equipment for using and caring for it. · Wash your cannula or mask with a liquid soap and warm water 1 or 2 times a week. Replace them every 2 to 4 weeks. · If you have a cold, change the nasal prongs when your cold symptoms are done. · If you have an oxygen concentrator, unplug the unit and wipe down the cabinet with a damp cloth daily. Clean the air filter at least 2 times a week. Where can you learn more? Go to http://maksim-yasir.info/. Enter E117 in the search box to learn more about \"Oxygen Therapy: Care Instructions. \" Current as of: December 6, 2017 Content Version: 11.7 © 9319-6147 [employer]. Care instructions adapted under license by [employer] (which disclaims liability or warranty for this information). If you have questions about a medical condition or this instruction, always ask your healthcare professional. [place] [address] any warranty or liability for your use of this information. Please provide this summary of care documentation to your next provider. Signatures-by signing, you are acknowledging that this After Visit Summary has been reviewed with you and you have received a copy. Patient Signature:  ____________________________________________________________ Date:  ____________________________________________________________  
  
Jilda Staple Provider Signature:  ____________________________________________________________ Date:  ____________________________________________________________

## 2018-09-18 NOTE — OP NOTES
[facility] REPORT    Cora Saldana  MR#: [id number]  : 1955  ACCOUNT #: [de-identified]   DATE OF SERVICE: 2018    DESCRIPTION OF PROCEDURE:  After informed consent was obtained for the above-mentioned procedure, the patient was then taken to the operating room. Appropriate monitoring lines were placed by the Anesthesia Department. After administration of general endotracheal anesthesia, the patient was prepped and draped in the usual fashion with Betadine scrub and paint and Ioban cardiovascular drapes. The chest was then entered through a standard mediastinotomy incision while simultaneous harvesting of peripheral conduit was undertaken. After harvesting the conduit, it was inspected and noted to be adequate. The incisions were subsequently closed in a 2-layer fashion of 3-0 and 4-0 Vicryl. The left internal mammary harvest was then undertaken in pedicle fashion and was injected with Papaverine solution. After administration of heparin, the distal pedicle was incised and noted to bleed briskly. A thoracostomy tube was then placed. This was brought out through a separate stab incision inferiorly and affixed to the skin. The pericardium was then opened and tacked up into a pericardial well, revealing the heart. Pursestrings were then placed into the aorta, the right atrial appendage, and the right atrium. After adequate ACT was obtained the patient was then cannulated through these pursestrings, at which point cardiopulmonary bypass was started. Target vessels were identified and marked. A crossclamp was then applied. Antegrade and retrograde cardioplegia was administered initially and then given intermittently throughout the case. A latticed heart support was placed to assist with the distal anastomoses, which were performed by using 7-0 Prolene for vein to artery anastomosis and 8-0 Prolene for artery to artery anastomosis. Proximal anastomoses to the aorta were also placed using 6-0 Prolene. At the conclusion of the final proximal anastomosis, the aorta and right ventricle were flushed of debris and the anastomosis was completed. The crossclamp was then removed. The heart then underwent meticulous de-airing. The patient was warmed and weaned from the cardiopulmonary bypass. The distal anastomoses were visualized and noted to be hemostatic. All grafts were dopplered and noted to have an excellent flow. The venous cannula was then removed. Ventricular and atrial pacing wires were then placed upon the heart, brought out through stab wounds inferiorly and affixed to the skin. A single straight mediastinal tube was placed, brought out through a separate stab incision inferiorly, and also affixed to the skin. After meticulous hemostasis was made the sternum was reapproximated with heavy gauge stainless steel wire. The midline fascia was subsequently closed separately. Vicryl was then used in a running fashion for subcutaneous tissue and skin. Dressings were then applied to all wounds. The patient was then transported with monitors to the intensive care unit intubated and ventilated. COMPLICATIONS:  None. All instruments and sponge counts were correct. PREOPERATIVE DIAGNOSIS:  Severe multivessel atherosclerotic coronary artery disease. POSTOPERATIVE DIAGNOSIS:  Severe multivessel atherosclerotic coronary artery disease. SURGEON:  Radha Aquino MD    ASSISTANT:      ANESTHESIA:  General endotracheal with MARY. PROCEDURE PERFORMED:  Coronary artery bypass grafting x 3, grafts consisting of:  1. LIMA to LAD. 2.  Reverse saphenous vein graft to OM. 3.  Reverse saphenous vein graft to the PDA. OPERATIVE FINDINGS:  Saphenous vein 2-3 mm, LIMA 2.5 mm. Aorta was soft. No palpable plaque.   LAD is 1.2 mm severe distal disease, OM was 1.5 mm severe distal disease, PDA is 1.3 mm, severe distal disease. INDICATION:  The patient is a pleasant 77-year-old gentleman who presented with worsening exertional dyspnea and fatigue. Stress testing showed inferior defect with reversibility. Left heart cath showed severe multivessel disease. LV function was preserved. COMPLICATIONS:  None. All instrument and sponge counts were correct at the end of the case.     ESTIMATED BLOOD LOSS:  Minimal.    SPECIMENS REMOVED:      IMPLANTS:        MD GERTRUDE Gibson / [place]  D: 09/18/2018 12:12     T: 09/18/2018 12:31  JOB #: [id number]

## 2018-09-18 NOTE — PERIOP NOTES
TRANSFER - OUT REPORT: 
 
Verbal report given to Minerva Morales on Wicho Older  being transferred to [facility](unit) for routine progression of care Report consisted of patients Situation, Background, Assessment and  
Recommendations(SBAR). Information from the following report(s) OR Summary was reviewed with the receiving nurse. Lines:  
Double Lumen 09/18/18 Right Internal jugular (Active) Rexene Eng Dual 09/18/18 Right Neck (Active) Venous Access Device Upper chest (subclavicular area, right (Active) Peripheral IV 09/18/18 Right Hand (Active) Site Assessment Clean, dry, & intact 9/18/2018  6:01 AM  
Phlebitis Assessment 0 9/18/2018  6:01 AM  
Infiltration Assessment 0 9/18/2018  6:01 AM  
Dressing Status Clean, dry, & intact 9/18/2018  6:01 AM  
Dressing Type Transparent;Tape 9/18/2018  6:01 AM  
Hub Color/Line Status Green; Infusing 9/18/2018  6:01 AM  
   
Arterial Line 09/18/18 Right Radial artery (Active) Opportunity for questions and clarification was provided. Patient transported with: 
 Monitor O2 @ 15 liters Patient-specific medications from Pharmacy Registered Nurse, CRNA, JUAN

## 2018-09-18 NOTE — PROGRESS NOTES
Patient out from operating room and placed on the ventilator on documented settings. Patient is orally intubated with a # 7.5 ET Tube secured at the 23 cm marcy at the lip. Breath sounds are clear and diminished. Trachea is midline. Negative for subcutaneous air, chest excursion is symmetric. Negative for pitting edema. Patient is also Negative for cyanosis. Patient has a Right Radial arterial line. All alarms are set and audible. Resuscitation bag is at the head of the bed. Ventilator Settings Mode FIO2 Rate Tidal Volume Pressure PEEP I:E Ratio ASV  60 % Peak airway pressure: 18 cm H2O Minute ventilation: 6.2 l/min ABG:  
Recent Labs  
   09/18/18 
 1232 PH  7.21* PCO2  49* PO2  222* HCO3  19* Crescencio Gorman, RT

## 2018-09-18 NOTE — ANESTHESIA PROCEDURE NOTES
Arterial Line Placement Start time: 9/18/2018 7:32 AM 
End time: 9/18/2018 7:36 AM 
Performed by: Selene Lieberman Authorized by: Trang Wakefield Pre-Procedure Indications:  Arterial pressure monitoring and blood sampling Preanesthetic Checklist: patient identified, risks and benefits discussed, anesthesia consent, site marked, patient being monitored, timeout performed and patient being monitored Timeout Time: 07:32 Procedure:  
Prep:  ChloraPrep Seldinger Technique?: Yes Orientation:  Right Location:  Radial artery Catheter size:  20 G Number of attempts:  1 Cont Cardiac Output Sensor: No   
 
Assessment:  
Post-procedure:  Line secured and sterile dressing applied Patient Tolerance:  Patient tolerated the procedure well with no immediate complications Comment:  
Right arm prepped with ChloraPrep, 0.8ml of 1% lidocaine infiltrated at skin, Seldinger technique, good blood return, good waveform.

## 2018-09-18 NOTE — PROGRESS NOTES
Problem: Pressure Injury - Risk of 
Goal: *Prevention of pressure injury Document Raúl Scale and appropriate interventions in the flowsheet. Outcome: Progressing Towards Goal 
Pressure Injury Interventions: 
Sensory Interventions: Assess changes in LOC, Assess need for specialty bed, Check visual cues for pain, Float heels, Keep linens dry and wrinkle-free, Monitor skin under medical devices, Pad between skin to skin, Minimize linen layers, Turn and reposition approx. every two hours (pillows and wedges if needed) Moisture Interventions: Absorbent underpads, Apply protective barrier, creams and emollients, Assess need for specialty bed, Internal/External urinary devices, Minimize layers, Moisture barrier Activity Interventions: Assess need for specialty bed, Pressure redistribution bed/mattress(bed type) Mobility Interventions: Assess need for specialty bed, HOB 30 degrees or less, Turn and reposition approx. every two hours(pillow and wedges) Nutrition Interventions: Document food/fluid/supplement intake Friction and Shear Interventions: Apply protective barrier, creams and emollients, Foam dressings/transparent film/skin sealants, Lift sheet, Minimize layers, Transferring/repositioning devices

## 2018-09-18 NOTE — CONSULTS
CONSULT NOTE    Fadia Louie    2018    Date of Admission:  2018    Consultation performed at the request of Dr. Ted Washington    Reason for consultation:   Postoperative ventilator management    HPI:     Fadia Louie is a 61 y.o. male with a PMH of mild COPD, heavy smoking from 1- 3ppd at times over 48 years, HTN, dyslipidemia, bladder cancer s/p cystectomy with ileal conduit who was seen by Dr. Lashaun Rudd in the office for worsening dyspnea and fatigue. A stress test revealed an inferior defect and left heart catheterization demonstrated severe multivessel disease with preserved LV function. He has been known to leave [place] and has history of EtOH use but reportedly quit in July. Today he underwent 3V CABG by Dr. Lidia Carney (ACUÑA to LAD, SVG to OM, SVG to PDA). The surgery was without complication and the patient is now in CVICU on a mechanical ventilator and remains sedated. He is only on NTG drip at this point. REVIEW OF SYSTEMS:  Unable to obtain    Prior to Admission Medications   Prescriptions Last Dose Informant Patient Reported? Taking? Ostomy Supplies (NEW IMAGE SKIN BARRIER) 2  \" misc   No No   Si Units by Does Not Apply route as needed. amLODIPine (NORVASC) 5 mg tablet 9/15/2018  No No   Sig: Take 1 Tab by mouth daily. amiodarone (CORDARONE) 200 mg tablet 2018 at 1900  Yes Yes   Sig: Take 600 mg by mouth once. aspirin delayed-release 81 mg tablet 2018 at Unknown time  Yes Yes   Sig: Take 81 mg by mouth daily. atorvastatin (LIPITOR) 40 mg tablet 2018  No No   Sig: Take 1 Tab by mouth daily. budesonide-formoterol (SYMBICORT) 160-4.5 mcg/actuation HFAA 2018 at Unknown time  No Yes   Sig: Take 2 Puffs by inhalation two (2) times a day. metoprolol tartrate (LOPRESSOR PO) 2018 at 1900  Yes Yes   Sig: Take 12.5 mg by mouth once.    mupirocin calcium (BACTROBAN NASAL) 2 % nasal ointment 2018 at Unknown time  Yes Yes Sig: by Both Nostrils route two (2) times a day. tiotropium bromide (SPIRIVA RESPIMAT) 2.5 mcg/actuation inhaler 9/17/2018 at Unknown time  No Yes   Sig: Take 2 Puffs by inhalation daily. Facility-Administered Medications: None       Past Medical History:   Diagnosis Date    Arrhythmia     patient states he had a murmur as a child    CAD (coronary artery disease)     No stents, No MI    Cancer ([facility]     Bladder Ca, has ostomy    Chronic neck pain     Chronic obstructive pulmonary disease ([facility]     Depression with anxiety     History of bladder cancer 07/2014    Cystectomy with ileo conduit urinary diversion ----and chemo    History of neck surgery     Hypertension     Kidney stones     Osteoarthritis     PAD (peripheral artery disease) ([facility]     Staghorn renal calculus     Left sided    Status post ileal conduit ([facility] 2014    Tobacco use disorder, continuous      Past Surgical History:   Procedure Laterality Date    HX CERVICAL FUSION  2014    Dr. 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 Social History Main Topics    Smoking status: Current Every Day Smoker     Packs/day: 1.00     Years: 50.00    Smokeless tobacco: Never Used    Alcohol use No      Comment: - QUIT 7/2018    Drug use: Yes     Special: Marijuana      Comment: last used 6 weeks ago    Sexual activity: Not Currently     Other Topics Concern    Not on file     Social History Narrative    Lives with girlfriend. His father is living in [place].       Family History   Problem Relation Age of Onset    Cancer Mother     Lung Disease Mother     Cancer Father     No Known Problems Sister     Cancer Sister      Allergies   Allergen Reactions    Codeine Hives    Penicillins Rash and Itching     Years ago       Current Facility-Administered Medications   Medication Dose Route Frequency    0.9% sodium chloride infusion  25 mL/hr IntraVENous CONTINUOUS    dextrose 5% - 0.45% NaCl with KCl 20 mEq/L infusion  25 mL/hr IntraVENous CONTINUOUS    sodium chloride (NS) flush 5-10 mL  5-10 mL IntraVENous Q8H    sodium chloride (NS) flush 5-10 mL  5-10 mL IntraVENous PRN    oxyCODONE-acetaminophen (PERCOCET) 5-325 mg per tablet 1 Tab  1 Tab Oral Q4H PRN    morphine injection 3-5 mg  3-5 mg IntraVENous Q1H PRN    naloxone (NARCAN) injection 0.4 mg  0.4 mg IntraVENous PRN    mupirocin (BACTROBAN) 2 % ointment   Both Nostrils BID    ceFAZolin (ANCEF) 2 g/20 mL in sterile water IV syringe  2 g IntraVENous Q8H    sodium bicarbonate 8.4 % (1 mEq/mL) injection 50 mEq  50 mEq IntraVENous PRN    EPINEPHrine (ADRENALIN) 4 mg in 0.9% sodium chloride 250 mL infusion  0.05-0.1 mcg/kg/min IntraVENous TITRATE    nitroglycerin (Tridil) 200 mcg/ml infusion   mcg/min IntraVENous TITRATE    lidocaine (PF) (XYLOCAINE) 100 mg/5 mL (2 %) injection syringe  mg   mg IntraVENous ONCE PRN    amiodarone (CORDARONE) tablet 200 mg  200 mg Oral Q12H    [START ON 9/19/2018] metoprolol tartrate (LOPRESSOR) tablet 25 mg  25 mg Oral Q12H    atorvastatin (LIPITOR) tablet 80 mg  80 mg Oral QHS    insulin regular (NOVOLIN R, HUMULIN R) 100 Units in 0.9% sodium chloride 100 mL infusion  1 Units/hr IntraVENous TITRATE    dextrose (D50W) injection syrg 12.5 g  25 mL IntraVENous PRN    [START ON 9/19/2018] aspirin chewable tablet 81 mg  81 mg Oral DAILY    magnesium sulfate 1 g/100 ml IVPB (premix or compounded)  1 g IntraVENous PRN    potassium chloride 10 mEq in 100 ml IVPB  10 mEq IntraVENous PRN    midazolam (VERSED) injection 1 mg  1 mg IntraVENous Q1H PRN    propofol (DIPRIVAN) infusion  0-50 mcg/kg/min IntraVENous TITRATE    meperidine (DEMEROL) injection 25 mg  25 mg IntraVENous Q1H PRN    chlorhexidine (PERIDEX) 0.12 % mouthwash 10 mL  10 mL Oral BID    tuberculin injection 5 Units  5 Units IntraDERMal ONCE    PHENYLephrine (JOE-SYNEPHRINE) 30,000 mcg in 250 mL infusion   mcg/min IntraVENous TITRATE         PHYSICAL EXAM     Vitals:    09/18/18 0549 09/18/18 0607 09/18/18 1217 09/18/18 1218   BP: 165/85  128/61 123/68   Pulse: 78  96 94   Resp: 16  15 15   Temp: 97.7 °F (36.5 °C)  97.4 °F (36.3 °C) 97.4 °F (36.3 °C)   SpO2: 98% 98% 100% 100%   Weight: 116 lb 12.8 oz (53 kg)      Height: 5' 5\" (1.651 m)        Constitutional:  the patient is well developed and in no acute distress  EENMT:  Sclera clear, pupils equal, oral mucosa moist  Respiratory: CTA  Cardiovascular:  RRR without M,G,R  Gastrointestinal: soft and non-tender; with positive bowel sounds. Musculoskeletal: warm without cyanosis. There is no lower leg edema. Skin:  no jaundice or rashes, surgical wounds dressed      Diagnostic Studies  CXR:  Reviewed - no infiltrate or atelectasis. Large volumes, ?emphysema    PFT- mild COPD      No results for input(s): WBC, HGB, HCT, PLT, INR, HGBEXT, HCTEXT, PLTEXT, HGBEXT, HCTEXT, PLTEXT in the last 72 hours. No lab exists for component: INREXT, INREXT  No results for input(s): NA, K, CL, GLU, CO2, BUN, CREA, MG, PHOS, CA, TROIQ, ALB, TBIL, TBILI, GPT, ALT, SGOT, BNPP in the last 72 hours. No lab exists for component: TROIP  Recent Labs      09/18/18   1232   PH  7.21*   PCO2  49*   PO2  222*   HCO3  19*     No results for input(s): LCAD, LAC in the last 72 hours.     Assessment:  (Medical Decision Making)     Hospital Problems  Date Reviewed: 9/5/2018          Codes Class Noted POA    Encounter for weaning from ventilator [facility]) ICD-10-CM: Z99.11  ICD-9-CM: V46.13 9/18/2018 Unknown    Starting to wake up now. Will wean to extubate as he awakens. S/P CABG x 3 ICD-10-CM: Z95.1  ICD-9-CM: V45.81  9/18/2018 Unknown    POD#0    Personal history of tobacco use, presenting hazards to health ICD-10-CM: Z87.891  ICD-9-CM: V15.82  9/18/2018 Unknown    Smoking cessation education during this hospitalization is recommended    CAD (coronary artery disease) ICD-10-CM: I25.10  ICD-9-CM: 414.00  Unknown Unknown        Chronic obstructive pulmonary disease ([facility] [address] ICD-10-CM: J44.9  ICD-9-CM: 561  3/3/2016 Yes    Prn albuterol, not wheezing presently          Plan/Recommendations:  (Medical Decision Making)     --Wean vent per protocol  --prn bronchodilators  --monitor for s/sx of EtOH withdrawal given prior history  --will continue to follow with you. More than 50% of the time documented was spent in face-to-face contact with the patient and in the care of the patient on the floor/unit where the patient is located. Thank you very much for this referral.  We appreciate the opportunity to participate in this patient's care. Will follow along with above stated plan.     Js Erickson MD

## 2018-09-18 NOTE — PERIOP NOTES
Helm bag added to the urostomy bag. Urostomy bag isolated with a Tegaderm and cholraprepped into field.

## 2018-09-18 NOTE — ANESTHESIA POSTPROCEDURE EVALUATION
Post-Anesthesia Evaluation and Assessment Patient: Malathi [facility] MRN: [id number]  SSN: [id number] YOB: 1955  Age: 61 y.o. Sex: male Cardiovascular Function/Vital Signs Visit Vitals  /68  Pulse (!) 102  Temp 35.3 °C (95.5 °F)  Resp 22  
 Ht 5' 5\" (1.651 m)  Wt 53 kg (116 lb 12.8 oz)  SpO2 100%  BMI 19.44 kg/m2 Patient is status post general anesthesia for Procedure(s): CORONARY ARTERY BYPASS GRAFT (CABG) X   3 WITH LIMA. LYSIS WITH MYOCARDIAL ADHESIONS.   
VEIN HARVEST. Greater Saphenous. ESOPHAGEAL TRANS ECHOCARDIOGRAM. 
 
Nausea/Vomiting: None Postoperative hydration reviewed and adequate. Pain: 
Pain Scale 1: Visual (09/18/18 1217) Pain Intensity 1: 0 (09/18/18 1217) Managed Neurological Status:  
Neuro (WDL): Within Defined Limits (09/18/18 0553) Neuro Neurologic State: Unresponsive (sedated) (09/18/18 1230) Orientation Level: Unable to verbalize (09/18/18 1230) Cognition: Unable to assess (comment) (sedated) (09/18/18 1230) Speech: Intubated (09/18/18 1230) LUE Motor Response: Other(comment) (sedated) (09/18/18 1230) LLE Motor Response: Other(comment) (sedated) (09/18/18 1230) RUE Motor Response: Other(comment) (sedated) (09/18/18 1230) RLE Motor Response: Other(comment) (sedated) (09/18/18 1230) At baseline Mental Status and Level of Consciousness: Arousable Pulmonary Status:  
O2 Device: Ventilator (09/18/18 1217) Adequate oxygenation and airway patent Complications related to anesthesia: None Post-anesthesia assessment completed. No concerns Signed By: Shilpi Murphy MD   
 September 18, 2018

## 2018-09-18 NOTE — ANESTHESIA PROCEDURE NOTES
Central Line Placement Start time: 9/18/2018 7:44 AM 
End time: 9/18/2018 8:58 AM 
Performed by: Justin Estrada Authorized by: Justin Estrada Indications: vascular access, central pressure monitoring and need for vasopressors Preanesthetic Checklist: patient identified, risks and benefits discussed, anesthesia consent, site marked, patient being monitored and timeout performed Timeout Time: 07:43 Pre-procedure: All elements of maximal sterile barrier technique followed? Yes   
2% Chlorhexidine for cutaneous antisepsis, Hand hygiene performed prior to catheter insertion and Ultrasound guidance Procedure:  
Prep:  Chlorhexidine and ChloraPrep Location:  Internal jugular Orientation:  Right Patient position:  Trendelenburg Catheter type:  Double lumen Catheter size:  9 Fr 
 
Number of attempts:  1 Successful placement: Yes Assessment:  
Post-procedure:  Catheter secured, sterile dressing applied and sterile dressing with CHG applied Assessment:  Placement verified by x-ray, free fluid flow, blood return through all ports, other (comment) and guidewire removal verified Insertion:  Uncomplicated Patient tolerance:  Patient tolerated the procedure well with no immediate complications Sherle Pontiff Catheter floated without complication and secured at: 55 
 
Real time Ultrasound guidance used for placement and image stored in chart

## 2018-09-18 NOTE — PROGRESS NOTES
TRANSFER - IN REPORT: 
 
Verbal report received from Dena Willingham CRNA(name) on [employer]  being received from CVOR(unit) for routine post - op Report consisted of patients Situation, Background, Assessment and  
Recommendations(SBAR). Information from the following report(s) SBAR, Kardex, OR Summary, Procedure Summary, Intake/Output, MAR, Recent Results, Med Rec Status and Cardiac Rhythm NSR was reviewed with the receiving nurse. Opportunity for questions and clarification was provided. Assessment completed upon patients arrival to unit and care assumed.

## 2018-09-18 NOTE — BRIEF OP NOTE
BRIEF OPERATIVE NOTE Date of Procedure: 9/18/2018 Preoperative Diagnosis: Atherosclerosis of native coronary artery of native heart with unstable angina pectoris ([address] [I25.110] Typical angina ([address] [I20.9] Postoperative Diagnosis: Atherosclerosis of native coronary artery of native heart with unstable angina pectoris ([address] [I25.110] Typical angina ([address] [I20.9] Procedure(s): CORONARY ARTERY BYPASS GRAFT (CABG) X   3 WITH LIMA. LYSIS WITH MYOCARDIAL ADHESIONS.   
VEIN HARVEST. Greater Saphenous. ESOPHAGEAL TRANS ECHOCARDIOGRAM 
Surgeon(s) and Role: * Grazyna White MD - Primary Surgical Assistant:  
 
Surgical Staff: 
Circ-1: Ricardo Lee RN 
Circ-Relief: Huseyin Zamudio RN Perfusionist: Joan Russ Scrub Tech-1: Jessica Herbert Scrub Tech-2: Cherelle Damon Scrub Tech-3: Giorgio Kim Scrub RN-1: Marquis Edna RN Event Time In Incision Start 5294 Incision Close 1151 Anesthesia: General  
Estimated Blood Loss: minimal 
Specimens: * No specimens in log * Findings: cad Complications: none Implants: * No implants in log *

## 2018-09-19 ENCOUNTER — APPOINTMENT (OUTPATIENT)
Dept: GENERAL RADIOLOGY | Age: 63
DRG: 236 | End: 2018-09-19
Attending: THORACIC SURGERY (CARDIOTHORACIC VASCULAR SURGERY)
Payer: MEDICARE

## 2018-09-19 PROBLEM — Z99.11 ENCOUNTER FOR WEANING FROM VENTILATOR (HCC): Status: RESOLVED | Noted: 2018-09-18 | Resolved: 2018-09-19

## 2018-09-19 PROBLEM — J93.83 OTHER PNEUMOTHORAX: Status: ACTIVE | Noted: 2018-09-19

## 2018-09-19 PROBLEM — R09.02 HYPOXEMIA: Status: ACTIVE | Noted: 2018-09-19

## 2018-09-19 LAB
ANION GAP SERPL CALC-SCNC: 5 MMOL/L (ref 7–16)
ATRIAL RATE: 86 BPM
BUN SERPL-MCNC: 9 MG/DL (ref 8–23)
CALCIUM SERPL-MCNC: 7.3 MG/DL (ref 8.3–10.4)
CALCULATED P AXIS, ECG09: 33 DEGREES
CALCULATED R AXIS, ECG10: 55 DEGREES
CALCULATED T AXIS, ECG11: 69 DEGREES
CHLORIDE SERPL-SCNC: 112 MMOL/L (ref 98–107)
CO2 SERPL-SCNC: 24 MMOL/L (ref 21–32)
CREAT SERPL-MCNC: 0.81 MG/DL (ref 0.8–1.5)
DIAGNOSIS, 93000: NORMAL
ERYTHROCYTE [DISTWIDTH] IN BLOOD BY AUTOMATED COUNT: 14.3 %
GLUCOSE BLD STRIP.AUTO-MCNC: 105 MG/DL (ref 65–100)
GLUCOSE BLD STRIP.AUTO-MCNC: 109 MG/DL (ref 65–100)
GLUCOSE BLD STRIP.AUTO-MCNC: 116 MG/DL (ref 65–100)
GLUCOSE BLD STRIP.AUTO-MCNC: 122 MG/DL (ref 65–100)
GLUCOSE BLD STRIP.AUTO-MCNC: 127 MG/DL (ref 65–100)
GLUCOSE BLD STRIP.AUTO-MCNC: 135 MG/DL (ref 65–100)
GLUCOSE BLD STRIP.AUTO-MCNC: 140 MG/DL (ref 65–100)
GLUCOSE BLD STRIP.AUTO-MCNC: 152 MG/DL (ref 65–100)
GLUCOSE SERPL-MCNC: 109 MG/DL (ref 65–100)
HCT VFR BLD AUTO: 27.2 % (ref 41.1–50.3)
HGB BLD-MCNC: 8.7 G/DL (ref 13.6–17.2)
MAGNESIUM SERPL-MCNC: 2.4 MG/DL (ref 1.8–2.4)
MCH RBC QN AUTO: 34.3 PG (ref 26.1–32.9)
MCHC RBC AUTO-ENTMCNC: 32 G/DL (ref 31.4–35)
MCV RBC AUTO: 107.1 FL (ref 79.6–97.8)
MM INDURATION POC: 0 MM (ref 0–5)
NRBC # BLD: 0 K/UL (ref 0–0.2)
P-R INTERVAL, ECG05: 106 MS
PLATELET # BLD AUTO: 180 K/UL (ref 150–450)
PMV BLD AUTO: 9.4 FL (ref 9.4–12.3)
POTASSIUM SERPL-SCNC: 4.2 MMOL/L (ref 3.5–5.1)
PPD POC: NORMAL NEGATIVE
Q-T INTERVAL, ECG07: 374 MS
QRS DURATION, ECG06: 80 MS
QTC CALCULATION (BEZET), ECG08: 447 MS
RBC # BLD AUTO: 2.54 M/UL (ref 4.23–5.6)
SODIUM SERPL-SCNC: 141 MMOL/L (ref 136–145)
VENTRICULAR RATE, ECG03: 86 BPM
WBC # BLD AUTO: 10.7 K/UL (ref 4.3–11.1)

## 2018-09-19 PROCEDURE — 77030027138 HC INCENT SPIROMETER -A

## 2018-09-19 PROCEDURE — 74011250636 HC RX REV CODE- 250/636: Performed by: THORACIC SURGERY (CARDIOTHORACIC VASCULAR SURGERY)

## 2018-09-19 PROCEDURE — 65660000004 HC RM CVT STEPDOWN

## 2018-09-19 PROCEDURE — 77010033678 HC OXYGEN DAILY

## 2018-09-19 PROCEDURE — 93005 ELECTROCARDIOGRAM TRACING: CPT | Performed by: THORACIC SURGERY (CARDIOTHORACIC VASCULAR SURGERY)

## 2018-09-19 PROCEDURE — 74011250637 HC RX REV CODE- 250/637: Performed by: PHYSICIAN ASSISTANT

## 2018-09-19 PROCEDURE — 85027 COMPLETE CBC AUTOMATED: CPT

## 2018-09-19 PROCEDURE — 94640 AIRWAY INHALATION TREATMENT: CPT

## 2018-09-19 PROCEDURE — 97161 PT EVAL LOW COMPLEX 20 MIN: CPT

## 2018-09-19 PROCEDURE — 71045 X-RAY EXAM CHEST 1 VIEW: CPT

## 2018-09-19 PROCEDURE — 36592 COLLECT BLOOD FROM PICC: CPT

## 2018-09-19 PROCEDURE — 94760 N-INVAS EAR/PLS OXIMETRY 1: CPT

## 2018-09-19 PROCEDURE — 74011250636 HC RX REV CODE- 250/636

## 2018-09-19 PROCEDURE — 74011000250 HC RX REV CODE- 250: Performed by: INTERNAL MEDICINE

## 2018-09-19 PROCEDURE — 74011250637 HC RX REV CODE- 250/637: Performed by: THORACIC SURGERY (CARDIOTHORACIC VASCULAR SURGERY)

## 2018-09-19 PROCEDURE — 99233 SBSQ HOSP IP/OBS HIGH 50: CPT | Performed by: INTERNAL MEDICINE

## 2018-09-19 PROCEDURE — 74011250637 HC RX REV CODE- 250/637: Performed by: INTERNAL MEDICINE

## 2018-09-19 PROCEDURE — 80048 BASIC METABOLIC PNL TOTAL CA: CPT

## 2018-09-19 PROCEDURE — 83735 ASSAY OF MAGNESIUM: CPT

## 2018-09-19 RX ORDER — MUPIROCIN 20 MG/G
OINTMENT TOPICAL EVERY 12 HOURS
Status: COMPLETED | OUTPATIENT
Start: 2018-09-19 | End: 2018-09-23

## 2018-09-19 RX ORDER — ONDANSETRON 2 MG/ML
4 INJECTION INTRAMUSCULAR; INTRAVENOUS
Status: DISCONTINUED | OUTPATIENT
Start: 2018-09-19 | End: 2018-09-23 | Stop reason: HOSPADM

## 2018-09-19 RX ORDER — SODIUM CHLORIDE 0.9 % (FLUSH) 0.9 %
5-10 SYRINGE (ML) INJECTION AS NEEDED
Status: DISCONTINUED | OUTPATIENT
Start: 2018-09-19 | End: 2018-09-23 | Stop reason: HOSPADM

## 2018-09-19 RX ORDER — FUROSEMIDE 40 MG/1
40 TABLET ORAL DAILY
Status: DISPENSED | OUTPATIENT
Start: 2018-09-19 | End: 2018-09-22

## 2018-09-19 RX ORDER — POTASSIUM CHLORIDE 20 MEQ/1
20 TABLET, EXTENDED RELEASE ORAL
Status: DISCONTINUED | OUTPATIENT
Start: 2018-09-19 | End: 2018-09-23 | Stop reason: HOSPADM

## 2018-09-19 RX ORDER — MAG HYDROX/ALUMINUM HYD/SIMETH 200-200-20
30 SUSPENSION, ORAL (FINAL DOSE FORM) ORAL
Status: DISCONTINUED | OUTPATIENT
Start: 2018-09-19 | End: 2018-09-23 | Stop reason: HOSPADM

## 2018-09-19 RX ORDER — ATORVASTATIN CALCIUM 40 MG/1
80 TABLET, FILM COATED ORAL
Status: DISCONTINUED | OUTPATIENT
Start: 2018-09-19 | End: 2018-09-23 | Stop reason: HOSPADM

## 2018-09-19 RX ORDER — LANOLIN ALCOHOL/MO/W.PET/CERES
400 CREAM (GRAM) TOPICAL
Status: DISCONTINUED | OUTPATIENT
Start: 2018-09-19 | End: 2018-09-23 | Stop reason: HOSPADM

## 2018-09-19 RX ORDER — ALBUTEROL SULFATE 0.83 MG/ML
2.5 SOLUTION RESPIRATORY (INHALATION)
Status: DISCONTINUED | OUTPATIENT
Start: 2018-09-19 | End: 2018-09-19

## 2018-09-19 RX ORDER — ASPIRIN 81 MG/1
81 TABLET ORAL DAILY
Status: DISCONTINUED | OUTPATIENT
Start: 2018-09-20 | End: 2018-09-23 | Stop reason: HOSPADM

## 2018-09-19 RX ORDER — POTASSIUM CHLORIDE 750 MG/1
10 TABLET, EXTENDED RELEASE ORAL DAILY
Status: DISPENSED | OUTPATIENT
Start: 2018-09-19 | End: 2018-09-22

## 2018-09-19 RX ORDER — SODIUM CHLORIDE 0.9 % (FLUSH) 0.9 %
5-10 SYRINGE (ML) INJECTION EVERY 8 HOURS
Status: DISCONTINUED | OUTPATIENT
Start: 2018-09-19 | End: 2018-09-23 | Stop reason: HOSPADM

## 2018-09-19 RX ORDER — POTASSIUM CHLORIDE 20 MEQ/1
40 TABLET, EXTENDED RELEASE ORAL
Status: DISCONTINUED | OUTPATIENT
Start: 2018-09-19 | End: 2018-09-23 | Stop reason: HOSPADM

## 2018-09-19 RX ORDER — DOCUSATE SODIUM 100 MG/1
100 CAPSULE, LIQUID FILLED ORAL DAILY
Status: DISCONTINUED | OUTPATIENT
Start: 2018-09-19 | End: 2018-09-19

## 2018-09-19 RX ORDER — AMOXICILLIN 250 MG
2 CAPSULE ORAL
Status: DISCONTINUED | OUTPATIENT
Start: 2018-09-19 | End: 2018-09-23 | Stop reason: HOSPADM

## 2018-09-19 RX ORDER — AMIODARONE HYDROCHLORIDE 200 MG/1
200 TABLET ORAL EVERY 12 HOURS
Status: DISCONTINUED | OUTPATIENT
Start: 2018-09-19 | End: 2018-09-23 | Stop reason: HOSPADM

## 2018-09-19 RX ORDER — LORAZEPAM 0.5 MG/1
0.5 TABLET ORAL
Status: DISCONTINUED | OUTPATIENT
Start: 2018-09-19 | End: 2018-09-20

## 2018-09-19 RX ORDER — TRAMADOL HYDROCHLORIDE 50 MG/1
50 TABLET ORAL
Status: DISCONTINUED | OUTPATIENT
Start: 2018-09-19 | End: 2018-09-23 | Stop reason: HOSPADM

## 2018-09-19 RX ORDER — METOPROLOL TARTRATE 25 MG/1
25 TABLET, FILM COATED ORAL EVERY 12 HOURS
Status: DISCONTINUED | OUTPATIENT
Start: 2018-09-19 | End: 2018-09-20

## 2018-09-19 RX ORDER — ACETAMINOPHEN 325 MG/1
650 TABLET ORAL
Status: DISCONTINUED | OUTPATIENT
Start: 2018-09-19 | End: 2018-09-23 | Stop reason: HOSPADM

## 2018-09-19 RX ORDER — FUROSEMIDE 10 MG/ML
20 INJECTION INTRAMUSCULAR; INTRAVENOUS ONCE
Status: COMPLETED | OUTPATIENT
Start: 2018-09-19 | End: 2018-09-19

## 2018-09-19 RX ORDER — ONDANSETRON 2 MG/ML
INJECTION INTRAMUSCULAR; INTRAVENOUS
Status: COMPLETED
Start: 2018-09-19 | End: 2018-09-19

## 2018-09-19 RX ORDER — BUDESONIDE 0.5 MG/2ML
500 INHALANT ORAL
Status: DISCONTINUED | OUTPATIENT
Start: 2018-09-19 | End: 2018-09-19

## 2018-09-19 RX ORDER — FAMOTIDINE 20 MG/1
20 TABLET, FILM COATED ORAL EVERY 12 HOURS
Status: DISCONTINUED | OUTPATIENT
Start: 2018-09-19 | End: 2018-09-23 | Stop reason: HOSPADM

## 2018-09-19 RX ADMIN — ALBUTEROL SULFATE 2.5 MG: 2.5 SOLUTION RESPIRATORY (INHALATION) at 07:08

## 2018-09-19 RX ADMIN — TRAMADOL HYDROCHLORIDE 50 MG: 50 TABLET, FILM COATED ORAL at 20:39

## 2018-09-19 RX ADMIN — Medication 10 ML: at 20:40

## 2018-09-19 RX ADMIN — METOPROLOL TARTRATE 25 MG: 25 TABLET ORAL at 20:39

## 2018-09-19 RX ADMIN — ASPIRIN 81 MG: 81 TABLET, CHEWABLE ORAL at 08:42

## 2018-09-19 RX ADMIN — KETOROLAC TROMETHAMINE 15 MG: 15 INJECTION, SOLUTION INTRAMUSCULAR; INTRAVENOUS at 07:56

## 2018-09-19 RX ADMIN — POTASSIUM CHLORIDE 10 MEQ: 10 INJECTION, SOLUTION INTRAVENOUS at 00:38

## 2018-09-19 RX ADMIN — OXYCODONE AND ACETAMINOPHEN 1 TABLET: 5; 325 TABLET ORAL at 09:34

## 2018-09-19 RX ADMIN — FAMOTIDINE 20 MG: 20 TABLET, FILM COATED ORAL at 08:42

## 2018-09-19 RX ADMIN — Medication 2 G: at 04:00

## 2018-09-19 RX ADMIN — TIOTROPIUM BROMIDE 18 MCG: 18 CAPSULE ORAL; RESPIRATORY (INHALATION) at 07:15

## 2018-09-19 RX ADMIN — MUPIROCIN: 20 OINTMENT TOPICAL at 20:42

## 2018-09-19 RX ADMIN — AMIODARONE HYDROCHLORIDE 200 MG: 200 TABLET ORAL at 20:39

## 2018-09-19 RX ADMIN — Medication 2 TABLET: at 20:39

## 2018-09-19 RX ADMIN — FAMOTIDINE 20 MG: 20 TABLET ORAL at 20:38

## 2018-09-19 RX ADMIN — KETOROLAC TROMETHAMINE 15 MG: 15 INJECTION, SOLUTION INTRAMUSCULAR; INTRAVENOUS at 00:38

## 2018-09-19 RX ADMIN — TAPENTADOL HYDROCHLORIDE 100 MG: 50 TABLET, FILM COATED ORAL at 16:59

## 2018-09-19 RX ADMIN — FUROSEMIDE 20 MG: 10 INJECTION, SOLUTION INTRAMUSCULAR; INTRAVENOUS at 07:56

## 2018-09-19 RX ADMIN — OXYCODONE AND ACETAMINOPHEN 1 TABLET: 5; 325 TABLET ORAL at 13:43

## 2018-09-19 RX ADMIN — AMIODARONE HYDROCHLORIDE 200 MG: 200 TABLET ORAL at 08:43

## 2018-09-19 RX ADMIN — LORAZEPAM 0.5 MG: 0.5 TABLET ORAL at 20:40

## 2018-09-19 RX ADMIN — ONDANSETRON 4 MG: 2 INJECTION INTRAMUSCULAR; INTRAVENOUS at 11:00

## 2018-09-19 RX ADMIN — Medication 10 ML: at 05:28

## 2018-09-19 RX ADMIN — MUPIROCIN: 20 OINTMENT TOPICAL at 09:38

## 2018-09-19 RX ADMIN — ATORVASTATIN CALCIUM 80 MG: 40 TABLET, FILM COATED ORAL at 20:38

## 2018-09-19 RX ADMIN — METOPROLOL TARTRATE 25 MG: 25 TABLET ORAL at 08:47

## 2018-09-19 RX ADMIN — BUDESONIDE 500 MCG: 0.5 INHALANT RESPIRATORY (INHALATION) at 07:08

## 2018-09-19 RX ADMIN — OXYCODONE AND ACETAMINOPHEN 1 TABLET: 5; 325 TABLET ORAL at 05:30

## 2018-09-19 NOTE — PROGRESS NOTES
Critical Care Daily Progress Note: 9/19/2018 Elena Yoder Admission Date: 9/18/2018 The patient's chart is reviewed and the patient is discussed with the staff. 61 y old ,with mild COPD, bladder cancer s/p cystectomy with ileal conduit underwent CABX x 3, extubated the next day Subjective:  
 
Extubated last night Up in a chair Current Facility-Administered Medications Medication Dose Route Frequency  tiotropium (SPIRIVA) inhalation capsule 18 mcg  1 Cap Inhalation DAILY  albuterol (PROVENTIL VENTOLIN) nebulizer solution 2.5 mg  2.5 mg Nebulization Q6HWA RT  
 budesonide (PULMICORT) 500 mcg/2 ml nebulizer suspension  500 mcg Nebulization BID RT  
 0.9% sodium chloride infusion  25 mL/hr IntraVENous CONTINUOUS  
 dextrose 5% - 0.45% NaCl with KCl 20 mEq/L infusion  25 mL/hr IntraVENous CONTINUOUS  
 sodium chloride (NS) flush 5-10 mL  5-10 mL IntraVENous Q8H  
 sodium chloride (NS) flush 5-10 mL  5-10 mL IntraVENous PRN  
 oxyCODONE-acetaminophen (PERCOCET) 5-325 mg per tablet 1 Tab  1 Tab Oral Q4H PRN  
 morphine injection 3-5 mg  3-5 mg IntraVENous Q1H PRN  
 naloxone (NARCAN) injection 0.4 mg  0.4 mg IntraVENous PRN  
 mupirocin (BACTROBAN) 2 % ointment   Both Nostrils BID  sodium bicarbonate 8.4 % (1 mEq/mL) injection 50 mEq  50 mEq IntraVENous PRN  
 EPINEPHrine (ADRENALIN) 4 mg in 0.9% sodium chloride 250 mL infusion  0.05-0.1 mcg/kg/min IntraVENous TITRATE  nitroglycerin (Tridil) 200 mcg/ml infusion   mcg/min IntraVENous TITRATE  lidocaine (PF) (XYLOCAINE) 100 mg/5 mL (2 %) injection syringe  mg   mg IntraVENous ONCE PRN  
 amiodarone (CORDARONE) tablet 200 mg  200 mg Oral Q12H  
 metoprolol tartrate (LOPRESSOR) tablet 25 mg  25 mg Oral Q12H  
 atorvastatin (LIPITOR) tablet 80 mg  80 mg Oral QHS  insulin regular (NOVOLIN R, HUMULIN R) 100 Units in 0.9% sodium chloride 100 mL infusion  1 Units/hr IntraVENous TITRATE  dextrose (D50W) injection syrg 12.5 g  25 mL IntraVENous PRN  
 aspirin chewable tablet 81 mg  81 mg Oral DAILY  magnesium sulfate 1 g/100 ml IVPB (premix or compounded)  1 g IntraVENous PRN  potassium chloride 10 mEq in 100 ml IVPB  10 mEq IntraVENous PRN  
 midazolam (VERSED) injection 1 mg  1 mg IntraVENous Q1H PRN  propofol (DIPRIVAN) infusion  0-50 mcg/kg/min IntraVENous TITRATE  meperidine (DEMEROL) injection 25 mg  25 mg IntraVENous Q1H PRN  
 tuberculin injection 5 Units  5 Units IntraDERMal ONCE  
 PHENYLephrine (JOE-SYNEPHRINE) 30,000 mcg in 250 mL infusion   mcg/min IntraVENous TITRATE  albuterol (PROVENTIL VENTOLIN) nebulizer solution 2.5 mg  2.5 mg Nebulization Q4H PRN  
 ketorolac (TORADOL) injection 15 mg  15 mg IntraVENous Q6H  
 famotidine (PEPCID) tablet 20 mg  20 mg Oral BID Review of Systems Constitutional:  negative for fever, chills, sweats Cardiovascular:  negative for chest pain, palpitations, syncope, edema Gastrointestinal:  negative for dysphagia, reflux, vomiting, diarrhea, abdominal pain, or melena Neurologic:  negative for focal weakness, numbness, headache Objective:  
 
Vitals:  
 09/19/18 8205 09/19/18 0559 09/19/18 8649 09/19/18 4739 BP: 119/69 126/68 111/63 Pulse: 86 84 85 Resp: 17 8 18 Temp:      
SpO2: 97% 96% 98% Weight:    125 lb 10.6 oz (57 kg) Height:      
 
 
Intake and Output:  
09/17 0701 - 09/18 1900 In: 1815.3 [I.V.:1815.3] Out: 6857 [Urine:2425] 09/18 1901 - 09/19 0700 In: 2794.4 [P.O.:1065; I.V.:1729.4] Out: 1335 [Urine:925] Physical Exam:         
Constitutional:  the patient is well developed and in no acute distress EENMT:  Sclera clear, pupils equal, oral mucosa moist 
Respiratory: clear Cardiovascular:  RRR without M,G,R 
Gastrointestinal: soft and non-tender; with positive bowel sounds. Musculoskeletal: warm without cyanosis. There is no lower leg edema. Skin:  no jaundice or rashes, sternal  wounds Neurologic: no gross neuro deficits Psychiatric:  alert and oriented x 3 LINES: 
RIJ cordis, wero, CT 
 
DRIPS: 
Iv fluids CXR:  
 
 
LAB Recent Labs  
   09/19/18 
 1863  09/19/18 
 9621  09/19/18 
 9047  09/19/18 
 0217  09/18/18 
 2353 GLUCPOC  116*  105*  122*  140*  152* Recent Labs  
   09/19/18 
 0333  09/18/18 
 2205  09/18/18 
 1832  09/18/18 
 1233 WBC  10.7   --    --   15.6* HGB  8.7*  8.4*  9.4*  11.0*  
HCT  27.2*  26.1*  28.7*  33.7*  
PLT  180   --    --   243 INR   --    --    --   1.4 Recent Labs  
   09/19/18 
 0333  09/18/18 
 2205  09/18/18 
 1832  09/18/18 
 1233 NA  141   --    --   139  
K  4.2  3.7  4.3  4.4  
CL  112*   --    --   110* CO2  24   --    --   23 GLU  109*   --    --   126* BUN  9   --    --   12  
CREA  0.81   --    --   1.02  
MG  2.4  2.6*  3.0*  3.9*  
CA  7.3*   --    --   7.5* Recent Labs  
   09/18/18 
 1712  09/18/18 
 1555  09/18/18 
 1232 PH  7.32*  7.27*  7.21* PCO2  41  37  49* PO2  86  82  222* HCO3  21*  17*  19* Assessment:  (Medical Decision Making) Hospital Problems  Date Reviewed: 9/5/2018 Codes Class Noted POA Other pneumothorax  
small, watch  ICD-10-CM: J93.83 ICD-9-CM: 512.89  9/19/2018 Unknown Encounter for weaning from ventilator [facility]) extubated  ICD-10-CM: Z99.11 ICD-9-CM: V46.13  9/18/2018 Unknown S/P CABG x 3 ICD-10-CM: Z95.1 ICD-9-CM: V45.81  9/18/2018 Unknown Personal history of tobacco use, presenting hazards to health ICD-10-CM: D55.080 ICD-9-CM: V15.82  9/18/2018 Unknown CAD (coronary artery disease) ICD-10-CM: I25.10 ICD-9-CM: 414.00  Unknown Unknown Chronic obstructive pulmonary disease (HCC) [facility]  ICD-10-CM: J44.9 ICD-9-CM: 533  3/3/2016 Yes Plan:  (Medical Decision Making) -- continue [facility] - monitor small PTX-recheck CXR  
-  Likely to step down More than 50% of the time documented was spent in face-to-face contact with the patient and in the care of the patient on the floor/unit where the patient is located.  
 
Ann Gonzalez MD

## 2018-09-19 NOTE — PROGRESS NOTES
TRANSFER - IN REPORT: 
 
Verbal report received from Hermelindo Hightower RN(name) on Giselle Contreras  being received from CVICU(unit) for routine progression of care Report consisted of patients Situation, Background, Assessment and  
Recommendations(SBAR). Information from the following report(s) SBAR, Kardex, Intake/Output, MAR, Recent Results and Cardiac Rhythm NSR was reviewed with the receiving nurse. Opportunity for questions and clarification was provided. Assessment completed upon patients arrival to unit and care assumed. Dual skin assessment completed upon pt's arrival to unit. Midsternal and LLE incisions with dressings c/d/i. RLQ urosotomy c/d/i. Sacrum with no redness or breakdown noted under allevyn. No other abnormalities noted.

## 2018-09-19 NOTE — PROGRESS NOTES
MARLIN assessed pt s/p CABG with common-law wife at the bedside. He lives in a one level home with two steps at entrance. He is ADL-independent, doesn't use anything for ambulation, and has a nebulizer at home. He confirmed his PCP and insurance as listed and doesn't have any trouble getting medications. His wife is his only support and is retired and will participate in his Publictivityy. He is okay with [facility]. MARLIN faxed referral and received confirmation. CM following. Care Management Interventions PCP Verified by CM: Yes Mode of Transport at Discharge: Other (see comment) (Wife) Transition of Care Consult (CM Consult): Home Health [address]: No 
Reason Outside [place]: Physician referred to specific agency Physical Therapy Consult: Yes Current Support Network: Lives with Spouse, Own Home Confirm Follow Up Transport: Family Plan discussed with Pt/Family/Caregiver: Yes Freedom of Choice Offered: Yes Discharge Location Discharge Placement: Home with home health

## 2018-09-19 NOTE — PROGRESS NOTES
Problem: Mobility Impaired (Adult and Pediatric) Goal: *Acute Goals and Plan of Care (Insert Text) LTG: 
(1.)Mr. Claudia Parra will move from supine to sit and sit to supine, scoot up and down and roll side to side INDEPENDENTLY with bed flat within 7 treatment day(s). (2.)Mr. Claudia Parra will transfer from bed to chair and chair to bed INDEPENDENTLY within 7 treatment day(s). (3.)Mr. Mcdonald will ambulate INDEPENDENTLY for 600 feet within 7 treatment day(s). (4.)Mr. Claudia Parra will independently perform LE exercises per HEP for 15+ minutes to improve strength and mobility within 7 days. ________________________________________________________________________________________________ PHYSICAL THERAPY: Initial Assessment, PM 9/19/2018 INPATIENT: Hospital Day: 2 [place] / Product Type: Medicare /  
  
NAME/AGE/GENDER: Alfie Patten is a 61 y.o. male PRIMARY DIAGNOSIS: Atherosclerosis of native coronary artery of native heart with unstable angina pectoris ([facility] [address] [I25.110] Typical angina ([facility] [address] [I20.9] <principal problem not specified> <principal problem not specified> Procedure(s) (LRB): 
CORONARY ARTERY BYPASS GRAFT (CABG) X   3 WITH LIMA. LYSIS WITH MYOCARDIAL ADHESIONS.   (N/A) VEIN HARVEST. Greater Saphenous. (Left) ESOPHAGEAL TRANS ECHOCARDIOGRAM (N/A) 1 Day Post-Op ICD-10: Treatment Diagnosis:  
 · Generalized Muscle Weakness (M62.81) · Difficulty in walking, Not elsewhere classified (R26.2) · Other abnormalities of gait and mobility (R26.89) Precaution/Allergies: 
Codeine and Penicillins ASSESSMENT:  
 
Mr. Claudia Parra is a 61year old male seen for initial therapy assessment following CABG x3. He presents sitting up in bed with expected post op soreness. Agreeable to mobility with min encouragement. Reviewed log roll technique with bed mobility; transfers to sitting with CGA.  Ambulates 200 ft in room and hallway without assistive device, close CGA for safety. Ascends and descends 5 steps x2 at his request with CGA. O2 sats checked on room air during ambulation and 90-91%. Returned to room and back to supine with CGA. Lashon Ruiz is demonstrating expected post op deficits with strength, mobility, balance, and activity tolerance and will benefit from continued therapy during acute care stay to maximize safety and independence with mobility. Anticipate return home at discharge with [facility]. This section established at most recent assessment PROBLEM LIST (Impairments causing functional limitations): 1. Decreased Strength 2. Decreased ADL/Functional Activities 3. Decreased Transfer Abilities 4. Decreased Ambulation Ability/Technique 5. Decreased Balance 6. Increased Pain 7. Decreased Activity Tolerance INTERVENTIONS PLANNED: (Benefits and precautions of physical therapy have been discussed with the patient.) 1. Balance Exercise 2. Bed Mobility 3. Gait Training 4. Home Exercise Program (HEP) 5. Therapeutic Activites 6. Therapeutic Exercise/Strengthening 7. Transfer Training TREATMENT PLAN: Frequency/Duration: twice daily for duration of hospital stay Rehabilitation Potential For Stated Goals: Good RECOMMENDED REHABILITATION/EQUIPMENT: (at time of discharge pending progress): Due to the probability of continued deficits (see above) this patient will likely need continued skilled physical therapy after discharge. Equipment:  
? None at this time HISTORY:  
History of Present Injury/Illness (Reason for Referral): 
Pt s/p CABG x3 Past Medical History/Comorbidities:  
Mr. Neda Mcdaniels  has a past medical history of Arrhythmia; CAD (coronary artery disease); Cancer ([facility] [address] Chronic neck pain; Chronic obstructive pulmonary disease ([facility] [address] Depression with anxiety; History of bladder cancer (07/2014); History of neck surgery;  Hypertension; Kidney stones; Osteoarthritis; PAD (peripheral artery disease) ([facility] [address] Staghorn renal calculus; Status post ileal conduit ([facility] [address] (2014); and Tobacco use disorder, continuous. Mr. Jessie Ferrer  has a past surgical history that includes hx knee arthroscopy (Left); hx hemorrhoidectomy; hx heart catheterization (08/2018); hx urological; hx urological (2015); hx urological (Left); hx vascular access (Right, 2014); and hx cervical fusion (2014). Social History/Living Environment:  
Home Environment: Private residence # Steps to Enter: 0 One/Two Story Residence: One story Living Alone: No 
Support Systems: Spouse/Significant Other/Partner, Family member(s) Patient Expects to be Discharged to[de-identified] Private residence Current DME Used/Available at Home: None Prior Level of Function/Work/Activity: 
Lives with wife in single story home with 1 small step up. Independent with mobility, ambulation, and ADLs. Drives. Does not use any DME or home O2. Denies falls. Number of Personal Factors/Comorbidities that affect the Plan of Care: 1-2: MODERATE COMPLEXITY EXAMINATION:  
Most Recent Physical Functioning:  
Gross Assessment: 
AROM: Within functional limits Strength: Generally decreased, functional 
Coordination: Within functional limits Posture: 
Posture (WDL): Exceptions to [facility] Posture Assessment: Forward head, Rounded shoulders Balance: 
Sitting: Intact Standing: Impaired Standing - Static: Fair Standing - Dynamic : Fair Bed Mobility: 
Rolling: Stand-by assistance Supine to Sit: Stand-by assistance Sit to Supine: Stand-by assistance Scooting: Stand-by assistance Wheelchair Mobility: 
  
Transfers: 
Sit to Stand: Contact guard assistance Stand to Sit: Contact guard assistance Gait: 
  
Base of Support: Narrowed; Center of gravity altered Speed/Svetlana: Accelerated; Fluctuations Step Length: Left shortened;Right shortened Gait Abnormalities: Trunk sway increased Distance (ft): 200 Feet (ft) Assistive Device:  (none) Ambulation - Level of Assistance: Contact guard assistance Number of Stairs Trained: 5 Stairs - Level of Assistance: Contact guard assistance Rail Use: Both Interventions: Verbal cues; Safety awareness training Body Structures Involved: 1. Heart 2. Muscles Body Functions Affected: 1. Sensory/Pain 2. Cardio 3. Movement Related Activities and Participation Affected: 1. General Tasks and Demands 2. Mobility 3. Self Care 4. Domestic Life 5. Community, Social and [place] Number of elements that affect the Plan of Care: 4+: HIGH COMPLEXITY CLINICAL PRESENTATION:  
Presentation: Stable and uncomplicated: LOW COMPLEXITY CLINICAL DECISION MAKING:  
[employer] AM-[employer] 6 Clicks Basic Mobility Inpatient Short Form How much difficulty does the patient currently have. .. Unable A Lot A Little None 1. Turning over in bed (including adjusting bedclothes, sheets and blankets)? [] 1   [] 2   [] 3   [x] 4  
2. Sitting down on and standing up from a chair with arms ( e.g., wheelchair, bedside commode, etc.)   [] 1   [] 2   [x] 3   [] 4  
3. Moving from lying on back to sitting on the side of the bed? [] 1   [] 2   [] 3   [x] 4 How much help from another person does the patient currently need. .. Total A Lot A Little None 4. Moving to and from a bed to a chair (including a wheelchair)? [] 1   [] 2   [x] 3   [] 4  
5. Need to walk in hospital room? [] 1   [] 2   [x] 3   [] 4  
6. Climbing 3-5 steps with a railing? [] 1   [] 2   [x] 3   [] 4  
© 2007, Trustees of [facility] [employer], under license to [employer]. All rights reserved Score:  Initial: 20 Most Recent: X (Date: -- ) Interpretation of Tool:  Represents activities that are increasingly more difficult (i.e. Bed mobility, Transfers, Gait). Score 24 23 22-20 19-15 14-10 9-7 6 Modifier CH CI CJ CK CL CM CN   
 
? Mobility - Walking and Moving Around:  - CURRENT STATUS: CJ - 20%-39% impaired, limited or restricted  - GOAL STATUS:  - 0% impaired, limited or restricted  - D/C STATUS:  ---------------To be determined--------------- [place] / [place] / Product Type: Medicare /   
 
Medical Necessity:    
· Patient demonstrates good rehab potential due to higher previous functional level. Reason for Services/Other Comments: 
· Patient continues to demonstrate capacity to improve strength, balance, activity tolerance, mobility which will increase independence, decrease amount of assistance required from caregiver and increase safety. Use of outcome tool(s) and clinical judgement create a POC that gives a: Clear prediction of patient's progress: LOW COMPLEXITY  
  
 
 
 
TREATMENT:  
(In addition to Assessment/Re-Assessment sessions the following treatments were rendered) Pre-treatment Symptoms/Complaints:  \"thank you\" Pain: Initial:  
Pain Intensity 1: 4 Pain Location 1: Chest, Incisional 
Pain Intervention(s) 1: Ambulation/Increased Activity, Declines  Post Session:  0/10 Assessment/Reassessment only, no treatment provided today Braces/Orthotics/Lines/Etc:  
· O2 Device: Nasal cannula Treatment/Session Assessment:   
· Response to Treatment:  Pt performs mobility with CGA · Interdisciplinary Collaboration:  
o Physical Therapist 
o Registered Nurse · After treatment position/precautions:  
o Supine in bed 
o Bed/Chair-wheels locked 
o Bed in low position 
o Call light within reach · Compliance with Program/Exercises: compliant all of the time. · Recommendations/Intent for next treatment session: \"Next visit will focus on advancements to more challenging activities and reduction in assistance provided\". Total Treatment Duration: PT Patient Time In/Time Out Time In: 1400 Time Out: 1428 Anju Hitchcock DPT

## 2018-09-19 NOTE — PROGRESS NOTES
POD 1 Day Post-Op Procedure:  Procedure(s): CORONARY ARTERY BYPASS GRAFT (CABG) X   3 WITH LIMA. LYSIS WITH MYOCARDIAL ADHESIONS.   
VEIN HARVEST. Greater Saphenous. ESOPHAGEAL TRANS ECHOCARDIOGRAM 
 
 
Subjective:  
 
Patient has No significant medical complaints Objective:  
 
Patient Vitals for the past 8 hrs: 
 BP Temp Pulse Resp SpO2 Weight  
18 0712 - - - - 95 % -  
18 0659 127/68 - 86 23 97 % -  
18 0644 119/66 - 84 19 97 % -  
18 0629 124/62 - 85 11 98 % -  
18 0625 - - - - - 125 lb 10.6 oz (57 kg) 18 0614 111/63 - 85 18 98 % -  
18 0559 126/68 - 84 8 96 % -  
18 0544 119/69 - 86 17 97 % -  
18 0535 125/65 - 90 17 97 % -  
18 0459 123/72 - 84 16 96 % -  
18 0445 123/61 - 86 18 95 % -  
18 0429 119/74 - 87 17 96 % -  
18 0415 118/64 - 84 19 96 % -  
18 0359 126/71 - 82 12 98 % -  
18 0344 112/59 - 80 16 96 % -  
18 0329 129/69 - 83 (!) 6 96 % -  
18 0314 143/75 - 81 29 96 % -  
18 0259 117/64 97.7 °F (36.5 °C) 85 24 96 % -  
18 0244 117/69 - 79 14 97 % -  
18 0229 121/65 - 79 14 97 % -  
18 0214 111/68 - 80 15 99 % -  
18 0159 104/66 - 79 14 97 % -  
18 0144 107/67 - 80 25 97 % -  
18 0129 106/68 - 80 13 98 % -  
18 0114 101/61 - 79 14 98 % -  
18 0059 105/61 - 80 16 98 % -  
18 0044 119/69 - 81 13 96 % -  
18 0029 106/63 - 88 13 96 % -  
18 0014 101/57 - 86 8 96 % -  
18 2359 94/59 97.3 °F (36.3 °C) 84 26 100 % -  
18 2349 (!) 86/53 - 85 28 100 % -  
18 2345 (!) 86/53 - 84 (!) 7 100 % -  
18 2329 (!) 83/54 - 83 27 97 % - Temp (24hrs), Av.8 °F (36 °C), Min:95.5 °F (35.3 °C), Max:97.7 °F (36.5 °C) Hemodynamics PAP Systolic: 43 PAP 
CO (l/min): 5.7 l/min CO 
CI (l/min/m2): 3.6 l/min/m2 CI 
 
  
 1901 -  0700 In: 4609.6 [P.O.:1065; I.V.:3544.6] [id number] Drainage 
  
  
   
  total of all CT's Heart:  regular rate and rhythm, S1, S2 normal, no murmur, click, rub or gallop Lung:  clear to auscultation bilaterally Neuro: Grossly non focal 
Incisions: Clean, dry, and intact Labs: 
Recent Results (from the past 24 hour(s)) POC CG8I Collection Time: 09/18/18  7:49 AM  
Result Value Ref Range pH (POC) 7.352 7.35 - 7.45    
 pCO2 (POC) 47.1 (H) 35 - 45 MMHG  
 pO2 (POC) 383 (H) 80 - 105 MMHG  
 HCO3 (POC) 26.2 (H) 22 - 26 MMOL/L  
 sO2 (POC) 100 (H) 95 - 98 % Base excess (POC) 0 mmol/L Sodium,  136 - 145 MMOL/L Potassium, POC 4.1 3.5 - 5.1 MMOL/L Glucose, POC 99 65 - 100 MG/DL Calcium, ionized (POC) 1.17 1.12 - 1.32 mmol/L  
POC CG8I Collection Time: 09/18/18  9:50 AM  
Result Value Ref Range pH (POC) 7.301 (L) 7.35 - 7.45    
 pCO2 (POC) 47.1 (H) 35 - 45 MMHG  
 pO2 (POC) 254 (H) 80 - 105 MMHG  
 HCO3 (POC) 23.3 22 - 26 MMOL/L  
 sO2 (POC) 100 (H) 95 - 98 % Base deficit (POC) 3 mmol/L Sodium,  136 - 145 MMOL/L Potassium, POC 4.3 3.5 - 5.1 MMOL/L Glucose,  (H) 65 - 100 MG/DL Calcium, ionized (POC) 1.16 1.12 - 1.32 mmol/L  
POC CG8I Collection Time: 09/18/18 10:19 AM  
Result Value Ref Range pH (POC) 7.361 7.35 - 7.45    
 pCO2 (POC) 40.6 35 - 45 MMHG  
 pO2 (POC) 329 (H) 80 - 105 MMHG  
 HCO3 (POC) 23.0 22 - 26 MMOL/L  
 sO2 (POC) 100 (H) 95 - 98 % Base deficit (POC) 2 mmol/L Sodium,  (L) 136 - 145 MMOL/L Potassium, POC 6.5 (H) 3.5 - 5.1 MMOL/L Glucose,  (H) 65 - 100 MG/DL Calcium, ionized (POC) 1.01 (L) 1.12 - 1.32 mmol/L  
POC CG8I Collection Time: 09/18/18 10:50 AM  
Result Value Ref Range pH (POC) 7.375 7.35 - 7.45    
 pCO2 (POC) 40.6 35 - 45 MMHG  
 pO2 (POC) 326 (H) 80 - 105 MMHG  
 HCO3 (POC) 23.8 22 - 26 MMOL/L  
 sO2 (POC) 100 (H) 95 - 98 % Base deficit (POC) 1 mmol/L  Sodium,  (L) 136 - 145 MMOL/L  
 Potassium, POC 5.7 (H) 3.5 - 5.1 MMOL/L Glucose,  (H) 65 - 100 MG/DL Calcium, ionized (POC) 1.01 (L) 1.12 - 1.32 mmol/L  
POC CG8I Collection Time: 09/18/18 11:39 AM  
Result Value Ref Range pH (POC) 7.208 (L) 7.35 - 7.45    
 pCO2 (POC) 59.5 (H) 35 - 45 MMHG  
 pO2 (POC) 355 (H) 80 - 105 MMHG  
 HCO3 (POC) 23.7 22 - 26 MMOL/L  
 sO2 (POC) 100 (H) 95 - 98 % Base deficit (POC) 4 mmol/L Sodium,  136 - 145 MMOL/L Potassium, POC 4.7 3.5 - 5.1 MMOL/L Glucose,  (H) 65 - 100 MG/DL Calcium, ionized (POC) 1.14 1.12 - 1.32 mmol/L  
BLOOD GAS & LYTES, ARTERIAL Collection Time: 09/18/18 12:32 PM  
Result Value Ref Range pH 7.21 (L) 7.35 - 7.45    
 PCO2 49 (H) 35 - 45 mmHg PO2 222 (H) 80 - 105 mmHg BICARBONATE 19 (L) 22 - 26 mmol/L  
 BASE DEFICIT 8.9 (H) 0 - 2 mmol/L  
 TOTAL HEMOGLOBIN 12.3 11.7 - 15.0 GM/DL  
 O2 SAT 99 (H) 92 - 98.5 % Arterial O2 Hgb 97.1 (H) 94 - 97 % CARBOXYHEMOGLOBIN 1.0 0.5 - 1.5 % METHEMOGLOBIN 0.6 0.0 - 1.5 % DEOXYHEMOGLOBIN 1 0.0 - 5.0 % Sodium 136.9 135 - 148 MMOL/L  
 POTASSIUM 4.41 3.5 - 5.3 MMOL/L  
 CHLORIDE 109 (H) 98 - 106 Calcium, ionized 1.14 1.0 - 1.3 mmol/L  
 SITE DANYEL ALLENS TEST NA   
 MODE ASV   
 FIO2 60.0 % PEEP/CPAP 8.0 Respiratory comment: Osmar Currie MD at 9 18 2018 12 38 10 PM. Read back. METABOLIC PANEL, BASIC Collection Time: 09/18/18 12:33 PM  
Result Value Ref Range Sodium 139 136 - 145 mmol/L Potassium 4.4 3.5 - 5.1 mmol/L Chloride 110 (H) 98 - 107 mmol/L  
 CO2 23 21 - 32 mmol/L Anion gap 6 (L) 7 - 16 mmol/L Glucose 126 (H) 65 - 100 mg/dL BUN 12 8 - 23 MG/DL Creatinine 1.02 0.8 - 1.5 MG/DL  
 GFR est AA >60 >60 ml/min/1.73m2 GFR est non-AA >60 >60 ml/min/1.73m2 Calcium 7.5 (L) 8.3 - 10.4 MG/DL MAGNESIUM Collection Time: 09/18/18 12:33 PM  
Result Value Ref Range Magnesium 3.9 (H) 1.8 - 2.4 mg/dL CBC W/O DIFF  
 Collection Time: 09/18/18 12:33 PM  
Result Value Ref Range WBC 15.6 (H) 4.3 - 11.1 K/uL  
 RBC 3.17 (L) 4.23 - 5.6 M/uL  
 HGB 11.0 (L) 13.6 - 17.2 g/dL HCT 33.7 (L) 41.1 - 50.3 % .3 (H) 79.6 - 97.8 FL  
 MCH 34.7 (H) 26.1 - 32.9 PG  
 MCHC 32.6 31.4 - 35.0 g/dL  
 RDW 13.8 % PLATELET 409 209 - 205 K/uL MPV 9.3 (L) 9.4 - 12.3 FL ABSOLUTE NRBC 0.00 0.0 - 0.2 K/uL PTT Collection Time: 09/18/18 12:33 PM  
Result Value Ref Range aPTT 36.0 (H) 23.2 - 35.3 SEC PROTHROMBIN TIME + INR Collection Time: 09/18/18 12:33 PM  
Result Value Ref Range Prothrombin time 16.7 (H) 11.5 - 14.5 sec INR 1.4 FIBRINOGEN Collection Time: 09/18/18 12:33 PM  
Result Value Ref Range Fibrinogen 255 190 - 501 mg/dL GLUCOSE, POC Collection Time: 09/18/18 12:36 PM  
Result Value Ref Range Glucose (POC) 131 (H) 65 - 100 mg/dL EKG, 12 LEAD, INITIAL Collection Time: 09/18/18 12:42 PM  
Result Value Ref Range Ventricular Rate 105 BPM  
 Atrial Rate 105 BPM  
 P-R Interval 118 ms QRS Duration 86 ms  
 Q-T Interval 352 ms QTC Calculation (Bezet) 465 ms Calculated P Axis 77 degrees Calculated R Axis 73 degrees Calculated T Axis 104 degrees Diagnosis Sinus tachycardia Otherwise normal ECG When compared with ECG of 14-SEP-2018 08:30, 
Nonspecific T wave abnormality now evident in Lateral leads Confirmed by TOOTIE WHITMORE (), Dayna Peña ([id number]) on 9/18/2018 1:34:30 PM 
  
BLOOD GAS, ARTERIAL Collection Time: 09/18/18  3:55 PM  
Result Value Ref Range pH 7.27 (L) 7.35 - 7.45    
 PCO2 37 35 - 45 mmHg PO2 82 80 - 105 mmHg BICARBONATE 17 (L) 22 - 26 mmol/L  
 BASE DEFICIT 9.7 (H) 0 - 2 mmol/L  
 TOTAL HEMOGLOBIN 10.8 (L) 11.7 - 15.0 GM/DL  
 O2 SAT 94 92 - 98.5 % Arterial O2 Hgb 93.0 (L) 94 - 97 % CARBOXYHEMOGLOBIN 0.3 (L) 0.5 - 1.5 % METHEMOGLOBIN 0.5 0.0 - 1.5 % DEOXYHEMOGLOBIN 6 (H) 0.0 - 5.0 % SITE DANYEL ALLENS TEST NA   
 MODE ASV FIO2 35.0 % PEEP/CPAP 8.0 Respiratory comment: Dorann Jeans at 9 18 2018 3 58 45 PM. Read back. GLUCOSE, POC Collection Time: 09/18/18  3:55 PM  
Result Value Ref Range Glucose (POC) 155 (H) 65 - 100 mg/dL BLOOD GAS, ARTERIAL Collection Time: 09/18/18  5:12 PM  
Result Value Ref Range pH 7.32 (L) 7.35 - 7.45    
 PCO2 41 35 - 45 mmHg PO2 86 80 - 105 mmHg BICARBONATE 21 (L) 22 - 26 mmol/L  
 BASE DEFICIT 5.3 (H) 0 - 2 mmol/L  
 TOTAL HEMOGLOBIN 10.6 (L) 11.7 - 15.0 GM/DL  
 O2 SAT 95 92 - 98.5 % Arterial O2 Hgb 94.3 94 - 97 % CARBOXYHEMOGLOBIN 0.3 (L) 0.5 - 1.5 % METHEMOGLOBIN 0.5 0.0 - 1.5 % DEOXYHEMOGLOBIN 5 0.0 - 5.0 % SITE DANYEL ALLENS TEST NA   
 MODE ASV   
 FIO2 35.0 % PEEP/CPAP 8.0 Respiratory comment: Odnina Webb RN at 9 18 2018 5 15 36 PM. Read back. GLUCOSE, POC Collection Time: 09/18/18  6:10 PM  
Result Value Ref Range Glucose (POC) 139 (H) 65 - 100 mg/dL MAGNESIUM Collection Time: 09/18/18  6:32 PM  
Result Value Ref Range Magnesium 3.0 (H) 1.8 - 2.4 mg/dL POTASSIUM Collection Time: 09/18/18  6:32 PM  
Result Value Ref Range Potassium 4.3 3.5 - 5.1 mmol/L  
HGB & HCT Collection Time: 09/18/18  6:32 PM  
Result Value Ref Range HGB 9.4 (L) 13.6 - 17.2 g/dL HCT 28.7 (L) 41.1 - 50.3 % GLUCOSE, POC Collection Time: 09/18/18  7:07 PM  
Result Value Ref Range Glucose (POC) 116 (H) 65 - 100 mg/dL GLUCOSE, POC Collection Time: 09/18/18  8:00 PM  
Result Value Ref Range Glucose (POC) 99 65 - 100 mg/dL GLUCOSE, POC Collection Time: 09/18/18  9:04 PM  
Result Value Ref Range Glucose (POC) 92 65 - 100 mg/dL MAGNESIUM Collection Time: 09/18/18 10:05 PM  
Result Value Ref Range Magnesium 2.6 (H) 1.8 - 2.4 mg/dL POTASSIUM Collection Time: 09/18/18 10:05 PM  
Result Value Ref Range Potassium 3.7 3.5 - 5.1 mmol/L  
HGB & HCT Collection Time: 09/18/18 10:05 PM  
Result Value Ref Range HGB 8.4 (L) 13.6 - 17.2 g/dL HCT 26.1 (L) 41.1 - 50.3 % GLUCOSE, POC Collection Time: 09/18/18 11:07 PM  
Result Value Ref Range Glucose (POC) 65 65 - 100 mg/dL GLUCOSE, POC Collection Time: 09/18/18 11:53 PM  
Result Value Ref Range Glucose (POC) 152 (H) 65 - 100 mg/dL GLUCOSE, POC Collection Time: 09/19/18  2:17 AM  
Result Value Ref Range Glucose (POC) 140 (H) 65 - 100 mg/dL GLUCOSE, POC Collection Time: 09/19/18  3:13 AM  
Result Value Ref Range Glucose (POC) 122 (H) 65 - 100 mg/dL MAGNESIUM Collection Time: 09/19/18  3:33 AM  
Result Value Ref Range Magnesium 2.4 1.8 - 2.4 mg/dL METABOLIC PANEL, BASIC Collection Time: 09/19/18  3:33 AM  
Result Value Ref Range Sodium 141 136 - 145 mmol/L Potassium 4.2 3.5 - 5.1 mmol/L Chloride 112 (H) 98 - 107 mmol/L  
 CO2 24 21 - 32 mmol/L Anion gap 5 (L) 7 - 16 mmol/L Glucose 109 (H) 65 - 100 mg/dL BUN 9 8 - 23 MG/DL Creatinine 0.81 0.8 - 1.5 MG/DL  
 GFR est AA >60 >60 ml/min/1.73m2 GFR est non-AA >60 >60 ml/min/1.73m2 Calcium 7.3 (L) 8.3 - 10.4 MG/DL  
CBC W/O DIFF Collection Time: 09/19/18  3:33 AM  
Result Value Ref Range WBC 10.7 4.3 - 11.1 K/uL  
 RBC 2.54 (L) 4.23 - 5.6 M/uL HGB 8.7 (L) 13.6 - 17.2 g/dL HCT 27.2 (L) 41.1 - 50.3 % .1 (H) 79.6 - 97.8 FL  
 MCH 34.3 (H) 26.1 - 32.9 PG  
 MCHC 32.0 31.4 - 35.0 g/dL  
 RDW 14.3 % PLATELET 222 122 - 738 K/uL MPV 9.4 9.4 - 12.3 FL ABSOLUTE NRBC 0.00 0.0 - 0.2 K/uL GLUCOSE, POC Collection Time: 09/19/18  5:08 AM  
Result Value Ref Range Glucose (POC) 105 (H) 65 - 100 mg/dL GLUCOSE, POC Collection Time: 09/19/18  6:19 AM  
Result Value Ref Range Glucose (POC) 116 (H) 65 - 100 mg/dL EKG, 12 LEAD, SUBSEQUENT Collection Time: 09/19/18  7:13 AM  
Result Value Ref Range Ventricular Rate 86 BPM  
 Atrial Rate 86 BPM  
 P-R Interval 106 ms QRS Duration 80 ms  
 Q-T Interval 374 ms QTC Calculation (Bezet) 447 ms Calculated P Axis 33 degrees Calculated R Axis 55 degrees Calculated T Axis 69 degrees Diagnosis Sinus rhythm with short NV Otherwise normal ECG When compared with ECG of 18-SEP-2018 12:42, No significant change was found Assessment:  
 
Active Problems: 
  Chronic obstructive pulmonary disease ([facility] [address] (3/3/2016) CAD (coronary artery disease) () 
 
  S/P CABG x 3 (9/18/2018) Personal history of tobacco use, presenting hazards to health (9/18/2018) Other pneumothorax (9/19/2018) Hypoxemia (9/19/2018) Plan/Recommendations/Medical Decision Making:  
 
Discontinue:  chest tubes See orders Doing well, to floor, protocol

## 2018-09-19 NOTE — PROGRESS NOTES
Pt very anxious even with attempting INT insertion. Stated that he wants to be done with all of this and just be in heaven. He stated that he had this surgery for his wife and his 39 yo autistic son. Allowed to verbalize. Pt stated that he wanted to cry all day and finally let it out. Reassured, encouraged. Payal NAVARRO updated and orders received.

## 2018-09-19 NOTE — PROGRESS NOTES
Stayed with pt for about an hour, allowed to verbalize. Stated that he is not a drinker daily but has an occasional beer, and he wants to quit smoking. Talked about his belief system and what God means to him. He doesn't want to take his life; he said sometimes life is so hard he just wants to quit. Everything just overwhelms him with his physical issues and his families issues that he states he brought onto himself with life choices. Pt cried the entire time, reassured and listened. Prayer given and the patient stated that the pain pill had made a difference but the prayer sure did. Pt now calm, smiling, and appreciative of the time spent with him. Aware that he can have something for anxiety if needed. He said he just needs some good sleep. O2 off during anxiety issue, ra sat 84, o2 back on at 2L NC. Primary RN aware and will monitor.

## 2018-09-19 NOTE — PROGRESS NOTES
TRANSFER - OUT REPORT: 
 
Verbal report given to Sandhya(name) on Martita Hernandez  being transferred to [facility](unit) for routine progression of care Report consisted of patients Situation, Background, Assessment and  
Recommendations(SBAR). Information from the following report(s) SBAR, Kardex, OR Summary, Intake/Output, MAR, Recent Results, Med Rec Status and Cardiac Rhythm NSR was reviewed with the receiving nurse. Lines:  
Venous Access Device Upper chest (subclavicular area, right (Active) Peripheral IV 09/18/18 Right Hand (Active) Site Assessment Clean, dry, & intact 9/19/2018 10:57 AM  
Phlebitis Assessment 0 9/19/2018 10:57 AM  
Infiltration Assessment 0 9/19/2018 10:57 AM  
Dressing Status Clean, dry, & intact 9/19/2018 10:57 AM  
Dressing Type Tape;Transparent 9/19/2018 10:57 AM  
Hub Color/Line Status Green;Capped 9/19/2018 10:57 AM  
Action Taken Open ports on tubing capped 9/18/2018 12:17 PM  
Alcohol Cap Used No 9/19/2018  3:00 AM  
  
 
Opportunity for questions and clarification was provided. Patient transported with: 
 Monitor, Registered Nurse

## 2018-09-20 PROBLEM — Z87.891 PERSONAL HISTORY OF TOBACCO USE, PRESENTING HAZARDS TO HEALTH: Chronic | Status: ACTIVE | Noted: 2018-09-18

## 2018-09-20 PROBLEM — R06.02 SHORTNESS OF BREATH: Status: RESOLVED | Noted: 2018-06-11 | Resolved: 2018-09-20

## 2018-09-20 PROBLEM — E78.2 MIXED HYPERLIPIDEMIA: Chronic | Status: ACTIVE | Noted: 2018-08-13

## 2018-09-20 PROBLEM — I25.119 ATHEROSCLEROSIS OF NATIVE CORONARY ARTERY OF NATIVE HEART WITH ANGINA PECTORIS (HCC): Chronic | Status: ACTIVE | Noted: 2018-08-13

## 2018-09-20 LAB
MM INDURATION POC: 0 MM (ref 0–5)
PPD POC: NORMAL NEGATIVE

## 2018-09-20 PROCEDURE — 97110 THERAPEUTIC EXERCISES: CPT

## 2018-09-20 PROCEDURE — 99232 SBSQ HOSP IP/OBS MODERATE 35: CPT | Performed by: INTERNAL MEDICINE

## 2018-09-20 PROCEDURE — 65660000004 HC RM CVT STEPDOWN

## 2018-09-20 PROCEDURE — 77010033678 HC OXYGEN DAILY

## 2018-09-20 PROCEDURE — 74011250637 HC RX REV CODE- 250/637: Performed by: INTERNAL MEDICINE

## 2018-09-20 PROCEDURE — 94640 AIRWAY INHALATION TREATMENT: CPT

## 2018-09-20 PROCEDURE — 97530 THERAPEUTIC ACTIVITIES: CPT

## 2018-09-20 PROCEDURE — 74011250637 HC RX REV CODE- 250/637: Performed by: PHYSICIAN ASSISTANT

## 2018-09-20 PROCEDURE — 94760 N-INVAS EAR/PLS OXIMETRY 1: CPT

## 2018-09-20 PROCEDURE — 74011250637 HC RX REV CODE- 250/637: Performed by: THORACIC SURGERY (CARDIOTHORACIC VASCULAR SURGERY)

## 2018-09-20 PROCEDURE — 74011000250 HC RX REV CODE- 250: Performed by: INTERNAL MEDICINE

## 2018-09-20 RX ORDER — ALBUTEROL SULFATE 0.83 MG/ML
2.5 SOLUTION RESPIRATORY (INHALATION)
Status: DISCONTINUED | OUTPATIENT
Start: 2018-09-20 | End: 2018-09-23 | Stop reason: HOSPADM

## 2018-09-20 RX ORDER — BISACODYL 5 MG
5 TABLET, DELAYED RELEASE (ENTERIC COATED) ORAL DAILY PRN
Status: DISCONTINUED | OUTPATIENT
Start: 2018-09-20 | End: 2018-09-23 | Stop reason: HOSPADM

## 2018-09-20 RX ORDER — METOPROLOL TARTRATE 50 MG/1
50 TABLET ORAL EVERY 12 HOURS
Status: DISCONTINUED | OUTPATIENT
Start: 2018-09-20 | End: 2018-09-23 | Stop reason: HOSPADM

## 2018-09-20 RX ORDER — LORAZEPAM 0.5 MG/1
0.5 TABLET ORAL
Status: DISCONTINUED | OUTPATIENT
Start: 2018-09-20 | End: 2018-09-23 | Stop reason: HOSPADM

## 2018-09-20 RX ORDER — TRAMADOL HYDROCHLORIDE 50 MG/1
100 TABLET ORAL
Status: DISCONTINUED | OUTPATIENT
Start: 2018-09-20 | End: 2018-09-23 | Stop reason: HOSPADM

## 2018-09-20 RX ORDER — BUDESONIDE 0.5 MG/2ML
500 INHALANT ORAL
Status: DISCONTINUED | OUTPATIENT
Start: 2018-09-20 | End: 2018-09-23 | Stop reason: HOSPADM

## 2018-09-20 RX ADMIN — TRAMADOL HYDROCHLORIDE 50 MG: 50 TABLET, FILM COATED ORAL at 20:13

## 2018-09-20 RX ADMIN — TIOTROPIUM BROMIDE 18 MCG: 18 CAPSULE ORAL; RESPIRATORY (INHALATION) at 07:21

## 2018-09-20 RX ADMIN — LORAZEPAM 0.5 MG: 0.5 TABLET ORAL at 17:52

## 2018-09-20 RX ADMIN — FAMOTIDINE 20 MG: 20 TABLET ORAL at 08:49

## 2018-09-20 RX ADMIN — ACETAMINOPHEN 650 MG: 325 TABLET ORAL at 07:16

## 2018-09-20 RX ADMIN — AMIODARONE HYDROCHLORIDE 200 MG: 200 TABLET ORAL at 08:49

## 2018-09-20 RX ADMIN — METOPROLOL TARTRATE 50 MG: 50 TABLET ORAL at 20:13

## 2018-09-20 RX ADMIN — POTASSIUM CHLORIDE 10 MEQ: 10 TABLET, EXTENDED RELEASE ORAL at 08:50

## 2018-09-20 RX ADMIN — ATORVASTATIN CALCIUM 80 MG: 40 TABLET, FILM COATED ORAL at 20:16

## 2018-09-20 RX ADMIN — ASPIRIN 81 MG: 81 TABLET, COATED ORAL at 08:49

## 2018-09-20 RX ADMIN — TRAMADOL HYDROCHLORIDE 100 MG: 50 TABLET, FILM COATED ORAL at 15:13

## 2018-09-20 RX ADMIN — METOPROLOL TARTRATE 25 MG: 25 TABLET ORAL at 08:50

## 2018-09-20 RX ADMIN — ALBUTEROL SULFATE 2.5 MG: 2.5 SOLUTION RESPIRATORY (INHALATION) at 04:00

## 2018-09-20 RX ADMIN — FUROSEMIDE 40 MG: 40 TABLET ORAL at 08:49

## 2018-09-20 RX ADMIN — MUPIROCIN: 20 OINTMENT TOPICAL at 20:14

## 2018-09-20 RX ADMIN — MUPIROCIN: 20 OINTMENT TOPICAL at 15:14

## 2018-09-20 RX ADMIN — LORAZEPAM 0.5 MG: 0.5 TABLET ORAL at 03:52

## 2018-09-20 RX ADMIN — BISACODYL 5 MG: 5 TABLET, COATED ORAL at 20:13

## 2018-09-20 RX ADMIN — BUDESONIDE 500 MCG: 0.5 INHALANT RESPIRATORY (INHALATION) at 19:15

## 2018-09-20 RX ADMIN — ALBUTEROL SULFATE 2.5 MG: 2.5 SOLUTION RESPIRATORY (INHALATION) at 19:15

## 2018-09-20 RX ADMIN — ALBUTEROL SULFATE 2.5 MG: 2.5 SOLUTION RESPIRATORY (INHALATION) at 08:01

## 2018-09-20 RX ADMIN — Medication 10 ML: at 05:46

## 2018-09-20 RX ADMIN — AMIODARONE HYDROCHLORIDE 200 MG: 200 TABLET ORAL at 20:13

## 2018-09-20 RX ADMIN — TRAMADOL HYDROCHLORIDE 100 MG: 50 TABLET, FILM COATED ORAL at 08:50

## 2018-09-20 RX ADMIN — FAMOTIDINE 20 MG: 20 TABLET ORAL at 20:13

## 2018-09-20 RX ADMIN — ALBUTEROL SULFATE 2.5 MG: 2.5 SOLUTION RESPIRATORY (INHALATION) at 14:47

## 2018-09-20 RX ADMIN — BUDESONIDE 500 MCG: 0.5 INHALANT RESPIRATORY (INHALATION) at 08:02

## 2018-09-20 RX ADMIN — TRAMADOL HYDROCHLORIDE 50 MG: 50 TABLET, FILM COATED ORAL at 03:08

## 2018-09-20 RX ADMIN — Medication 2 TABLET: at 20:19

## 2018-09-20 RX ADMIN — Medication 10 ML: at 15:14

## 2018-09-20 RX ADMIN — Medication 10 ML: at 20:14

## 2018-09-20 NOTE — PROGRESS NOTES
Today's Date: 9/20/2018 Date of Admission: 9/18/2018 Chart Reviewed. Subjective:  
 
Patient feels ok. He states pain is well controlled, appetite ok. Medications Reviewed. Objective:  
 
Vitals:  
 09/20/18 0189 09/20/18 0321 09/20/18 6490 09/20/18 3057 BP: 154/72  168/87 Pulse: 90  (!) 101 Resp: 16  22 Temp: 98 °F (36.7 °C)  99.3 °F (37.4 °C) SpO2: 91% 93% (!) 87% 93% Weight:      
Height:      
 
 
Intake and Output Current Shift:    
Last 3 Shifts: 09/18 1901 - 09/20 0700 In: 2914.4 [P.O.:1185; I.V.:1729.4] Out: 6351 [[id number]] Physical Exam: 
General: Well Developed, Well Nourished, No Acute Distress, Alert & Oriented x 3, Anxious mood Neck: supple, no JVD Heart: S1S2 with tachycardic rate, regular rhythm Lungs: Clear throughout auscultation bilaterally without adventitious sounds Abd: soft, nontender, nondistended, with good bowel sounds Ext: no edema bilaterally Sternal incision: clean, dry, and intact Skin: warm and dry LABS Data Review:  
Recent Labs  
   09/19/18 
 0333  09/18/18 
 2205   09/18/18 
 1233 NA  141   --    --   139  
K  4.2  3.7   < >  4.4 MG  2.4  2.6*   < >  3.9*  
BUN  9   --    --   12  
CREA  0.81   --    --   1.02  
GLU  109*   --    --   126* WBC  10.7   --    --   15.6* HGB  8.7*  8.4*   < >  11.0*  
HCT  27.2*  26.1*   < >  33.7*  
PLT  180   --    --   243 INR   --    --    --   1.4  
 < > = values in this interval not displayed. Estimated Creatinine Clearance: 72.2 mL/min (based on Cr of 0.81). Assessment/Plan: S/P CABG x 3 (9/18/2018) Continue medical therapy with ASA, BB, statin, pacing wires removed, passing gas but no BM Active Problems: 
  Chronic obstructive pulmonary disease ([address] (3/3/2016) No wheezing on exam, tobacco cessation encouraged CAD (coronary artery disease) () As above, continue medical therapy   Personal history of tobacco use, presenting hazards to health (9/18/2018) Cessation encouraged, pt adamant that he has quit smoking Other pneumothorax (9/19/2018) Small right apical pneumothorax, stable on room air Hypoxemia (9/19/2018) Resolved, stable on room air Hilda Wise PA-C

## 2018-09-20 NOTE — PROGRESS NOTES
Problem: Mobility Impaired (Adult and Pediatric) Goal: *Acute Goals and Plan of Care (Insert Text) LTG: 
(1.)Mr. Juanito Hernandez will move from supine to sit and sit to supine, scoot up and down and roll side to side INDEPENDENTLY with bed flat within 7 treatment day(s). (2.)Mr. Juanito Hernandez will transfer from bed to chair and chair to bed INDEPENDENTLY within 7 treatment day(s). (3.)Mr. Mcdonald will ambulate INDEPENDENTLY for 600 feet within 7 treatment day(s). (4.)Mr. Juanito Hernandez will independently perform LE exercises per HEP for 15+ minutes to improve strength and mobility within 7 days. ________________________________________________________________________________________________ PHYSICAL THERAPY: Daily Note, Treatment Day: 1st, PM 9/20/2018 INPATIENT: Hospital Day: 3 [place] / Product Type: Medicare /  
  
NAME/AGE/GENDER: Hany Lam is a 61 y.o. male PRIMARY DIAGNOSIS: Atherosclerosis of native coronary artery of native heart with unstable angina pectoris ([facility] [I25.110] Typical angina (HCC) [I20.9] S/P CABG x 3 S/P CABG x 3 Procedure(s) (LRB): 
CORONARY ARTERY BYPASS GRAFT (CABG) X   3 WITH LIMA. LYSIS WITH MYOCARDIAL ADHESIONS.   (N/A) VEIN HARVEST. Greater Saphenous. (Left) ESOPHAGEAL TRANS ECHOCARDIOGRAM (N/A) 2 Days Post-Op ICD-10: Treatment Diagnosis:  
 · Generalized Muscle Weakness (M62.81) · Difficulty in walking, Not elsewhere classified (R26.2) · Other abnormalities of gait and mobility (R26.89) Precaution/Allergies: 
Codeine and Penicillins ASSESSMENT:  
 
Mr. Juanito Hernandez presents sitting up in chair with expected post op soreness. Pleasant and agreeable to therapy. Pt ambulates in hallway for 250 ft x2 wth seated rest break to check O2 sats and increased supplemental O2 due to mid-high 80's on 3L. He demonstrates appropriate gait speed with mild trunk sway at times, no loss of balance.  Performed seated LE exercises after activity to improve strength and mobility. Good progress noted with gait distance, activity tolerance, and exercises. Will continue with therapy efforts per plan of care. Anticipate return home at discharge with [facility].                                                               PM treatment:  Patient is coming out of the bathroom. He is agreeable to therapy. RN request that he ambulate on 4L O2. Patient request no use of assistive device for gait training this pm.  Gait x 500 feet with steady cristian. O2 saturation 93-94%. Patient is returned to supine in bed per his request.  Bed mobility is with supervision. Alarm intact with needs within reach. Good session. Patient is making excellent progress. Will continue PT efforts. This section established at most recent assessment PROBLEM LIST (Impairments causing functional limitations): 1. Decreased Strength 2. Decreased ADL/Functional Activities 3. Decreased Transfer Abilities 4. Decreased Ambulation Ability/Technique 5. Decreased Balance 6. Increased Pain 7. Decreased Activity Tolerance INTERVENTIONS PLANNED: (Benefits and precautions of physical therapy have been discussed with the patient.) 1. Balance Exercise 2. Bed Mobility 3. Gait Training 4. Home Exercise Program (HEP) 5. Therapeutic Activites 6. Therapeutic Exercise/Strengthening 7. Transfer Training TREATMENT PLAN: Frequency/Duration: twice daily for duration of hospital stay Rehabilitation Potential For Stated Goals: Good RECOMMENDED REHABILITATION/EQUIPMENT: (at time of discharge pending progress): Due to the probability of continued deficits (see above) this patient will likely need continued skilled physical therapy after discharge. Equipment:  
? None at this time HISTORY:  
History of Present Injury/Illness (Reason for Referral): 
Pt s/p CABG x3 Past Medical History/Comorbidities: Mr. Mila Ortiz  has a past medical history of Abnormal cardiovascular stress test (7/25/2018); Arrhythmia; CAD (coronary artery disease); Cancer [facility] Chronic neck pain; Chronic obstructive pulmonary disease ([facility] Depression with anxiety; History of bladder cancer (07/2014); History of neck surgery; Hypertension; Kidney stone on left side (4/23/2018); Kidney stones; Osteoarthritis; PAD (peripheral artery disease) ([facility] Staghorn renal calculus; Status post ileal conduit ([facility] (2014); and Tobacco use disorder, continuous. Mr. Mila Ortiz  has a past surgical history that includes hx knee arthroscopy (Left); hx hemorrhoidectomy; hx heart catheterization (08/2018); hx urological; hx urological (2015); hx urological (Left); hx vascular access (Right, 2014); and hx cervical fusion (2014). Social History/Living Environment:  
Home Environment: Private residence # Steps to Enter: 0 One/Two Story Residence: One story Living Alone: No 
Support Systems: Spouse/Significant Other/Partner, Family member(s) Patient Expects to be Discharged to[de-identified] Private residence Current DME Used/Available at Home: None Prior Level of Function/Work/Activity: 
Lives with wife in single story home with 1 small step up. Independent with mobility, ambulation, and ADLs. Drives. Does not use any DME or home O2. Denies falls. Number of Personal Factors/Comorbidities that affect the Plan of Care: 1-2: MODERATE COMPLEXITY EXAMINATION:  
Most Recent Physical Functioning:  
Gross Assessment: 
  
         
  
Posture: 
  
Balance: 
Sitting: Intact Standing: Intact Standing - Static: Good Standing - Dynamic : Fair Bed Mobility: 
Rolling: Supervision Sit to Supine: Supervision Scooting: Supervision Wheelchair Mobility: 
  
Transfers: 
Sit to Stand: Supervision Stand to Sit: Supervision Gait: 
  
Distance (ft): 500 Feet (ft) Assistive Device: Other (comment) Stairs - Level of Assistance:  (no assistive device) Body Structures Involved: 1. Heart 2. Muscles Body Functions Affected: 1. Sensory/Pain 2. Cardio 3. Movement Related Activities and Participation Affected: 1. General Tasks and Demands 2. Mobility 3. Self Care 4. Domestic Life 5. Community, Social and [place] Number of elements that affect the Plan of Care: 4+: HIGH COMPLEXITY CLINICAL PRESENTATION:  
Presentation: Stable and uncomplicated: LOW COMPLEXITY CLINICAL DECISION MAKING:  
[facility] [employer] AM-PAC 6 Clicks Basic Mobility Inpatient Short Form How much difficulty does the patient currently have. .. Unable A Lot A Little None 1. Turning over in bed (including adjusting bedclothes, sheets and blankets)? [] 1   [] 2   [] 3   [x] 4  
2. Sitting down on and standing up from a chair with arms ( e.g., wheelchair, bedside commode, etc.)   [] 1   [] 2   [x] 3   [] 4  
3. Moving from lying on back to sitting on the side of the bed? [] 1   [] 2   [] 3   [x] 4 How much help from another person does the patient currently need. .. Total A Lot A Little None 4. Moving to and from a bed to a chair (including a wheelchair)? [] 1   [] 2   [x] 3   [] 4  
5. Need to walk in hospital room? [] 1   [] 2   [x] 3   [] 4  
6. Climbing 3-5 steps with a railing? [] 1   [] 2   [x] 3   [] 4  
© 2007, Trustees of [facility] [employer], under license to [employer]. All rights reserved Score:  Initial: 20 Most Recent: X (Date: -- ) Interpretation of Tool:  Represents activities that are increasingly more difficult (i.e. Bed mobility, Transfers, Gait). Score 24 23 22-20 19-15 14-10 9-7 6 Modifier CH CI CJ CK CL CM CN   
 
? Mobility - Walking and Moving Around:  
  - CURRENT STATUS: CJ - 20%-39% impaired, limited or restricted  - GOAL STATUS:  - 0% impaired, limited or restricted  - D/C STATUS:  ---------------To be determined--------------- [place] / [place] / Product Type: Medicare /   
 
 Medical Necessity:    
· Patient demonstrates good rehab potential due to higher previous functional level. Reason for Services/Other Comments: 
· Patient continues to demonstrate capacity to improve strength, balance, activity tolerance, mobility which will increase independence, decrease amount of assistance required from caregiver and increase safety. Use of outcome tool(s) and clinical judgement create a POC that gives a: Clear prediction of patient's progress: LOW COMPLEXITY  
  
 
 
 
TREATMENT:  
(In addition to Assessment/Re-Assessment sessions the following treatments were rendered) Pre-treatment Symptoms/Complaints: \"Hello\" Pain: Initial:  
Pain Intensity 1: 0  Post Session:  0/10 Therapeutic Activity: (  13 minutes): Therapeutic activities including Chair transfers, Ambulation on level ground and standing balance to improve mobility, strength, balance and activity tolerance. Required stand by assist to close supervision with  Verbal cues; Safety awareness training to promote static and dynamic balance in standing and promote motor control of bilateral, lower extremity(s). Therapeutic Exercise: ():  Exercises per grid below to improve mobility, strength and balance. Required minimal visual and verbal cues to promote proper body mechanics and exercise speed, form. Progressed range and repetitions as indicated. Date: 
9/20/18 Date: 
 Date: Activity/Exercise Parameters Parameters Parameters LAQ 15x AB Seated marching 15x AB Ankle pumps 15x AB Seated hip aBd 15x AB Braces/Orthotics/Lines/Etc:  
· O2 Device: Nasal cannula 3L Treatment/Session Assessment:   
· Response to Treatment:  Tolerated well · Interdisciplinary Collaboration:  
o Physical Therapy Assistant 
o Registered Nurse · After treatment position/precautions:  
o Supine in bed 
o Bed alarm/tab alert on 
o Bed/Chair-wheels locked 
o Bed in low position 
o Call light within reach o RN notified · Compliance with Program/Exercises: compliant all of the time. · Recommendations/Intent for next treatment session: \"Next visit will focus on advancements to more challenging activities and reduction in assistance provided\". Total Treatment Duration: PT Patient Time In/Time Out Time In: 0061 Time Out: 1346 Viridiana Linear, PTA

## 2018-09-20 NOTE — PROGRESS NOTES
Problem: Mobility Impaired (Adult and Pediatric) Goal: *Acute Goals and Plan of Care (Insert Text) LTG: 
(1.)Mr. Clementine Kwok will move from supine to sit and sit to supine, scoot up and down and roll side to side INDEPENDENTLY with bed flat within 7 treatment day(s). (2.)Mr. Clementine Kwok will transfer from bed to chair and chair to bed INDEPENDENTLY within 7 treatment day(s). (3.)Mr. Mcdonald will ambulate INDEPENDENTLY for 600 feet within 7 treatment day(s). (4.)Mr. Clementine Kwok will independently perform LE exercises per HEP for 15+ minutes to improve strength and mobility within 7 days. ________________________________________________________________________________________________ PHYSICAL THERAPY: Daily Note, Treatment Day: 1st, AM 9/20/2018 INPATIENT: Hospital Day: 3 [place] / Product Type: Medicare /  
  
NAME/AGE/GENDER: Neil Snider is a 61 y.o. male PRIMARY DIAGNOSIS: Atherosclerosis of native coronary artery of native heart with unstable angina pectoris [facility] [I25.110] Typical angina (HCC) [I20.9] S/P CABG x 3 S/P CABG x 3 Procedure(s) (LRB): 
CORONARY ARTERY BYPASS GRAFT (CABG) X   3 WITH LIMA. LYSIS WITH MYOCARDIAL ADHESIONS.   (N/A) VEIN HARVEST. Greater Saphenous. (Left) ESOPHAGEAL TRANS ECHOCARDIOGRAM (N/A) 2 Days Post-Op ICD-10: Treatment Diagnosis:  
 · Generalized Muscle Weakness (M62.81) · Difficulty in walking, Not elsewhere classified (R26.2) · Other abnormalities of gait and mobility (R26.89) Precaution/Allergies: 
Codeine and Penicillins ASSESSMENT:  
 
Mr. Clementine Kwok presents sitting up in chair with expected post op soreness. Pleasant and agreeable to therapy. Pt ambulates in hallway for 250 ft x2 wth seated rest break to check O2 sats and increased supplemental O2 due to mid-high 80's on 3L. He demonstrates appropriate gait speed with mild trunk sway at times, no loss of balance.  Performed seated LE exercises after activity to improve strength and mobility. Good progress noted with gait distance, activity tolerance, and exercises. Will continue with therapy efforts per plan of care. Anticipate return home at discharge with wife and [place] PT. This section established at most recent assessment PROBLEM LIST (Impairments causing functional limitations): 1. Decreased Strength 2. Decreased ADL/Functional Activities 3. Decreased Transfer Abilities 4. Decreased Ambulation Ability/Technique 5. Decreased Balance 6. Increased Pain 7. Decreased Activity Tolerance INTERVENTIONS PLANNED: (Benefits and precautions of physical therapy have been discussed with the patient.) 1. Balance Exercise 2. Bed Mobility 3. Gait Training 4. Home Exercise Program (HEP) 5. Therapeutic Activites 6. Therapeutic Exercise/Strengthening 7. Transfer Training TREATMENT PLAN: Frequency/Duration: twice daily for duration of hospital stay Rehabilitation Potential For Stated Goals: Good RECOMMENDED REHABILITATION/EQUIPMENT: (at time of discharge pending progress): Due to the probability of continued deficits (see above) this patient will likely need continued skilled physical therapy after discharge. Equipment:  
? None at this time HISTORY:  
History of Present Injury/Illness (Reason for Referral): 
Pt s/p CABG x3 Past Medical History/Comorbidities:  
Mr. Claudia Parra  has a past medical history of Arrhythmia; CAD (coronary artery disease); Cancer ([address] Chronic neck pain; Chronic obstructive pulmonary disease ([address] Depression with anxiety; History of bladder cancer (07/2014); History of neck surgery; Hypertension; Kidney stones; Osteoarthritis; PAD (peripheral artery disease) ([address] Staghorn renal calculus; Status post ileal conduit ([address] (2014); and Tobacco use disorder, continuous.   Mr. Claudia Parra  has a past surgical history that includes hx knee arthroscopy (Left); hx hemorrhoidectomy; hx heart catheterization (08/2018); hx urological; hx urological (2015); hx urological (Left); hx vascular access (Right, 2014); and hx cervical fusion (2014). Social History/Living Environment:  
Home Environment: Private residence # Steps to Enter: 0 One/Two Story Residence: One story Living Alone: No 
Support Systems: Spouse/Significant Other/Partner, Family member(s) Patient Expects to be Discharged to[de-identified] Private residence Current DME Used/Available at Home: None Prior Level of Function/Work/Activity: 
Lives with wife in single story home with 1 small step up. Independent with mobility, ambulation, and ADLs. Drives. Does not use any DME or home O2. Denies falls. Number of Personal Factors/Comorbidities that affect the Plan of Care: 1-2: MODERATE COMPLEXITY EXAMINATION:  
Most Recent Physical Functioning:  
Gross Assessment: 
AROM: Within functional limits Strength: Generally decreased, functional 
Coordination: Within functional limits Posture: 
Posture (WDL): Exceptions to [facility] Posture Assessment: Forward head, Rounded shoulders Balance: 
Sitting: Intact Standing: Impaired Standing - Static: Good Standing - Dynamic : Fair Bed Mobility: 
  
Wheelchair Mobility: 
  
Transfers: 
Sit to Stand: Supervision Stand to Sit: Supervision Bed to Chair: Supervision Interventions: Verbal cues; Safety awareness training Duration: 16 Minutes Gait: 
  
Base of Support: Narrowed; Center of gravity altered Step Length: Left shortened;Right shortened Gait Abnormalities: Trunk sway increased Distance (ft): 250 Feet (ft) (x2) Assistive Device:  (none) Ambulation - Level of Assistance: Contact guard assistance;Stand-by assistance Interventions: Verbal cues; Safety awareness training Body Structures Involved: 1. Heart 2. Muscles Body Functions Affected: 1. Sensory/Pain 2. Cardio 3. Movement Related Activities and Participation Affected: 1. General Tasks and Demands 2. Mobility 3. Self Care 4. Domestic Life 5. Community, Social and [place] Number of elements that affect the Plan of Care: 4+: HIGH COMPLEXITY CLINICAL PRESENTATION:  
Presentation: Stable and uncomplicated: LOW COMPLEXITY CLINICAL DECISION MAKING:  
[employer] AM-PAC 6 Clicks Basic Mobility Inpatient Short Form How much difficulty does the patient currently have. .. Unable A Lot A Little None 1. Turning over in bed (including adjusting bedclothes, sheets and blankets)? [] 1   [] 2   [] 3   [x] 4  
2. Sitting down on and standing up from a chair with arms ( e.g., wheelchair, bedside commode, etc.)   [] 1   [] 2   [x] 3   [] 4  
3. Moving from lying on back to sitting on the side of the bed? [] 1   [] 2   [] 3   [x] 4 How much help from another person does the patient currently need. .. Total A Lot A Little None 4. Moving to and from a bed to a chair (including a wheelchair)? [] 1   [] 2   [x] 3   [] 4  
5. Need to walk in hospital room? [] 1   [] 2   [x] 3   [] 4  
6. Climbing 3-5 steps with a railing? [] 1   [] 2   [x] 3   [] 4  
© 2007, Trustees of [facility] [employer], under license to [employer]. All rights reserved Score:  Initial: 20 Most Recent: X (Date: -- ) Interpretation of Tool:  Represents activities that are increasingly more difficult (i.e. Bed mobility, Transfers, Gait). Score 24 23 22-20 19-15 14-10 9-7 6 Modifier CH CI CJ CK CL CM CN   
 
? Mobility - Walking and Moving Around:  
  - CURRENT STATUS: CJ - 20%-39% impaired, limited or restricted  - GOAL STATUS:  - 0% impaired, limited or restricted  - D/C STATUS:  ---------------To be determined--------------- [place] / Product Type: Medicare /   
 
Medical Necessity:    
· Patient demonstrates good rehab potential due to higher previous functional level. Reason for Services/Other Comments: · Patient continues to demonstrate capacity to improve strength, balance, activity tolerance, mobility which will increase independence, decrease amount of assistance required from caregiver and increase safety. Use of outcome tool(s) and clinical judgement create a POC that gives a: Clear prediction of patient's progress: LOW COMPLEXITY  
  
 
 
 
TREATMENT:  
(In addition to Assessment/Re-Assessment sessions the following treatments were rendered) Pre-treatment Symptoms/Complaints:  \"I don't want my body to get used to this oxygen\" Pain: Initial:  
Pain Intensity 1: 3 Pain Location 1: Sternum, Leg 
Pain Orientation 1: Left Pain Intervention(s) 1: Repositioned, Ambulation/Increased Activity  Post Session:  0/10 Therapeutic Activity: (  16 Minutes ):  Therapeutic activities including Chair transfers, Ambulation on level ground and standing balance to improve mobility, strength, balance and activity tolerance. Required minimal Verbal cues; Safety awareness training to promote static and dynamic balance in standing and promote motor control of bilateral, lower extremity(s). Therapeutic Exercise: (10 Minutes):  Exercises per grid below to improve mobility, strength and balance. Required minimal visual and verbal cues to promote proper body mechanics and exercise speed, form. Progressed range and repetitions as indicated. Date: 
9/20/18 Date: 
 Date: Activity/Exercise Parameters Parameters Parameters LAQ 15x AB Seated marching 15x AB Ankle pumps 15x AB Seated hip aBd 15x AB Braces/Orthotics/Lines/Etc:  
· O2 Device: Nasal cannula Treatment/Session Assessment:   
· Response to Treatment:  Pt performs mobility with CGA-SBA · Interdisciplinary Collaboration:  
o Physical Therapist 
o Registered Nurse · After treatment position/precautions:  
o Up in chair 
o Bed alarm/tab alert on 
o Bed/Chair-wheels locked 
o Bed in low position o Call light within reach 
o RN notified · Compliance with Program/Exercises: compliant all of the time. · Recommendations/Intent for next treatment session: \"Next visit will focus on advancements to more challenging activities and reduction in assistance provided\". Total Treatment Duration: PT Patient Time In/Time Out Time In: 1020 Time Out: 1046 Ana Cristina Sandoval DPT

## 2018-09-20 NOTE — PROGRESS NOTES
Visited with pt today, stated he felt much better and that our talk helped him yesterday more than I knew. Pt stated his venous access device had been removed over 3 years ago. Removed from discharge: remove lines and drains this existing venous access per infection prevention.

## 2018-09-20 NOTE — DISCHARGE SUMMARY
Discharge Summary     Patient ID:  Heydi Bond  [id number]  89 y.o.  1955    Admit date: 9/18/2018    Discharge date:  9/23/2018    Admitting Physician: Chio Porter MD     Discharge Physician: MELANIE Barney/Dr. Shon Ashford    Admission Diagnoses: Atherosclerosis of native coronary artery of native heart with unstable angina pectoris ([facility] [I25.110]  Typical angina [facility]) [I20.9]    Discharge Diagnoses:   Patient Active Problem List    Diagnosis Date Noted    Other pneumothorax 09/19/2018    Hypoxemia 09/19/2018    S/P CABG x 3 09/18/2018    Personal history of tobacco use, presenting hazards to health 09/18/2018    CAD (coronary artery disease)     Atherosclerosis of native coronary artery of native heart with angina pectoris ([facility] 08/13/2018    Mixed hyperlipidemia 08/13/2018    History of neck surgery     Status post ileal conduit ([facility]     Depression with anxiety 03/03/2016    Chronic obstructive pulmonary disease ([facility] 03/03/2016    Urinary bladder cancer ([facility] 07/01/2014    HTN (hypertension) 04/19/2014    Cervical stenosis of spinal canal 04/18/2014       Procedures this admission:  CABG surgery   Consults: [facility] Course: Patient presented for elective CABG. He was seen in the office by Dr. Raina Chaudhari for worsening HINDS and fatigue. A stress test showed inferior defect with mild reversibility. He underwent LHC that showed severe multivessel disease. He had noted occasional dull chest pain as well as worsening fatigue and dyspnea. He left AMA from hospital before bedrest period was over after [facility] with groin access. He called the hospital the following day and was very apologetic for his behavior. He was seen in the office on 9/5. He was again very apologetic for leaving AMA. He wished to proceed with CABG surgery and stressed that he would be compliant with treatment and instructions.  He underwent CABG X 3 with LIMA to LAD, reverse SVG to OM and reverse SVG to PDA on 9/18. He did well post operatively and was transferred to  stepdown on POD 1. He had a small right apical pneumothorax but was asymptomatic. He remained in sinus rhythm and was ambulating without assistance. He was weaned off of oxygen. The afternoon of 9/23/18, patient was feeling well and ambulating without any symptoms. Patient was determined stable and ready for discharge. Patient was instructed on the importance of medication compliance and outpatient follow up. For maximized medical therapy for CAD, patient will continue ASA, BB, ACE-I/ARB, and statin as well. The patient will have close transitional care follow up with [facility] Cardiology Dr. Stanley Ponce on October 1st at 9:30am THE [facility] AT [place]). The patient will follow up with Dr. Osmar Currie in 3 weeks and has been referred to cardiac rehab. DISPOSITION: The patient is being discharged home with home health services in stable condition on a low saturated fat, low cholesterol and low salt diet. The patient is instructed to advance activities as tolerated to the limit of fatigue or shortness of breath. The patient is instructed to avoid all heavy lifting or activities that strain the chest or upper arm muscles. Strenuous activity should be avoided. The patient is instructed to watch for signs of infection which include: increasing area of redness, fever or purulent drainage at the incision site. The patient is instructed to call the office or return to the ER for immediate evaluation for any shortness of breath or chest pain not relieved by NTG.     Discharge Exam:   Visit Vitals    /62 (BP 1 Location: Right arm, BP Patient Position: Sitting)    Pulse 77    Temp 97.4 °F (36.3 °C)    Resp 18    Ht 5' 5\" (1.651 m)    Wt 118 lb 3.2 oz (53.6 kg)    SpO2 91%    BMI 19.67 kg/m2         Physical Exam:  General: Well Developed, Well Nourished, No Acute Distress, Alert & Oriented  Neck: supple, no JVD  Heart: S1S2 with RRR without murmurs or gallops  Lungs: Clear throughout auscultation bilaterally without adventitious sounds  Abd: soft, nontender, nondistended, with good bowel sounds  Ext: warm, no edema  Sternal incision: clean, dry, and intact  Skin: warm and dry      Patient Instructions:   Discharge Medication List as of 9/23/2018 12:25 PM      START taking these medications    Details   traMADol (ULTRAM) 50 mg tablet Take 1 Tab by mouth every six (6) hours as needed. Max Daily Amount: 200 mg. Indications: Pain, Print, Disp-40 Tab, R-0         CONTINUE these medications which have CHANGED    Details   amiodarone (CORDARONE) 200 mg tablet Take 1 Tab by mouth once for 1 dose. Indications: PREVENTION OF A. FIB POST CARDIO-THORACIC SURGERY, Normal, Disp-30 Tab, R-0      metoprolol tartrate (LOPRESSOR) 50 mg tablet Take 1 Tab by mouth every twelve (12) hours. , Normal, Disp-60 Tab, R-3         CONTINUE these medications which have NOT CHANGED    Details   amLODIPine (NORVASC) 5 mg tablet Take 1 Tab by mouth daily. , Normal, Disp-90 Tab, R-3      atorvastatin (LIPITOR) 40 mg tablet Take 1 Tab by mouth daily. , Normal, Disp-30 Tab, R-11      aspirin delayed-release 81 mg tablet Take 81 mg by mouth daily. , Historical Med      tiotropium bromide (SPIRIVA RESPIMAT) 2.5 mcg/actuation inhaler Take 2 Puffs by inhalation daily. , Print, Disp-1 Inhaler, R-0      budesonide-formoterol (SYMBICORT) 160-4.5 mcg/actuation HFAA Take 2 Puffs by inhalation two (2) times a day., Normal, Disp-1 Inhaler, R-3      Ostomy Supplies (NEW IMAGE SKIN BARRIER) 2 1/4 \" misc 1 Units by Does Not Apply route as needed. , Print, Disp-10 Each, R-99         STOP taking these medications       mupirocin calcium (BACTROBAN NASAL) 2 % nasal ointment Comments:   Reason for Stopping:                 Signed:  Rocio Piedra PA-C  9/23/2018

## 2018-09-20 NOTE — PROGRESS NOTES
Cardiac Rehab: Spoke with patient regarding referral to cardiac rehab. Patient meets admission criteria based on CABG (9/18/18). Written information about Cardiac Rehab given and reviewed with patient. Discussed lifestyle modifications to promote cardiac wellness. Patient indicated that he wants to participate in the cardiac rehab program and his orientation has been scheduled. His Cardiologist is Dr. Jacki Medina. Thank you, CESAR AlejandreN, RN Cardiopulmonary Rehabilitation Nurse Liaison [facility]

## 2018-09-20 NOTE — PROGRESS NOTES
Ale Snyder Admission Date: 9/18/2018 Daily Progress Note: 9/20/2018 The patient's chart is reviewed and the patient is discussed with the staff. 61 y old ,with mild COPD, bladder cancer s/p cystectomy with ileal conduit underwent CABX x 3, extubated the next day. Small post op ptx - no intervention. Was still smoking up until admission. Subjective:  
   Using IS during commercials - volumes around 1 liter. Has ileal conduit - using drainage back at night at home. Mild chest discomfort. Seen sitting up in the chair. Current Facility-Administered Medications Medication Dose Route Frequency  albuterol (PROVENTIL VENTOLIN) nebulizer solution 2.5 mg  2.5 mg Nebulization Q6HWA RT  
 budesonide (PULMICORT) 500 mcg/2 ml nebulizer suspension  500 mcg Nebulization BID RT  
 tiotropium (SPIRIVA) inhalation capsule 18 mcg  1 Cap Inhalation DAILY  traMADol (ULTRAM) tablet 100 mg  100 mg Oral Q6H PRN  
 metoprolol tartrate (LOPRESSOR) tablet 50 mg  50 mg Oral Q12H  
 sodium chloride (NS) flush 5-10 mL  5-10 mL IntraVENous Q8H  
 sodium chloride (NS) flush 5-10 mL  5-10 mL IntraVENous PRN  
 furosemide (LASIX) tablet 40 mg  40 mg Oral DAILY  alum-mag hydroxide-simeth (MYLANTA) oral suspension 30 mL  30 mL Oral Q4H PRN  
 famotidine (PEPCID) tablet 20 mg  20 mg Oral Q12H  
 ondansetron (ZOFRAN) injection 4 mg  4 mg IntraVENous Q6H PRN  
 acetaminophen (TYLENOL) tablet 650 mg  650 mg Oral Q4H PRN  
 atorvastatin (LIPITOR) tablet 80 mg  80 mg Oral QHS  amiodarone (CORDARONE) tablet 200 mg  200 mg Oral Q12H  mupirocin (BACTROBAN) 2 % ointment   Both Nostrils Q12H  aspirin delayed-release tablet 81 mg  81 mg Oral DAILY  magnesium oxide (MAG-OX) tablet 400 mg  400 mg Oral TID PRN  
 magnesium oxide (MAG-OX) tablet 400 mg  400 mg Oral QID PRN  potassium chloride (KLOR-CON) tablet 10 mEq  10 mEq Oral DAILY  potassium chloride (K-DUR, KLOR-CON) SR tablet 20 mEq  20 mEq Oral BID PRN  potassium chloride (K-DUR, KLOR-CON) SR tablet 40 mEq  40 mEq Oral BID PRN  
 tapentadol (NUCYNTA) tablet 100 mg  100 mg Oral Q4H PRN  
 traMADol (ULTRAM) tablet 50 mg  50 mg Oral Q6H PRN  
 senna-docusate (PERICOLACE) 8.6-50 mg per tablet 2 Tab  2 Tab Oral QHS  LORazepam (ATIVAN) tablet 0.5 mg  0.5 mg Oral BID PRN  
 oxyCODONE-acetaminophen (PERCOCET) 5-325 mg per tablet 1 Tab  1 Tab Oral Q4H PRN Objective:  
 
Vitals:  
 09/20/18 0954 09/20/18 1009 09/20/18 1046 09/20/18 1140 BP: 124/71 123/72  113/68 Pulse: 96 90  94 Resp:    22 Temp:    97.7 °F (36.5 °C) SpO2:   90% 94% Weight:      
Height:      
 
Intake and Output:  
09/18 1901 - 09/20 0700 In: 2914.4 [P.O.:1185; I.V.:1729.4] Out: 2181 [[id number]] Physical Exam:  
Constitutional:  the patient is well developed and in no acute distress HEENT:  Sclera clear, pupils equal, oral mucosa moist 
Lungs: some wheezing bilaterally. Wearing 3 liter cannula. Cardiovascular:  RRR without M,G,R 
Abd/GI: soft and non-tender; with positive bowel sounds. Ext: warm without cyanosis. There is no lower leg edema. Musculoskeletal: moves all four extremities with equal strength Skin:  no jaundice or rashes, sternal wound Neuro: no gross neuro deficits Musculoskeletal: can ambulate. No deformity Psychiatric: Calm. ROS: some weakness, mild pain, denies dyspnea Lines: peripheral IV 
 
CHEST XRAY:   
 
LAB Recent Labs  
   09/19/18 
 0333  09/18/18 
 2205  09/18/18 
 1832  09/18/18 
 1233 WBC  10.7   --    --   15.6* HGB  8.7*  8.4*  9.4*  11.0*  
HCT  27.2*  26.1*  28.7*  33.7*  
PLT  180   --    --   243 INR   --    --    --   1.4 Recent Labs  
   09/19/18 
 0333  09/18/18 
 2205  09/18/18 
 1832  09/18/18 
 1233 NA  141   --    --   139  
K  4.2  3.7  4.3  4.4  
CL  112*   --    --   110* CO2  24   --    --   23  
 GLU  109*   --    --   126* BUN  9   --    --   12  
CREA  0.81   --    --   1.02  
MG  2.4  2.6*  3.0*  3.9* Recent Labs  
   09/18/18 
 1712  09/18/18 
 1555  09/18/18 
 1232 PH  7.32*  7.27*  7.21* PCO2  41  37  49* PO2  86  82  222* HCO3  21*  17*  19* Assessment:  (Medical Decision Making) Hospital Problems  Date Reviewed: 9/5/2018 Codes Class Noted POA Other pneumothorax ICD-10-CM: J93.83 ICD-9-CM: 512.89  9/19/2018 Unknown Small apical  
 Hypoxemia ICD-10-CM: R09.02 
ICD-9-CM: 799.02  9/19/2018 Unknown Currently on 3 liter cannula. No CXR today * (Principal)S/P CABG x 3 ICD-10-CM: Z95.1 ICD-9-CM: V45.81  9/18/2018 Unknown Rhythm stable. Pain mild Personal history of tobacco use, presenting hazards to health ICD-10-CM: W64.034 ICD-9-CM: V15.82  9/18/2018 Unknown Smoking up until admission CAD (coronary artery disease) ICD-10-CM: I25.10 ICD-9-CM: 414.00  Unknown Unknown S/p surgery Chronic obstructive pulmonary disease (HCC) ICD-10-CM: J44.9 ICD-9-CM: 175  3/3/2016 Yes Plan:  (Medical Decision Making) 1. Spiriva/symbicort as used at home. Scheduled albuterol for now as well - some wheezing on exam 
2. Check follow up CXR tomorrow to reassess ptx 3. Wean oxygen if CXR stable 4. Using IS - volumes around 1 liter 5. Daily lasix for now - fluid balance neg 1.4 liters. Labs reviewed Nataliya Drew NP More than 50% of time documented was spent face-to-face contact with the patient and in the care of the patient on the floor/unit where the patient is located. Lungs:  Decreased but currently clear. Heart:  RRR with no Murmur/Rubs/Gallops Additional Comments:   
Patient denies SOB. On 3L with sats in upper 80's. Turned to 4L. Does not use O2 at home. Agree with repeat CXR tomorrow am and cont nebs. I have spoken with and examined the patient. I agree with the above assessment and plan as documented. Thea Aviles MD

## 2018-09-20 NOTE — PROGRESS NOTES
Pacing wires pulled per ADAM NAVARRO. Patient instructed to one hour bedrest with every 15 minute blood pressure checks for one hour. Pt voices understanding.

## 2018-09-21 ENCOUNTER — APPOINTMENT (OUTPATIENT)
Dept: GENERAL RADIOLOGY | Age: 63
DRG: 236 | End: 2018-09-21
Attending: NURSE PRACTITIONER
Payer: MEDICARE

## 2018-09-21 LAB
ABO + RH BLD: NORMAL
ANION GAP SERPL CALC-SCNC: 8 MMOL/L (ref 7–16)
BASOPHILS # BLD: 0 K/UL (ref 0–0.2)
BASOPHILS NFR BLD: 0 % (ref 0–2)
BLD PROD TYP BPU: NORMAL
BLOOD GROUP ANTIBODIES SERPL: NORMAL
BPU ID: NORMAL
BUN SERPL-MCNC: 8 MG/DL (ref 8–23)
CALCIUM SERPL-MCNC: 8.4 MG/DL (ref 8.3–10.4)
CHLORIDE SERPL-SCNC: 98 MMOL/L (ref 98–107)
CO2 SERPL-SCNC: 28 MMOL/L (ref 21–32)
CREAT SERPL-MCNC: 0.8 MG/DL (ref 0.8–1.5)
CROSSMATCH RESULT,%XM: NORMAL
DIFFERENTIAL METHOD BLD: ABNORMAL
EOSINOPHIL # BLD: 0.5 K/UL (ref 0–0.8)
EOSINOPHIL NFR BLD: 4 % (ref 0.5–7.8)
ERYTHROCYTE [DISTWIDTH] IN BLOOD BY AUTOMATED COUNT: 13.9 %
GLUCOSE SERPL-MCNC: 99 MG/DL (ref 65–100)
HCT VFR BLD AUTO: 27.2 % (ref 41.1–50.3)
HGB BLD-MCNC: 8.9 G/DL (ref 13.6–17.2)
IMM GRANULOCYTES # BLD: 0.1 K/UL (ref 0–0.5)
IMM GRANULOCYTES NFR BLD AUTO: 0 % (ref 0–5)
LYMPHOCYTES # BLD: 1.1 K/UL (ref 0.5–4.6)
LYMPHOCYTES NFR BLD: 9 % (ref 13–44)
MCH RBC QN AUTO: 34.4 PG (ref 26.1–32.9)
MCHC RBC AUTO-ENTMCNC: 32.7 G/DL (ref 31.4–35)
MCV RBC AUTO: 105 FL (ref 79.6–97.8)
MM INDURATION POC: 0 MM (ref 0–5)
MM INDURATION POC: NORMAL MM (ref 0–5)
MONOCYTES # BLD: 0.8 K/UL (ref 0.1–1.3)
MONOCYTES NFR BLD: 7 % (ref 4–12)
NEUTS SEG # BLD: 9.7 K/UL (ref 1.7–8.2)
NEUTS SEG NFR BLD: 80 % (ref 43–78)
NRBC # BLD: 0 K/UL (ref 0–0.2)
PLATELET # BLD AUTO: 199 K/UL (ref 150–450)
PMV BLD AUTO: 10.4 FL (ref 9.4–12.3)
POTASSIUM SERPL-SCNC: 3.9 MMOL/L (ref 3.5–5.1)
PPD POC: NORMAL NEGATIVE
PPD POC: NORMAL NEGATIVE
RBC # BLD AUTO: 2.59 M/UL (ref 4.23–5.6)
SODIUM SERPL-SCNC: 134 MMOL/L (ref 136–145)
SPECIMEN EXP DATE BLD: NORMAL
STATUS OF UNIT,%ST: NORMAL
UNIT DIVISION, %UDIV: 0
WBC # BLD AUTO: 12.1 K/UL (ref 4.3–11.1)

## 2018-09-21 PROCEDURE — 74011000250 HC RX REV CODE- 250: Performed by: INTERNAL MEDICINE

## 2018-09-21 PROCEDURE — 74011250637 HC RX REV CODE- 250/637: Performed by: PHYSICIAN ASSISTANT

## 2018-09-21 PROCEDURE — 94760 N-INVAS EAR/PLS OXIMETRY 1: CPT

## 2018-09-21 PROCEDURE — 65660000004 HC RM CVT STEPDOWN

## 2018-09-21 PROCEDURE — 94640 AIRWAY INHALATION TREATMENT: CPT

## 2018-09-21 PROCEDURE — 71045 X-RAY EXAM CHEST 1 VIEW: CPT

## 2018-09-21 PROCEDURE — 97530 THERAPEUTIC ACTIVITIES: CPT

## 2018-09-21 PROCEDURE — 74011250637 HC RX REV CODE- 250/637: Performed by: INTERNAL MEDICINE

## 2018-09-21 PROCEDURE — 77010033678 HC OXYGEN DAILY

## 2018-09-21 PROCEDURE — 85025 COMPLETE CBC W/AUTO DIFF WBC: CPT

## 2018-09-21 PROCEDURE — 80048 BASIC METABOLIC PNL TOTAL CA: CPT

## 2018-09-21 PROCEDURE — 74011250637 HC RX REV CODE- 250/637: Performed by: NURSE PRACTITIONER

## 2018-09-21 PROCEDURE — 74011250636 HC RX REV CODE- 250/636: Performed by: THORACIC SURGERY (CARDIOTHORACIC VASCULAR SURGERY)

## 2018-09-21 PROCEDURE — 99232 SBSQ HOSP IP/OBS MODERATE 35: CPT | Performed by: INTERNAL MEDICINE

## 2018-09-21 PROCEDURE — 77030020120 HC VLV RESP PEP HI -B

## 2018-09-21 PROCEDURE — 36415 COLL VENOUS BLD VENIPUNCTURE: CPT

## 2018-09-21 PROCEDURE — 74011250637 HC RX REV CODE- 250/637: Performed by: THORACIC SURGERY (CARDIOTHORACIC VASCULAR SURGERY)

## 2018-09-21 RX ORDER — GUAIFENESIN 600 MG/1
1200 TABLET, EXTENDED RELEASE ORAL 2 TIMES DAILY
Status: DISCONTINUED | OUTPATIENT
Start: 2018-09-21 | End: 2018-09-21

## 2018-09-21 RX ORDER — GUAIFENESIN 600 MG/1
1200 TABLET, EXTENDED RELEASE ORAL EVERY 12 HOURS
Status: DISCONTINUED | OUTPATIENT
Start: 2018-09-21 | End: 2018-09-23 | Stop reason: HOSPADM

## 2018-09-21 RX ADMIN — TIOTROPIUM BROMIDE 18 MCG: 18 CAPSULE ORAL; RESPIRATORY (INHALATION) at 07:29

## 2018-09-21 RX ADMIN — BUDESONIDE 500 MCG: 0.5 INHALANT RESPIRATORY (INHALATION) at 19:20

## 2018-09-21 RX ADMIN — AMIODARONE HYDROCHLORIDE 200 MG: 200 TABLET ORAL at 20:15

## 2018-09-21 RX ADMIN — FAMOTIDINE 20 MG: 20 TABLET ORAL at 20:14

## 2018-09-21 RX ADMIN — Medication 10 ML: at 05:56

## 2018-09-21 RX ADMIN — ATORVASTATIN CALCIUM 80 MG: 40 TABLET, FILM COATED ORAL at 20:18

## 2018-09-21 RX ADMIN — ALBUTEROL SULFATE 2.5 MG: 2.5 SOLUTION RESPIRATORY (INHALATION) at 15:01

## 2018-09-21 RX ADMIN — TRAMADOL HYDROCHLORIDE 50 MG: 50 TABLET, FILM COATED ORAL at 20:14

## 2018-09-21 RX ADMIN — POTASSIUM CHLORIDE 20 MEQ: 20 TABLET, EXTENDED RELEASE ORAL at 07:46

## 2018-09-21 RX ADMIN — Medication 10 ML: at 20:16

## 2018-09-21 RX ADMIN — Medication 10 ML: at 09:47

## 2018-09-21 RX ADMIN — ALBUTEROL SULFATE 2.5 MG: 2.5 SOLUTION RESPIRATORY (INHALATION) at 07:29

## 2018-09-21 RX ADMIN — GUAIFENESIN 1200 MG: 600 TABLET, EXTENDED RELEASE ORAL at 09:39

## 2018-09-21 RX ADMIN — FUROSEMIDE 40 MG: 40 TABLET ORAL at 07:39

## 2018-09-21 RX ADMIN — LORAZEPAM 0.5 MG: 0.5 TABLET ORAL at 22:24

## 2018-09-21 RX ADMIN — POTASSIUM CHLORIDE 10 MEQ: 10 TABLET, EXTENDED RELEASE ORAL at 07:51

## 2018-09-21 RX ADMIN — AMIODARONE HYDROCHLORIDE 200 MG: 200 TABLET ORAL at 07:44

## 2018-09-21 RX ADMIN — MUPIROCIN: 20 OINTMENT TOPICAL at 20:17

## 2018-09-21 RX ADMIN — MUPIROCIN: 20 OINTMENT TOPICAL at 07:48

## 2018-09-21 RX ADMIN — GUAIFENESIN 1200 MG: 600 TABLET, EXTENDED RELEASE ORAL at 20:14

## 2018-09-21 RX ADMIN — ALBUTEROL SULFATE 2.5 MG: 2.5 SOLUTION RESPIRATORY (INHALATION) at 19:20

## 2018-09-21 RX ADMIN — TRAMADOL HYDROCHLORIDE 100 MG: 50 TABLET, FILM COATED ORAL at 07:36

## 2018-09-21 RX ADMIN — Medication 10 ML: at 16:18

## 2018-09-21 RX ADMIN — LORAZEPAM 0.5 MG: 0.5 TABLET ORAL at 09:48

## 2018-09-21 RX ADMIN — METOPROLOL TARTRATE 50 MG: 50 TABLET ORAL at 20:16

## 2018-09-21 RX ADMIN — METOPROLOL TARTRATE 50 MG: 50 TABLET ORAL at 07:45

## 2018-09-21 RX ADMIN — POTASSIUM CHLORIDE 20 MEQ: 20 TABLET, EXTENDED RELEASE ORAL at 16:17

## 2018-09-21 RX ADMIN — BUDESONIDE 500 MCG: 0.5 INHALANT RESPIRATORY (INHALATION) at 07:29

## 2018-09-21 RX ADMIN — LORAZEPAM 0.5 MG: 0.5 TABLET ORAL at 16:16

## 2018-09-21 RX ADMIN — ONDANSETRON 4 MG: 2 INJECTION INTRAMUSCULAR; INTRAVENOUS at 09:47

## 2018-09-21 RX ADMIN — ASPIRIN 81 MG: 81 TABLET, COATED ORAL at 07:45

## 2018-09-21 RX ADMIN — FAMOTIDINE 20 MG: 20 TABLET ORAL at 07:43

## 2018-09-21 RX ADMIN — Medication 2 TABLET: at 20:15

## 2018-09-21 NOTE — PROGRESS NOTES
Today's Date: 9/21/2018 Date of Admission: 9/18/2018 Chart Reviewed. Subjective:  
 
Patient feels ok. He has some dyspnea. He states pain is well controlled. Appetite has improved. Medications Reviewed. Objective:  
 
Vitals:  
 09/21/18 0300 09/21/18 1406 09/21/18 0387 09/21/18 3865 BP:   155/83 155/83 Pulse:   95 95 Resp:      
Temp:      
SpO2:  92% 90% Weight: 118 lb 4.8 oz (53.7 kg) Height:      
 
 
Intake and Output Current Shift:    
Last 3 Shifts: 09/19 1901 - 09/21 0700 In: 840 [P.O.:840] Out: 3125 [[id number]] Physical Exam: 
General: Well Developed, Well Nourished, No Acute Distress, Alert & Oriented x 3, Appropriate mood Neck: supple, no JVD Heart: S1S2 with RRR without murmurs or gallops Lungs: decreased at bilateral bases Abd: soft, nontender, nondistended, with good bowel sounds Ext: no edema bilaterally Sternal incision: clean, dry, and intact Skin: warm and dry LABS Data Review:  
Recent Labs  
   09/21/18 
 0404  09/19/18 
 0333  09/18/18 
 2205   09/18/18 
 1233 NA  134*  141   --    --   139  
K  3.9  4.2  3.7   < >  4.4 MG   --   2.4  2.6*   < >  3.9*  
BUN  8  9   --    --   12  
CREA  0.80  0.81   --    --   1.02  
GLU  99  109*   --    --   126* WBC  12.1*  10.7   --    --   15.6* HGB  8.9*  8.7*  8.4*   < >  11.0*  
HCT  27.2*  27.2*  26.1*   < >  33.7*  
PLT  199  180   --    --   243 INR   --    --    --    --   1.4  
 < > = values in this interval not displayed. Estimated Creatinine Clearance: 71.8 mL/min (based on Cr of 0.8). Assessment/Plan: S/P CABG x 3 (9/18/2018) Continue medical therapy with ASA, BB, statin, pacing wires removed, had BM this morning 
  
Active Problems: 
  Chronic obstructive pulmonary disease ([facility] (3/3/2016) No wheezing on exam, tobacco cessation encouraged 
  
  CAD (coronary artery disease) () As above, continue medical therapy  
  
 Personal history of tobacco use, presenting hazards to health (9/18/2018) Cessation encouraged, pt adamant that he has quit smoking 
  
  Other pneumothorax (9/19/2018) Had small right apical pneumothorax, now small right basilar pneumothorax 
  
  Hypoxemia (9/19/2018) Worse, now on 5 liters by NC, right basilar pneumothorax and vascular congestion, continue oral lasix, good diuresis yesterday Hilda Wise PA-C

## 2018-09-21 NOTE — PROGRESS NOTES
Problem: Mobility Impaired (Adult and Pediatric) Goal: *Acute Goals and Plan of Care (Insert Text) LTG: 
(1.)Mr. John Ramirez will move from supine to sit and sit to supine, scoot up and down and roll side to side INDEPENDENTLY with bed flat within 7 treatment day(s). (2.)Mr. John Ramirez will transfer from bed to chair and chair to bed INDEPENDENTLY within 7 treatment day(s). (3.)Mr. Mcdonald will ambulate INDEPENDENTLY for 600 feet within 7 treatment day(s). (4.)Mr. John Ramirez will independently perform LE exercises per HEP for 15+ minutes to improve strength and mobility within 7 days. ________________________________________________________________________________________________ PHYSICAL THERAPY: Daily Note, Treatment Day: 2nd, AM 9/21/2018 INPATIENT: Hospital Day: 4 [place] / Product Type: Medicare /  
  
NAME/AGE/GENDER: Kathrin Torrez is a 61 y.o. male PRIMARY DIAGNOSIS: Atherosclerosis of native coronary artery of native heart with unstable angina pectoris ([facility] [I25.110] Typical angina (HCC) [I20.9] S/P CABG x 3 S/P CABG x 3 Procedure(s) (LRB): 
CORONARY ARTERY BYPASS GRAFT (CABG) X   3 WITH LIMA. LYSIS WITH MYOCARDIAL ADHESIONS.   (N/A) VEIN HARVEST. Greater Saphenous. (Left) ESOPHAGEAL TRANS ECHOCARDIOGRAM (N/A) 3 Days Post-Op ICD-10: Treatment Diagnosis:  
 · Generalized Muscle Weakness (M62.81) · Difficulty in walking, Not elsewhere classified (R26.2) · Other abnormalities of gait and mobility (R26.89) Precaution/Allergies: 
Codeine and Penicillins ASSESSMENT:  
 
Mr. John Ramirez presents lying supine in bed. He is agreeable to treatment. He ambulated around unit with Supervision. He ambulated on room air and his O2 saturation level ranged between 86-87%. He then donned 4 liters of O2 and his O2 saturation level was above 90%. He is progressing well with mobility. Will continue PT efforts. This section established at most recent assessment PROBLEM LIST (Impairments causing functional limitations): 1. Decreased Strength 2. Decreased ADL/Functional Activities 3. Decreased Transfer Abilities 4. Decreased Ambulation Ability/Technique 5. Decreased Balance 6. Increased Pain 7. Decreased Activity Tolerance INTERVENTIONS PLANNED: (Benefits and precautions of physical therapy have been discussed with the patient.) 1. Balance Exercise 2. Bed Mobility 3. Gait Training 4. Home Exercise Program (HEP) 5. Therapeutic Activites 6. Therapeutic Exercise/Strengthening 7. Transfer Training TREATMENT PLAN: Frequency/Duration: twice daily for duration of hospital stay Rehabilitation Potential For Stated Goals: Good RECOMMENDED REHABILITATION/EQUIPMENT: (at time of discharge pending progress): Due to the probability of continued deficits (see above) this patient will likely need continued skilled physical therapy after discharge. Equipment:  
? None at this time HISTORY:  
History of Present Injury/Illness (Reason for Referral): 
Pt s/p CABG x3 Past Medical History/Comorbidities:  
Mr. Amisha Castillo  has a past medical history of Abnormal cardiovascular stress test (7/25/2018); Arrhythmia; CAD (coronary artery disease); Cancer ([address] Chronic neck pain; Chronic obstructive pulmonary disease ([address] Depression with anxiety; History of bladder cancer (07/2014); History of neck surgery; Hypertension; Kidney stone on left side (4/23/2018); Kidney stones; Osteoarthritis; PAD (peripheral artery disease) ([address] Staghorn renal calculus; Status post ileal conduit ([address] (2014); and Tobacco use disorder, continuous. Mr. Amisha Castillo  has a past surgical history that includes hx knee arthroscopy (Left); hx hemorrhoidectomy; hx heart catheterization (08/2018); hx urological; hx urological (2015); hx urological (Left); hx vascular access (Right, 2014); and hx cervical fusion (2014). Social History/Living Environment:  
Home Environment: Private residence # Steps to Enter: 0 One/Two Story Residence: One story Living Alone: No 
Support Systems: Spouse/Significant Other/Partner, Family member(s) Patient Expects to be Discharged to[de-identified] Private residence Current DME Used/Available at Home: None Prior Level of Function/Work/Activity: 
Lives with wife in single story home with 1 small step up. Independent with mobility, ambulation, and ADLs. Drives. Does not use any DME or home O2. Denies falls. Number of Personal Factors/Comorbidities that affect the Plan of Care: 1-2: MODERATE COMPLEXITY EXAMINATION:  
Most Recent Physical Functioning:  
Gross Assessment: 
  
         
  
Posture: 
Posture (WDL): Exceptions to [facility] Posture Assessment: Forward head, Rounded shoulders Balance: 
Sitting: Intact Standing: Intact Standing - Static: Good Standing - Dynamic : Good Bed Mobility: 
Rolling: Supervision Supine to Sit: Supervision Sit to Supine: Supervision Scooting: Supervision Wheelchair Mobility: 
  
Transfers: 
Sit to Stand: Supervision Stand to Sit: Supervision Gait: 
  
Base of Support: Narrowed Distance (ft): 500 Feet (ft) Assistive Device:  (None) Ambulation - Level of Assistance: Supervision Interventions: Verbal cues Body Structures Involved: 1. Heart 2. Muscles Body Functions Affected: 1. Sensory/Pain 2. Cardio 3. Movement Related Activities and Participation Affected: 1. General Tasks and Demands 2. Mobility 3. Self Care 4. Domestic Life 5. Community, Social and [place] Number of elements that affect the Plan of Care: 4+: HIGH COMPLEXITY CLINICAL PRESENTATION:  
Presentation: Stable and uncomplicated: LOW COMPLEXITY CLINICAL DECISION MAKING:  
[employer] AM-PAC 6 Clicks Basic Mobility Inpatient Short Form How much difficulty does the patient currently have. .. Unable A Lot A Little None 1.  Turning over in bed (including adjusting bedclothes, sheets and blankets)? [] 1   [] 2   [] 3   [x] 4  
2. Sitting down on and standing up from a chair with arms ( e.g., wheelchair, bedside commode, etc.)   [] 1   [] 2   [x] 3   [] 4  
3. Moving from lying on back to sitting on the side of the bed? [] 1   [] 2   [] 3   [x] 4 How much help from another person does the patient currently need. .. Total A Lot A Little None 4. Moving to and from a bed to a chair (including a wheelchair)? [] 1   [] 2   [x] 3   [] 4  
5. Need to walk in hospital room? [] 1   [] 2   [x] 3   [] 4  
6. Climbing 3-5 steps with a railing? [] 1   [] 2   [x] 3   [] 4  
© 2007, Trustees of [address] [place] [address], under license to [employer]. All rights reserved Score:  Initial: 20 Most Recent: X (Date: -- ) Interpretation of Tool:  Represents activities that are increasingly more difficult (i.e. Bed mobility, Transfers, Gait). Score 24 23 22-20 19-15 14-10 9-7 6 Modifier CH CI CJ CK CL CM CN   
 
? Mobility - Walking and Moving Around:  
  - CURRENT STATUS: CJ - 20%-39% impaired, limited or restricted  - GOAL STATUS: CH - 0% impaired, limited or restricted  - D/C STATUS:  ---------------To be determined--------------- [place] / [place] / Product Type: Medicare /   
 
Medical Necessity:    
· Patient demonstrates good rehab potential due to higher previous functional level. Reason for Services/Other Comments: 
· Patient continues to demonstrate capacity to improve strength, balance, activity tolerance, mobility which will increase independence, decrease amount of assistance required from caregiver and increase safety. Use of outcome tool(s) and clinical judgement create a POC that gives a: Clear prediction of patient's progress: LOW COMPLEXITY  
  
 
 
 
TREATMENT:  
(In addition to Assessment/Re-Assessment sessions the following treatments were rendered) Pre-treatment Symptoms/Complaints:  \"I am ready to walk. \" 
Pain: Initial:  
Pain Intensity 1: 0  Post Session:  0/10 Therapeutic Activity: (  13 minutes): Therapeutic activities including Bed transfers, Ambulation on level ground and standing balance to improve mobility, strength, balance and activity tolerance. Required stand by assist to close supervision with  Verbal cues to promote static and dynamic balance in standing and promote motor control of bilateral, lower extremity(s). Therapeutic Exercise: ():  Exercises per grid below to improve mobility, strength and balance. Required minimal visual and verbal cues to promote proper body mechanics and exercise speed, form. Progressed range and repetitions as indicated. Date: 
9/20/18 Date: 
 Date: Activity/Exercise Parameters Parameters Parameters LAQ 15x AB Seated marching 15x AB Ankle pumps 15x AB Seated hip aBd 15x AB Braces/Orthotics/Lines/Etc:  
· O2 Device: Nasal cannula 4L Treatment/Session Assessment:   
· Response to Treatment:  Tolerated well · Interdisciplinary Collaboration:  
o Physical Therapy Assistant 
o Registered Nurse · After treatment position/precautions:  
o Supine in bed 
o Bed alarm/tab alert on 
o Bed/Chair-wheels locked 
o Bed in low position 
o Call light within reach 
o RN notified · Compliance with Program/Exercises: compliant all of the time. · Recommendations/Intent for next treatment session: \"Next visit will focus on advancements to more challenging activities and reduction in assistance provided\". Total Treatment Duration: PT Patient Time In/Time Out Time In: 2155 Time Out: 1028 Babara Daily, PTA

## 2018-09-21 NOTE — PROGRESS NOTES
Yonatan Hardy Admission Date: 9/18/2018 Daily Progress Note: 9/21/2018 The patient's chart is reviewed and the patient is discussed with the staff. 61 y old ,with mild COPD, HTN, HL, bladder cancer s/p cystectomy with ileal conduit underwent CABX x 3, extubated the next day. Small post op ptx - no intervention. Was still smoking up until admission Subjective:  
 
Currently on O2 at 5 lpm with O2 sat 90%. + cough productive of large amounts of light tan colored mucus. Using IS and drawing around 1000 ml. C/O R upper chest pain. Current Facility-Administered Medications Medication Dose Route Frequency  albuterol (PROVENTIL VENTOLIN) nebulizer solution 2.5 mg  2.5 mg Nebulization Q6HWA RT  
 budesonide (PULMICORT) 500 mcg/2 ml nebulizer suspension  500 mcg Nebulization BID RT  
 tiotropium (SPIRIVA) inhalation capsule 18 mcg  1 Cap Inhalation DAILY  traMADol (ULTRAM) tablet 100 mg  100 mg Oral Q6H PRN  
 metoprolol tartrate (LOPRESSOR) tablet 50 mg  50 mg Oral Q12H  
 bisacodyl (DULCOLAX) tablet 5 mg  5 mg Oral DAILY PRN  
 LORazepam (ATIVAN) tablet 0.5 mg  0.5 mg Oral TID PRN  
 sodium chloride (NS) flush 5-10 mL  5-10 mL IntraVENous Q8H  
 sodium chloride (NS) flush 5-10 mL  5-10 mL IntraVENous PRN  
 furosemide (LASIX) tablet 40 mg  40 mg Oral DAILY  alum-mag hydroxide-simeth (MYLANTA) oral suspension 30 mL  30 mL Oral Q4H PRN  
 famotidine (PEPCID) tablet 20 mg  20 mg Oral Q12H  
 ondansetron (ZOFRAN) injection 4 mg  4 mg IntraVENous Q6H PRN  
 acetaminophen (TYLENOL) tablet 650 mg  650 mg Oral Q4H PRN  
 atorvastatin (LIPITOR) tablet 80 mg  80 mg Oral QHS  amiodarone (CORDARONE) tablet 200 mg  200 mg Oral Q12H  mupirocin (BACTROBAN) 2 % ointment   Both Nostrils Q12H  aspirin delayed-release tablet 81 mg  81 mg Oral DAILY  magnesium oxide (MAG-OX) tablet 400 mg  400 mg Oral TID PRN  
  magnesium oxide (MAG-OX) tablet 400 mg  400 mg Oral QID PRN  potassium chloride (KLOR-CON) tablet 10 mEq  10 mEq Oral DAILY  potassium chloride (K-DUR, KLOR-CON) SR tablet 20 mEq  20 mEq Oral BID PRN  potassium chloride (K-DUR, KLOR-CON) SR tablet 40 mEq  40 mEq Oral BID PRN  
 tapentadol (NUCYNTA) tablet 100 mg  100 mg Oral Q4H PRN  
 traMADol (ULTRAM) tablet 50 mg  50 mg Oral Q6H PRN  
 senna-docusate (PERICOLACE) 8.6-50 mg per tablet 2 Tab  2 Tab Oral QHS  oxyCODONE-acetaminophen (PERCOCET) 5-325 mg per tablet 1 Tab  1 Tab Oral Q4H PRN Review of Systems Constitutional: negative for fever, chills, sweats Cardiovascular: negative for chest pain, palpitations, syncope, edema Gastrointestinal:  negative for dysphagia, reflux, vomiting, diarrhea, abdominal pain, or melena Neurologic:  negative for focal weakness, numbness, headache Objective:  
 
Vitals:  
 09/20/18 2238 09/21/18 0300 09/21/18 0898 09/21/18 3715 BP: 113/68   155/83 Pulse: 81   95 Resp: 18 Temp: 98 °F (36.7 °C) SpO2: 94%  92% Weight:  118 lb 4.8 oz (53.7 kg) Height:      
 
Intake and Output:  
09/19 1901 - 09/21 0700 In: 840 [P.O.:840] Out: 3125 [VPLPJ:9745] Physical Exam:  
Constitution:  the patient is well developed and in no acute distress EENMT:  Sclera clear, pupils equal, oral mucosa moist 
Respiratory: scattered rhonchi / wheezing, O2 at 5 lpm 
Cardiovascular:  RRR without M,G,R 
Gastrointestinal: soft and non-tender; with positive bowel sounds. Musculoskeletal: warm without cyanosis. There is no lower leg edema. Skin:  no jaundice or rashes, midline sternal incision without redness, swelling, or drainage Neurologic: no gross neuro deficits Psychiatric:  alert and oriented x 3 CXR:  
9/21 LAB Recent Labs  
   09/19/18 2037  09/19/18 
 1542  09/19/18 
 0732  09/19/18 
 2062  09/19/18 
 0519 GLUCPOC  109*  127*  135*  116*  105* Recent Labs  
   09/21/18 1820  09/19/18 
 4757  09/18/18 
 2205  09/18/18 
 1832  09/18/18 
 1233 WBC  12.1*  10.7   --    --   15.6* HGB  8.9*  8.7*  8.4*  9.4*  11.0*  
HCT  27.2*  27.2*  26.1*  28.7*  33.7*  
PLT  199  180   --    --   243 INR   --    --    --    --   1.4 Recent Labs  
   09/21/18 
 0404  09/19/18 
 6552  09/18/18 
 2205  09/18/18 
 1832  09/18/18 
 1233 NA  134*  141   --    --   139  
K  3.9  4.2  3.7  4.3  4.4 CL  98  112*   --    --   110* CO2  28  24   --    --   23  
GLU  99  109*   --    --   126* BUN  8  9   --    --   12  
CREA  0.80  0.81   --    --   1.02  
MG   --   2.4  2.6*  3.0*  3.9*  
CA  8.4  7.3*   --    --   7.5* Recent Labs  
   09/18/18 
 1712  09/18/18 
 1555  09/18/18 
 1232 PH  7.32*  7.27*  7.21* PCO2  41  37  49* PO2  86  82  222* HCO3  21*  17*  19* No results for input(s): LCAD, LAC in the last 72 hours. Assessment:  (Medical Decision Making) Hospital Problems  Date Reviewed: 9/21/2018 Codes Class Noted POA Other pneumothorax ICD-10-CM: J93.83 ICD-9-CM: 512.89  9/19/2018 No  
 R base Hypoxemia ICD-10-CM: R09.02 
ICD-9-CM: 799.02  9/19/2018 No  
 Remains on O2 at 5 lpm 
  
 * (Principal)S/P CABG x 3 ICD-10-CM: Z95.1 ICD-9-CM: V45.81  9/18/2018 No  
   
 Personal history of tobacco use, presenting hazards to health (Chronic) ICD-10-CM: H78.746 ICD-9-CM: V15.82  9/18/2018 Unknown Was smoking until admission States he is ready to quit CAD (coronary artery disease) (Chronic) ICD-10-CM: I25.10 ICD-9-CM: 414.00  Unknown Yes Chronic obstructive pulmonary disease (HCC) (Chronic) ICD-10-CM: J44.9 ICD-9-CM: 599  3/3/2016 Yes  
  9/2018 Was not O2 dependent prior to surgery - may require at discharge Plan:  (Medical Decision Making) --Albuterol / Camila Renée / spiriva 
--add mucinex --check ambulating O2 sat to help determine home o2 needs 
--PT following for mobility - ambulated 500' yesterday More than 50% of the time documented was spent in face-to-face contact with the patient and in the care of the patient on the floor/unit where the patient is located. Venessa Rader NP Lungs: clear Heart:  RRR with no Murmur/Rubs/Gallops Additional Comments:   Wean O2 and cont. IS and BD. May need home o2 I have spoken with and examined the patient. I agree with the above assessment and plan as documented.  
 
Ivelisse Benitez MD

## 2018-09-21 NOTE — PROGRESS NOTES
Pt requested information about joing a [employer] in the area. I recommended [facility] and with his permission contacted the , Fr. Bibi Samuels, and gave him pat's name and phone number. Emperatriz Mehta

## 2018-09-21 NOTE — PROGRESS NOTES
Problem: Mobility Impaired (Adult and Pediatric) Goal: *Acute Goals and Plan of Care (Insert Text) LTG: 
(1.)Mr. Shannon Pfeiffer will move from supine to sit and sit to supine, scoot up and down and roll side to side INDEPENDENTLY with bed flat within 7 treatment day(s). (2.)Mr. Shannon Pfeiffer will transfer from bed to chair and chair to bed INDEPENDENTLY within 7 treatment day(s). (3.)Mr. Mcdonald will ambulate INDEPENDENTLY for 600 feet within 7 treatment day(s). (4.)Mr. Shannon Pfeiffer will independently perform LE exercises per HEP for 15+ minutes to improve strength and mobility within 7 days. ________________________________________________________________________________________________ PHYSICAL THERAPY: Daily Note, Treatment Day: 2nd, PM 9/21/2018 INPATIENT: Hospital Day: 4 [place] / Product Type: Medicare /  
  
NAME/AGE/GENDER: Wicho Aceves is a 61 y.o. male PRIMARY DIAGNOSIS: Atherosclerosis of native coronary artery of native heart with unstable angina pectoris ([facility] [address] [I25.110] Typical angina (HCC) [I20.9] S/P CABG x 3 S/P CABG x 3 Procedure(s) (LRB): 
CORONARY ARTERY BYPASS GRAFT (CABG) X   3 WITH [place]. LYSIS WITH MYOCARDIAL ADHESIONS.   (N/A) VEIN HARVEST. Greater Saphenous. (Left) ESOPHAGEAL TRANS ECHOCARDIOGRAM (N/A) 3 Days Post-Op ICD-10: Treatment Diagnosis:  
 · Generalized Muscle Weakness (M62.81) · Difficulty in walking, Not elsewhere classified (R26.2) · Other abnormalities of gait and mobility (R26.89) Precaution/Allergies: 
Codeine and Penicillins ASSESSMENT:  
 
Mr. Shannon Pfeiffer presents lying supine in bed. He is agreeable to treatment. He ambulated around unit with Supervision. He ambulated on room air and his O2 saturation level ranged between 86-87%. He then donned 4 liters of O2 and his O2 saturation level was above 90%. He is progressing well with mobility. Will continue PT efforts. PM NOTE: Patient presents lying supine. He is agreeable to treatment. He increased his ambulation distance this treatment with modified independence. He ambulated on 4 liters of O2 and his O2 saturation level was above 90%. He has met all goals and will be discharged from physical therapy services. This section established at most recent assessment PROBLEM LIST (Impairments causing functional limitations): 1. Decreased Strength 2. Decreased ADL/Functional Activities 3. Decreased Transfer Abilities 4. Decreased Ambulation Ability/Technique 5. Decreased Balance 6. Increased Pain 7. Decreased Activity Tolerance INTERVENTIONS PLANNED: (Benefits and precautions of physical therapy have been discussed with the patient.) 1. Balance Exercise 2. Bed Mobility 3. Gait Training 4. Home Exercise Program (HEP) 5. Therapeutic Activites 6. Therapeutic Exercise/Strengthening 7. Transfer Training TREATMENT PLAN: Frequency/Duration: twice daily for duration of hospital stay Rehabilitation Potential For Stated Goals: Good RECOMMENDED REHABILITATION/EQUIPMENT: (at time of discharge pending progress): Due to the probability of continued deficits (see above) this patient will likely need continued skilled physical therapy after discharge. Equipment:  
? None at this time HISTORY:  
History of Present Injury/Illness (Reason for Referral): 
Pt s/p CABG x3 Past Medical History/Comorbidities:  
Mr. Andi Toney  has a past medical history of Abnormal cardiovascular stress test (7/25/2018); Arrhythmia; CAD (coronary artery disease); Cancer ([address] Chronic neck pain; Chronic obstructive pulmonary disease ([address] Depression with anxiety; History of bladder cancer (07/2014); History of neck surgery; Hypertension; Kidney stone on left side (4/23/2018); Kidney stones; Osteoarthritis; PAD (peripheral artery disease) ([address]  Staghorn renal calculus; Status post ileal conduit ([address] (2014); and Tobacco use disorder, continuous. Mr. Army Elaine  has a past surgical history that includes hx knee arthroscopy (Left); hx hemorrhoidectomy; hx heart catheterization (08/2018); hx urological; hx urological (2015); hx urological (Left); hx vascular access (Right, 2014); and hx cervical fusion (2014). Social History/Living Environment:  
Home Environment: Private residence # Steps to Enter: 0 One/Two Story Residence: One story Living Alone: No 
Support Systems: Spouse/Significant Other/Partner, Family member(s) Patient Expects to be Discharged to[de-identified] Private residence Current DME Used/Available at Home: None Prior Level of Function/Work/Activity: 
Lives with wife in single story home with 1 small step up. Independent with mobility, ambulation, and ADLs. Drives. Does not use any DME or home O2. Denies falls. Number of Personal Factors/Comorbidities that affect the Plan of Care: 1-2: MODERATE COMPLEXITY EXAMINATION:  
Most Recent Physical Functioning:  
Gross Assessment: 
  
         
  
Posture: 
Posture (WDL): Exceptions to [facility] Posture Assessment: Forward head, Rounded shoulders Balance: 
Sitting: Intact Standing: Intact Standing - Static: Good Standing - Dynamic : Good Bed Mobility: 
Rolling: Independent Supine to Sit: Independent Sit to Supine: Independent Scooting: Independent Wheelchair Mobility: 
  
Transfers: 
Sit to Stand: Independent Stand to Sit: Independent Gait: 
  
Base of Support: Narrowed Distance (ft): 710 Feet (ft) Assistive Device:  (pushing O2 cart) Ambulation - Level of Assistance: Modified independent Interventions: Verbal cues Body Structures Involved: 1. Heart 2. Muscles Body Functions Affected: 1. Sensory/Pain 2. Cardio 3. Movement Related Activities and Participation Affected: 1. General Tasks and Demands 2. Mobility 3. Self Care 4. Domestic Life 5. Community, Social and [place] Number of elements that affect the Plan of Care: 4+: HIGH COMPLEXITY CLINICAL PRESENTATION:  
Presentation: Stable and uncomplicated: LOW COMPLEXITY CLINICAL DECISION MAKIN[address] [place] [id number] AM-PAC 6 Clicks Basic Mobility Inpatient Short Form How much difficulty does the patient currently have. .. Unable A Lot A Little None 1. Turning over in bed (including adjusting bedclothes, sheets and blankets)? [] 1   [] 2   [] 3   [x] 4  
2. Sitting down on and standing up from a chair with arms ( e.g., wheelchair, bedside commode, etc.)   [] 1   [] 2   [x] 3   [] 4  
3. Moving from lying on back to sitting on the side of the bed? [] 1   [] 2   [] 3   [x] 4 How much help from another person does the patient currently need. .. Total A Lot A Little None 4. Moving to and from a bed to a chair (including a wheelchair)? [] 1   [] 2   [x] 3   [] 4  
5. Need to walk in hospital room? [] 1   [] 2   [x] 3   [] 4  
6. Climbing 3-5 steps with a railing? [] 1   [] 2   [x] 3   [] 4  
© , Trustees of [address] [place] [address] [address] [address], under license to [employer]. All rights reserved Score:  Initial: 20 Most Recent: X (Date: -- ) Interpretation of Tool:  Represents activities that are increasingly more difficult (i.e. Bed mobility, Transfers, Gait). Score 24 23 22-20 19-15 14-10 9-7 6 Modifier CH CI CJ CK CL CM CN   
 
? Mobility - Walking and Moving Around:  
  - CURRENT STATUS: CJ - 20%-39% impaired, limited or restricted  - GOAL STATUS:  - 0% impaired, limited or restricted  - D/C STATUS:  ---------------To be determined--------------- [place] / [place] / Product Type: Medicare /   
 
Medical Necessity:    
· Patient demonstrates good rehab potential due to higher previous functional level.  
Reason for Services/Other Comments: 
· Patient continues to demonstrate capacity to improve strength, balance, activity tolerance, mobility which will increase independence, decrease amount of assistance required from caregiver and increase safety. Use of outcome tool(s) and clinical judgement create a POC that gives a: Clear prediction of patient's progress: LOW COMPLEXITY  
  
 
 
 
TREATMENT:  
(In addition to Assessment/Re-Assessment sessions the following treatments were rendered) Pre-treatment Symptoms/Complaints:  \"I like you. \" 
Pain: Initial:  
Pain Intensity 1: 0  Post Session:  0/10 Therapeutic Activity: (  10 minutes): Therapeutic activities including Bed transfers, Ambulation on level ground and standing balance to improve mobility, strength, balance and activity tolerance. Required stand by assist to close supervision with  Verbal cues to promote static and dynamic balance in standing and promote motor control of bilateral, lower extremity(s). Therapeutic Exercise: ():  Exercises per grid below to improve mobility, strength and balance. Required minimal visual and verbal cues to promote proper body mechanics and exercise speed, form. Progressed range and repetitions as indicated. Date: 
9/20/18 Date: 
 Date: Activity/Exercise Parameters Parameters Parameters LAQ 15x AB Seated marching 15x AB Ankle pumps 15x AB Seated hip aBd 15x AB Braces/Orthotics/Lines/Etc:  
· O2 Device: Nasal cannula 4L Treatment/Session Assessment:   
· Response to Treatment:  Tolerated well · Interdisciplinary Collaboration:  
o Physical Therapy Assistant 
o Registered Nurse · After treatment position/precautions:  
o Supine in bed 
o Bed alarm/tab alert on 
o Bed/Chair-wheels locked 
o Bed in low position 
o Call light within reach 
o RN notified · Compliance with Program/Exercises: compliant all of the time. · Recommendations/Intent for next treatment session: \"Next visit will focus on advancements to more challenging activities and reduction in assistance provided\". Total Treatment Duration: PT Patient Time In/Time Out 
 Time In: 1150 Time Out: 1425 Alexis Cruz, PTA

## 2018-09-22 ENCOUNTER — APPOINTMENT (OUTPATIENT)
Dept: GENERAL RADIOLOGY | Age: 63
DRG: 236 | End: 2018-09-22
Attending: NURSE PRACTITIONER
Payer: MEDICARE

## 2018-09-22 PROCEDURE — 71046 X-RAY EXAM CHEST 2 VIEWS: CPT

## 2018-09-22 PROCEDURE — 99232 SBSQ HOSP IP/OBS MODERATE 35: CPT | Performed by: INTERNAL MEDICINE

## 2018-09-22 PROCEDURE — 74011250637 HC RX REV CODE- 250/637: Performed by: INTERNAL MEDICINE

## 2018-09-22 PROCEDURE — 65660000004 HC RM CVT STEPDOWN

## 2018-09-22 PROCEDURE — 74011250636 HC RX REV CODE- 250/636: Performed by: INTERNAL MEDICINE

## 2018-09-22 PROCEDURE — 74011000250 HC RX REV CODE- 250: Performed by: INTERNAL MEDICINE

## 2018-09-22 PROCEDURE — 74011250637 HC RX REV CODE- 250/637: Performed by: THORACIC SURGERY (CARDIOTHORACIC VASCULAR SURGERY)

## 2018-09-22 PROCEDURE — 94760 N-INVAS EAR/PLS OXIMETRY 1: CPT

## 2018-09-22 PROCEDURE — 94640 AIRWAY INHALATION TREATMENT: CPT

## 2018-09-22 PROCEDURE — 74011250637 HC RX REV CODE- 250/637: Performed by: PHYSICIAN ASSISTANT

## 2018-09-22 RX ORDER — FUROSEMIDE 10 MG/ML
20 INJECTION INTRAMUSCULAR; INTRAVENOUS ONCE
Status: COMPLETED | OUTPATIENT
Start: 2018-09-22 | End: 2018-09-22

## 2018-09-22 RX ADMIN — BUDESONIDE 500 MCG: 0.5 INHALANT RESPIRATORY (INHALATION) at 08:32

## 2018-09-22 RX ADMIN — BUDESONIDE 500 MCG: 0.5 INHALANT RESPIRATORY (INHALATION) at 22:15

## 2018-09-22 RX ADMIN — AMIODARONE HYDROCHLORIDE 200 MG: 200 TABLET ORAL at 19:24

## 2018-09-22 RX ADMIN — Medication 2 TABLET: at 19:24

## 2018-09-22 RX ADMIN — METOPROLOL TARTRATE 50 MG: 50 TABLET ORAL at 10:04

## 2018-09-22 RX ADMIN — GUAIFENESIN 1200 MG: 600 TABLET, EXTENDED RELEASE ORAL at 10:03

## 2018-09-22 RX ADMIN — LORAZEPAM 0.5 MG: 0.5 TABLET ORAL at 10:02

## 2018-09-22 RX ADMIN — MUPIROCIN: 20 OINTMENT TOPICAL at 19:24

## 2018-09-22 RX ADMIN — FAMOTIDINE 20 MG: 20 TABLET ORAL at 10:04

## 2018-09-22 RX ADMIN — MUPIROCIN: 20 OINTMENT TOPICAL at 10:04

## 2018-09-22 RX ADMIN — ALBUTEROL SULFATE 2.5 MG: 2.5 SOLUTION RESPIRATORY (INHALATION) at 14:15

## 2018-09-22 RX ADMIN — TIOTROPIUM BROMIDE 18 MCG: 18 CAPSULE ORAL; RESPIRATORY (INHALATION) at 08:34

## 2018-09-22 RX ADMIN — METOPROLOL TARTRATE 50 MG: 50 TABLET ORAL at 19:24

## 2018-09-22 RX ADMIN — FUROSEMIDE 20 MG: 10 INJECTION, SOLUTION INTRAMUSCULAR; INTRAVENOUS at 16:25

## 2018-09-22 RX ADMIN — Medication 10 ML: at 19:24

## 2018-09-22 RX ADMIN — GUAIFENESIN 1200 MG: 600 TABLET, EXTENDED RELEASE ORAL at 19:24

## 2018-09-22 RX ADMIN — ASPIRIN 81 MG: 81 TABLET, COATED ORAL at 10:04

## 2018-09-22 RX ADMIN — AMIODARONE HYDROCHLORIDE 200 MG: 200 TABLET ORAL at 10:04

## 2018-09-22 RX ADMIN — Medication 10 ML: at 05:19

## 2018-09-22 RX ADMIN — ALBUTEROL SULFATE 2.5 MG: 2.5 SOLUTION RESPIRATORY (INHALATION) at 08:32

## 2018-09-22 RX ADMIN — Medication 10 ML: at 10:06

## 2018-09-22 RX ADMIN — ATORVASTATIN CALCIUM 80 MG: 40 TABLET, FILM COATED ORAL at 19:24

## 2018-09-22 RX ADMIN — FAMOTIDINE 20 MG: 20 TABLET ORAL at 19:24

## 2018-09-22 RX ADMIN — LORAZEPAM 0.5 MG: 0.5 TABLET ORAL at 16:37

## 2018-09-22 RX ADMIN — ALBUTEROL SULFATE 2.5 MG: 2.5 SOLUTION RESPIRATORY (INHALATION) at 22:15

## 2018-09-22 RX ADMIN — OXYCODONE AND ACETAMINOPHEN 1 TABLET: 5; 325 TABLET ORAL at 10:11

## 2018-09-22 NOTE — PROGRESS NOTES
Saul Ash Admission Date: 9/18/2018 Daily Progress Note: 9/22/2018 The patient's chart is reviewed and the patient is discussed with the staff. 61 y old ,with mild COPD, HTN, HL, bladder cancer s/p cystectomy with ileal conduit underwent CABX x 3, extubated the next day. Small post op ptx - no intervention. Was still smoking up until admission Subjective:  
 
Currently on O2 at 2 lpm with O2 sat 90%. + cough productive of moderate amounts of light tan colored mucus. Using IS and drawing around 1000 ml. Pain controlled with pain medications. Current Facility-Administered Medications Medication Dose Route Frequency  guaiFENesin ER (MUCINEX) tablet 1,200 mg  1,200 mg Oral Q12H  
 albuterol (PROVENTIL VENTOLIN) nebulizer solution 2.5 mg  2.5 mg Nebulization Q6HWA RT  
 budesonide (PULMICORT) 500 mcg/2 ml nebulizer suspension  500 mcg Nebulization BID RT  
 tiotropium (SPIRIVA) inhalation capsule 18 mcg  1 Cap Inhalation DAILY  traMADol (ULTRAM) tablet 100 mg  100 mg Oral Q6H PRN  
 metoprolol tartrate (LOPRESSOR) tablet 50 mg  50 mg Oral Q12H  
 bisacodyl (DULCOLAX) tablet 5 mg  5 mg Oral DAILY PRN  
 LORazepam (ATIVAN) tablet 0.5 mg  0.5 mg Oral TID PRN  
 sodium chloride (NS) flush 5-10 mL  5-10 mL IntraVENous Q8H  
 sodium chloride (NS) flush 5-10 mL  5-10 mL IntraVENous PRN  
 furosemide (LASIX) tablet 40 mg  40 mg Oral DAILY  alum-mag hydroxide-simeth (MYLANTA) oral suspension 30 mL  30 mL Oral Q4H PRN  
 famotidine (PEPCID) tablet 20 mg  20 mg Oral Q12H  
 ondansetron (ZOFRAN) injection 4 mg  4 mg IntraVENous Q6H PRN  
 acetaminophen (TYLENOL) tablet 650 mg  650 mg Oral Q4H PRN  
 atorvastatin (LIPITOR) tablet 80 mg  80 mg Oral QHS  amiodarone (CORDARONE) tablet 200 mg  200 mg Oral Q12H  mupirocin (BACTROBAN) 2 % ointment   Both Nostrils Q12H  aspirin delayed-release tablet 81 mg  81 mg Oral DAILY  magnesium oxide (MAG-OX) tablet 400 mg  400 mg Oral TID PRN  
 magnesium oxide (MAG-OX) tablet 400 mg  400 mg Oral QID PRN  potassium chloride (KLOR-CON) tablet 10 mEq  10 mEq Oral DAILY  potassium chloride (K-DUR, KLOR-CON) SR tablet 20 mEq  20 mEq Oral BID PRN  potassium chloride (K-DUR, KLOR-CON) SR tablet 40 mEq  40 mEq Oral BID PRN  
 tapentadol (NUCYNTA) tablet 100 mg  100 mg Oral Q4H PRN  
 traMADol (ULTRAM) tablet 50 mg  50 mg Oral Q6H PRN  
 senna-docusate (PERICOLACE) 8.6-50 mg per tablet 2 Tab  2 Tab Oral QHS  oxyCODONE-acetaminophen (PERCOCET) 5-325 mg per tablet 1 Tab  1 Tab Oral Q4H PRN Review of Systems Constitutional: negative for fever, chills, sweats Cardiovascular: negative for chest pain, palpitations, syncope, edema Gastrointestinal:  negative for dysphagia, reflux, vomiting, diarrhea, abdominal pain, or melena Neurologic:  negative for focal weakness, numbness, headache Objective:  
 
Vitals:  
 09/21/18 1848 09/21/18 2233 09/22/18 0300 09/22/18 0732 BP: 127/70 108/66  128/74 Pulse: 95 82  100 Resp: 20 20 Temp: 97.7 °F (36.5 °C) 98.3 °F (36.8 °C) SpO2: 92% 93%  90% Weight:   118 lb 3.2 oz (53.6 kg) Height:      
 
Intake and Output:  
09/20 1901 - 09/22 0700 In: 1080 [P.O.:1080] Out: 3400 [BOMME:9954] Physical Exam:  
Constitution:  the patient is well developed and in no acute distress EENMT:  Sclera clear, pupils equal, oral mucosa moist 
Respiratory:  CTA bilaterally, O2 at 2 lpm 
Cardiovascular:  RRR without M,G,R 
Gastrointestinal: soft and non-tender; with positive bowel sounds. Musculoskeletal: warm without cyanosis. There is no lower leg edema. Skin:  no jaundice or rashes, midline sternal incision without redness, swelling, or drainage Neurologic: no gross neuro deficits Psychiatric:  alert and oriented x 3 CXR:  
9/21 LAB Recent Labs  
   09/19/18 2037 09/19/18 
 1542 [facility]  109*  127* Recent Labs  
   09/21/18 
 0404 WBC  12.1* HGB  8.9* HCT  27.2*  
PLT  199 Recent Labs  
   09/21/18 
 0404 NA  134* K  3.9 CL  98  
CO2  28  
GLU  99 BUN  8  
CREA  0.80 CA  8.4 No results for input(s): PH, PCO2, PO2, HCO3 in the last 72 hours. No results for input(s): LCAD, LAC in the last 72 hours. Assessment:  (Medical Decision Making) Hospital Problems  Date Reviewed: 9/21/2018 Codes Class Noted POA Other pneumothorax ICD-10-CM: J93.83 ICD-9-CM: 512.89  9/19/2018 No  
 R base - today's CXR pending Hypoxemia ICD-10-CM: R09.02 
ICD-9-CM: 799.02  9/19/2018 No  
 Remains on O2 at 2 lpm 
  
 * (Principal)S/P CABG x 3 ICD-10-CM: Z95.1 ICD-9-CM: V45.81  9/18/2018 No  
   
 Personal history of tobacco use, presenting hazards to health (Chronic) ICD-10-CM: I97.166 ICD-9-CM: V15.82  9/18/2018 Unknown Was smoking until admission States he is ready to quit CAD (coronary artery disease) (Chronic) ICD-10-CM: I25.10 ICD-9-CM: 414.00  Unknown Yes Chronic obstructive pulmonary disease (HCC) (Chronic) ICD-10-CM: J44.9 ICD-9-CM: 294  3/3/2016 Yes  
  9/2018 Was not O2 dependent prior to surgery - may require at discharge Plan:  (Medical Decision Making) --Albuterol / [facility] [place] / [facility] 
--mucinex 
--follow up CXR pending 
--good diuresis with lasix - 2400 ml output yesterday / overall negative fluid balance. --check ambulating O2 sat to help determine home o2 needs - possible dc home tomorrow 
--PT following for mobility - ambulated 710' yesterday More than 50% of the time documented was spent in face-to-face contact with the patient and in the care of the patient on the floor/unit where the patient is located. Lorn Aus, NP Lungs: coarse in left chest.  
Heart S1 and S2 audible, no murmers or rubs appreciated Other No pneumothorax but has effusion and increased congestion in left chest. 
 Continue IS and flutter valve Continue meds Get overnight on RA since may need oxygen for likely COPD -- smoked since age 15 and quitting now 
cxr tomorrow Lasix 20 mg IV x 1 I have spoken with and examined the patient. I have reviewed the history, examination, assessment, and plan and agree with the above. Morelia Awan MD 
 
 
This note was signed electronically. Errors are unfortunately her likely due to dictation software.

## 2018-09-22 NOTE — PROGRESS NOTES
Today's Date: 9/22/2018 Date of Admission: 9/18/2018 Chart Reviewed. Subjective:  
 
Patient feels ok. He has some dyspnea. He states pain is well controlled. Appetite has improved. Medications Reviewed. Objective:  
 
Vitals:  
 09/21/18 1848 09/21/18 2233 09/22/18 0300 09/22/18 0732 BP: 127/70 108/66  128/74 Pulse: 95 82  100 Resp: 20 20 Temp: 97.7 °F (36.5 °C) 98.3 °F (36.8 °C) SpO2: 92% 93%  90% Weight:   53.6 kg (118 lb 3.2 oz) Height:      
 
 
Intake and Output Current Shift:    
Last 3 Shifts: 09/20 1901 - 09/22 0700 In: 1080 [P.O.:1080] Out: 3400 [HIQAT:3846] Physical Exam: 
General: Well Developed, Well Nourished, No Acute Distress, Alert & Oriented x 3, Appropriate mood Neck: supple, no JVD Heart: S1S2 with RRR without murmurs or gallops Lungs: decreased at bilateral bases Abd: soft, nontender, nondistended, with good bowel sounds Ext: no edema bilaterally Sternal incision: clean, dry, and intact Skin: warm and dry LABS Data Review:  
Recent Labs  
   09/21/18 
 0404 NA  134* K  3.9 BUN  8  
CREA  0.80 GLU  99 WBC  12.1* HGB  8.9* HCT  27.2*  
PLT  199 Estimated Creatinine Clearance: 71.7 mL/min (based on Cr of 0.8). Assessment/Plan: S/P CABG x 3 (9/18/2018) Continue medical therapy with ASA, BB, statin, pacing wires removed. 
  
Active Problems: 
  Chronic obstructive pulmonary disease ([address] (3/3/2016) No wheezing on exam, tobacco cessation encouraged 
  
  CAD (coronary artery disease) () As above, continue medical therapy  
  
  Personal history of tobacco use, presenting hazards to health (9/18/2018) Cessation encouraged, pt adamant that he has quit smoking 
  
  Other pneumothorax (9/19/2018) Had small right apical pneumothorax, now small right basilar pneumothorax. Check CXR.  
 
  Hypoxemia (9/19/2018) Down to 2L NC. Possibly home tomorrow.  
 
 
 
FRANKLYN Astorga

## 2018-09-23 ENCOUNTER — APPOINTMENT (OUTPATIENT)
Dept: GENERAL RADIOLOGY | Age: 63
DRG: 236 | End: 2018-09-23
Attending: THORACIC SURGERY (CARDIOTHORACIC VASCULAR SURGERY)
Payer: MEDICARE

## 2018-09-23 VITALS
RESPIRATION RATE: 18 BRPM | HEIGHT: 65 IN | TEMPERATURE: 97.4 F | WEIGHT: 118.2 LBS | BODY MASS INDEX: 19.69 KG/M2 | OXYGEN SATURATION: 91 % | DIASTOLIC BLOOD PRESSURE: 62 MMHG | SYSTOLIC BLOOD PRESSURE: 107 MMHG | HEART RATE: 77 BPM

## 2018-09-23 PROCEDURE — 74011000250 HC RX REV CODE- 250: Performed by: INTERNAL MEDICINE

## 2018-09-23 PROCEDURE — 94761 N-INVAS EAR/PLS OXIMETRY MLT: CPT

## 2018-09-23 PROCEDURE — 71046 X-RAY EXAM CHEST 2 VIEWS: CPT

## 2018-09-23 PROCEDURE — 74011250637 HC RX REV CODE- 250/637: Performed by: THORACIC SURGERY (CARDIOTHORACIC VASCULAR SURGERY)

## 2018-09-23 PROCEDURE — 99232 SBSQ HOSP IP/OBS MODERATE 35: CPT | Performed by: INTERNAL MEDICINE

## 2018-09-23 PROCEDURE — 94640 AIRWAY INHALATION TREATMENT: CPT

## 2018-09-23 PROCEDURE — 74011250637 HC RX REV CODE- 250/637: Performed by: INTERNAL MEDICINE

## 2018-09-23 PROCEDURE — 74011250637 HC RX REV CODE- 250/637: Performed by: PHYSICIAN ASSISTANT

## 2018-09-23 RX ORDER — METOPROLOL TARTRATE 50 MG/1
50 TABLET ORAL EVERY 12 HOURS
Qty: 60 TAB | Refills: 3 | Status: SHIPPED | OUTPATIENT
Start: 2018-09-23 | End: 2019-03-19 | Stop reason: SDUPTHER

## 2018-09-23 RX ORDER — AMIODARONE HYDROCHLORIDE 200 MG/1
200 TABLET ORAL ONCE
Qty: 30 TAB | Refills: 0 | Status: SHIPPED | OUTPATIENT
Start: 2018-09-23 | End: 2018-09-23

## 2018-09-23 RX ORDER — TRAMADOL HYDROCHLORIDE 50 MG/1
50 TABLET ORAL
Qty: 40 TAB | Refills: 0 | Status: SHIPPED | OUTPATIENT
Start: 2018-09-23 | End: 2018-10-08

## 2018-09-23 RX ADMIN — BUDESONIDE 500 MCG: 0.5 INHALANT RESPIRATORY (INHALATION) at 08:20

## 2018-09-23 RX ADMIN — GUAIFENESIN 1200 MG: 600 TABLET, EXTENDED RELEASE ORAL at 08:41

## 2018-09-23 RX ADMIN — FAMOTIDINE 20 MG: 20 TABLET ORAL at 08:41

## 2018-09-23 RX ADMIN — AMIODARONE HYDROCHLORIDE 200 MG: 200 TABLET ORAL at 08:41

## 2018-09-23 RX ADMIN — MUPIROCIN: 20 OINTMENT TOPICAL at 08:42

## 2018-09-23 RX ADMIN — OXYCODONE AND ACETAMINOPHEN 1 TABLET: 5; 325 TABLET ORAL at 08:40

## 2018-09-23 RX ADMIN — ALBUTEROL SULFATE 2.5 MG: 2.5 SOLUTION RESPIRATORY (INHALATION) at 08:20

## 2018-09-23 RX ADMIN — TIOTROPIUM BROMIDE 18 MCG: 18 CAPSULE ORAL; RESPIRATORY (INHALATION) at 08:20

## 2018-09-23 RX ADMIN — METOPROLOL TARTRATE 50 MG: 50 TABLET ORAL at 08:41

## 2018-09-23 RX ADMIN — ASPIRIN 81 MG: 81 TABLET, COATED ORAL at 08:41

## 2018-09-23 RX ADMIN — TRAMADOL HYDROCHLORIDE 50 MG: 50 TABLET, FILM COATED ORAL at 03:10

## 2018-09-23 RX ADMIN — Medication 10 ML: at 05:11

## 2018-09-23 NOTE — PROGRESS NOTES
Discharge instructions, activity restrictions, medications, and follow up appointments reviewed with patient . Time allowed for questions. Verbalize understanding. Patient transported to discharge area where he was driven home by wife. Patient stable at discharge.

## 2018-09-23 NOTE — PROGRESS NOTES
Patient up and down all night. Patient states \"i'm leaving, I'm tired of this bull shit here, I don't want anymore medicine, I just want to go home\". Patient encouraged to keep calm and wait on the MD to come in the morning. Will continue to monitor.

## 2018-09-23 NOTE — PROGRESS NOTES
Today's Date: 9/23/2018 Date of Admission: 9/18/2018 Chart Reviewed. Subjective:  
 
Patient feels ok. Breathing comfortably. O2 sat 90% on ambulation. Medications Reviewed. Objective:  
 
Vitals:  
 09/22/18 1913 09/22/18 2215 09/22/18 2230 09/23/18 0400 BP: 105/72  115/73 112/67 Pulse: 100  90 96 Resp: 18 18 18 Temp: 98.7 °F (37.1 °C)  98.6 °F (37 °C) 98 °F (36.7 °C) SpO2: 90% 93% 97% 91% Weight:      
Height:      
 
 
Intake and Output Current Shift: 09/22 1901 - 09/23 0700 In: 240 [P.O.:240] Out: 600 [Urine:600] Last 3 Shifts: 09/21 0701 - 09/22 1900 In: 1560 [P.O.:1560] Out: 4225 [IMEHQ:0752] Physical Exam: 
General: Well Developed, Well Nourished, No Acute Distress, Alert & Oriented x 3, Appropriate mood Neck: supple, no JVD Heart: S1S2 with RRR without murmurs or gallops Lungs: decreased at bilateral bases Abd: soft, nontender, nondistended, with good bowel sounds Ext: no edema bilaterally Sternal incision: clean, dry, and intact Skin: warm and dry LABS Data Review:  
Recent Labs  
   09/21/18 
 0404 NA  134* K  3.9 BUN  8  
CREA  0.80 GLU  99 WBC  12.1* HGB  8.9* HCT  27.2*  
PLT  199 Estimated Creatinine Clearance: 71.7 mL/min (based on Cr of 0.8). Assessment/Plan: S/P CABG x 3 (9/18/2018) Continue medical therapy with ASA, BB, statin, pacing wires removed. 
  
Active Problems: 
  Chronic obstructive pulmonary disease ([address] (3/3/2016) No wheezing on exam, tobacco cessation encouraged; cxr this am pending. 
  
  CAD (coronary artery disease) () As above, continue medical therapy  
  
  Personal history of tobacco use, presenting hazards to health (9/18/2018) Cessation encouraged, pt adamant that he has quit smoking 
  
  Other pneumothorax (9/19/2018) Had small right apical pneumothorax, now small right basilar pneumothorax. Check CXR prior to discharge.  
 
  Hypoxemia (9/19/2018) On RA. Awaiting CXR and pulmonary re-valuation. If all ok, can be d/alida today. Will arrange orders.  
 
 
 
JYOTI AstorgaC

## 2018-09-23 NOTE — PROGRESS NOTES
Oxygen Qualifier Room air: SpO2 with O2 and liter flow Resting SpO2  91% Ambulating SpO2  91% Completed by: 
 
Ana Landa, RT

## 2018-09-23 NOTE — PROGRESS NOTES
Santiago Silver Admission Date: 9/18/2018 Daily Progress Note: 9/23/2018 The patient's chart is reviewed and the patient is discussed with the staff. 61 y old ,with mild COPD, HTN, HL, bladder cancer s/p cystectomy with ileal conduit underwent CABX x 3, extubated the next day. Small post op ptx - no intervention. Was still smoking up until admission Subjective:  
 
Currently on O2 at 2 lpm with O2 sat 90%. + cough productive of moderate amounts of light tan colored mucus. Using IS and drawing around 1000 ml. Pain controlled with pain medications. Current Facility-Administered Medications Medication Dose Route Frequency  guaiFENesin ER (MUCINEX) tablet 1,200 mg  1,200 mg Oral Q12H  
 albuterol (PROVENTIL VENTOLIN) nebulizer solution 2.5 mg  2.5 mg Nebulization Q6HWA RT  
 budesonide (PULMICORT) 500 mcg/2 ml nebulizer suspension  500 mcg Nebulization BID RT  
 tiotropium (SPIRIVA) inhalation capsule 18 mcg  1 Cap Inhalation DAILY  traMADol (ULTRAM) tablet 100 mg  100 mg Oral Q6H PRN  
 metoprolol tartrate (LOPRESSOR) tablet 50 mg  50 mg Oral Q12H  
 bisacodyl (DULCOLAX) tablet 5 mg  5 mg Oral DAILY PRN  
 LORazepam (ATIVAN) tablet 0.5 mg  0.5 mg Oral TID PRN  
 sodium chloride (NS) flush 5-10 mL  5-10 mL IntraVENous Q8H  
 sodium chloride (NS) flush 5-10 mL  5-10 mL IntraVENous PRN  
 alum-mag hydroxide-simeth (MYLANTA) oral suspension 30 mL  30 mL Oral Q4H PRN  
 famotidine (PEPCID) tablet 20 mg  20 mg Oral Q12H  
 ondansetron (ZOFRAN) injection 4 mg  4 mg IntraVENous Q6H PRN  
 acetaminophen (TYLENOL) tablet 650 mg  650 mg Oral Q4H PRN  
 atorvastatin (LIPITOR) tablet 80 mg  80 mg Oral QHS  amiodarone (CORDARONE) tablet 200 mg  200 mg Oral Q12H  mupirocin (BACTROBAN) 2 % ointment   Both Nostrils Q12H  aspirin delayed-release tablet 81 mg  81 mg Oral DAILY  magnesium oxide (MAG-OX) tablet 400 mg  400 mg Oral TID PRN  
  magnesium oxide (MAG-OX) tablet 400 mg  400 mg Oral QID PRN  potassium chloride (K-DUR, KLOR-CON) SR tablet 20 mEq  20 mEq Oral BID PRN  potassium chloride (K-DUR, KLOR-CON) SR tablet 40 mEq  40 mEq Oral BID PRN  
 tapentadol (NUCYNTA) tablet 100 mg  100 mg Oral Q4H PRN  
 traMADol (ULTRAM) tablet 50 mg  50 mg Oral Q6H PRN  
 senna-docusate (PERICOLACE) 8.6-50 mg per tablet 2 Tab  2 Tab Oral QHS  oxyCODONE-acetaminophen (PERCOCET) 5-325 mg per tablet 1 Tab  1 Tab Oral Q4H PRN Review of Systems Constitutional: negative for fever, chills, sweats Cardiovascular: negative for chest pain, palpitations, syncope, edema Gastrointestinal:  negative for dysphagia, reflux, vomiting, diarrhea, abdominal pain, or melena Neurologic:  negative for focal weakness, numbness, headache Objective:  
 
Vitals:  
 09/22/18 2215 09/22/18 2230 09/23/18 0400 09/23/18 0725 BP:  115/73 112/67 131/79 Pulse:  90 96 88 Resp:  18 18 16 Temp:  98.6 °F (37 °C) 98 °F (36.7 °C) 99.4 °F (37.4 °C) SpO2: 93% 97% 91% 91% Weight:      
Height:      
 
Intake and Output:  
09/21 1901 - 09/23 0700 In: 1080 [P.O.:1080] Out: 6945 [MVVFR:1508] 09/23 0701 - 09/23 1900 In: -  
Out: 1000 [Urine:1000] Physical Exam:  
Constitution:  the patient is well developed and in no acute distress EENMT:  Sclera clear, pupils equal, oral mucosa moist 
Respiratory:  CTA bilaterally, O2 at 2 lpm 
Cardiovascular:  RRR without M,G,R 
Gastrointestinal: soft and non-tender; with positive bowel sounds. Musculoskeletal: warm without cyanosis. There is no lower leg edema. Skin:  no jaundice or rashes, midline sternal incision without redness, swelling, or drainage Neurologic: no gross neuro deficits Psychiatric:  alert and oriented x 3 CXR:  
9/23 LAB Recent Labs  
   09/21/18 
 0404 WBC  12.1* HGB  8.9* HCT  27.2*  
PLT  199 Recent Labs  
   09/21/18 
 0404 NA  134* K  3.9 CL  98  
CO2  28  
GLU  99 BUN  8  
CREA  0.80 CA  8.4 No results for input(s): PH, PCO2, PO2, HCO3 in the last 72 hours. No results for input(s): LCAD, LAC in the last 72 hours. Assessment:  (Medical Decision Making) Hospital Problems  Date Reviewed: 9/21/2018 Codes Class Noted POA Other pneumothorax ICD-10-CM: J93.83 ICD-9-CM: 512.89  9/19/2018 No  
 Improved on today's CXR Hypoxemia ICD-10-CM: R09.02 
ICD-9-CM: 799.02  9/19/2018 No  
 Currently on RA Checking ambulating sat IDA - needs o2 with sleep * (Principal)S/P CABG x 3 ICD-10-CM: Z95.1 ICD-9-CM: V45.81  9/18/2018 No  
   
 Personal history of tobacco use, presenting hazards to health (Chronic) ICD-10-CM: F89.508 ICD-9-CM: V15.82  9/18/2018 Unknown Was smoking until admission States he is ready to quit CAD (coronary artery disease) (Chronic) ICD-10-CM: I25.10 ICD-9-CM: 414.00  Unknown Yes Chronic obstructive pulmonary disease (HCC) (Chronic) ICD-10-CM: J44.9 ICD-9-CM: 038  3/3/2016 Yes  
  9/2018 Was not O2 dependent prior to surgery - may require at discharge IDA on RA - difficulty with compliance during the study Lowest O2 sat 71% Time with o2 <88% 0:41:36 Plan:  (Medical Decision Making) --Albuterol / Orbie Nalini / Lynn Quill 
--mucinex 
--lasix IV x 1 yesterday - good diuresis with lasix - 2400 ml output yesterday overall - 7+ liters --check ambulating O2 sat to help determine home o2 needs - possible dc home tomorrow. Minimally needs O2 with sleep based on IDA 
--PT following for mobility - ambulated 710' yesterday 
--will need office follow up with [facility] in 2-3 weeks. Will eventually need PFTs More than 50% of the time documented was spent in face-to-face contact with the patient and in the care of the patient on the floor/unit where the patient is located. Jina Peeling, NP Lungs: coarse in left chest.  
Heart S1 and S2 audible, no murmers or rubs appreciated Other No pneumothorax but has effusion and increased congestion in left chest. 
Continue IS and flutter valve Continue meds Get overnight on RA since may need oxygen for likely COPD -- smoked since age 15 and quitting now 
cxr tomorrow Lasix 20 mg IV x 1 I have spoken with and examined the patient. I have reviewed the history, examination, assessment, and plan and agree with the above. Venessa Rader NP This note was signed electronically. Errors are unfortunately her likely due to dictation software. Lungs:  Decreased on the right. Heart:  RRR with no Murmur/Rubs/Gallops Additional Comments:  Patient with stable hydropneumothorax. Would ideally want to have set follow up appt for him before he leaves but on a Sunday this is not possible. Erby Dessert for him to leave if he absolutely wants to (which he does) and we will hope to get him in to the pulmonary office within the week with another CXR. Instructed to come back in with any worsening dyspnea or pain. I have spoken with and examined the patient. I agree with the above assessment and plan as documented.  
 
Mitchell Murray MD

## 2018-09-23 NOTE — PROGRESS NOTES
CM made contact with [facility] and informed them that patient was being d/c home. Patient will needed home O2 for night. All information was faxed to [address] [place] [address]. [facility] made contact with patient and will set patient hoem O2 up for night. Care Management Interventions PCP Verified by CM: Yes Mode of Transport at Discharge: Other (see comment) (Wife) Transition of Care Consult (CM Consult): Home Health [facility] - INPATIENT: No 
Reason Outside [place]: Physician referred to specific agency Physical Therapy Consult: Yes Current Support Network: Lives with Spouse, Own Home Confirm Follow Up Transport: Family Plan discussed with Pt/Family/Caregiver: Yes Freedom of Choice Offered: Yes Discharge Location Discharge Placement: Home with home health ([place])

## 2018-09-23 NOTE — DISCHARGE INSTRUCTIONS
Coronary Artery Bypass Graft: What to Expect at Home  Your Recovery    Coronary artery bypass graft (CABG) is surgery to treat coronary artery disease. The surgery helps blood make a detour, or bypass, around one or more narrowed or blocked coronary arteries. Coronary arteries are the blood vessels that bring blood to the heart. Your doctor did the surgery through a cut, called an incision, in your chest.  You will feel tired and sore for the first few weeks after surgery. You may have some brief, sharp pains on either side of your chest. Your chest, shoulders, and upper back may ache. The incision in your chest and the area where the healthy vein was taken may be sore or swollen. These symptoms usually get better after 4 to 6 weeks. You will probably be able to do many of your usual activities after 4 to 6 weeks. But for 2 to 3 months you will not be able to lift heavy objects or do activities that strain your chest or upper arm muscles. At first you may notice that you get tired easily and need to rest often. It may take 1 to 2 months to get your energy back. Some people find that they are more emotional after this surgery. You may cry easily or show emotion in ways that are unusual for you. This is common and may last for up to a year. Some people get depressed after CABG surgery. Talk with your doctor if you have sadness that continues or you are concerned about how you are feeling. Treatment and other support can help you feel better. Even though the surgery may improve your symptoms, you will still need to make changes in your lifestyle to lower your risk of a heart attack or stroke. It will be important to eat a heart-healthy diet, get regular exercise, not smoke, take your heart medicines, and reduce stress.   You will likely start a cardiac rehabilitation (rehab) program in the hospital. You will continue with this rehab program after you go home to help you recover and prevent problems with your heart. Talk to your doctor about whether rehab is right for you. This care sheet gives you a general idea about how long it will take for you to recover. But each person recovers at a different pace. Follow the steps below to get better as quickly as possible. How can you care for yourself at home? Activity    · Rest when you feel tired. Getting enough sleep will help you recover. Try to sleep on your back for 4 to 6 weeks while your breastbone (sternum) heals. This usually takes about 4 to 6 weeks.     · Try to walk each day. Start by walking a little more than you did the day before. Bit by bit, increase the amount you walk. Walking boosts blood flow and helps prevent pneumonia and constipation.     · Avoid strenuous activities, such as bicycle riding, jogging, weight lifting, or heavy aerobic exercise, until your doctor says it is okay.     · For 3 months, avoid activities that strain your chest or upper arm muscles. This includes pushing a  or vacuum, mopping floors, or swinging a golf club or tennis racquet.     · For 2 to 3 months, avoid lifting anything that would make you strain. This may include a child, heavy grocery bags and milk containers, a heavy briefcase or backpack, or cat litter or dog food bags.     · Hold a pillow firmly over your chest incision when you cough or take deep breaths. This will support your chest and reduce your pain.     · Do breathing exercises at home as instructed by your doctor. This will help prevent pneumonia.     · Ask your doctor when you can drive again.     · You will probably need to take 4 to 12 weeks off from work. It depends on the type of work you do and how you feel.     · You may shower as usual. Pat the incision dry. Do not take a bath for the first 3 weeks, or until your doctor tells you it is okay.     · Do not swim or use a hot tub for at least 1 month, or until your doctor says it is okay.     · Ask your doctor when it is okay for you to have sex. Diet    · Eat a heart-healthy diet. If you have not been eating this way, talk to your doctor. You also may want to talk to a dietitian. A dietitian can help you learn about healthy foods.     · Drink plenty of fluids (unless your doctor tells you not to).     · You may notice that your bowel movements are not regular right after your surgery. This is common. Try to avoid constipation and straining with bowel movements. You may want to take a fiber supplement every day. If you have not had a bowel movement after a couple of days, ask your doctor about taking a mild laxative. Medicines    · Your doctor will tell you if and when you can restart your medicines. He or she will also give you instructions about taking any new medicines.     · If you take blood thinners, such as warfarin (Coumadin), clopidogrel (Plavix), or aspirin, be sure to talk to your doctor. He or she will tell you if and when to start taking those medicines again. Make sure that you understand exactly what your doctor wants you to do.     · Your doctor may give you medicines to prevent blood clots, keep your heartbeat steady, and lower your blood pressure and cholesterol. Take your medicines exactly as prescribed. Call your doctor if you think you are having a problem with your medicine.     · Be safe with medicines. Take pain medicines exactly as directed. ¨ If the doctor gave you a prescription medicine for pain, take it as prescribed. ¨ If you are not taking a prescription pain medicine, ask your doctor if you can take an over-the-counter medicine. ¨ Do not take aspirin, ibuprofen (Advil, Motrin), naproxen (Aleve), or other nonsteroidal anti-inflammatory drugs (NSAIDs) unless your doctor says it is okay.     · If you think your pain medicine is making you sick to your stomach:  ¨ Take your medicine after meals (unless your doctor has told you not to).   ¨ Ask your doctor for a different pain medicine.     · If your doctor prescribed antibiotics, take them as directed. Do not stop taking them just because you feel better. You need to take the full course of antibiotics. Incision care    · If you have strips of tape on the incisions the doctor made, leave the tape on for a week or until it falls off.     · Wash the area daily with warm, soapy water, and pat it dry. Don't use hydrogen peroxide or alcohol, which can slow healing. You may cover the area with a gauze bandage if it weeps or rubs against clothing. Change the bandage every day.     · Keep the area clean and dry.     · If you have an incision in your leg:  ¨ Wear support stockings on your legs during the day for the first 2 weeks. You can take the stockings off at night while you sleep. ¨ Raise your legs above the level of your heart whenever you lay down for the first 4 to 6 weeks. Other instructions    · Keep track of your weight. Weigh yourself every day at the same time of day, on the same scale, in the same amount of clothing. A sudden increase in weight can be a sign of a problem with your heart. Tell your doctor if you suddenly gain weight, such as 3 pounds or more in 2 to 3 days.     · Do not smoke. Smoking can make it harder for you to recover and it will raise the chances of your arteries narrowing again. If you need help quitting, talk to your doctor about stop-smoking programs and medicines. These can increase your chances of quitting for good. Follow-up care is a key part of your treatment and safety. Be sure to make and go to all appointments, and call your doctor if you are having problems. It's also a good idea to know your test results and keep a list of the medicines you take. When should you call for help? Call 911 anytime you think you may need emergency care.  For example, call if:    · You passed out (lost consciousness).     · You have severe trouble breathing.     · You have sudden chest pain and shortness of breath, or you cough up blood.     · You have severe pain in your chest.     · You have symptoms of a heart attack. These may include:  ¨ Chest pain or pressure, or a strange feeling in the chest.  ¨ Sweating. ¨ Shortness of breath. ¨ Nausea or vomiting. ¨ Pain, pressure, or a strange feeling in the back, neck, jaw, or upper belly or in one or both shoulders or arms. ¨ Lightheadedness or sudden weakness. ¨ A fast or irregular heartbeat. After you call 911, the  may tell you to chew 1 adult-strength or 2 to 4 low-dose aspirin. Wait for an ambulance. Do not try to drive yourself.     · You have angina symptoms (such as chest pain or pressure) that do not go away with rest or are not getting better within 5 minutes after you take a dose of nitroglycerin.    Call your doctor now or seek immediate medical care if:    · You have pain that does not get better after you take pain medicine.     · You have a fever over 100°F.     · You have loose stitches, or your incision comes open.     · Bright red blood has soaked through the bandage over your incision.     · You have signs of infection, such as:  ¨ Increased pain, swelling, warmth, or redness. ¨ Red streaks leading from the incision. ¨ Pus draining from the incision. ¨ Swollen lymph nodes in your neck, armpits, or groin. ¨ A fever.     · You have signs of a blood clot in a leg. If you had a vein removed from your leg, you may have tenderness and swelling while your leg heals. But signs of a blood clot may be in a different part of your leg and may include:  ¨ Pain in your calf, back of the knee, thigh, or groin.   ¨ Redness and swelling in your leg or groin.     · Your heartbeat feels very fast or slow, skips beats, or flutters.     · You are dizzy or lightheaded, or you feel like you may faint.     · You have new or increased shortness of breath.    Watch closely for changes in your health, and be sure to contact your doctor if:    · You gain weight suddenly, such as 3 pounds or more in 2 to 3 days.     · You have increased swelling in your legs, ankles, or feet.     · You have any concerns about your incision.     · You feel very sad or have other signs of depression, such as trouble sleeping or eating.     · You have questions about diet, exercise, quitting smoking, or stress reduction after surgery. Where can you learn more? Go to http://maksim-yasir.info/. Enter F759 in the search box to learn more about \"Coronary Artery Bypass Graft: What to Expect at Home. \"  Current as of: December 6, 2017  Content Version: 11.7  © 6640-7455 [employer]. Care instructions adapted under license by [employer] (which disclaims liability or warranty for this information). If you have questions about a medical condition or this instruction, always ask your healthcare professional. [address] any warranty or liability for your use of this information. Reducing Heart Attack Risk With Daily Medicine: Care Instructions  Your Care Instructions    Heart disease is the number one cause of death. If you are at risk for heart disease, there are many medicines that can reduce your risk. These include:  · ACE inhibitors or ARBs. These are types of blood pressure medicines. They can reduce the risk of heart attacks and strokes if you are at high risk. · Statin medicines. These lower cholesterol. They can also reduce the risk of heart disease and strokes. · Aspirin and other antiplatelets. These prevent blood clots. They can help certain people lower their risk of a heart attack or stroke. · Beta-blocker medicines. These are a type of blood pressure and heart medicine. They can reduce the chance of early death if you have had a heart attack. All medicines can cause side effects. So it is important to understand the pros and cons of any medicine you take. It is also important to take your medicines exactly as your doctor tells you to.   Follow-up care is a key part of your treatment and safety. Be sure to make and go to all appointments, and call your doctor if you are having problems. It's also a good idea to know your test results and keep a list of the medicines you take. ACE inhibitors  ACE (angiotensin-converting enzyme) inhibitors are used for three main reasons. They lower blood pressure, protect the kidneys, and prevent heart attacks and strokes. Examples include benazepril (Lotensin), lisinopril (Prinivil, Zestril), and ramipril (Altace). An angiotensin II receptor blocker (ARB) may be used instead of an ACE inhibitor. ARBs help you in the same ways as ACE inhibitors. Examples include candesartan (Atacand), irbesartan (Avapro), losartan (Cozaar). Before you start taking an ACE inhibitor or an ARB, make sure your doctor knows if:  · You are taking a water pill (diuretic). · You are taking potassium pills or using salt substitutes. · You are pregnant or breastfeeding. · You have had a kidney transplant or other kidney problems. ACE inhibitors and ARBs can cause side effects. Call your doctor right away if you have:  · Trouble breathing. · Swelling in your face, head, neck, or tongue. · Dizziness or lightheadedness. · A dry cough. Statins  Statins lower cholesterol. Examples include atorvastatin (Lipitor), lovastatin (Mevacor), pravastatin (Pravachol), and simvastatin (Zocor). Before you start taking a statin, make sure your doctor knows if:  · You have had a kidney transplant or other kidney problems. · You have liver disease. · You take any other prescription medicine, over-the-counter medicine, vitamins, supplements, or herbal remedies. · You are pregnant or breastfeeding. Statins can cause side effects. Call your doctor right away if you have:  · New, severe muscle aches. · Brown urine. Aspirin  Taking an aspirin every day can lower your risk for a heart attack. A heart attack occurs when a blood vessel in the heart gets blocked.  When this happens, oxygen can't get to the heart muscle, and part of the heart dies. Aspirin can help prevent blood clots that can block the blood vessels. Talk to your doctor before you start taking aspirin every day. He or she may recommend that you take one low-dose aspirin (81 mg) tablet each day, with a meal and a full glass of water. Taking aspirin isn't right for everyone. This is because it can cause serious bleeding. And you may not be able to use aspirin if you:  · Have asthma. · Have an ulcer or other stomach problem. · Take some other medicine (called a blood thinner) that prevents blood clots. · Are allergic to aspirin. Before having a surgery or procedure, tell your doctor or dentist that you take aspirin. He or she will tell you if you should stop taking aspirin beforehand. Make sure that you understand exactly what your doctor wants you to do. Aspirin can cause side effects. Call your doctor right away if you have:  · Unusual bleeding or bruising. · Nausea, vomiting, or heartburn. · Black or bloody stools. Beta-blockers  Beta-blockers are used for three main reasons. They lower blood pressure, relieve angina symptoms (such as chest pain or pressure), and reduce the chances of a second heart attack. They include atenolol (Tenormin), carvedilol (Coreg), and metoprolol (Lopressor). Before you start taking a beta-blocker, make sure your doctor knows if you have:  · Severe asthma or frequent asthma attacks. · A very slow pulse (less than 55 beats a minute). Beta-blockers can cause side effects. Call your doctor right away if you have:  · Wheezing or trouble breathing. · Dizziness or lightheadedness. · Asthma that gets worse. When should you call for help? Watch closely for changes in your health, and be sure to contact your doctor if you have any problems. Where can you learn more? Go to http://maksim-yasir.info/.   Enter R428 in the search box to learn more about \"Reducing Heart Attack Risk With Daily Medicine: Care Instructions. \"  Current as of: December 6, 2017  Content Version: 11.7  © 8122-8736 [employer]. Care instructions adapted under license by [employer] (which disclaims liability or warranty for this information). If you have questions about a medical condition or this instruction, always ask your healthcare professional. [address] any warranty or liability for your use of this information. Heart-Healthy Diet: Care Instructions  Your Care Instructions    A heart-healthy diet has lots of vegetables, fruits, nuts, beans, and whole grains, and is low in salt. It limits foods that are high in saturated fat, such as meats, cheeses, and fried foods. It may be hard to change your diet, but even small changes can lower your risk of heart attack and heart disease. Follow-up care is a key part of your treatment and safety. Be sure to make and go to all appointments, and call your doctor if you are having problems. It's also a good idea to know your test results and keep a list of the medicines you take. How can you care for yourself at home? Watch your portions  · Learn what a serving is. A \"serving\" and a \"portion\" are not always the same thing. Make sure that you are not eating larger portions than are recommended. For example, a serving of pasta is ½ cup. A serving size of meat is 2 to 3 ounces. A 3-ounce serving is about the size of a deck of cards. Measure serving sizes until you are good at [place]" them. Keep in mind that restaurants often serve portions that are 2 or 3 times the size of one serving. · To keep your energy level up and keep you from feeling hungry, eat often but in smaller portions. · Eat only the number of calories you need to stay at a healthy weight. If you need to lose weight, eat fewer calories than your body burns (through exercise and other physical activity).   Eat more fruits and vegetables  · Eat a variety of fruit and vegetables every day. Dark green, deep orange, red, or yellow fruits and vegetables are especially good for you. Examples include spinach, carrots, peaches, and berries. · Keep carrots, celery, and other veggies handy for snacks. Buy fruit that is in season and store it where you can see it so that you will be tempted to eat it. · Cook dishes that have a lot of veggies in them, such as stir-fries and soups. Limit saturated and trans fat  · Read food labels, and try to avoid saturated and trans fats. They increase your risk of heart disease. Trans fat is found in many processed foods such as cookies and crackers. · Use olive or canola oil when you cook. Try cholesterol-lowering spreads, such as Benecol or Take Control. · Bake, broil, grill, or steam foods instead of frying them. · Choose lean meats instead of high-fat meats such as hot dogs and sausages. Cut off all visible fat when you prepare meat. · Eat fish, skinless poultry, and meat alternatives such as soy products instead of high-fat meats. Soy products, such as tofu, may be especially good for your heart. · Choose low-fat or fat-free milk and dairy products. Eat fish  · Eat at least two servings of fish a week. Certain fish, such as salmon and tuna, contain omega-3 fatty acids, which may help reduce your risk of heart attack. Eat foods high in fiber  · Eat a variety of grain products every day. Include whole-grain foods that have lots of fiber and nutrients. Examples of whole-grain foods include oats, whole wheat bread, and brown rice. · Buy whole-grain breads and cereals, instead of white bread or pastries. Limit salt and sodium  · Limit how much salt and sodium you eat to help lower your blood pressure. · Taste food before you salt it. Add only a little salt when you think you need it. With time, your taste buds will adjust to less salt.   · Eat fewer snack items, fast foods, and other high-salt, processed foods. Check food labels for the amount of sodium in packaged foods. · Choose low-sodium versions of canned goods (such as soups, vegetables, and beans). Limit sugar  · Limit drinks and foods with added sugar. These include candy, desserts, and soda pop. Limit alcohol  · Limit alcohol to no more than 2 drinks a day for men and 1 drink a day for women. Too much alcohol can cause health problems. When should you call for help? Watch closely for changes in your health, and be sure to contact your doctor if:    · You would like help planning heart-healthy meals. Where can you learn more? Go to http://maksimCarticept Medicalyasir.info/. Enter V137 in the search box to learn more about \"Heart-Healthy Diet: Care Instructions. \"  Current as of: December 6, 2017  Content Version: 11.7  © 1478-9489 [employer]. Care instructions adapted under license by [employer] (which disclaims liability or warranty for this information). If you have questions about a medical condition or this instruction, always ask your healthcare professional. [address] any warranty or liability for your use of this information. Stopping Smoking: Care Instructions  Your Care Instructions  Cigarette smokers crave the nicotine in cigarettes. Giving it up is much harder than simply changing a habit. Your body has to stop craving the nicotine. It is hard to quit, but you can do it. There are many tools that people use to quit smoking. You may find that combining tools works best for you. There are several steps to quitting. First you get ready to quit. Then you get support to help you. After that, you learn new skills and behaviors to become a nonsmoker. For many people, a necessary step is getting and using medicine. Your doctor will help you set up the plan that best meets your needs. You may want to attend a smoking cessation program to help you quit smoking.  When you choose a program, look for one that has proven success. Ask your doctor for ideas. You will greatly increase your chances of success if you take medicine as well as get counseling or join a cessation program.  Some of the changes you feel when you first quit tobacco are uncomfortable. Your body will miss the nicotine at first, and you may feel short-tempered and grumpy. You may have trouble sleeping or concentrating. Medicine can help you deal with these symptoms. You may struggle with changing your smoking habits and rituals. The last step is the tricky one: Be prepared for the smoking urge to continue for a time. This is a lot to deal with, but keep at it. You will feel better. Follow-up care is a key part of your treatment and safety. Be sure to make and go to all appointments, and call your doctor if you are having problems. It's also a good idea to know your test results and keep a list of the medicines you take. How can you care for yourself at home? · Ask your family, friends, and coworkers for support. You have a better chance of quitting if you have help and support. · Join a support group, such as Nicotine Anonymous, for people who are trying to quit smoking. · Consider signing up for a smoking cessation program, such as the American Lung Association's Freedom from Smoking program.  · Get text messaging support. Go to the website at www.smokefree. gov to sign up for the [facility] program.  · Set a quit date. Pick your date carefully so that it is not right in the middle of a big deadline or stressful time. Once you quit, do not even take a puff. Get rid of all ashtrays and lighters after your last cigarette. Clean your house and your clothes so that they do not smell of smoke. · Learn how to be a nonsmoker. Think about ways you can avoid those things that make you reach for a cigarette. ¨ Avoid situations that put you at greatest risk for smoking.  For some people, it is hard to have a drink with friends without smoking. For others, they might skip a coffee break with coworkers who smoke. ¨ Change your daily routine. Take a different route to work or eat a meal in a different place. · Cut down on stress. Calm yourself or release tension by doing an activity you enjoy, such as reading a book, taking a hot bath, or gardening. · Talk to your doctor or pharmacist about nicotine replacement therapy, which replaces the nicotine in your body. You still get nicotine but you do not use tobacco. Nicotine replacement products help you slowly reduce the amount of nicotine you need. These products come in several forms, many of them available over-the-counter:  ¨ Nicotine patches  ¨ Nicotine gum and lozenges  ¨ Nicotine inhaler  · Ask your doctor about bupropion (Wellbutrin) or varenicline (Chantix), which are prescription medicines. They do not contain nicotine. They help you by reducing withdrawal symptoms, such as stress and anxiety. · Some people find hypnosis, acupuncture, and massage helpful for ending the smoking habit. · Eat a healthy diet and get regular exercise. Having healthy habits will help your body move past its craving for nicotine. · Be prepared to keep trying. Most people are not successful the first few times they try to quit. Do not get mad at yourself if you smoke again. Make a list of things you learned and think about when you want to try again, such as next week, next month, or next year. Where can you learn more? Go to http://maksim-yasir.info/. Enter T590 in the search box to learn more about \"Stopping Smoking: Care Instructions. \"  Current as of: November 29, 2017  Content Version: 11.7  © 7607-2760 [employer]. Care instructions adapted under license by [employer] (which disclaims liability or warranty for this information).  If you have questions about a medical condition or this instruction, always ask your healthcare professional. [employer] disclaims any warranty or liability for your use of this information. DISCHARGE SUMMARY from Nurse    PATIENT INSTRUCTIONS:    After general anesthesia or intravenous sedation, for 24 hours or while taking prescription Narcotics:  · Limit your activities  · Do not drive and operate hazardous machinery  · Do not make important personal or business decisions  · Do  not drink alcoholic beverages  · If you have not urinated within 8 hours after discharge, please contact your surgeon on call. Report the following to your surgeon:  · Excessive pain, swelling, redness or odor of or around the surgical area  · Temperature over 100.5  · Nausea and vomiting lasting longer than 4 hours or if unable to take medications  · Any signs of decreased circulation or nerve impairment to extremity: change in color, persistent  numbness, tingling, coldness or increase pain  · Any questions        *  Please give a list of your current medications to your Primary Care Provider. *  Please update this list whenever your medications are discontinued, doses are      changed, or new medications (including over-the-counter products) are added. *  Please carry medication information at all times in case of emergency situations. These are general instructions for a healthy lifestyle:    No smoking/ No tobacco products/ Avoid exposure to second hand smoke  Surgeon General's Warning:  Quitting smoking now greatly reduces serious risk to your health.     Obesity, smoking, and sedentary lifestyle greatly increases your risk for illness    A healthy diet, regular physical exercise & weight monitoring are important for maintaining a healthy lifestyle    You may be retaining fluid if you have a history of heart failure or if you experience any of the following symptoms:  Weight gain of 3 pounds or more overnight or 5 pounds in a week, increased swelling in our hands or feet or shortness of breath while lying flat in bed.  Please call your doctor as soon as you notice any of these symptoms; do not wait until your next office visit. Recognize signs and symptoms of STROKE:    F-face looks uneven    A-arms unable to move or move unevenly    S-speech slurred or non-existent    T-time-call 911 as soon as signs and symptoms begin-DO NOT go       Back to bed or wait to see if you get better-TIME IS BRAIN. Warning Signs of HEART ATTACK     Call 911 if you have these symptoms:   Chest discomfort. Most heart attacks involve discomfort in the center of the chest that lasts more than a few minutes, or that goes away and comes back. It can feel like uncomfortable pressure, squeezing, fullness, or pain.  Discomfort in other areas of the upper body. Symptoms can include pain or discomfort in one or both arms, the back, neck, jaw, or stomach.  Shortness of breath with or without chest discomfort.  Other signs may include breaking out in a cold sweat, nausea, or lightheadedness. Don't wait more than five minutes to call 911 - MINUTES MATTER! Fast action can save your life. Calling 911 is almost always the fastest way to get lifesaving treatment. Emergency Medical Services staff can begin treatment when they arrive -- up to an hour sooner than if someone gets to the hospital by car. The discharge information has been reviewed with the patient. The patient verbalized understanding. Discharge medications reviewed with the patient and appropriate educational materials and side effects teaching were provided.   ___________________________________________________________________________________________________________________________________

## 2018-09-24 ENCOUNTER — PATIENT OUTREACH (OUTPATIENT)
Dept: CASE MANAGEMENT | Age: 63
End: 2018-09-24

## 2018-09-24 NOTE — PROGRESS NOTES
Transition of Care Discharge Follow-up Questionnaire Date/Time of JINNY Outreach: 9/24/18 5:30 PM  
What was the patient hospitalized for? Patient was hospitalized for S/P [place] x3 Does the patient understand his/her diagnosis and/or treatment and what happened during the hospitalization? CM Assessed Risk for Readmission: 
 
 
 
Patient stated Risk for Readmission: 
 
 Spoke to Patient who consented to [place] outreach, and verbalized understanding of treatment and diagnosis. Risk for Readmission completed and assessed and Care Coordinator assessed risk would be due to diagnosis and care management. Please note patient has frequent IP admissions. Offered patient a CCM referral for needs assessment and care management support. Patient agreed and states, Angie Wu you that would really help me a lot especially with all of these appointments.  Referral to [address] [place] [address]. Did the patient receive discharge instructions? Patient states d/c instructions received. Review any discharge instructions (see notes in [facility]). Ask patient if they understand these. Do they have any questions? Patient discussed discharge plan and instruction as Patient understood it to be with Care Coordinator. Which I have documented in the pertinent areas throughout this progress note. Were home services ordered (nursing, PT, OT, ST, etc.)? [facility] ordered at d/c. If so, has the first visit occurred? If not, why? (Assist with coordination of services if necessary.) Patient has spoken to office but awaiting visit. [facility] to review. Was any DME ordered? Home Oxygen DME ordered at d/c via [address]. If so, has it been received? If not, why?  (Assist with coordination of arranging DME orders if necessary.) Yes. Complete a review of all medications (new, continued and discontinued meds per the D/C instructions and medication tab in [facility]).  Review of all meds completed with Patient and Care Coordinator. Ultram prescribed at d/c. Were all new prescriptions filled? If not, why?  (Assist with obtainment of medications if necessary.) Yes. Does the patient understand the purpose and dosing instructions for all medications? (If patient has questions, provide explanation and education.) Indicated by Patient to Care Coordinator, the purpose and dosing instructions for all medications were understood. Does the patient have any problems in performing ADLs? (If patient is unable to perform ADLs  what is the limiting factor(s)? Do they have a support system that can assist? If no support system is present, discuss possible assistance that they may be able to obtain.) Patient states he is independent with all ADLs. Patient states he also has girlfriends support as well, but that she also has health problems. Does the patient have all follow-up appointments scheduled? 7 day f/up with PCP? 
 
7-14 day f/up with specialist? 
 
If f/up has not been made  what actions has the care coordinator made to accomplish this? Has transportation been arranged? [facility] Pulmonary follow-up should be within 7 days of discharge; all others should have PCP follow-up within 7 days of discharge; follow-ups with other specialists as appropriate or ordered.) PCP f/u appt. 10/17. Cardiopulmonary f/u appt. 10/8. Cardiology f/u appt. 10/1. Cardiothoracic f/u appt. 10/9 per patient Patient denies transportation needs. All to be reviewed with CCM. Schedule next appointment with [facility] Coordinator or refer to RN Case Manager/  per the workflow guidelines. Referral to CCM. Any other questions or concerns expressed by the patient? Gratitude stated and no further questions asked or needs identified. JINNY Call Completed By:  
Lizzy Estrada LPN/ Care Coordinator EJHIJL:[phone] Juan R [address] [facility], [address] [facility] 
[email or web address] This note will not be viewable in [address]

## 2018-09-25 ENCOUNTER — PATIENT OUTREACH (OUTPATIENT)
Dept: CASE MANAGEMENT | Age: 63
End: 2018-09-25

## 2018-09-25 NOTE — PROGRESS NOTES
Complex Case Management Initial Assessment Questionnaire Date/Time of Call: 
 9/25/18 0645 Referral source? Referral reason? (if known) JINNY call Multiple comorbidities, requests any assistance he can get Recent hospitalization/ED visit? 
(if so multiple ED or hospitalizations in past 12 months?) 
 
 4th hospitalization this year Fall Risk Screening - Falls in past 12 months? If yes  were injuries incurred? (Complete falls risk screening tool in [facility]) No risk per tool ACP: 
 
Does the patient have ACP in place? If so, are these one file? Is the patient interested in ACP information if they do not have this in place? No ACP in place, declines any information. Ambulatory? Independent? Current use of DME for ambulation? Does the patient still drive? Yes Yes None Yes Medication Reconciliation Issues/side effects? Able to obtain all prescribed meds? Understand all medication dosing instructions? Understand what meds are for and why they are taking them? Date Completed:  9/25/18 None, except feels \"loopy\" on Tramadol. States he is not taking anymore, except before bed. Pt states he cannot afford the inhalers Yes Yes, reviewed w/ pt and he understands Diabetic patient? If yes  most recent HgA1C result? Is DM well controlled? Education on medications needed? Nutrition consult warranted? Annual foot and eye exam scheduled? NA Diagnosis of hypertension? If yes  medication prescribed? BP well controlled? Education on medication needed? Nutrition consult warranted? Daily BPs ordered? If yes  does patient have a BP cuff at home and keeping a log of BPs? Yes Metoprolol, amiodarone and norvasc Yes No 
 
No 
 
No  
Are home services ordered (nursing, PT, OT, ST, etc.)? Assist with coordination if necessary. Yes, [place] RN. Nurse out to see pt today. Will transition to Cardiac Rehab. DME needs present? 
(Assisted with coordination if necessary) No  
Does the patient have any problems in performing ADLs? (If patient is unable to perform ADLs  what is the limiting factor(s)? Do they have a support system that can assist? If no support system is present, discuss possible assistance that they may be able to obtain.) GF can assist as needed Social needs present? Support system? Transportation issues? Financial issues in obtaining meds &/or healthcare? Nutritional needs met? Yes, needs help affording meds Lives w/ GF No 
 
Yes, needs assist affording meds Referral to [address] Yes Health Maintenance Due? 
( to check [facility] for health maintenance due items and assist in coordination and scheduling as appropriate) a. Colorectal cancer screening status? 
b. Flu vaccine? 
c. Pneumonia vaccine? 
d. Mammogram (if applicable) Will assess Depression Screening Completed (complete the depression screening tool in [facility]  RN to complete PHQ2- refer to SW if PHQ2 is positive  SW to complete PHQ9 and notify PCP) Hx of depression Does the patient have all follow-up appointments scheduled? Has transportation been arranged? 
(for [facility] Pulmonary follow-up should be within 7 days of discharge; all others should have PCP follow-up within 7 days of discharge; follow-ups with other specialists as appropriate or ordered.) Yes [facility] Card - 10/1/18 [address] - 10/9/18 [facility] - 10/17/18 [facility] - 10/17/18 Any other questions or concerns expressed by the patient? No  
Schedule next appointment with RN Case Manager/  as appropriate. F/U next week CCM outreach Completed By: 
 
 Irving Farris RN Pt reports he has quit smoking X 1week, congratulated and encouraged to continue cessation. Referral will be made to [facility] for help w/ meds/ ostomy supplies. This note will not be viewable in [address]

## 2018-09-28 ENCOUNTER — PATIENT OUTREACH (OUTPATIENT)
Dept: CASE MANAGEMENT | Age: 63
End: 2018-09-28

## 2018-09-28 NOTE — PROGRESS NOTES
Ambulatory Care Coordination  Social Work Assessment Date of referral? 
 
Referral from which RN CM/other source? 9/25/18 Costa Cabral RN CM Previously referred? If so, reason and brief outcome na Reason for current referral: Needs assistance affording inhalers and urostomy supplies  no drug plan Living situation: Resides in private residence with common law wife Insurance type? Prescription drug plan? Affordable meds? Obtained where? [facility]? [facility] No drug plan. Has been paying for meds and urostomy supplies OOP Income information (if needed): Food stamps? SS No food stamps Transportation ? Pt is able to drive Housing situation? Housing assistance needed? Pvt residence Sources of Support: Family? Common law wife. Estranged from adult children Home Health involved? CLTC aides/pvt  pay aides? [facility]   
DME? Nebulizer, home oxygen Ambulatory? ADLs  assistance required? Assistive device needed for ambulation? Independent with ambulation and ADLs Referral to CLTC/Medicaid needed? NA Referral to Medicare Extra Help/LIS program needed? NA Small home repair needed? NA  
MOW referral?  Adequate nutrition? Adequate nutrition. Able to obtain food and prepare it Falls Risk Assessment? Completed per RN CM Depression screening? Dx of depression ACP set up? Needs information? Declined info per RN CM  Note 9/25/18 CM Assessed Risk for Readmission:  
 
 
Patient stated Risk for Readmission:  
 NA  
 
 
NA Any other concerns/questions? na  
Next steps: See below Initial SW assessment on 9/27. Pt has no prescription drug coverage. Informed pt of upcoming Medicare Part D open enrollment period and provided contact info for I-CARE program with [place] [facility] for assistance with enrollment. Recommended pt contact [facility] to ask for samples of Spiriva.  Pharma co pt assistance not indicated given pt will only need assistance for a couple of  months before Part D eligibility begins on Jan 1. Also informed pt that some meds (Metoprolol, Atorvastatin, Amlodipine) can be obtained inexpensively from $4 formularies through [employer], [employer], [employer]. Pt has Medicare A and B. Informed pt that urostomy supplies are covered at 80% after Part B deductible. Provided pt with contact info for [facility], a local ostomy supplier that accepts Medicare. Updated RN ZANA Diaz. PLAN Contact pt next week to check on status of contact with [address] [place] [address] Pulmonary Follow for Part D plan enrollment This note will not be viewable in [address]

## 2018-10-01 PROBLEM — I65.23 BILATERAL CAROTID ARTERY STENOSIS: Status: ACTIVE | Noted: 2018-10-01

## 2018-10-01 PROBLEM — I25.10 ATHEROSCLEROSIS OF NATIVE CORONARY ARTERY OF NATIVE HEART WITHOUT ANGINA PECTORIS: Status: ACTIVE | Noted: 2018-08-13

## 2018-10-02 ENCOUNTER — PATIENT OUTREACH (OUTPATIENT)
Dept: CASE MANAGEMENT | Age: 63
End: 2018-10-02

## 2018-10-02 NOTE — PROGRESS NOTES
Community Care Management  Follow up Outreach Note Outreach type: Phone call: yes Date/Time of Outreach: 10/2/18, 1056 Reason for follow-up: 
 Continued outreach Disease specific complaints/issues: Fatigue, weakness Patient progress towards goals set from last contact: 
 Stable Has patient attended any PCP or specialist follow-up appointments since last contact? What was outcome of appointment? When is next follow-up scheduled? Cardiology appmt w/ Dr Kun Billy- meds adjusted Pulmonary appmt 10/9 Review medications. Any medication changes since last outreach? Does patient have any questions or issues related to their medications? All meds reviewed - discussed med changes, pt aware, verbalizes understanding Home health active? If yes  any issue? Progress? Yes, [facility] - states last day is Friday 10/5. He begins Outpt Cardiac Rehab 10/8. Referrals needed? 
(SW, Diabetes education, HH, etc. ) No  
Other issues/Miscellaneous? (Transportation, access to meals, ability to perform ADLs, adequate caregiver support, etc.) States he is not contacting hetras supply co for ostomy supplies until 10/17 when he gets his next check Next Outreach Scheduled: Next week Next Steps/Goals: 
 Encourage smoking cessation Community Care Manager: Oumou Perdomo RN  
 
RNCM call to pt, message left and he returned call. Discussed appmt yesterday with Dr Kun Billy at [facility]. Norvasc and amiodarone on hold for now, metoprolol decreased to 25mg BID. Pt understands changes and is hopeful will begin to feel better. Apparently he has been feeling weak, dizzy at times the past week. Encouraged to check BP every am and record for MD and RN review. States he \"knows\" he should do this but doesn't. Reports he has decreased to 1/2 pack/day smoking. At one time was smoking 2 packs/ day. Congratulated on progress, encouragement, support given. Cautioned against smoking with O2 in the home and states he has not been using O2, \"I don't need it\". Reports he has not contacted [facility] yet to investigate ostomy supplies. Will call them after the 17th when he expects his check. Plan to f/u next week, if not before. Case conference w/ Jeaneth Torres. This note will not be viewable in [address]

## 2018-10-05 ENCOUNTER — PATIENT OUTREACH (OUTPATIENT)
Dept: CASE MANAGEMENT | Age: 63
End: 2018-10-05

## 2018-10-08 ENCOUNTER — HOSPITAL ENCOUNTER (OUTPATIENT)
Dept: CARDIAC REHAB | Age: 63
Discharge: HOME | End: 2018-10-08

## 2018-10-08 NOTE — PROGRESS NOTES
Date 10/08/18    Re: Scores from Psychological Testing       Dear Dr. Lizzy Gonzales,    Your patient, Mr. Frederic Carmichael ( 1955), has taken the Patient Health Questionnaire (PHQ-9) as part of their admission to the Cardiac Rehab program. The results of this test indicate that they may be experiencing depression. Thank you for your support and attention to this matter.       CESAR AlamoN, RN  Cardiopulmonary Rehabilitation

## 2018-10-08 NOTE — PROGRESS NOTES
Dear Dr. Kun Billy,    Thank you for referring your patient,Mr. Geovani Martínez ( 1955), to the Cardiopulmonary Rehabilitation Program at [facility]. He is a good candidate for the Cardiac Rehab Program and should see improvements with regular participation. We will be addressing appropriate interventions for modifiable risk factors with your patient during the next 12 weeks. We will contact you with any issues or concerns that may arise, or you can follow your patient's progress through [address] [place] [address] at any time. A final summary will be sent to you when the program is completed. Again, thank you for the referral. If we can be of further assistance, please feel free to contact the Cardiopulmonary Rehab staff at [phone].     Sincerely,    CESAR RayN, RN  Cardiopulmonary Rehabilitation Nurse Liaison  [facility]

## 2018-10-10 ENCOUNTER — PATIENT OUTREACH (OUTPATIENT)
Dept: CASE MANAGEMENT | Age: 63
End: 2018-10-10

## 2018-10-10 NOTE — PROGRESS NOTES
Community Care Management  Follow up Outreach Note Outreach type: Phone call: Yes 
  
Date/Time of Outreach: 10/10/18, 7401 Reason for follow-up: 
 Continued outreach Disease specific complaints/issues: C/O some SOB Patient progress towards goals set from last contact: 
 Stable Has patient attended any PCP or specialist follow-up appointments since last contact? What was outcome of appointment? When is next follow-up scheduled? Orientation to Cardiac Rehab Review medications. Any medication changes since last outreach? Does patient have any questions or issues related to their medications? Meds reviewed, no changes Home health active? If yes  any issue? Progress? No, discharged from [facility] [place] prior to Cardiac rehab visit Referrals needed? 
(SW, Diabetes education, HH, etc. ) No  
Other issues/Miscellaneous? (Transportation, access to meals, ability to perform ADLs, adequate caregiver support, etc.) Missed [facility] appmt yesterday, states he didn't know about it Next Outreach Scheduled: Later in week Next Steps/Goals: 
 Continue smoking cessation/ decrease Community Care Manager: Carri Jacob RN  
 
RNCM call to pt. He reports attended Cardiac Rehab orientation on Monday, but states he doesn't know anything about a Pulmonary appmt yesterday. .. This RN called [facility] and he did miss a hospital f/u yesterday. Able to reschedule for tomorrow, CXR at [phone] and appmt w/ Hector NAVARRO at [phone]. Called pt back and he is agreeable to appmt. States \"I have been more active and have noticed a little more SOB\". Is not wearing any O2, states \"it is more trouble than its worth\". Declines need for any immediate appmt, appreciative of f/u tomorrow. Reports med compliance, continues to cut back on smoking. Encouragement, congratulations given. Plan to f/u later in week if not before. Pt agrees. This note will not be viewable in [address]

## 2018-10-11 ENCOUNTER — HOSPITAL ENCOUNTER (OUTPATIENT)
Dept: GENERAL RADIOLOGY | Age: 63
Discharge: HOME | End: 2018-10-11
Payer: MEDICARE

## 2018-10-11 DIAGNOSIS — J90 PLEURAL EFFUSION: ICD-10-CM

## 2018-10-11 PROCEDURE — 71046 X-RAY EXAM CHEST 2 VIEWS: CPT

## 2018-10-12 ENCOUNTER — PATIENT OUTREACH (OUTPATIENT)
Dept: CASE MANAGEMENT | Age: 63
End: 2018-10-12

## 2018-10-18 ENCOUNTER — PATIENT OUTREACH (OUTPATIENT)
Dept: CASE MANAGEMENT | Age: 63
End: 2018-10-18

## 2018-10-18 NOTE — PROGRESS NOTES
3rd unsuccessful attempt at phone outreach. LM for call back. Updated RN ZANA, HAIR Tian. MARLIN CM will not plan to make further outreaches. Pt has this writer's contact # if needs arise. Will remain on care team in support of RN CM. This note will not be viewable in [address]

## 2018-10-22 ENCOUNTER — APPOINTMENT (OUTPATIENT)
Dept: CARDIAC REHAB | Age: 63
End: 2018-10-22

## 2018-10-22 NOTE — PROGRESS NOTES
Multiple attempts by this RN to reach pt. Messages left. Will attempt again later in week. This note will not be viewable in [address]
Unknown if ever smoked

## 2018-10-26 ENCOUNTER — APPOINTMENT (OUTPATIENT)
Dept: CARDIAC REHAB | Age: 63
End: 2018-10-26

## 2018-10-26 ENCOUNTER — HOSPITAL ENCOUNTER (OUTPATIENT)
Dept: LAB | Age: 63
Discharge: HOME | End: 2018-10-26
Payer: MEDICARE

## 2018-10-26 ENCOUNTER — PATIENT OUTREACH (OUTPATIENT)
Dept: CASE MANAGEMENT | Age: 63
End: 2018-10-26

## 2018-10-26 DIAGNOSIS — I25.10 ATHEROSCLEROSIS OF NATIVE CORONARY ARTERY OF NATIVE HEART WITHOUT ANGINA PECTORIS: ICD-10-CM

## 2018-10-26 DIAGNOSIS — C67.9 MALIGNANT NEOPLASM OF URINARY BLADDER, UNSPECIFIED SITE (HCC): Chronic | ICD-10-CM

## 2018-10-26 LAB
ALBUMIN SERPL-MCNC: 4.2 G/DL (ref 3.2–4.6)
ALBUMIN SERPL-MCNC: 4.2 G/DL (ref 3.2–4.6)
ALBUMIN/GLOB SERPL: 1.1 {RATIO} (ref 1.2–3.5)
ALBUMIN/GLOB SERPL: 1.1 {RATIO} (ref 1.2–3.5)
ALP SERPL-CCNC: 129 U/L (ref 50–136)
ALP SERPL-CCNC: 129 U/L (ref 50–136)
ALT SERPL-CCNC: 36 U/L (ref 12–65)
ALT SERPL-CCNC: 36 U/L (ref 12–65)
ANION GAP SERPL CALC-SCNC: 8 MMOL/L (ref 7–16)
ANION GAP SERPL CALC-SCNC: 8 MMOL/L (ref 7–16)
AST SERPL-CCNC: 23 U/L (ref 15–37)
AST SERPL-CCNC: 23 U/L (ref 15–37)
BASOPHILS # BLD: 0.1 K/UL (ref 0–0.2)
BASOPHILS # BLD: 0.1 K/UL (ref 0–0.2)
BASOPHILS NFR BLD: 1 % (ref 0–2)
BASOPHILS NFR BLD: 1 % (ref 0–2)
BILIRUB SERPL-MCNC: 0.4 MG/DL (ref 0.2–1.1)
BILIRUB SERPL-MCNC: 0.4 MG/DL (ref 0.2–1.1)
BUN SERPL-MCNC: 6 MG/DL (ref 8–23)
BUN SERPL-MCNC: 6 MG/DL (ref 8–23)
CALCIUM SERPL-MCNC: 8.9 MG/DL (ref 8.3–10.4)
CALCIUM SERPL-MCNC: 8.9 MG/DL (ref 8.3–10.4)
CHLORIDE SERPL-SCNC: 101 MMOL/L (ref 98–107)
CHLORIDE SERPL-SCNC: 101 MMOL/L (ref 98–107)
CO2 SERPL-SCNC: 25 MMOL/L (ref 21–32)
CO2 SERPL-SCNC: 25 MMOL/L (ref 21–32)
CREAT SERPL-MCNC: 0.95 MG/DL (ref 0.8–1.5)
CREAT SERPL-MCNC: 0.95 MG/DL (ref 0.8–1.5)
DIFFERENTIAL METHOD BLD: ABNORMAL
DIFFERENTIAL METHOD BLD: ABNORMAL
EOSINOPHIL # BLD: 0.4 K/UL (ref 0–0.8)
EOSINOPHIL # BLD: 0.4 K/UL (ref 0–0.8)
EOSINOPHIL NFR BLD: 5 % (ref 0.5–7.8)
EOSINOPHIL NFR BLD: 5 % (ref 0.5–7.8)
ERYTHROCYTE [DISTWIDTH] IN BLOOD BY AUTOMATED COUNT: 15.5 % (ref 11.9–14.6)
ERYTHROCYTE [DISTWIDTH] IN BLOOD BY AUTOMATED COUNT: 15.5 % (ref 11.9–14.6)
GLOBULIN SER CALC-MCNC: 3.8 G/DL (ref 2.3–3.5)
GLOBULIN SER CALC-MCNC: 3.8 G/DL (ref 2.3–3.5)
GLUCOSE SERPL-MCNC: 120 MG/DL (ref 65–100)
GLUCOSE SERPL-MCNC: 120 MG/DL (ref 65–100)
HCT VFR BLD AUTO: 43.5 % (ref 41.1–50.3)
HCT VFR BLD AUTO: 43.5 % (ref 41.1–50.3)
HGB BLD-MCNC: 14.3 G/DL (ref 13.6–17.2)
HGB BLD-MCNC: 14.3 G/DL (ref 13.6–17.2)
IMM GRANULOCYTES # BLD: 0.1 K/UL (ref 0–0.5)
IMM GRANULOCYTES # BLD: 0.1 K/UL (ref 0–0.5)
IMM GRANULOCYTES NFR BLD AUTO: 1 % (ref 0–5)
IMM GRANULOCYTES NFR BLD AUTO: 1 % (ref 0–5)
LYMPHOCYTES # BLD: 1.7 K/UL (ref 0.5–4.6)
LYMPHOCYTES # BLD: 1.7 K/UL (ref 0.5–4.6)
LYMPHOCYTES NFR BLD: 20 % (ref 13–44)
LYMPHOCYTES NFR BLD: 20 % (ref 13–44)
MAGNESIUM SERPL-MCNC: 2.3 MG/DL (ref 1.8–2.4)
MCH RBC QN AUTO: 34.7 PG (ref 26.1–32.9)
MCH RBC QN AUTO: 34.7 PG (ref 26.1–32.9)
MCHC RBC AUTO-ENTMCNC: 32.9 G/DL (ref 31.4–35)
MCHC RBC AUTO-ENTMCNC: 32.9 G/DL (ref 31.4–35)
MCV RBC AUTO: 105.6 FL (ref 79.6–97.8)
MCV RBC AUTO: 105.6 FL (ref 79.6–97.8)
MONOCYTES # BLD: 0.5 K/UL (ref 0.1–1.3)
MONOCYTES # BLD: 0.5 K/UL (ref 0.1–1.3)
MONOCYTES NFR BLD: 7 % (ref 4–12)
MONOCYTES NFR BLD: 7 % (ref 4–12)
NEUTS SEG # BLD: 5.5 K/UL (ref 1.7–8.2)
NEUTS SEG # BLD: 5.5 K/UL (ref 1.7–8.2)
NEUTS SEG NFR BLD: 67 % (ref 43–78)
NEUTS SEG NFR BLD: 67 % (ref 43–78)
NRBC # BLD: 0 K/UL (ref 0–0.2)
NRBC # BLD: 0 K/UL (ref 0–0.2)
PLATELET # BLD AUTO: 431 K/UL (ref 150–450)
PLATELET # BLD AUTO: 431 K/UL (ref 150–450)
PMV BLD AUTO: 8.5 FL (ref 9.4–12.3)
PMV BLD AUTO: 8.5 FL (ref 9.4–12.3)
POTASSIUM SERPL-SCNC: 4.2 MMOL/L (ref 3.5–5.1)
POTASSIUM SERPL-SCNC: 4.2 MMOL/L (ref 3.5–5.1)
PROT SERPL-MCNC: 8 G/DL (ref 6.3–8.2)
PROT SERPL-MCNC: 8 G/DL (ref 6.3–8.2)
RBC # BLD AUTO: 4.12 M/UL (ref 4.23–5.67)
RBC # BLD AUTO: 4.12 M/UL (ref 4.23–5.67)
SODIUM SERPL-SCNC: 134 MMOL/L (ref 136–145)
SODIUM SERPL-SCNC: 134 MMOL/L (ref 136–145)
TSH SERPL DL<=0.005 MIU/L-ACNC: 0.81 UIU/ML (ref 0.36–3.74)
WBC # BLD AUTO: 8.2 K/UL (ref 4.3–11.1)
WBC # BLD AUTO: 8.2 K/UL (ref 4.3–11.1)

## 2018-10-26 PROCEDURE — 80053 COMPREHEN METABOLIC PANEL: CPT

## 2018-10-26 PROCEDURE — 36415 COLL VENOUS BLD VENIPUNCTURE: CPT

## 2018-10-26 PROCEDURE — 83735 ASSAY OF MAGNESIUM: CPT

## 2018-10-26 PROCEDURE — 85025 COMPLETE CBC W/AUTO DIFF WBC: CPT

## 2018-10-26 PROCEDURE — 84443 ASSAY THYROID STIM HORMONE: CPT

## 2018-10-26 NOTE — PROGRESS NOTES
Community Care Management  Follow up Outreach Note Outreach type: Phone call: Yes 
  
Date/Time of Outreach: 10/26/18, 1252 Reason for follow-up: 
 Continued outreach Disease specific complaints/issues: Continued soreness in chest 
  
 
Patient progress towards goals set from last contact: 
 Stable Has patient attended any PCP or specialist follow-up appointments since last contact? What was outcome of appointment? When is next follow-up scheduled? [facility] 10/11, no changes Dr Brian Arciniega 10/16, no changes Dr Omayra Celis 10/17, RX for Medrol dose pack Dr Omar Salazar 10/26, no med changes Review medications. Any medication changes since last outreach? Does patient have any questions or issues related to their medications? Verbalizes understanding of meds Home health active? If yes  any issue? Progress? No  
Referrals needed? 
(SW, Diabetes education, HH, etc. ) No  
Other issues/Miscellaneous? (Transportation, access to meals, ability to perform ADLs, adequate caregiver support, etc.) None Next Outreach Scheduled: Next 1-2 weeks Next Steps/Goals: 
 Possibly d/c Community Care Manager: Jossy Sharpe RN  
 
RNCM call to pt. He reports at Dr Mcclellan Close this am, has rash in area of stoma and MD has referred him to [address] [place] [address]. Denies needing any help with ostomy supplies. States \"I'm good now that I know Medicare helps pay for it\" and \"I have everything I need\". States continues to smoke but \"not as much\". Encouraged and commended for less smoking and suggested cutting back even further. Has only attended orientation appmt for cardiac rehab, states \"I am too busy\" but states he will try to attend next week. Reviewed schedule with him, encouragement given. Boni ISAAC updated by email. Plan to f/u in 1-2 weeks and possibly discharge. This note will not be viewable in [address]

## 2018-10-29 ENCOUNTER — APPOINTMENT (OUTPATIENT)
Dept: CARDIAC REHAB | Age: 63
End: 2018-10-29

## 2018-10-31 ENCOUNTER — APPOINTMENT (OUTPATIENT)
Dept: CARDIAC REHAB | Age: 63
End: 2018-10-31

## 2018-10-31 PROBLEM — Z72.0 TOBACCO USE: Status: ACTIVE | Noted: 2018-10-31

## 2018-11-02 ENCOUNTER — HOSPITAL ENCOUNTER (OUTPATIENT)
Dept: CT IMAGING | Age: 63
Discharge: HOME | End: 2018-11-02
Attending: INTERNAL MEDICINE
Payer: MEDICARE

## 2018-11-02 ENCOUNTER — APPOINTMENT (OUTPATIENT)
Dept: CARDIAC REHAB | Age: 63
End: 2018-11-02

## 2018-11-02 VITALS — WEIGHT: 116 LBS | BODY MASS INDEX: 19.33 KG/M2 | HEIGHT: 65 IN

## 2018-11-02 DIAGNOSIS — I65.23 BILATERAL CAROTID ARTERY STENOSIS: ICD-10-CM

## 2018-11-02 PROCEDURE — 74011636320 HC RX REV CODE- 636/320: Performed by: INTERNAL MEDICINE

## 2018-11-02 PROCEDURE — 70498 CT ANGIOGRAPHY NECK: CPT

## 2018-11-02 PROCEDURE — 74011000258 HC RX REV CODE- 258: Performed by: INTERNAL MEDICINE

## 2018-11-02 RX ORDER — SODIUM CHLORIDE 0.9 % (FLUSH) 0.9 %
10 SYRINGE (ML) INJECTION
Status: COMPLETED | OUTPATIENT
Start: 2018-11-02 | End: 2018-11-02

## 2018-11-02 RX ADMIN — IOPAMIDOL 70 ML: 755 INJECTION, SOLUTION INTRAVENOUS at 09:31

## 2018-11-02 RX ADMIN — Medication 10 ML: at 09:31

## 2018-11-02 RX ADMIN — SODIUM CHLORIDE 100 ML: 900 INJECTION, SOLUTION INTRAVENOUS at 09:31

## 2018-11-02 NOTE — PROGRESS NOTES
Patient informed of CTA results and Dr. Manning Cooks response. I urged the importance of keeping his appointment on Tuesday 11/6 with Vascular to discuss blockage. Patient verbalized understanding.

## 2018-11-02 NOTE — PROGRESS NOTES
Please call patient, CTA showed a blockage, needs to see vascular surgery, does he have an appt time?   Thanks

## 2018-11-05 ENCOUNTER — APPOINTMENT (OUTPATIENT)
Dept: CARDIAC REHAB | Age: 63
End: 2018-11-05

## 2018-11-07 ENCOUNTER — PATIENT OUTREACH (OUTPATIENT)
Dept: CASE MANAGEMENT | Age: 63
End: 2018-11-07

## 2018-11-07 ENCOUNTER — APPOINTMENT (OUTPATIENT)
Dept: CARDIAC REHAB | Age: 63
End: 2018-11-07

## 2018-11-07 NOTE — PROGRESS NOTES
Community Care Management  Follow up Outreach Note Outreach type: Phone call: Yes 
  
Date/Time of Outreach: 11/7/18, 1030 Reason for follow-up: 
 Continued outreach Disease specific complaints/issues:  
None Patient progress towards goals set from last contact: 
 Stable Has patient attended any PCP or specialist follow-up appointments since last contact? What was outcome of appointment? When is next follow-up scheduled? Dr Cheli Peña, [facility] Cardiology 10/31 Dr Deidre Winkler, Vascular, 11/6 Left Carotid Endard. Scheduled for 11/21. No meds changes Review medications. Any medication changes since last outreach? Does patient have any questions or issues related to their medications? Pt verbalizes understanding of meds Home health active? If yes  any issue? Progress? No  
Referrals needed? 
(SW, Diabetes education, HH, etc. ) None Other issues/Miscellaneous? (Transportation, access to meals, ability to perform ADLs, adequate caregiver support, etc.) None Next Outreach Scheduled: After CEA surgery 11/21 Next Steps/Goals: 
 Discharge from [facility] Community Care Manager: Tona Calderon RN  
  
  
 
RNCM call to pt. He reports upcoming surgery 11/21 for Left CEA. No questions voiced. Admits not going to Cardiac Rehab, \"I don't have time, I have so many appmts\". Feeling well overall. Encouraged smoking cessation, perhaps before upcoming surgery. Pt noncommittal. Suggested he and SO quit together. Again noncommittal. No issues getting his urostomy supplies. Plan to f/u after surgery, perhaps to discharge. Pt agreeable. This note will not be viewable in [address]

## 2018-11-09 ENCOUNTER — APPOINTMENT (OUTPATIENT)
Dept: CARDIAC REHAB | Age: 63
End: 2018-11-09

## 2018-11-12 ENCOUNTER — APPOINTMENT (OUTPATIENT)
Dept: CARDIAC REHAB | Age: 63
End: 2018-11-12

## 2018-11-14 ENCOUNTER — HOSPITAL ENCOUNTER (OUTPATIENT)
Dept: SURGERY | Age: 63
Discharge: HOME | End: 2018-11-14
Payer: MEDICARE

## 2018-11-14 ENCOUNTER — ANESTHESIA EVENT (OUTPATIENT)
Dept: SURGERY | Age: 63
DRG: 039 | End: 2018-11-14
Payer: MEDICARE

## 2018-11-14 ENCOUNTER — APPOINTMENT (OUTPATIENT)
Dept: CARDIAC REHAB | Age: 63
End: 2018-11-14

## 2018-11-14 VITALS
HEIGHT: 65 IN | WEIGHT: 115 LBS | SYSTOLIC BLOOD PRESSURE: 153 MMHG | DIASTOLIC BLOOD PRESSURE: 77 MMHG | BODY MASS INDEX: 19.16 KG/M2 | TEMPERATURE: 98 F | HEART RATE: 104 BPM | OXYGEN SATURATION: 98 % | RESPIRATION RATE: 16 BRPM

## 2018-11-14 LAB
ANION GAP SERPL CALC-SCNC: 7 MMOL/L (ref 7–16)
BUN SERPL-MCNC: 7 MG/DL (ref 8–23)
CALCIUM SERPL-MCNC: 8.9 MG/DL (ref 8.3–10.4)
CHLORIDE SERPL-SCNC: 104 MMOL/L (ref 98–107)
CO2 SERPL-SCNC: 23 MMOL/L (ref 21–32)
CREAT SERPL-MCNC: 0.96 MG/DL (ref 0.8–1.5)
ERYTHROCYTE [DISTWIDTH] IN BLOOD BY AUTOMATED COUNT: 15.2 %
GLUCOSE SERPL-MCNC: 98 MG/DL (ref 65–100)
HCT VFR BLD AUTO: 46.4 % (ref 41.1–50.3)
HGB BLD-MCNC: 14.8 G/DL (ref 13.6–17.2)
MCH RBC QN AUTO: 36 PG (ref 26.1–32.9)
MCHC RBC AUTO-ENTMCNC: 31.9 G/DL (ref 31.4–35)
MCV RBC AUTO: 112.9 FL (ref 79.6–97.8)
NRBC # BLD: 0 K/UL (ref 0–0.2)
PLATELET # BLD AUTO: 323 K/UL (ref 150–450)
PMV BLD AUTO: 10.4 FL (ref 9.4–12.3)
POTASSIUM SERPL-SCNC: 5.1 MMOL/L (ref 3.5–5.1)
RBC # BLD AUTO: 4.11 M/UL (ref 4.23–5.6)
SODIUM SERPL-SCNC: 134 MMOL/L (ref 136–145)
WBC # BLD AUTO: 7.3 K/UL (ref 4.3–11.1)

## 2018-11-14 PROCEDURE — 85027 COMPLETE CBC AUTOMATED: CPT

## 2018-11-14 PROCEDURE — 80048 BASIC METABOLIC PNL TOTAL CA: CPT

## 2018-11-14 NOTE — ANESTHESIA PREPROCEDURE EVALUATION
Anesthetic History Increased risk of difficult airway (with CABG) Review of Systems / Medical History Patient summary reviewed and pertinent labs reviewed Pulmonary COPD (no home O2): moderate Smoker Neuro/Psych Psychiatric history Cardiovascular Hypertension CAD (80% LAD, 60% LCx, 70% OM1, 90% RCA) and PAD Exercise tolerance: >4 METS 
  
GI/Hepatic/Renal 
  
 
 
Renal disease: stones Comments: S/p cystectomy and ileal conduit Endo/Other Arthritis and cancer (bladder now with illeal conduit) Other Findings Comments: Heavy ETOH use daily Physical Exam 
 
Airway Mallampati: II 
TM Distance: 4 - 6 cm Neck ROM: normal range of motion Mouth opening: Normal 
 
 Cardiovascular Regular rate and rhythm,  S1 and S2 normal,  no murmur, click, rub, or gallop Rhythm: regular Rate: normal 
 
 
 
 Dental 
No notable dental hx Pulmonary Breath sounds clear to auscultation Abdominal 
GI exam deferred Other Findings Anesthetic Plan ASA: 3 Anesthesia type: general 
 
Monitoring Plan: Arterial line Post procedure ventilation Induction: Intravenous Anesthetic plan and risks discussed with: Patient

## 2018-11-14 NOTE — PERIOP NOTES
Patient verified name and  Order for consent found in EHR and matches case posting; patient verified. Type III surgery, pre assessment complete. Labs per surgeon: CBC, BMP- all normal today; T&S for day of surgery Labs per anesthesia protocol: no other labs EKG: in chart- 10/31/18- seen by Dr Tasha Stovall- OK for surgery Dr Tasha Stovall here to evaluate for anesthesia due to type of surgery. No further orders. Hibiclens and instructions given per hospital policy. Patient provided with and instructed on educational handouts including Guide to Surgery, Pain Management, Hand Hygiene, Blood Transfusion Education, and [facility] Brochure. Patient answered medical/surgical history questions at their best of ability. All prior to admission medications documented in [facility]. Original medication prescription bottle not visualized during patient appointment. Patient instructed to hold all vitamins 7 days prior to surgery and NSAIDS 5 days prior to surgery, patient verbalized understanding. Medications to be held: none Patient instructed to continue previous medications as prescribed prior to surgery and to take the following medications the day of surgery according to anesthesia guidelines with a small sip of water: lipitor, ASA 81mg, metoprolol, to use symbicort and spiriva inhalers. Patient teach back successful and patient demonstrates knowledge of instructions.

## 2018-11-16 ENCOUNTER — APPOINTMENT (OUTPATIENT)
Dept: CARDIAC REHAB | Age: 63
End: 2018-11-16

## 2018-11-19 ENCOUNTER — APPOINTMENT (OUTPATIENT)
Dept: CARDIAC REHAB | Age: 63
End: 2018-11-19

## 2018-11-21 ENCOUNTER — APPOINTMENT (OUTPATIENT)
Dept: CARDIAC REHAB | Age: 63
End: 2018-11-21

## 2018-11-21 ENCOUNTER — PATIENT OUTREACH (OUTPATIENT)
Dept: CASE MANAGEMENT | Age: 63
End: 2018-11-21

## 2018-11-21 NOTE — PROGRESS NOTES
CORNELIUS Tian informed this writer of case closure. SW ZANA's name will be removed from care team as well. This note will not be viewable in [address]

## 2018-11-21 NOTE — PROGRESS NOTES
Community Care Management  Follow up Outreach Note Outreach type: Phone call: Yes 
  
Date/Time of Outreach: 11/21/18, 4745 Reason for follow-up: 
 F/u, d/c from outreach Disease specific complaints/issues:  
None Patient progress towards goals set from last contact: 
 Stable Has patient attended any PCP or specialist follow-up appointments since last contact? What was outcome of appointment? When is next follow-up scheduled? No 
Pt has surgery scheduled for 12/6 w/ Dr Lakshmi Warner Review medications. Any medication changes since last outreach? Does patient have any questions or issues related to their medications? Meds reviewed, pt verbalizes understanding Home health active? If yes  any issue? Progress? No  
Referrals needed? 
(SW, Diabetes education, HH, etc. ) None Other issues/Miscellaneous? (Transportation, access to meals, ability to perform ADLs, adequate caregiver support, etc.) None Next Outreach Scheduled: None planned Next Steps/Goals: 
 Goal met Community Care Manager: Tayo Etienne RN  
 
RNCM call to pt, left message and pt called back. Pt reports feeling well, anticipating carotid surgery with Dr Lakshmi Warner 12/6. Goal met, no needs/ questions/ issues voiced. No further outreach planned. Pt has contact info if any needs arise. Pt agreeable, appreciative. This note will not be viewable in [address]

## 2018-11-26 ENCOUNTER — APPOINTMENT (OUTPATIENT)
Dept: CARDIAC REHAB | Age: 63
End: 2018-11-26

## 2018-11-28 ENCOUNTER — APPOINTMENT (OUTPATIENT)
Dept: CARDIAC REHAB | Age: 63
End: 2018-11-28

## 2018-11-30 ENCOUNTER — APPOINTMENT (OUTPATIENT)
Dept: CARDIAC REHAB | Age: 63
End: 2018-11-30

## 2018-12-03 ENCOUNTER — APPOINTMENT (OUTPATIENT)
Dept: CARDIAC REHAB | Age: 63
End: 2018-12-03

## 2018-12-05 ENCOUNTER — APPOINTMENT (OUTPATIENT)
Dept: CARDIAC REHAB | Age: 63
End: 2018-12-05

## 2018-12-06 ENCOUNTER — HOSPITAL ENCOUNTER (INPATIENT)
Age: 63
LOS: 1 days | Discharge: HOME | DRG: 039 | End: 2018-12-07
Attending: SURGERY | Admitting: SURGERY
Payer: MEDICARE

## 2018-12-06 ENCOUNTER — ANESTHESIA (OUTPATIENT)
Dept: SURGERY | Age: 63
DRG: 039 | End: 2018-12-06
Payer: MEDICARE

## 2018-12-06 DIAGNOSIS — I65.23 BILATERAL CAROTID ARTERY STENOSIS: Primary | ICD-10-CM

## 2018-12-06 PROBLEM — I65.22 CAROTID STENOSIS, ASYMPTOMATIC, LEFT: Status: ACTIVE | Noted: 2018-12-06

## 2018-12-06 PROBLEM — I65.29 CAROTID STENOSIS: Status: ACTIVE | Noted: 2018-12-06

## 2018-12-06 LAB
ABO + RH BLD: NORMAL
ACT BLD: 125 SECS (ref 70–128)
BLOOD GROUP ANTIBODIES SERPL: NORMAL
SPECIMEN EXP DATE BLD: NORMAL

## 2018-12-06 PROCEDURE — 03CL0ZZ EXTIRPATION OF MATTER FROM LEFT INTERNAL CAROTID ARTERY, OPEN APPROACH: ICD-10-PCS | Performed by: SURGERY

## 2018-12-06 PROCEDURE — 77030019908 HC STETH ESOPH SIMS -A: Performed by: ANESTHESIOLOGY

## 2018-12-06 PROCEDURE — 77030002996 HC SUT SLK J&J -A: Performed by: SURGERY

## 2018-12-06 PROCEDURE — 76060000036 HC ANESTHESIA 2.5 TO 3 HR: Performed by: SURGERY

## 2018-12-06 PROCEDURE — 74011000258 HC RX REV CODE- 258: Performed by: SURGERY

## 2018-12-06 PROCEDURE — 94640 AIRWAY INHALATION TREATMENT: CPT

## 2018-12-06 PROCEDURE — 77030032490 HC SLV COMPR SCD KNE COVD -B: Performed by: SURGERY

## 2018-12-06 PROCEDURE — 77030014008 HC SPNG HEMSTAT J&J -C: Performed by: SURGERY

## 2018-12-06 PROCEDURE — 74011000250 HC RX REV CODE- 250: Performed by: SURGERY

## 2018-12-06 PROCEDURE — 74011250636 HC RX REV CODE- 250/636

## 2018-12-06 PROCEDURE — 77030020782 HC GWN BAIR PAWS FLX 3M -B: Performed by: ANESTHESIOLOGY

## 2018-12-06 PROCEDURE — 74011250636 HC RX REV CODE- 250/636: Performed by: SURGERY

## 2018-12-06 PROCEDURE — 86901 BLOOD TYPING SEROLOGIC RH(D): CPT

## 2018-12-06 PROCEDURE — C1768 GRAFT, VASCULAR: HCPCS | Performed by: SURGERY

## 2018-12-06 PROCEDURE — 76010000172 HC OR TIME 2.5 TO 3 HR INTENSV-TIER 1: Performed by: SURGERY

## 2018-12-06 PROCEDURE — 03UL0KZ SUPPLEMENT LEFT INTERNAL CAROTID ARTERY WITH NONAUTOLOGOUS TISSUE SUBSTITUTE, OPEN APPROACH: ICD-10-PCS | Performed by: SURGERY

## 2018-12-06 PROCEDURE — 77030010512 HC APPL CLP LIG J&J -C: Performed by: SURGERY

## 2018-12-06 PROCEDURE — 77030031139 HC SUT VCRL2 J&J -A: Performed by: SURGERY

## 2018-12-06 PROCEDURE — 77030039266 HC ADH SKN EXOFIN S2SG -A: Performed by: SURGERY

## 2018-12-06 PROCEDURE — 77030018836 HC SOL IRR NACL ICUM -A: Performed by: SURGERY

## 2018-12-06 PROCEDURE — C1755 CATHETER, INTRASPINAL: HCPCS | Performed by: SURGERY

## 2018-12-06 PROCEDURE — 77030034888 HC SUT PROL 2 J&J -B: Performed by: SURGERY

## 2018-12-06 PROCEDURE — 74011250636 HC RX REV CODE- 250/636: Performed by: ANESTHESIOLOGY

## 2018-12-06 PROCEDURE — 03UJ0KZ SUPPLEMENT LEFT COMMON CAROTID ARTERY WITH NONAUTOLOGOUS TISSUE SUBSTITUTE, OPEN APPROACH: ICD-10-PCS | Performed by: SURGERY

## 2018-12-06 PROCEDURE — 77030037088 HC TUBE ENDOTRACH ORAL NSL COVD-A: Performed by: ANESTHESIOLOGY

## 2018-12-06 PROCEDURE — 77030018673: Performed by: SURGERY

## 2018-12-06 PROCEDURE — 77030002986 HC SUT PROL J&J -A: Performed by: SURGERY

## 2018-12-06 PROCEDURE — 77030018824 HC SHNT CAR ARGY COVD -B: Performed by: SURGERY

## 2018-12-06 PROCEDURE — 74011250637 HC RX REV CODE- 250/637: Performed by: SURGERY

## 2018-12-06 PROCEDURE — 74011250636 HC RX REV CODE- 250/636: Performed by: PHYSICIAN ASSISTANT

## 2018-12-06 PROCEDURE — 77030002933 HC SUT MCRYL J&J -A: Performed by: SURGERY

## 2018-12-06 PROCEDURE — 77030013292 HC BOWL MX PRSM J&J -A: Performed by: ANESTHESIOLOGY

## 2018-12-06 PROCEDURE — 85347 COAGULATION TIME ACTIVATED: CPT

## 2018-12-06 PROCEDURE — 77030013794 HC KT TRNSDUC BLD EDWD -B: Performed by: ANESTHESIOLOGY

## 2018-12-06 PROCEDURE — 77030039425 HC BLD LARYNG TRULITE DISP TELE -A: Performed by: ANESTHESIOLOGY

## 2018-12-06 PROCEDURE — 76210000063 HC OR PH I REC FIRST 0.5 HR: Performed by: SURGERY

## 2018-12-06 PROCEDURE — 03CJ0ZZ EXTIRPATION OF MATTER FROM LEFT COMMON CAROTID ARTERY, OPEN APPROACH: ICD-10-PCS | Performed by: SURGERY

## 2018-12-06 PROCEDURE — 74011250637 HC RX REV CODE- 250/637: Performed by: ANESTHESIOLOGY

## 2018-12-06 PROCEDURE — 77030002987 HC SUT PROL J&J -B: Performed by: SURGERY

## 2018-12-06 PROCEDURE — 65610000006 HC RM INTENSIVE CARE

## 2018-12-06 PROCEDURE — 74011000250 HC RX REV CODE- 250

## 2018-12-06 DEVICE — XENOSURE BIOLOGIC PATCH, 0.8CM X 8CM, EIFU
Type: IMPLANTABLE DEVICE | Site: CAROTID | Status: FUNCTIONAL
Brand: XENOSURE BIOLOGIC PATCH

## 2018-12-06 RX ORDER — IBUPROFEN 200 MG
1 TABLET ORAL DAILY
Status: DISCONTINUED | OUTPATIENT
Start: 2018-12-07 | End: 2018-12-07 | Stop reason: HOSPADM

## 2018-12-06 RX ORDER — SODIUM CHLORIDE 9 MG/ML
75 INJECTION, SOLUTION INTRAVENOUS CONTINUOUS
Status: DISCONTINUED | OUTPATIENT
Start: 2018-12-06 | End: 2018-12-07 | Stop reason: HOSPADM

## 2018-12-06 RX ORDER — FENTANYL CITRATE 50 UG/ML
INJECTION, SOLUTION INTRAMUSCULAR; INTRAVENOUS AS NEEDED
Status: DISCONTINUED | OUTPATIENT
Start: 2018-12-06 | End: 2018-12-06 | Stop reason: HOSPADM

## 2018-12-06 RX ORDER — BUDESONIDE 0.5 MG/2ML
500 INHALANT ORAL
Status: DISCONTINUED | OUTPATIENT
Start: 2018-12-06 | End: 2018-12-07 | Stop reason: HOSPADM

## 2018-12-06 RX ORDER — ALBUTEROL SULFATE 0.83 MG/ML
2.5 SOLUTION RESPIRATORY (INHALATION) AS NEEDED
Status: DISCONTINUED | OUTPATIENT
Start: 2018-12-06 | End: 2018-12-06 | Stop reason: HOSPADM

## 2018-12-06 RX ORDER — HEPARIN SODIUM 5000 [USP'U]/ML
INJECTION, SOLUTION INTRAVENOUS; SUBCUTANEOUS AS NEEDED
Status: DISCONTINUED | OUTPATIENT
Start: 2018-12-06 | End: 2018-12-06 | Stop reason: HOSPADM

## 2018-12-06 RX ORDER — PROPOFOL 10 MG/ML
INJECTION, EMULSION INTRAVENOUS AS NEEDED
Status: DISCONTINUED | OUTPATIENT
Start: 2018-12-06 | End: 2018-12-06 | Stop reason: HOSPADM

## 2018-12-06 RX ORDER — SODIUM CHLORIDE, SODIUM LACTATE, POTASSIUM CHLORIDE, CALCIUM CHLORIDE 600; 310; 30; 20 MG/100ML; MG/100ML; MG/100ML; MG/100ML
INJECTION, SOLUTION INTRAVENOUS
Status: DISCONTINUED | OUTPATIENT
Start: 2018-12-06 | End: 2018-12-06 | Stop reason: HOSPADM

## 2018-12-06 RX ORDER — ONDANSETRON 2 MG/ML
4 INJECTION INTRAMUSCULAR; INTRAVENOUS
Status: DISCONTINUED | OUTPATIENT
Start: 2018-12-06 | End: 2018-12-06 | Stop reason: HOSPADM

## 2018-12-06 RX ORDER — SODIUM CHLORIDE 9 MG/ML
INJECTION, SOLUTION INTRAVENOUS
Status: DISCONTINUED | OUTPATIENT
Start: 2018-12-06 | End: 2018-12-06 | Stop reason: HOSPADM

## 2018-12-06 RX ORDER — ROCURONIUM BROMIDE 10 MG/ML
INJECTION, SOLUTION INTRAVENOUS AS NEEDED
Status: DISCONTINUED | OUTPATIENT
Start: 2018-12-06 | End: 2018-12-06 | Stop reason: HOSPADM

## 2018-12-06 RX ORDER — HYDROCODONE BITARTRATE AND ACETAMINOPHEN 5; 325 MG/1; MG/1
1 TABLET ORAL
Status: DISCONTINUED | OUTPATIENT
Start: 2018-12-06 | End: 2018-12-07 | Stop reason: HOSPADM

## 2018-12-06 RX ORDER — HEPARIN SODIUM 1000 [USP'U]/ML
INJECTION, SOLUTION INTRAVENOUS; SUBCUTANEOUS AS NEEDED
Status: DISCONTINUED | OUTPATIENT
Start: 2018-12-06 | End: 2018-12-06 | Stop reason: HOSPADM

## 2018-12-06 RX ORDER — SODIUM CHLORIDE 0.9 % (FLUSH) 0.9 %
5-10 SYRINGE (ML) INJECTION AS NEEDED
Status: DISCONTINUED | OUTPATIENT
Start: 2018-12-06 | End: 2018-12-06 | Stop reason: HOSPADM

## 2018-12-06 RX ORDER — MORPHINE SULFATE 2 MG/ML
2 INJECTION, SOLUTION INTRAMUSCULAR; INTRAVENOUS ONCE
Status: COMPLETED | OUTPATIENT
Start: 2018-12-06 | End: 2018-12-06

## 2018-12-06 RX ORDER — METOPROLOL TARTRATE 5 MG/5ML
INJECTION INTRAVENOUS AS NEEDED
Status: DISCONTINUED | OUTPATIENT
Start: 2018-12-06 | End: 2018-12-06 | Stop reason: HOSPADM

## 2018-12-06 RX ORDER — PROTAMINE SULFATE 10 MG/ML
INJECTION, SOLUTION INTRAVENOUS AS NEEDED
Status: DISCONTINUED | OUTPATIENT
Start: 2018-12-06 | End: 2018-12-06 | Stop reason: HOSPADM

## 2018-12-06 RX ORDER — ONDANSETRON 2 MG/ML
INJECTION INTRAMUSCULAR; INTRAVENOUS AS NEEDED
Status: DISCONTINUED | OUTPATIENT
Start: 2018-12-06 | End: 2018-12-06 | Stop reason: HOSPADM

## 2018-12-06 RX ORDER — ASPIRIN 81 MG/1
81 TABLET ORAL DAILY
Status: DISCONTINUED | OUTPATIENT
Start: 2018-12-07 | End: 2018-12-07 | Stop reason: HOSPADM

## 2018-12-06 RX ORDER — HYDROMORPHONE HYDROCHLORIDE 2 MG/ML
0.5 INJECTION, SOLUTION INTRAMUSCULAR; INTRAVENOUS; SUBCUTANEOUS
Status: DISCONTINUED | OUTPATIENT
Start: 2018-12-06 | End: 2018-12-06 | Stop reason: HOSPADM

## 2018-12-06 RX ORDER — MIDAZOLAM HYDROCHLORIDE 1 MG/ML
2 INJECTION, SOLUTION INTRAMUSCULAR; INTRAVENOUS
Status: DISCONTINUED | OUTPATIENT
Start: 2018-12-06 | End: 2018-12-06 | Stop reason: HOSPADM

## 2018-12-06 RX ORDER — SODIUM CHLORIDE 0.9 % (FLUSH) 0.9 %
5-10 SYRINGE (ML) INJECTION EVERY 8 HOURS
Status: DISCONTINUED | OUTPATIENT
Start: 2018-12-06 | End: 2018-12-07 | Stop reason: HOSPADM

## 2018-12-06 RX ORDER — LIDOCAINE HYDROCHLORIDE 20 MG/ML
INJECTION, SOLUTION EPIDURAL; INFILTRATION; INTRACAUDAL; PERINEURAL AS NEEDED
Status: DISCONTINUED | OUTPATIENT
Start: 2018-12-06 | End: 2018-12-06 | Stop reason: HOSPADM

## 2018-12-06 RX ORDER — ACETAMINOPHEN 500 MG
1000 TABLET ORAL ONCE
Status: COMPLETED | OUTPATIENT
Start: 2018-12-06 | End: 2018-12-06

## 2018-12-06 RX ORDER — SODIUM CHLORIDE, SODIUM LACTATE, POTASSIUM CHLORIDE, CALCIUM CHLORIDE 600; 310; 30; 20 MG/100ML; MG/100ML; MG/100ML; MG/100ML
1000 INJECTION, SOLUTION INTRAVENOUS CONTINUOUS
Status: DISCONTINUED | OUTPATIENT
Start: 2018-12-06 | End: 2018-12-06 | Stop reason: HOSPADM

## 2018-12-06 RX ORDER — MORPHINE SULFATE 2 MG/ML
4 INJECTION, SOLUTION INTRAMUSCULAR; INTRAVENOUS
Status: DISCONTINUED | OUTPATIENT
Start: 2018-12-06 | End: 2018-12-07 | Stop reason: HOSPADM

## 2018-12-06 RX ORDER — NEOSTIGMINE METHYLSULFATE 1 MG/ML
INJECTION INTRAVENOUS AS NEEDED
Status: DISCONTINUED | OUTPATIENT
Start: 2018-12-06 | End: 2018-12-06 | Stop reason: HOSPADM

## 2018-12-06 RX ORDER — MORPHINE SULFATE 2 MG/ML
2 INJECTION, SOLUTION INTRAMUSCULAR; INTRAVENOUS
Status: DISCONTINUED | OUTPATIENT
Start: 2018-12-06 | End: 2018-12-06

## 2018-12-06 RX ORDER — ALBUTEROL SULFATE 0.83 MG/ML
2.5 SOLUTION RESPIRATORY (INHALATION)
Status: DISCONTINUED | OUTPATIENT
Start: 2018-12-06 | End: 2018-12-07 | Stop reason: HOSPADM

## 2018-12-06 RX ORDER — BUPIVACAINE HYDROCHLORIDE 2.5 MG/ML
INJECTION, SOLUTION EPIDURAL; INFILTRATION; INTRACAUDAL AS NEEDED
Status: DISCONTINUED | OUTPATIENT
Start: 2018-12-06 | End: 2018-12-06 | Stop reason: HOSPADM

## 2018-12-06 RX ORDER — GLYCOPYRROLATE 0.2 MG/ML
INJECTION INTRAMUSCULAR; INTRAVENOUS AS NEEDED
Status: DISCONTINUED | OUTPATIENT
Start: 2018-12-06 | End: 2018-12-06 | Stop reason: HOSPADM

## 2018-12-06 RX ORDER — SODIUM CHLORIDE 0.9 % (FLUSH) 0.9 %
5-10 SYRINGE (ML) INJECTION EVERY 8 HOURS
Status: DISCONTINUED | OUTPATIENT
Start: 2018-12-06 | End: 2018-12-06 | Stop reason: HOSPADM

## 2018-12-06 RX ORDER — LIDOCAINE HYDROCHLORIDE 10 MG/ML
0.1 INJECTION INFILTRATION; PERINEURAL AS NEEDED
Status: DISCONTINUED | OUTPATIENT
Start: 2018-12-06 | End: 2018-12-06 | Stop reason: HOSPADM

## 2018-12-06 RX ORDER — METOPROLOL TARTRATE 25 MG/1
25 TABLET, FILM COATED ORAL EVERY EVENING
Status: DISCONTINUED | OUTPATIENT
Start: 2018-12-06 | End: 2018-12-07 | Stop reason: HOSPADM

## 2018-12-06 RX ORDER — SODIUM CHLORIDE 0.9 % (FLUSH) 0.9 %
5-10 SYRINGE (ML) INJECTION AS NEEDED
Status: DISCONTINUED | OUTPATIENT
Start: 2018-12-06 | End: 2018-12-07 | Stop reason: HOSPADM

## 2018-12-06 RX ADMIN — NEOSTIGMINE METHYLSULFATE 3 MG: 1 INJECTION INTRAVENOUS at 09:31

## 2018-12-06 RX ADMIN — PROTAMINE SULFATE 15 MG: 10 INJECTION, SOLUTION INTRAVENOUS at 09:10

## 2018-12-06 RX ADMIN — ROCURONIUM BROMIDE 40 MG: 10 INJECTION, SOLUTION INTRAVENOUS at 07:23

## 2018-12-06 RX ADMIN — SODIUM CHLORIDE, SODIUM LACTATE, POTASSIUM CHLORIDE, CALCIUM CHLORIDE: 600; 310; 30; 20 INJECTION, SOLUTION INTRAVENOUS at 07:13

## 2018-12-06 RX ADMIN — GLYCOPYRROLATE 0.4 MG: 0.2 INJECTION INTRAMUSCULAR; INTRAVENOUS at 09:31

## 2018-12-06 RX ADMIN — ONDANSETRON 4 MG: 2 INJECTION INTRAMUSCULAR; INTRAVENOUS at 09:25

## 2018-12-06 RX ADMIN — VANCOMYCIN HYDROCHLORIDE 1000 MG: 1 INJECTION, POWDER, LYOPHILIZED, FOR SOLUTION INTRAVENOUS at 20:00

## 2018-12-06 RX ADMIN — SODIUM CHLORIDE 75 ML/HR: 900 INJECTION, SOLUTION INTRAVENOUS at 13:30

## 2018-12-06 RX ADMIN — ALBUTEROL SULFATE 2.5 MG: 2.5 SOLUTION RESPIRATORY (INHALATION) at 19:46

## 2018-12-06 RX ADMIN — SODIUM CHLORIDE, SODIUM LACTATE, POTASSIUM CHLORIDE, AND CALCIUM CHLORIDE 1000 ML: 600; 310; 30; 20 INJECTION, SOLUTION INTRAVENOUS at 06:22

## 2018-12-06 RX ADMIN — ROCURONIUM BROMIDE 10 MG: 10 INJECTION, SOLUTION INTRAVENOUS at 07:46

## 2018-12-06 RX ADMIN — METOPROLOL TARTRATE 2 MG: 5 INJECTION INTRAVENOUS at 09:38

## 2018-12-06 RX ADMIN — SODIUM CHLORIDE 900 MG: 9 INJECTION, SOLUTION INTRAVENOUS at 07:40

## 2018-12-06 RX ADMIN — ROCURONIUM BROMIDE 10 MG: 10 INJECTION, SOLUTION INTRAVENOUS at 08:30

## 2018-12-06 RX ADMIN — MORPHINE SULFATE 2 MG: 2 INJECTION, SOLUTION INTRAMUSCULAR; INTRAVENOUS at 15:38

## 2018-12-06 RX ADMIN — PROTAMINE SULFATE 5 MG: 10 INJECTION, SOLUTION INTRAVENOUS at 09:06

## 2018-12-06 RX ADMIN — Medication 10 ML: at 13:11

## 2018-12-06 RX ADMIN — ROCURONIUM BROMIDE 5 MG: 10 INJECTION, SOLUTION INTRAVENOUS at 09:05

## 2018-12-06 RX ADMIN — FENTANYL CITRATE 100 MCG: 50 INJECTION, SOLUTION INTRAMUSCULAR; INTRAVENOUS at 07:35

## 2018-12-06 RX ADMIN — ALBUTEROL SULFATE 2.5 MG: 2.5 SOLUTION RESPIRATORY (INHALATION) at 13:46

## 2018-12-06 RX ADMIN — HYDROMORPHONE HYDROCHLORIDE 0.5 MG: 2 INJECTION, SOLUTION INTRAMUSCULAR; INTRAVENOUS; SUBCUTANEOUS at 12:00

## 2018-12-06 RX ADMIN — HEPARIN SODIUM 6000 UNITS: 1000 INJECTION, SOLUTION INTRAVENOUS; SUBCUTANEOUS at 08:15

## 2018-12-06 RX ADMIN — ACETAMINOPHEN 1000 MG: 500 TABLET, FILM COATED ORAL at 06:41

## 2018-12-06 RX ADMIN — PROTAMINE SULFATE 20 MG: 10 INJECTION, SOLUTION INTRAVENOUS at 09:11

## 2018-12-06 RX ADMIN — Medication 10 ML: at 20:06

## 2018-12-06 RX ADMIN — FENTANYL CITRATE 100 MCG: 50 INJECTION, SOLUTION INTRAMUSCULAR; INTRAVENOUS at 07:21

## 2018-12-06 RX ADMIN — HYDROMORPHONE HYDROCHLORIDE 0.5 MG: 2 INJECTION, SOLUTION INTRAMUSCULAR; INTRAVENOUS; SUBCUTANEOUS at 09:59

## 2018-12-06 RX ADMIN — SODIUM CHLORIDE: 9 INJECTION, SOLUTION INTRAVENOUS at 07:30

## 2018-12-06 RX ADMIN — HYDROCODONE BITARTRATE AND ACETAMINOPHEN 1 TABLET: 5; 325 TABLET ORAL at 14:11

## 2018-12-06 RX ADMIN — BUDESONIDE 500 MCG: 0.5 INHALANT RESPIRATORY (INHALATION) at 19:46

## 2018-12-06 RX ADMIN — LIDOCAINE HYDROCHLORIDE 100 MG: 20 INJECTION, SOLUTION EPIDURAL; INFILTRATION; INTRACAUDAL; PERINEURAL at 07:21

## 2018-12-06 RX ADMIN — PROPOFOL 150 MG: 10 INJECTION, EMULSION INTRAVENOUS at 07:21

## 2018-12-06 RX ADMIN — GENTAMICIN SULFATE 260 MG: 40 INJECTION, SOLUTION INTRAMUSCULAR; INTRAVENOUS at 06:20

## 2018-12-06 RX ADMIN — HYDROCODONE BITARTRATE AND ACETAMINOPHEN 1 TABLET: 5; 325 TABLET ORAL at 20:00

## 2018-12-06 RX ADMIN — MORPHINE SULFATE 2 MG: 2 INJECTION, SOLUTION INTRAMUSCULAR; INTRAVENOUS at 13:24

## 2018-12-06 RX ADMIN — PROPOFOL 50 MG: 10 INJECTION, EMULSION INTRAVENOUS at 07:23

## 2018-12-06 RX ADMIN — METOPROLOL TARTRATE 12.5 MG: 25 TABLET ORAL at 17:49

## 2018-12-06 NOTE — ANESTHESIA PROCEDURE NOTES
Arterial Line Placement Start time: 12/6/2018 7:00 AM 
End time: 12/6/2018 7:05 AM 
Performed by: Jimbo Florez MD 
Authorized by: Jimbo Florez MD  
 
Pre-Procedure Indications:  Arterial pressure monitoring and blood sampling Preanesthetic Checklist: patient identified, risks and benefits discussed, anesthesia consent, patient being monitored and patient being monitored Procedure:  
Prep:  Chlorhexidine Seldinger Technique?: No   
Orientation:  Left Location:  Radial artery Catheter size:  20 G Number of attempts:  1 Assessment:  
Post-procedure:  Line secured Patient Tolerance:  Patient tolerated the procedure well with no immediate complications Comment:  
Ultrasound used for placement, image placed on chart.

## 2018-12-06 NOTE — PROGRESS NOTES
Dual skin assessment performed with two RNs. Skin is clean, dry, and intact. Incision to left neck covered with dermabond, clean, dry, and intact. Continuing to monitor.

## 2018-12-06 NOTE — PROGRESS NOTES
TRANSFER - IN REPORT: 
 
Verbal report received from CORNELIUS Valerio(name) on Nida Harden  being received from scroll kit) for routine post - op Report consisted of patients Situation, Background, Assessment and  
Recommendations(SBAR). Information from the following report(s) SBAR, Kardex, OR Summary, Procedure Summary, Intake/Output, MAR, Recent Results, Med Rec Status and Cardiac Rhythm NSR was reviewed with the receiving nurse. Opportunity for questions and clarification was provided. Assessment completed upon patients arrival to unit and care assumed.

## 2018-12-06 NOTE — PROGRESS NOTES
Spiritual Care Visit, initial visit. Visited with patient at bedside. Prayed for patient's healing and health. Visit by Loretta Shukla, Staff .  Shyann., Bernice.BUTCH., B.A.

## 2018-12-06 NOTE — OP NOTES
[facility] REPORT    Perry Hudson  MR#: [id number]  : 1955  ACCOUNT #: [de-identified]   DATE OF SERVICE: 2018    CLINICAL SERVICE:  Vascular Surgery. PREOPERATIVE DIAGNOSIS:  High-grade left carotid stenosis, greater than 80%. POSTOPERATIVE DIAGNOSIS:  High-grade left carotid stenosis, greater than 80%. PROCEDURE PERFORMED:  Left carotid endarterectomy, bovine pericardial patch closure. ATTENDING SURGEON:  Pierre Alonso MD    ASSISTANT:      ANESTHESIA:  General.    COMPLICATIONS:  None. SPECIMENS REMOVED:  Left carotid plaque. ESTIMATED BLOOD LOSS:  10 mL. IMPLANTS:      INDICATION FOR PROCEDURE:  This is a 57-year-old male with a history of coronary artery disease who underwent a carotid ultrasound prior to his CABG procedure and was found to have 80% stenosis. He underwent the CABG procedure without any complications and followed up with me once he completed rehab with a CTA which showed he had 80% stenosis. We discussed with the patient the risks and benefits of the procedure. We elected to proceed. PROCEDURE IN DETAIL:  After giving informed consent, the patient was brought to the operating room. General anesthesia was induced. Preop antibiotics were given before skin incision. The patient's left neck was then prepped and draped in normal sterile fashion. An  incision was made over the anterior border of the sternocleidomastoid. We dissected down through the subcutaneous tissue and the platysma. The sternocleidomastoid was retracted laterally, exposing the carotid sheath. The internal jugular vein and the facial vein were then identified. The facial vein was suture ligated with 2-0 silk ties. We got control of the common carotid artery with a Rumel tourniquet. We identified the vagus nerve and the hypoglossal and kept them out of harm's way.   The external carotid artery and the internal carotid were controlled with vessel loops. The plaque was, on CT scan, mostly in the proximal ICA. We did heparinize the patient with 5000 units of heparin. Then, we let it circulate for 2 minutes. Once we got proximal and distal control, we used an 11 blade for an arteriotomy in the common and extended it with De La Garza scissors through the heavily diseased plaque into the normal-appearing  lumen internal.  We put a #8 shunt in the internal and common, and confirmed we were in with Doppler signals. We then started our endarterectomy plane at the common with a [place] elevator. We transected it distally. It had feathered off, leaving a smooth endpoint. We did not do endarterectomy in the external as it was clean. All remaining free debris was removed with fine forceps. We then brought onto the field a bovine pericardial patch. We did a patch angioplasty using 6-0 Prolene proximally. We cut the stitch to the appropriate length distally. Prior to finishing our anastomosis, we removed the shunt. There was good backbleeding from the internal.  We forward flushed the common and the external.  We then finished our anastomosis, returning flow first into the external, then into the internal, then common. Patient had good Doppler signals over the internal, external, and common. We were happy with the procedure. We then reversed the patient with 50 mg of protamine. The ACT was 125. We then closed with 2-0 Vicryl, 3-0 Vicryl, and 4-0 Monocryl. The patient was extubated and transferred to the PACU in stable condition.         MD DAILY Max / BRUCE  D: 12/06/2018 09:43     T: 12/06/2018 11:05  JOB #: [id number]

## 2018-12-06 NOTE — PROGRESS NOTES
Patient c/o pain. 8/10. Morphine and Norco given and not due. MELANIE Guthrie notified. Increased dose of morphine and explained change to patient. Patient verbalized understanding. Will continue to monitor.

## 2018-12-06 NOTE — BRIEF OP NOTE
[address], [address] [place] [address]. [address] [place] [address] FAX: [phone] Op Note Template Note Pre-Op Diagnosis: Left carotid stenosis [I65.22] Post-Op Diagnosis:  Left carotid stenosis [I65.22] Procedures: Procedure(s): LEFT CAROTID ARTERY ENDARTERECTOMY Surgeon: Tri Hernandez MD 
 
Assistants: Surgeon(s): Virgen Simon MD   
 
Anesthesia:  General  
 
Findings: Left ICA plaque Tourniquet Time:  * No tourniquets in log * Estimated Blood Loss:       
     
Specimens: Same Implants:   
Implant Name Type Inv. Item Serial No.  Lot No. LRB No. Used Action PATCH VASC PERIPATCH [id number] -- XENOSURE -   PATCH VASC PERIPATCH 0.8X8CM -- XENOSURE 0000 [facility] VASCULAR [facility] [id number] Left 1 Implanted Complications: None Signed: Tri Hernandez MD 
   
Elements of this note have been dictated using speech recognition software. As a result, errors of speech recognition may have occurred.

## 2018-12-06 NOTE — ANESTHESIA POSTPROCEDURE EVALUATION
Procedure(s): LEFT CAROTID ARTERY ENDARTERECTOMY. Anesthesia Post Evaluation Multimodal analgesia: multimodal analgesia not used between 6 hours prior to anesthesia start to PACU discharge Patient location during evaluation: PACU Patient participation: complete - patient participated Level of consciousness: awake and alert Pain management: adequate Airway patency: patent Anesthetic complications: no 
Cardiovascular status: hemodynamically stable Respiratory status: acceptable Hydration status: acceptable Visit Vitals /61 Pulse 69 Temp 36.4 °C (97.6 °F) Resp 20 Ht 5' 5\" (1.651 m) Wt 53.5 kg (117 lb 14.4 oz) SpO2 90% BMI 19.62 kg/m²

## 2018-12-07 ENCOUNTER — APPOINTMENT (OUTPATIENT)
Dept: CARDIAC REHAB | Age: 63
End: 2018-12-07

## 2018-12-07 VITALS
WEIGHT: 117.9 LBS | RESPIRATION RATE: 15 BRPM | SYSTOLIC BLOOD PRESSURE: 163 MMHG | HEIGHT: 65 IN | DIASTOLIC BLOOD PRESSURE: 77 MMHG | BODY MASS INDEX: 19.64 KG/M2 | TEMPERATURE: 98.3 F | OXYGEN SATURATION: 95 % | HEART RATE: 95 BPM

## 2018-12-07 PROCEDURE — 74011000250 HC RX REV CODE- 250: Performed by: SURGERY

## 2018-12-07 PROCEDURE — 94640 AIRWAY INHALATION TREATMENT: CPT

## 2018-12-07 PROCEDURE — 74011250637 HC RX REV CODE- 250/637: Performed by: PHYSICIAN ASSISTANT

## 2018-12-07 PROCEDURE — 74011250637 HC RX REV CODE- 250/637: Performed by: SURGERY

## 2018-12-07 PROCEDURE — 74011250636 HC RX REV CODE- 250/636: Performed by: PHYSICIAN ASSISTANT

## 2018-12-07 PROCEDURE — 74011250636 HC RX REV CODE- 250/636: Performed by: SURGERY

## 2018-12-07 RX ORDER — ONDANSETRON 2 MG/ML
INJECTION INTRAMUSCULAR; INTRAVENOUS
Status: DISCONTINUED
Start: 2018-12-07 | End: 2018-12-07 | Stop reason: HOSPADM

## 2018-12-07 RX ORDER — ONDANSETRON 2 MG/ML
4 INJECTION INTRAMUSCULAR; INTRAVENOUS
Status: DISCONTINUED | OUTPATIENT
Start: 2018-12-07 | End: 2018-12-07 | Stop reason: HOSPADM

## 2018-12-07 RX ORDER — HYDROCODONE BITARTRATE AND ACETAMINOPHEN 5; 325 MG/1; MG/1
1 TABLET ORAL
Qty: 25 TAB | Refills: 0 | Status: SHIPPED | OUTPATIENT
Start: 2018-12-07 | End: 2018-12-17

## 2018-12-07 RX ADMIN — HYDROCODONE BITARTRATE AND ACETAMINOPHEN 1 TABLET: 5; 325 TABLET ORAL at 09:03

## 2018-12-07 RX ADMIN — MORPHINE SULFATE 4 MG: 2 INJECTION, SOLUTION INTRAMUSCULAR; INTRAVENOUS at 00:43

## 2018-12-07 RX ADMIN — ASPIRIN 81 MG: 81 TABLET, COATED ORAL at 07:43

## 2018-12-07 RX ADMIN — VANCOMYCIN HYDROCHLORIDE 1000 MG: 1 INJECTION, POWDER, LYOPHILIZED, FOR SOLUTION INTRAVENOUS at 06:15

## 2018-12-07 RX ADMIN — ONDANSETRON 4 MG: 2 INJECTION INTRAMUSCULAR; INTRAVENOUS at 00:48

## 2018-12-07 RX ADMIN — Medication 10 ML: at 06:16

## 2018-12-07 RX ADMIN — BUDESONIDE 500 MCG: 0.5 INHALANT RESPIRATORY (INHALATION) at 07:25

## 2018-12-07 RX ADMIN — HYDROCODONE BITARTRATE AND ACETAMINOPHEN 1 TABLET: 5; 325 TABLET ORAL at 04:51

## 2018-12-07 RX ADMIN — ALBUTEROL SULFATE 2.5 MG: 2.5 SOLUTION RESPIRATORY (INHALATION) at 07:25

## 2018-12-07 NOTE — DISCHARGE INSTRUCTIONS
MD Instructions:  Wound care:  - Beginning Monday, wash neck wound daily with liquid Dial soap (or other liquid antibacterial soap); use fingers or soft washcloth gently then pat area dry. - Keep incision clean and dry, may cover with gauze. - Do not apply lotions, creams or ointments to incisions. - Tissue adhesive (\"skin glue\") used over incision will flake or peel off on its own. Diet:  - As tolerated before surgery. Activity:  - Don't overdo your activity throughout the day for the next 10-14 days - no prolonged standing, no lifting more than 10lb. - No driving while taking narcotics. - Do not drink alcohol while taking narcotics. - May shower - no tub baths, soaking or swimming. Medications:  - Use prescription pain medications if needed; if you do not wish to use narcotic pain medication or require less pain control, you may take acetaminophen (Tylenol, for example) or naproxen (Aleve, for example) following instructions on the label.  - Resume other home medications.     Follow up in the office with Dr. Filippo Roa on 12/18/2018 at 8:50 AM.    If problems or questions arise, please call our office at [phone]. DISCHARGE SUMMARY from Nurse    PATIENT INSTRUCTIONS:    After general anesthesia or intravenous sedation, for 24 hours or while taking prescription Narcotics:  · Limit your activities  · Do not drive and operate hazardous machinery  · Do not make important personal or business decisions  · Do  not drink alcoholic beverages  · If you have not urinated within 8 hours after discharge, please contact your surgeon on call.     Report the following to your surgeon:  · Excessive pain, swelling, redness or odor of or around the surgical area  · Temperature over 100.5  · Nausea and vomiting lasting longer than 4 hours or if unable to take medications  · Any signs of decreased circulation or nerve impairment to extremity: change in color, persistent  numbness, tingling, coldness or increase pain  · Any questions    What to do at Home:  Recommended activity: No lifting, Driving, or Strenuous exercise for 1 week or while on narcotics,     If you experience any of the following symptoms fever, drainage from site, swelling that dose not improve, please follow up with Dr. Erick Capps office. *  Please give a list of your current medications to your Primary Care Provider. *  Please update this list whenever your medications are discontinued, doses are      changed, or new medications (including over-the-counter products) are added. *  Please carry medication information at all times in case of emergency situations. These are general instructions for a healthy lifestyle:    No smoking/ No tobacco products/ Avoid exposure to second hand smoke  Surgeon General's Warning:  Quitting smoking now greatly reduces serious risk to your health. Obesity, smoking, and sedentary lifestyle greatly increases your risk for illness    A healthy diet, regular physical exercise & weight monitoring are important for maintaining a healthy lifestyle    You may be retaining fluid if you have a history of heart failure or if you experience any of the following symptoms:  Weight gain of 3 pounds or more overnight or 5 pounds in a week, increased swelling in our hands or feet or shortness of breath while lying flat in bed. Please call your doctor as soon as you notice any of these symptoms; do not wait until your next office visit. Recognize signs and symptoms of STROKE:    F-face looks uneven    A-arms unable to move or move unevenly    S-speech slurred or non-existent    T-time-call 911 as soon as signs and symptoms begin-DO NOT go       Back to bed or wait to see if you get better-TIME IS BRAIN. Warning Signs of HEART ATTACK     Call 911 if you have these symptoms:   Chest discomfort.  Most heart attacks involve discomfort in the center of the chest that lasts more than a few minutes, or that goes away and comes back. It can feel like uncomfortable pressure, squeezing, fullness, or pain.  Discomfort in other areas of the upper body. Symptoms can include pain or discomfort in one or both arms, the back, neck, jaw, or stomach.  Shortness of breath with or without chest discomfort.  Other signs may include breaking out in a cold sweat, nausea, or lightheadedness. Don't wait more than five minutes to call 911 - MINUTES MATTER! Fast action can save your life. Calling 911 is almost always the fastest way to get lifesaving treatment. Emergency Medical Services staff can begin treatment when they arrive -- up to an hour sooner than if someone gets to the hospital by car. The discharge information has been reviewed with the patient and spouse. The patient and spouse verbalized understanding. Discharge medications reviewed with the patient and spouse and appropriate educational materials and side effects teaching were provided.   ___________________________________________________________________________________________________________________________________

## 2018-12-07 NOTE — CALL BACK NOTE
Patient states he has been home a couple of hours and his left chest is swelling, red and itches. Patient instructed to call Dr. Lanre Bloom promptly for further advice. Patient verbalizes understanding.

## 2018-12-07 NOTE — PROGRESS NOTES
Pt's left radial art line removed at this time. Manual pressure held until hemostasis achieved. No bleeding or hematoma at site. Pressure dressing to site. Pt placed on NIBP Q 15min. Will monitor.

## 2018-12-07 NOTE — PROGRESS NOTES
Left incisional site, upper chest red and inflamed. MELANIE Cox notified. She spoke with Dr. Verna Echols. No new orders received. Instructed to take OTC benadryl at home. Will continue to monitor.

## 2018-12-07 NOTE — PROGRESS NOTES
No d/c needs voiced per pt, staff or MD.  
 
Care Management Interventions PCP Verified by CM: Yes Mode of Transport at Discharge: Other (see comment) Transition of Care Consult (CM Consult): Discharge Planning Current Support Network: Lives with Spouse, Own Home Confirm Follow Up Transport: Family Freedom of Choice Offered: Yes [place] Resource Information Provided?: [facility]) Discharge Location Discharge Placement: Home

## 2018-12-07 NOTE — PROGRESS NOTES
Discharge instructions explained to patient. All questions and concerns answered. Verbalized understanding. Prescription for pain medication handed to patient. Patient discharged to private vehicle.

## 2018-12-07 NOTE — PROGRESS NOTES
[address], [address] [place] [address]. [address] [place] [address] FAX: [phone] VASCULAR SURGERY FLOOR PROGRESS NOTE Admit Date: 2018 POD: 1 Day Post-Op Procedure:  Procedure(s): LEFT CAROTID ARTERY ENDARTERECTOMY Subjective:  
 
Patient has no new complaints. Objective:  
 
Vitals: 
Blood pressure 147/75, pulse 77, temperature 98.1 °F (36.7 °C), resp. rate (!) 39, height 5' 5\" (1.651 m), weight 117 lb 14.4 oz (53.5 kg), SpO2 96 %. Temp (24hrs), Av °F (36.7 °C), Min:97.6 °F (36.4 °C), Max:98.2 °F (36.8 °C) Intake / Output: 
 
Intake/Output Summary (Last 24 hours) at 2018 6282 Last data filed at 2018 0501 Gross per 24 hour Intake 902.5 ml Output 3200 ml Net -2297.5 ml Physical Exam:   
Constitutional: he appears well-developed. No distress. HENT:  
Head: Atraumatic. Eyes: Pupils are equal, round, and reactive to light. Neck: Normal range of motion. Cardiovascular: Regular rhythm. Pulmonary/Chest: Effort normal and breath sounds normal. No respiratory distress. Abdominal: Soft. Bowel sounds are normal. he exhibits no distension. There is no tenderness. There is no guarding. No hernia. Musculoskeletal: Normal range of motion. Neurological: He is alert. CN II- XII grossly intact Vascular: neuro intact Labs: No results for input(s): HGB, WBC, K, GLU, HGBEXT in the last 72 hours. No lab exists for component:  CREA Data Review Assessment:  
 
Patient Active Problem List  
 Diagnosis Date Noted  Carotid stenosis, asymptomatic, left 2018  Carotid stenosis 2018  Tobacco use 10/31/2018  Bilateral carotid artery stenosis 10/01/2018  Other pneumothorax 2018  Hypoxemia 2018  S/P CABG x 3 2018  Personal history of tobacco use, presenting hazards to health 2018  CAD (coronary artery disease)  Atherosclerosis of native coronary artery of native heart without angina pectoris 08/13/2018  Mixed hyperlipidemia 08/13/2018  History of neck surgery  Status post ileal conduit (HCC)  Depression with anxiety 03/03/2016  Chronic obstructive pulmonary disease ([facility] [address] 03/03/2016  Urinary bladder cancer ([facility] [address] 07/01/2014  
 HTN (hypertension) 04/19/2014  Cervical stenosis of spinal canal 04/18/2014 Plan/Recommendations/Medical Decision Making: D/c home follow 2 weeks Elements of this note have been dictated using speech recognition software. As a result, errors of speech recognition may have occurred.

## 2018-12-10 ENCOUNTER — APPOINTMENT (OUTPATIENT)
Dept: CARDIAC REHAB | Age: 63
End: 2018-12-10

## 2018-12-10 ENCOUNTER — PATIENT OUTREACH (OUTPATIENT)
Dept: CASE MANAGEMENT | Age: 63
End: 2018-12-10

## 2018-12-10 NOTE — PROGRESS NOTES
This note will not be viewable in [address] Date/Time of Call: 
 12/10/18 1123am  
What was the patient hospitalized for? Left Carotid Artery Endarectomy Consent for [facility] Call Does the patient understand his/her diagnosis and/or treatment and what happened during the hospitalization? Spoke with patient and he agrees to [facility] call Patient states that he understands the treatment and what happened in the hospital  
Did the patient receive discharge instructions? Yes  
CM Assessed Risk for Readmission:  
 
 
Patient stated Risk for Readmission:  
 
 Patient is a low risk for readmission per Care Coordinator assessment Patient states that he is not concerned for readmission at this time Review any discharge instructions ([facility] Ask patient if they understand these. Do they have any questions? DC instructions reviewed Were home services ordered (nursing, PT, OT, ST, etc.)? No  
If so, has the first visit occurred? If not, why? (Assist with coordination of services if necessary. ) 
 NA Was any DME ordered? No  
If so, has it been received? If not, why?  (Assist patient in obtaining DME orders &/or equipment if necessary. ) NA Complete a review of all medications (new, continued and discontinued meds per the D/C instructions and medication tab in [facility]). Medications reviewed Were all new prescriptions filled? If not, why?  (Assist patient in obtaining medications if necessary  escalate for CCM &/or SW if ongoing issues are verbalized by pt or anticipated) Yes Does the patient understand the purpose and dosing instructions for all medications? (If patient has questions, provide explanation and education.) Patient states that he understands all of the medications that she takes Does the patient have any problems in performing ADLs? (If patient is unable to perform ADLs  what is the limiting factor(s)?   Do they have a support system that can assist? If no support system is present, discuss possible assistance that they may be able to obtain. Escalate for CCM/SW if ongoing issues are verbalized by pt or anticipated) Patient is independent with ADLs and has family to assist PRN Does the patient have all follow-up appointments scheduled? 7 day f/up with PCP?  
(f/up with PCP may be w/in 14 days if patient has a f/up with their specialist w/in 7 days) 7-14 day f/up with specialist?  
(or per discharge instructions) If f/up has not been made  what actions has the care coordinator made to accomplish this? Has transportation been arranged? Yes 
 
 
12/17/18 at 1015am   
 
 
 
Vascular 12/18/18 at 850am  
 
 
Reviewed appointment information with the patient. He verbalizes understanding of appointment date, time and location. Patients family will transport him to his appointments Any other questions or concerns expressed by the patient? Patients states that he does not have any questions or concerns at this time regarding her hospital stay or care at home. Contact information for Care Coordinator provided should anything arise that the patient needs assistance with Schedule next appointment with BO Gaitan or refer to RN Case Manager/ per the workflow guidelines. When is care coordinators next follow-up call scheduled? If referred for CCM  what RN care manager was the referral assigned? Within 30 days Within 30 days NA   
JINNY Call Completed By: Chapis Art CMA Care Coordinator

## 2018-12-12 ENCOUNTER — APPOINTMENT (OUTPATIENT)
Dept: CARDIAC REHAB | Age: 63
End: 2018-12-12

## 2018-12-14 ENCOUNTER — APPOINTMENT (OUTPATIENT)
Dept: CARDIAC REHAB | Age: 63
End: 2018-12-14

## 2018-12-17 ENCOUNTER — APPOINTMENT (OUTPATIENT)
Dept: CARDIAC REHAB | Age: 63
End: 2018-12-17

## 2018-12-19 ENCOUNTER — APPOINTMENT (OUTPATIENT)
Dept: CARDIAC REHAB | Age: 63
End: 2018-12-19

## 2018-12-21 ENCOUNTER — APPOINTMENT (OUTPATIENT)
Dept: CARDIAC REHAB | Age: 63
End: 2018-12-21

## 2018-12-26 ENCOUNTER — APPOINTMENT (OUTPATIENT)
Dept: CARDIAC REHAB | Age: 63
End: 2018-12-26

## 2018-12-28 ENCOUNTER — APPOINTMENT (OUTPATIENT)
Dept: CARDIAC REHAB | Age: 63
End: 2018-12-28

## 2018-12-31 ENCOUNTER — APPOINTMENT (OUTPATIENT)
Dept: CARDIAC REHAB | Age: 63
End: 2018-12-31

## 2019-01-02 ENCOUNTER — APPOINTMENT (OUTPATIENT)
Dept: CARDIAC REHAB | Age: 64
End: 2019-01-02

## 2019-01-02 ENCOUNTER — PATIENT OUTREACH (OUTPATIENT)
Dept: CASE MANAGEMENT | Age: 64
End: 2019-01-02

## 2019-01-02 NOTE — PROGRESS NOTES
This note will not be viewable in [address] Called and spoke to patient for 30 day FU. He states that he is doing good, and going to his appointments as directed. He has his medications and transportation to his appointments. He denies any questions or concerns. Episode closed at this time as no further needs are identified.

## 2019-01-04 ENCOUNTER — APPOINTMENT (OUTPATIENT)
Dept: CARDIAC REHAB | Age: 64
End: 2019-01-04

## 2019-01-07 ENCOUNTER — APPOINTMENT (OUTPATIENT)
Dept: CARDIAC REHAB | Age: 64
End: 2019-01-07

## 2019-01-09 ENCOUNTER — APPOINTMENT (OUTPATIENT)
Dept: CARDIAC REHAB | Age: 64
End: 2019-01-09

## 2019-01-31 ENCOUNTER — HOSPITAL ENCOUNTER (OUTPATIENT)
Dept: CT IMAGING | Age: 64
Discharge: HOME | End: 2019-01-31
Attending: UROLOGY
Payer: MEDICARE

## 2019-01-31 DIAGNOSIS — N20.0 KIDNEY STONE ON LEFT SIDE: ICD-10-CM

## 2019-01-31 PROCEDURE — 74176 CT ABD & PELVIS W/O CONTRAST: CPT

## 2019-04-15 ENCOUNTER — HOSPITAL ENCOUNTER (OUTPATIENT)
Dept: CT IMAGING | Age: 64
Discharge: HOME | End: 2019-04-15
Attending: INTERNAL MEDICINE
Payer: MEDICARE

## 2019-04-15 VITALS — WEIGHT: 100 LBS | HEIGHT: 65 IN | BODY MASS INDEX: 16.66 KG/M2

## 2019-04-15 DIAGNOSIS — C67.9 MALIGNANT NEOPLASM OF URINARY BLADDER, UNSPECIFIED SITE (HCC): Chronic | ICD-10-CM

## 2019-04-15 LAB — CREAT BLD-MCNC: 0.9 MG/DL (ref 0.8–1.5)

## 2019-04-15 PROCEDURE — 74177 CT ABD & PELVIS W/CONTRAST: CPT

## 2019-04-15 PROCEDURE — 82565 ASSAY OF CREATININE: CPT

## 2019-04-15 PROCEDURE — 74011000258 HC RX REV CODE- 258: Performed by: INTERNAL MEDICINE

## 2019-04-15 PROCEDURE — 74011636320 HC RX REV CODE- 636/320: Performed by: INTERNAL MEDICINE

## 2019-04-15 RX ORDER — SODIUM CHLORIDE 0.9 % (FLUSH) 0.9 %
10 SYRINGE (ML) INJECTION
Status: COMPLETED | OUTPATIENT
Start: 2019-04-15 | End: 2019-04-15

## 2019-04-15 RX ADMIN — SODIUM CHLORIDE 100 ML: 900 INJECTION, SOLUTION INTRAVENOUS at 11:08

## 2019-04-15 RX ADMIN — Medication 10 ML: at 11:08

## 2019-04-15 RX ADMIN — IOPAMIDOL 100 ML: 755 INJECTION, SOLUTION INTRAVENOUS at 11:08

## 2019-04-15 RX ADMIN — DIATRIZOATE MEGLUMINE AND DIATRIZOATE SODIUM 15 ML: 660; 100 LIQUID ORAL; RECTAL at 11:08

## 2019-04-24 PROBLEM — R11.2 INTRACTABLE VOMITING WITH NAUSEA: Status: ACTIVE | Noted: 2019-04-24

## 2019-04-26 ENCOUNTER — HOSPITAL ENCOUNTER (OUTPATIENT)
Dept: LAB | Age: 64
Discharge: HOME | End: 2019-04-26
Payer: MEDICARE

## 2019-04-26 DIAGNOSIS — C67.9 MALIGNANT NEOPLASM OF URINARY BLADDER, UNSPECIFIED SITE (HCC): ICD-10-CM

## 2019-04-26 LAB
ALBUMIN SERPL-MCNC: 3.8 G/DL (ref 3.2–4.6)
ALBUMIN/GLOB SERPL: 1 {RATIO} (ref 1.2–3.5)
ALP SERPL-CCNC: 86 U/L (ref 50–136)
ALT SERPL-CCNC: 34 U/L (ref 12–65)
ANION GAP SERPL CALC-SCNC: 5 MMOL/L (ref 7–16)
AST SERPL-CCNC: 18 U/L (ref 15–37)
BASOPHILS # BLD: 0.1 K/UL (ref 0–0.2)
BASOPHILS NFR BLD: 1 % (ref 0–2)
BILIRUB SERPL-MCNC: 0.3 MG/DL (ref 0.2–1.1)
BUN SERPL-MCNC: 16 MG/DL (ref 8–23)
CALCIUM SERPL-MCNC: 8.7 MG/DL (ref 8.3–10.4)
CHLORIDE SERPL-SCNC: 101 MMOL/L (ref 98–107)
CO2 SERPL-SCNC: 26 MMOL/L (ref 21–32)
CREAT SERPL-MCNC: 0.92 MG/DL (ref 0.8–1.5)
DIFFERENTIAL METHOD BLD: ABNORMAL
EOSINOPHIL # BLD: 0.2 K/UL (ref 0–0.8)
EOSINOPHIL NFR BLD: 2 % (ref 0.5–7.8)
ERYTHROCYTE [DISTWIDTH] IN BLOOD BY AUTOMATED COUNT: 14.3 % (ref 11.9–14.6)
GLOBULIN SER CALC-MCNC: 3.8 G/DL (ref 2.3–3.5)
GLUCOSE SERPL-MCNC: 83 MG/DL (ref 65–100)
HCT VFR BLD AUTO: 39.4 % (ref 41.1–50.3)
HGB BLD-MCNC: 13.2 G/DL (ref 13.6–17.2)
IMM GRANULOCYTES # BLD AUTO: 0 K/UL (ref 0–0.5)
IMM GRANULOCYTES NFR BLD AUTO: 1 % (ref 0–5)
LYMPHOCYTES # BLD: 2.5 K/UL (ref 0.5–4.6)
LYMPHOCYTES NFR BLD: 35 % (ref 13–44)
MCH RBC QN AUTO: 35.2 PG (ref 26.1–32.9)
MCHC RBC AUTO-ENTMCNC: 33.5 G/DL (ref 31.4–35)
MCV RBC AUTO: 105.1 FL (ref 79.6–97.8)
MONOCYTES # BLD: 0.7 K/UL (ref 0.1–1.3)
MONOCYTES NFR BLD: 10 % (ref 4–12)
NEUTS SEG # BLD: 3.5 K/UL (ref 1.7–8.2)
NEUTS SEG NFR BLD: 51 % (ref 43–78)
NRBC # BLD: 0 K/UL (ref 0–0.2)
PLATELET # BLD AUTO: 467 K/UL (ref 150–450)
PMV BLD AUTO: 8.5 FL (ref 9.4–12.3)
POTASSIUM SERPL-SCNC: 4.3 MMOL/L (ref 3.5–5.1)
PROT SERPL-MCNC: 7.6 G/DL (ref 6.3–8.2)
RBC # BLD AUTO: 3.75 M/UL (ref 4.23–5.67)
SODIUM SERPL-SCNC: 132 MMOL/L (ref 136–145)
WBC # BLD AUTO: 7 K/UL (ref 4.3–11.1)

## 2019-04-26 PROCEDURE — 36415 COLL VENOUS BLD VENIPUNCTURE: CPT

## 2019-04-26 PROCEDURE — 80053 COMPREHEN METABOLIC PANEL: CPT

## 2019-04-26 PROCEDURE — 85025 COMPLETE CBC W/AUTO DIFF WBC: CPT

## 2019-05-29 PROBLEM — I65.22 CAROTID STENOSIS, ASYMPTOMATIC, LEFT: Status: RESOLVED | Noted: 2018-12-06 | Resolved: 2019-05-29

## 2019-05-29 PROBLEM — R09.02 HYPOXEMIA: Status: RESOLVED | Noted: 2018-09-19 | Resolved: 2019-05-29

## 2019-05-29 PROBLEM — E78.2 MIXED HYPERLIPIDEMIA: Chronic | Status: RESOLVED | Noted: 2018-08-13 | Resolved: 2019-05-29

## 2019-05-29 PROBLEM — J93.83 OTHER PNEUMOTHORAX: Status: RESOLVED | Noted: 2018-09-19 | Resolved: 2019-05-29

## 2019-05-29 PROBLEM — Z87.891 PERSONAL HISTORY OF TOBACCO USE, PRESENTING HAZARDS TO HEALTH: Chronic | Status: RESOLVED | Noted: 2018-09-18 | Resolved: 2019-05-29

## 2019-05-29 PROBLEM — I25.10 ATHEROSCLEROSIS OF NATIVE CORONARY ARTERY OF NATIVE HEART WITHOUT ANGINA PECTORIS: Status: RESOLVED | Noted: 2018-08-13 | Resolved: 2019-05-29

## 2019-06-17 ENCOUNTER — HOSPITAL ENCOUNTER (OUTPATIENT)
Dept: CT IMAGING | Age: 64
Discharge: HOME | End: 2019-06-17
Attending: INTERNAL MEDICINE

## 2019-06-17 DIAGNOSIS — R43.0 LOSS OF SMELL: ICD-10-CM

## 2019-06-17 DIAGNOSIS — G89.29 CHRONIC NONINTRACTABLE HEADACHE, UNSPECIFIED HEADACHE TYPE: ICD-10-CM

## 2019-06-17 DIAGNOSIS — R51.9 CHRONIC NONINTRACTABLE HEADACHE, UNSPECIFIED HEADACHE TYPE: ICD-10-CM

## 2019-06-24 ENCOUNTER — HOSPITAL ENCOUNTER (OUTPATIENT)
Dept: LAB | Age: 64
Discharge: HOME | End: 2019-06-24

## 2019-06-24 PROCEDURE — 88312 SPECIAL STAINS GROUP 1: CPT

## 2019-06-24 PROCEDURE — 88305 TISSUE EXAM BY PATHOLOGIST: CPT

## 2019-07-05 PROBLEM — F33.2 SEVERE EPISODE OF RECURRENT MAJOR DEPRESSIVE DISORDER, WITHOUT PSYCHOTIC FEATURES (HCC): Status: ACTIVE | Noted: 2019-07-05

## 2019-10-21 ENCOUNTER — HOSPITAL ENCOUNTER (OUTPATIENT)
Dept: LAB | Age: 64
Discharge: HOME | End: 2019-10-21
Payer: MEDICARE

## 2019-10-21 DIAGNOSIS — C67.9 MALIGNANT NEOPLASM OF URINARY BLADDER, UNSPECIFIED SITE (HCC): ICD-10-CM

## 2019-10-21 LAB
ALBUMIN SERPL-MCNC: 4.3 G/DL (ref 3.2–4.6)
ALBUMIN/GLOB SERPL: 1.1 {RATIO} (ref 1.2–3.5)
ALP SERPL-CCNC: 90 U/L (ref 50–136)
ALT SERPL-CCNC: 25 U/L (ref 12–65)
ANION GAP SERPL CALC-SCNC: 7 MMOL/L (ref 7–16)
AST SERPL-CCNC: 18 U/L (ref 15–37)
BASOPHILS # BLD: 0.1 K/UL (ref 0–0.2)
BASOPHILS NFR BLD: 1 % (ref 0–2)
BILIRUB SERPL-MCNC: 0.3 MG/DL (ref 0.2–1.1)
BUN SERPL-MCNC: 10 MG/DL (ref 8–23)
CALCIUM SERPL-MCNC: 9.4 MG/DL (ref 8.3–10.4)
CHLORIDE SERPL-SCNC: 105 MMOL/L (ref 98–107)
CO2 SERPL-SCNC: 23 MMOL/L (ref 21–32)
CREAT SERPL-MCNC: 0.9 MG/DL (ref 0.8–1.5)
DIFFERENTIAL METHOD BLD: ABNORMAL
EOSINOPHIL # BLD: 0 K/UL (ref 0–0.8)
EOSINOPHIL NFR BLD: 0 % (ref 0.5–7.8)
ERYTHROCYTE [DISTWIDTH] IN BLOOD BY AUTOMATED COUNT: 13.1 % (ref 11.9–14.6)
GLOBULIN SER CALC-MCNC: 4 G/DL (ref 2.3–3.5)
GLUCOSE SERPL-MCNC: 102 MG/DL (ref 65–100)
HCT VFR BLD AUTO: 45.7 % (ref 41.1–50.3)
HGB BLD-MCNC: 15.5 G/DL (ref 13.6–17.2)
IMM GRANULOCYTES # BLD AUTO: 0 K/UL (ref 0–0.5)
IMM GRANULOCYTES NFR BLD AUTO: 1 % (ref 0–5)
LYMPHOCYTES # BLD: 1.8 K/UL (ref 0.5–4.6)
LYMPHOCYTES NFR BLD: 25 % (ref 13–44)
MCH RBC QN AUTO: 34.9 PG (ref 26.1–32.9)
MCHC RBC AUTO-ENTMCNC: 33.9 G/DL (ref 31.4–35)
MCV RBC AUTO: 102.9 FL (ref 79.6–97.8)
MONOCYTES # BLD: 0.5 K/UL (ref 0.1–1.3)
MONOCYTES NFR BLD: 7 % (ref 4–12)
NEUTS SEG # BLD: 5 K/UL (ref 1.7–8.2)
NEUTS SEG NFR BLD: 67 % (ref 43–78)
NRBC # BLD: 0 K/UL (ref 0–0.2)
PLATELET # BLD AUTO: 483 K/UL (ref 150–450)
PMV BLD AUTO: 9 FL (ref 9.4–12.3)
POTASSIUM SERPL-SCNC: 4.4 MMOL/L (ref 3.5–5.1)
PROT SERPL-MCNC: 8.3 G/DL (ref 6.3–8.2)
RBC # BLD AUTO: 4.44 M/UL (ref 4.23–5.67)
SODIUM SERPL-SCNC: 135 MMOL/L (ref 136–145)
WBC # BLD AUTO: 7.5 K/UL (ref 4.3–11.1)

## 2019-10-21 PROCEDURE — 80053 COMPREHEN METABOLIC PANEL: CPT

## 2019-10-21 PROCEDURE — 36415 COLL VENOUS BLD VENIPUNCTURE: CPT

## 2019-10-21 PROCEDURE — 85025 COMPLETE CBC W/AUTO DIFF WBC: CPT

## 2020-04-15 ENCOUNTER — HOSPITAL ENCOUNTER (OUTPATIENT)
Dept: CT IMAGING | Age: 65
Discharge: HOME | End: 2020-04-15
Attending: INTERNAL MEDICINE
Payer: MEDICARE

## 2020-04-15 DIAGNOSIS — C67.9 MALIGNANT NEOPLASM OF URINARY BLADDER, UNSPECIFIED SITE (HCC): ICD-10-CM

## 2020-04-15 LAB — CREAT BLD-MCNC: 0.9 MG/DL (ref 0.8–1.5)

## 2020-04-15 PROCEDURE — 82565 ASSAY OF CREATININE: CPT

## 2020-04-15 PROCEDURE — 74011636320 HC RX REV CODE- 636/320: Performed by: INTERNAL MEDICINE

## 2020-04-15 PROCEDURE — 74177 CT ABD & PELVIS W/CONTRAST: CPT

## 2020-04-15 PROCEDURE — 74011000258 HC RX REV CODE- 258: Performed by: INTERNAL MEDICINE

## 2020-04-15 RX ORDER — SODIUM CHLORIDE 0.9 % (FLUSH) 0.9 %
10 SYRINGE (ML) INJECTION
Status: COMPLETED | OUTPATIENT
Start: 2020-04-15 | End: 2020-04-15

## 2020-04-15 RX ADMIN — Medication 10 ML: at 10:47

## 2020-04-15 RX ADMIN — IOPAMIDOL 100 ML: 755 INJECTION, SOLUTION INTRAVENOUS at 10:47

## 2020-04-15 RX ADMIN — SODIUM CHLORIDE 100 ML: 900 INJECTION, SOLUTION INTRAVENOUS at 10:47

## 2020-04-15 RX ADMIN — DIATRIZOATE MEGLUMINE AND DIATRIZOATE SODIUM 15 ML: 660; 100 LIQUID ORAL; RECTAL at 10:47

## 2020-08-05 ENCOUNTER — HOSPITAL ENCOUNTER (OUTPATIENT)
Dept: LAB | Age: 65
Discharge: HOME | End: 2020-08-05
Payer: MEDICARE

## 2020-08-05 DIAGNOSIS — C67.9 MALIGNANT NEOPLASM OF URINARY BLADDER, UNSPECIFIED SITE (HCC): ICD-10-CM

## 2020-08-05 LAB
ALBUMIN SERPL-MCNC: 4.3 G/DL (ref 3.2–4.6)
ALBUMIN/GLOB SERPL: 1.1 {RATIO} (ref 1.2–3.5)
ALP SERPL-CCNC: 78 U/L (ref 50–136)
ALT SERPL-CCNC: 34 U/L (ref 12–65)
ANION GAP SERPL CALC-SCNC: 2 MMOL/L (ref 7–16)
AST SERPL-CCNC: 22 U/L (ref 15–37)
BASOPHILS # BLD: 0.1 K/UL (ref 0–0.2)
BASOPHILS NFR BLD: 1 % (ref 0–2)
BILIRUB SERPL-MCNC: 0.4 MG/DL (ref 0.2–1.1)
BUN SERPL-MCNC: 14 MG/DL (ref 8–23)
CALCIUM SERPL-MCNC: 9.3 MG/DL (ref 8.3–10.4)
CHLORIDE SERPL-SCNC: 103 MMOL/L (ref 98–107)
CO2 SERPL-SCNC: 27 MMOL/L (ref 21–32)
CREAT SERPL-MCNC: 1.1 MG/DL (ref 0.8–1.5)
DIFFERENTIAL METHOD BLD: ABNORMAL
EOSINOPHIL # BLD: 0.2 K/UL (ref 0–0.8)
EOSINOPHIL NFR BLD: 3 % (ref 0.5–7.8)
ERYTHROCYTE [DISTWIDTH] IN BLOOD BY AUTOMATED COUNT: 13.3 % (ref 11.9–14.6)
GLOBULIN SER CALC-MCNC: 3.8 G/DL (ref 2.3–3.5)
GLUCOSE SERPL-MCNC: 101 MG/DL (ref 65–100)
HCT VFR BLD AUTO: 44 % (ref 41.1–50.3)
HGB BLD-MCNC: 15 G/DL (ref 13.6–17.2)
IMM GRANULOCYTES # BLD AUTO: 0 K/UL (ref 0–0.5)
IMM GRANULOCYTES NFR BLD AUTO: 0 % (ref 0–5)
LYMPHOCYTES # BLD: 2.2 K/UL (ref 0.5–4.6)
LYMPHOCYTES NFR BLD: 27 % (ref 13–44)
MCH RBC QN AUTO: 35.5 PG (ref 26.1–32.9)
MCHC RBC AUTO-ENTMCNC: 34.1 G/DL (ref 31.4–35)
MCV RBC AUTO: 104.3 FL (ref 79.6–97.8)
MONOCYTES # BLD: 0.9 K/UL (ref 0.1–1.3)
MONOCYTES NFR BLD: 11 % (ref 4–12)
NEUTS SEG # BLD: 4.8 K/UL (ref 1.7–8.2)
NEUTS SEG NFR BLD: 59 % (ref 43–78)
NRBC # BLD: 0 K/UL (ref 0–0.2)
PLATELET # BLD AUTO: 421 K/UL (ref 150–450)
PMV BLD AUTO: 8.8 FL (ref 9.4–12.3)
POTASSIUM SERPL-SCNC: 5.4 MMOL/L (ref 3.5–5.1)
PROT SERPL-MCNC: 8.1 G/DL (ref 6.3–8.2)
RBC # BLD AUTO: 4.22 M/UL (ref 4.23–5.67)
SODIUM SERPL-SCNC: 132 MMOL/L (ref 136–145)
WBC # BLD AUTO: 8.2 K/UL (ref 4.3–11.1)

## 2020-08-05 PROCEDURE — 85025 COMPLETE CBC W/AUTO DIFF WBC: CPT

## 2020-08-05 PROCEDURE — 36415 COLL VENOUS BLD VENIPUNCTURE: CPT

## 2020-08-05 PROCEDURE — 80053 COMPREHEN METABOLIC PANEL: CPT

## 2020-08-06 ENCOUNTER — HOSPITAL ENCOUNTER (OUTPATIENT)
Dept: CT IMAGING | Age: 65
Discharge: HOME | End: 2020-08-06
Attending: INTERNAL MEDICINE
Payer: MEDICARE

## 2020-08-06 DIAGNOSIS — Z87.442 HISTORY OF KIDNEY STONES: ICD-10-CM

## 2020-08-06 DIAGNOSIS — R10.9 ABDOMINAL PAIN, UNSPECIFIED ABDOMINAL LOCATION: ICD-10-CM

## 2020-08-06 PROCEDURE — 74176 CT ABD & PELVIS W/O CONTRAST: CPT

## 2020-08-17 DIAGNOSIS — Z01.818 PREOP TESTING: Primary | ICD-10-CM

## 2020-08-23 ENCOUNTER — ANESTHESIA EVENT (OUTPATIENT)
Dept: SURGERY | Age: 65
DRG: 919 | End: 2020-08-23
Payer: MEDICARE

## 2020-08-23 RX ORDER — CELECOXIB 200 MG/1
200 CAPSULE ORAL AS NEEDED
Status: CANCELLED | OUTPATIENT
Start: 2020-08-23

## 2020-08-24 ENCOUNTER — APPOINTMENT (OUTPATIENT)
Dept: GENERAL RADIOLOGY | Age: 65
DRG: 919 | End: 2020-08-24
Attending: EMERGENCY MEDICINE
Payer: MEDICARE

## 2020-08-24 ENCOUNTER — APPOINTMENT (OUTPATIENT)
Dept: CT IMAGING | Age: 65
DRG: 919 | End: 2020-08-24
Attending: EMERGENCY MEDICINE
Payer: MEDICARE

## 2020-08-24 ENCOUNTER — HOSPITAL ENCOUNTER (OUTPATIENT)
Age: 65
Setting detail: OUTPATIENT SURGERY
Discharge: HOME | DRG: 919 | End: 2020-08-24
Attending: UROLOGY | Admitting: UROLOGY
Payer: MEDICARE

## 2020-08-24 ENCOUNTER — HOSPITAL ENCOUNTER (INPATIENT)
Age: 65
LOS: 3 days | Discharge: HOME | DRG: 919 | End: 2020-08-27
Attending: EMERGENCY MEDICINE | Admitting: UROLOGY
Payer: MEDICARE

## 2020-08-24 ENCOUNTER — ANESTHESIA (OUTPATIENT)
Dept: SURGERY | Age: 65
DRG: 919 | End: 2020-08-24
Payer: MEDICARE

## 2020-08-24 VITALS
OXYGEN SATURATION: 97 % | BODY MASS INDEX: 18.8 KG/M2 | TEMPERATURE: 97.5 F | RESPIRATION RATE: 12 BRPM | SYSTOLIC BLOOD PRESSURE: 163 MMHG | DIASTOLIC BLOOD PRESSURE: 79 MMHG | WEIGHT: 113 LBS | HEART RATE: 77 BPM

## 2020-08-24 DIAGNOSIS — K66.1 NONTRAUMATIC RETROPERITONEAL HEMATOMA: Primary | ICD-10-CM

## 2020-08-24 DIAGNOSIS — N20.0 KIDNEY STONE ON LEFT SIDE: Primary | ICD-10-CM

## 2020-08-24 PROBLEM — S37.019A RENAL HEMATOMA: Status: ACTIVE | Noted: 2020-08-24

## 2020-08-24 LAB
ALBUMIN SERPL-MCNC: 4.1 G/DL (ref 3.2–4.6)
ALBUMIN/GLOB SERPL: 1.1 {RATIO} (ref 1.2–3.5)
ALP SERPL-CCNC: 85 U/L (ref 50–136)
ALT SERPL-CCNC: 39 U/L (ref 12–65)
ANION GAP SERPL CALC-SCNC: 9 MMOL/L (ref 7–16)
APTT PPP: 30.7 SEC (ref 24.3–35.4)
AST SERPL-CCNC: 26 U/L (ref 15–37)
ATRIAL RATE: 104 BPM
BASOPHILS # BLD: 0 K/UL (ref 0–0.2)
BASOPHILS NFR BLD: 0 % (ref 0–2)
BILIRUB SERPL-MCNC: 0.3 MG/DL (ref 0.2–1.1)
BNP SERPL-MCNC: 149 PG/ML (ref 5–125)
BUN SERPL-MCNC: 12 MG/DL (ref 8–23)
CALCIUM SERPL-MCNC: 9.1 MG/DL (ref 8.3–10.4)
CALCULATED P AXIS, ECG09: 82 DEGREES
CALCULATED R AXIS, ECG10: 76 DEGREES
CALCULATED T AXIS, ECG11: 90 DEGREES
CHLORIDE SERPL-SCNC: 102 MMOL/L (ref 98–107)
CO2 SERPL-SCNC: 23 MMOL/L (ref 21–32)
CREAT SERPL-MCNC: 1.15 MG/DL (ref 0.8–1.5)
DIAGNOSIS, 93000: NORMAL
DIFFERENTIAL METHOD BLD: ABNORMAL
EOSINOPHIL # BLD: 0 K/UL (ref 0–0.8)
EOSINOPHIL NFR BLD: 0 % (ref 0.5–7.8)
ERYTHROCYTE [DISTWIDTH] IN BLOOD BY AUTOMATED COUNT: 12.6 % (ref 11.9–14.6)
GLOBULIN SER CALC-MCNC: 3.7 G/DL (ref 2.3–3.5)
GLUCOSE SERPL-MCNC: 163 MG/DL (ref 65–100)
HCT VFR BLD AUTO: 39.7 % (ref 41.1–50.3)
HGB BLD-MCNC: 14.3 G/DL (ref 13.6–17.2)
IMM GRANULOCYTES # BLD AUTO: 0.1 K/UL (ref 0–0.5)
IMM GRANULOCYTES NFR BLD AUTO: 1 % (ref 0–5)
INR PPP: 0.9
LIPASE SERPL-CCNC: 67 U/L (ref 73–393)
LYMPHOCYTES # BLD: 0.8 K/UL (ref 0.5–4.6)
LYMPHOCYTES NFR BLD: 7 % (ref 13–44)
MCH RBC QN AUTO: 36.4 PG (ref 26.1–32.9)
MCHC RBC AUTO-ENTMCNC: 36 G/DL (ref 31.4–35)
MCV RBC AUTO: 101 FL (ref 79.6–97.8)
MONOCYTES # BLD: 0.2 K/UL (ref 0.1–1.3)
MONOCYTES NFR BLD: 1 % (ref 4–12)
NEUTS SEG # BLD: 10.4 K/UL (ref 1.7–8.2)
NEUTS SEG NFR BLD: 91 % (ref 43–78)
NRBC # BLD: 0 K/UL (ref 0–0.2)
P-R INTERVAL, ECG05: 120 MS
PLATELET # BLD AUTO: 427 K/UL (ref 150–450)
PMV BLD AUTO: 9.4 FL (ref 9.4–12.3)
POTASSIUM SERPL-SCNC: 3.8 MMOL/L (ref 3.5–5.1)
PROT SERPL-MCNC: 7.8 G/DL (ref 6.3–8.2)
PROTHROMBIN TIME: 12.6 SEC (ref 12–14.7)
Q-T INTERVAL, ECG07: 350 MS
QRS DURATION, ECG06: 70 MS
QTC CALCULATION (BEZET), ECG08: 430 MS
RBC # BLD AUTO: 3.93 M/UL (ref 4.23–5.6)
SODIUM SERPL-SCNC: 134 MMOL/L (ref 136–145)
VENTRICULAR RATE, ECG03: 91 BPM
WBC # BLD AUTO: 11.4 K/UL (ref 4.3–11.1)

## 2020-08-24 PROCEDURE — 96374 THER/PROPH/DIAG INJ IV PUSH: CPT

## 2020-08-24 PROCEDURE — 76210000020 HC REC RM PH II FIRST 0.5 HR: Performed by: UROLOGY

## 2020-08-24 PROCEDURE — 93005 ELECTROCARDIOGRAM TRACING: CPT | Performed by: EMERGENCY MEDICINE

## 2020-08-24 PROCEDURE — 77030010509 HC AIRWY LMA MSK TELE -A: Performed by: ANESTHESIOLOGY

## 2020-08-24 PROCEDURE — 83880 ASSAY OF NATRIURETIC PEPTIDE: CPT

## 2020-08-24 PROCEDURE — 85610 PROTHROMBIN TIME: CPT

## 2020-08-24 PROCEDURE — 99284 EMERGENCY DEPT VISIT MOD MDM: CPT

## 2020-08-24 PROCEDURE — 80053 COMPREHEN METABOLIC PANEL: CPT

## 2020-08-24 PROCEDURE — 74011250636 HC RX REV CODE- 250/636: Performed by: UROLOGY

## 2020-08-24 PROCEDURE — 74011250637 HC RX REV CODE- 250/637: Performed by: ANESTHESIOLOGY

## 2020-08-24 PROCEDURE — 85730 THROMBOPLASTIN TIME PARTIAL: CPT

## 2020-08-24 PROCEDURE — 74011250636 HC RX REV CODE- 250/636: Performed by: NURSE ANESTHETIST, CERTIFIED REGISTERED

## 2020-08-24 PROCEDURE — 50590 FRAGMENTING OF KIDNEY STONE: CPT | Performed by: UROLOGY

## 2020-08-24 PROCEDURE — 96376 TX/PRO/DX INJ SAME DRUG ADON: CPT

## 2020-08-24 PROCEDURE — 65270000029 HC RM PRIVATE

## 2020-08-24 PROCEDURE — 74011000636 HC RX REV CODE- 636: Performed by: EMERGENCY MEDICINE

## 2020-08-24 PROCEDURE — 74022 RADEX COMPL AQT ABD SERIES: CPT

## 2020-08-24 PROCEDURE — 74011250637 HC RX REV CODE- 250/637: Performed by: UROLOGY

## 2020-08-24 PROCEDURE — 74011250636 HC RX REV CODE- 250/636: Performed by: ANESTHESIOLOGY

## 2020-08-24 PROCEDURE — 83690 ASSAY OF LIPASE: CPT

## 2020-08-24 PROCEDURE — 85025 COMPLETE CBC W/AUTO DIFF WBC: CPT

## 2020-08-24 PROCEDURE — 77030040361 HC SLV COMPR DVT MDII -B: Performed by: UROLOGY

## 2020-08-24 PROCEDURE — 74011250636 HC RX REV CODE- 250/636: Performed by: EMERGENCY MEDICINE

## 2020-08-24 PROCEDURE — 74011000250 HC RX REV CODE- 250: Performed by: NURSE ANESTHETIST, CERTIFIED REGISTERED

## 2020-08-24 PROCEDURE — 74011000258 HC RX REV CODE- 258: Performed by: EMERGENCY MEDICINE

## 2020-08-24 PROCEDURE — 76210000063 HC OR PH I REC FIRST 0.5 HR: Performed by: UROLOGY

## 2020-08-24 PROCEDURE — 76060000033 HC ANESTHESIA 1 TO 1.5 HR: Performed by: UROLOGY

## 2020-08-24 PROCEDURE — 71275 CT ANGIOGRAPHY CHEST: CPT

## 2020-08-24 PROCEDURE — 76010000138 HC OR TIME 0.5 TO 1 HR: Performed by: UROLOGY

## 2020-08-24 RX ORDER — SODIUM CHLORIDE 9 MG/ML
100 INJECTION, SOLUTION INTRAVENOUS CONTINUOUS
Status: DISCONTINUED | OUTPATIENT
Start: 2020-08-24 | End: 2020-08-27 | Stop reason: HOSPADM

## 2020-08-24 RX ORDER — HYDROMORPHONE HYDROCHLORIDE 1 MG/ML
1 INJECTION, SOLUTION INTRAMUSCULAR; INTRAVENOUS; SUBCUTANEOUS ONCE
Status: COMPLETED | OUTPATIENT
Start: 2020-08-24 | End: 2020-08-24

## 2020-08-24 RX ORDER — TAMSULOSIN HYDROCHLORIDE 0.4 MG/1
0.4 CAPSULE ORAL DAILY
Qty: 7 CAP | Refills: 0 | Status: SHIPPED | OUTPATIENT
Start: 2020-08-24 | End: 2021-11-19

## 2020-08-24 RX ORDER — METOPROLOL TARTRATE 25 MG/1
25 TABLET, FILM COATED ORAL EVERY 12 HOURS
Status: DISCONTINUED | OUTPATIENT
Start: 2020-08-24 | End: 2020-08-27 | Stop reason: HOSPADM

## 2020-08-24 RX ORDER — NALOXONE HYDROCHLORIDE 0.4 MG/ML
0.4 INJECTION, SOLUTION INTRAMUSCULAR; INTRAVENOUS; SUBCUTANEOUS AS NEEDED
Status: DISCONTINUED | OUTPATIENT
Start: 2020-08-24 | End: 2020-08-27 | Stop reason: HOSPADM

## 2020-08-24 RX ORDER — ONDANSETRON 2 MG/ML
4 INJECTION INTRAMUSCULAR; INTRAVENOUS
Status: DISCONTINUED | OUTPATIENT
Start: 2020-08-24 | End: 2020-08-27 | Stop reason: HOSPADM

## 2020-08-24 RX ORDER — ALBUTEROL SULFATE 0.83 MG/ML
2.5 SOLUTION RESPIRATORY (INHALATION) AS NEEDED
Status: DISCONTINUED | OUTPATIENT
Start: 2020-08-24 | End: 2020-08-24 | Stop reason: HOSPADM

## 2020-08-24 RX ORDER — OXYBUTYNIN CHLORIDE 5 MG/1
5 TABLET ORAL
Status: DISCONTINUED | OUTPATIENT
Start: 2020-08-24 | End: 2020-08-27 | Stop reason: HOSPADM

## 2020-08-24 RX ORDER — ACETAMINOPHEN 325 MG/1
650 TABLET ORAL
Status: DISCONTINUED | OUTPATIENT
Start: 2020-08-24 | End: 2020-08-27 | Stop reason: HOSPADM

## 2020-08-24 RX ORDER — OXYCODONE AND ACETAMINOPHEN 5; 325 MG/1; MG/1
1 TABLET ORAL
Status: DISCONTINUED | OUTPATIENT
Start: 2020-08-24 | End: 2020-08-27 | Stop reason: HOSPADM

## 2020-08-24 RX ORDER — ACETAMINOPHEN 500 MG
1000 TABLET ORAL ONCE
Status: COMPLETED | OUTPATIENT
Start: 2020-08-24 | End: 2020-08-24

## 2020-08-24 RX ORDER — SODIUM CHLORIDE 0.9 % (FLUSH) 0.9 %
10 SYRINGE (ML) INJECTION
Status: COMPLETED | OUTPATIENT
Start: 2020-08-24 | End: 2020-08-24

## 2020-08-24 RX ORDER — EPHEDRINE SULFATE/0.9% NACL/PF 50 MG/5 ML
SYRINGE (ML) INTRAVENOUS AS NEEDED
Status: DISCONTINUED | OUTPATIENT
Start: 2020-08-24 | End: 2020-08-24 | Stop reason: HOSPADM

## 2020-08-24 RX ORDER — FENTANYL CITRATE 50 UG/ML
100 INJECTION, SOLUTION INTRAMUSCULAR; INTRAVENOUS ONCE
Status: DISCONTINUED | OUTPATIENT
Start: 2020-08-24 | End: 2020-08-24 | Stop reason: HOSPADM

## 2020-08-24 RX ORDER — PROPOFOL 10 MG/ML
INJECTION, EMULSION INTRAVENOUS AS NEEDED
Status: DISCONTINUED | OUTPATIENT
Start: 2020-08-24 | End: 2020-08-24 | Stop reason: HOSPADM

## 2020-08-24 RX ORDER — FENTANYL CITRATE 50 UG/ML
INJECTION, SOLUTION INTRAMUSCULAR; INTRAVENOUS AS NEEDED
Status: DISCONTINUED | OUTPATIENT
Start: 2020-08-24 | End: 2020-08-24 | Stop reason: HOSPADM

## 2020-08-24 RX ORDER — LIDOCAINE HYDROCHLORIDE 20 MG/ML
INJECTION, SOLUTION EPIDURAL; INFILTRATION; INTRACAUDAL; PERINEURAL AS NEEDED
Status: DISCONTINUED | OUTPATIENT
Start: 2020-08-24 | End: 2020-08-24 | Stop reason: HOSPADM

## 2020-08-24 RX ORDER — SODIUM CHLORIDE 0.9 % (FLUSH) 0.9 %
5-40 SYRINGE (ML) INJECTION AS NEEDED
Status: DISCONTINUED | OUTPATIENT
Start: 2020-08-24 | End: 2020-08-27 | Stop reason: HOSPADM

## 2020-08-24 RX ORDER — DEXAMETHASONE SODIUM PHOSPHATE 4 MG/ML
INJECTION, SOLUTION INTRA-ARTICULAR; INTRALESIONAL; INTRAMUSCULAR; INTRAVENOUS; SOFT TISSUE AS NEEDED
Status: DISCONTINUED | OUTPATIENT
Start: 2020-08-24 | End: 2020-08-24 | Stop reason: HOSPADM

## 2020-08-24 RX ORDER — HYDROMORPHONE HYDROCHLORIDE 1 MG/ML
1 INJECTION, SOLUTION INTRAMUSCULAR; INTRAVENOUS; SUBCUTANEOUS
Status: DISCONTINUED | OUTPATIENT
Start: 2020-08-24 | End: 2020-08-27 | Stop reason: HOSPADM

## 2020-08-24 RX ORDER — MIDAZOLAM HYDROCHLORIDE 1 MG/ML
2 INJECTION, SOLUTION INTRAMUSCULAR; INTRAVENOUS
Status: DISCONTINUED | OUTPATIENT
Start: 2020-08-24 | End: 2020-08-24 | Stop reason: HOSPADM

## 2020-08-24 RX ORDER — LORAZEPAM 2 MG/ML
1 INJECTION INTRAMUSCULAR
Status: DISCONTINUED | OUTPATIENT
Start: 2020-08-24 | End: 2020-08-27 | Stop reason: HOSPADM

## 2020-08-24 RX ORDER — OXYCODONE HYDROCHLORIDE 5 MG/1
5 TABLET ORAL
Status: DISCONTINUED | OUTPATIENT
Start: 2020-08-24 | End: 2020-08-24 | Stop reason: HOSPADM

## 2020-08-24 RX ORDER — TAMSULOSIN HYDROCHLORIDE 0.4 MG/1
0.4 CAPSULE ORAL DAILY
Status: DISCONTINUED | OUTPATIENT
Start: 2020-08-25 | End: 2020-08-27 | Stop reason: HOSPADM

## 2020-08-24 RX ORDER — ONDANSETRON 2 MG/ML
INJECTION INTRAMUSCULAR; INTRAVENOUS AS NEEDED
Status: DISCONTINUED | OUTPATIENT
Start: 2020-08-24 | End: 2020-08-24 | Stop reason: HOSPADM

## 2020-08-24 RX ORDER — LIDOCAINE HYDROCHLORIDE 10 MG/ML
0.1 INJECTION INFILTRATION; PERINEURAL AS NEEDED
Status: DISCONTINUED | OUTPATIENT
Start: 2020-08-24 | End: 2020-08-24 | Stop reason: HOSPADM

## 2020-08-24 RX ORDER — CEFAZOLIN SODIUM/WATER 2 G/20 ML
2 SYRINGE (ML) INTRAVENOUS ONCE
Status: COMPLETED | OUTPATIENT
Start: 2020-08-24 | End: 2020-08-24

## 2020-08-24 RX ORDER — OXYCODONE AND ACETAMINOPHEN 5; 325 MG/1; MG/1
1 TABLET ORAL
Qty: 20 TAB | Refills: 0 | Status: SHIPPED | OUTPATIENT
Start: 2020-08-24 | End: 2020-08-27

## 2020-08-24 RX ORDER — SODIUM CHLORIDE, SODIUM LACTATE, POTASSIUM CHLORIDE, CALCIUM CHLORIDE 600; 310; 30; 20 MG/100ML; MG/100ML; MG/100ML; MG/100ML
75 INJECTION, SOLUTION INTRAVENOUS CONTINUOUS
Status: DISCONTINUED | OUTPATIENT
Start: 2020-08-24 | End: 2020-08-24 | Stop reason: HOSPADM

## 2020-08-24 RX ORDER — SODIUM CHLORIDE 0.9 % (FLUSH) 0.9 %
5-40 SYRINGE (ML) INJECTION EVERY 8 HOURS
Status: DISCONTINUED | OUTPATIENT
Start: 2020-08-24 | End: 2020-08-27 | Stop reason: HOSPADM

## 2020-08-24 RX ORDER — MIDAZOLAM HYDROCHLORIDE 1 MG/ML
2 INJECTION, SOLUTION INTRAMUSCULAR; INTRAVENOUS ONCE
Status: DISCONTINUED | OUTPATIENT
Start: 2020-08-24 | End: 2020-08-24 | Stop reason: HOSPADM

## 2020-08-24 RX ORDER — HYDROMORPHONE HYDROCHLORIDE 2 MG/ML
0.5 INJECTION, SOLUTION INTRAMUSCULAR; INTRAVENOUS; SUBCUTANEOUS
Status: DISCONTINUED | OUTPATIENT
Start: 2020-08-24 | End: 2020-08-24 | Stop reason: HOSPADM

## 2020-08-24 RX ORDER — SODIUM CHLORIDE, SODIUM LACTATE, POTASSIUM CHLORIDE, CALCIUM CHLORIDE 600; 310; 30; 20 MG/100ML; MG/100ML; MG/100ML; MG/100ML
50 INJECTION, SOLUTION INTRAVENOUS CONTINUOUS
Status: DISCONTINUED | OUTPATIENT
Start: 2020-08-24 | End: 2020-08-24 | Stop reason: HOSPADM

## 2020-08-24 RX ADMIN — SODIUM CHLORIDE 1000 ML: 900 INJECTION, SOLUTION INTRAVENOUS at 19:13

## 2020-08-24 RX ADMIN — PHENYLEPHRINE HYDROCHLORIDE 100 MCG: 10 INJECTION INTRAVENOUS at 09:09

## 2020-08-24 RX ADMIN — Medication 10 MG: at 09:43

## 2020-08-24 RX ADMIN — Medication 10 ML: at 18:18

## 2020-08-24 RX ADMIN — ACETAMINOPHEN 1000 MG: 500 TABLET, FILM COATED ORAL at 08:32

## 2020-08-24 RX ADMIN — HYDROMORPHONE HYDROCHLORIDE 1 MG: 1 INJECTION, SOLUTION INTRAMUSCULAR; INTRAVENOUS; SUBCUTANEOUS at 15:13

## 2020-08-24 RX ADMIN — Medication 10 MG: at 09:23

## 2020-08-24 RX ADMIN — IOPAMIDOL 100 ML: 755 INJECTION, SOLUTION INTRAVENOUS at 18:18

## 2020-08-24 RX ADMIN — HYDROMORPHONE HYDROCHLORIDE 1 MG: 1 INJECTION, SOLUTION INTRAMUSCULAR; INTRAVENOUS; SUBCUTANEOUS at 19:13

## 2020-08-24 RX ADMIN — Medication 10 MG: at 09:07

## 2020-08-24 RX ADMIN — Medication 2 G: at 09:05

## 2020-08-24 RX ADMIN — PHENYLEPHRINE HYDROCHLORIDE 100 MCG: 10 INJECTION INTRAVENOUS at 09:22

## 2020-08-24 RX ADMIN — FENTANYL CITRATE 25 MCG: 50 INJECTION INTRAMUSCULAR; INTRAVENOUS at 09:28

## 2020-08-24 RX ADMIN — DEXAMETHASONE SODIUM PHOSPHATE 4 MG: 4 INJECTION, SOLUTION INTRAMUSCULAR; INTRAVENOUS at 09:25

## 2020-08-24 RX ADMIN — METOPROLOL TARTRATE 25 MG: 25 TABLET, FILM COATED ORAL at 22:41

## 2020-08-24 RX ADMIN — ONDANSETRON 4 MG: 2 INJECTION INTRAMUSCULAR; INTRAVENOUS at 09:25

## 2020-08-24 RX ADMIN — SODIUM CHLORIDE 100 ML/HR: 900 INJECTION, SOLUTION INTRAVENOUS at 23:09

## 2020-08-24 RX ADMIN — HYDROMORPHONE HYDROCHLORIDE 1 MG: 1 INJECTION, SOLUTION INTRAMUSCULAR; INTRAVENOUS; SUBCUTANEOUS at 22:38

## 2020-08-24 RX ADMIN — PROPOFOL 200 MG: 10 INJECTION, EMULSION INTRAVENOUS at 09:01

## 2020-08-24 RX ADMIN — SODIUM CHLORIDE 100 ML: 900 INJECTION, SOLUTION INTRAVENOUS at 18:18

## 2020-08-24 RX ADMIN — HYDROMORPHONE HYDROCHLORIDE 1 MG: 1 INJECTION, SOLUTION INTRAMUSCULAR; INTRAVENOUS; SUBCUTANEOUS at 15:44

## 2020-08-24 RX ADMIN — SODIUM CHLORIDE 1000 ML: 900 INJECTION, SOLUTION INTRAVENOUS at 15:44

## 2020-08-24 RX ADMIN — LIDOCAINE HYDROCHLORIDE 100 MG: 20 INJECTION, SOLUTION EPIDURAL; INFILTRATION; INTRACAUDAL; PERINEURAL at 09:01

## 2020-08-24 RX ADMIN — SODIUM CHLORIDE, SODIUM LACTATE, POTASSIUM CHLORIDE, AND CALCIUM CHLORIDE 75 ML/HR: 600; 310; 30; 20 INJECTION, SOLUTION INTRAVENOUS at 08:32

## 2020-08-24 RX ADMIN — Medication 10 MG: at 09:31

## 2020-08-24 NOTE — PROGRESS NOTES
's pre-procedure visit requested by patient. Conveyed care and concern for patient and family. Offered prayer as requested for patient, family, and staff.     Gina Arroyo MDiv, BS  Board Certified

## 2020-08-24 NOTE — OP NOTES
[facility]  OPERATIVE REPORT    Name:  Gege Gale  MR#:  [id number]  :  1955  ACCOUNT #:  [de-identified]  DATE OF SERVICE:  2020    PREOPERATIVE DIAGNOSES:  History of a cystectomy and ileal conduit for bladder cancer with left lower pole renal stones. POSTOPERATIVE DIAGNOSES:  History of a cystectomy and ileal conduit for bladder cancer with left lower pole renal stones. PROCEDURE PERFORMED:  Left renal ESWL. SURGEON:  Giovanny Walton MD    ASSISTANT:  None. ANESTHESIA:  General.    COMPLICATIONS:  None. SPECIMENS REMOVED:  None. IMPLANTS:  None. ESTIMATED BLOOD LOSS:  None. DRAINS:  None. FINDINGS:  None. OPERATIVE INDICATION:  The patient with previous cystectomy and ileal conduit having intermittent left flank pain, found on CT scan to have some stones in the lower pole of the kidney with no hydronephrosis. He has extensive vascular atherosclerosis. PROCEDURE:  The patient was taken to the operating room suite, underwent general anesthesia, and placed in the supine position. Fluoroscopic monitoring was used to identify the stones in the lower pole of the left kidney. They were blasted with 3000 shocks at the maximum setting with apparent pulverization. The patient tolerated the procedure well. He will be sent home on tamsulosin for 7 days and Percocet for pain and would follow up in the office in about six weeks for a KUB.       Neris Barlow MD      [id number]  D:  2020 10:01  T:  2020 11:33  JOB #:  [id number]

## 2020-08-24 NOTE — ED PROVIDER NOTES
80-year-old male presenting for left-sided flank pain radiating to his chest.  Describes as a burning sensation. Is been progressively worsening since he had lithotripsy this morning. Sent home and asked that \"pain medications wore off the pain in his chest worsen\". Hurts to take a deep breath. It is only on the left side. He points feels left lower abdomen states it radiates into his chest.  Tells me \"I have been dealing with this for years and it is never been this bad\". Called urology and they told him to come here. The history is provided by the patient. Post-Op Problem   This is a new problem. The current episode started 3 to 5 hours ago. The problem has been gradually worsening. Associated symptoms include chest pain and abdominal pain. The symptoms are aggravated by coughing. Nothing relieves the symptoms. He has tried nothing for the symptoms. The treatment provided no relief. Chest Pain (Angina)    Associated symptoms include abdominal pain.         Past Medical History:   Diagnosis Date    Adverse effect of anesthesia     hard to awaken    CAD (coronary artery disease) 09/18/2018    Cancer ([facility] [address]     bladder    Carotid stenosis, bilateral     S/P left endarterectomy,   CARLY 30% per CTA    Chronic neck pain     Chronic obstructive pulmonary disease (HCC)     no inhalers    Depression with anxiety     GERD (gastroesophageal reflux disease)     no meds    History of bladder cancer 07/2014    Cystectomy with ileo conduit urinary diversion ----and chemo    History of left-sided carotid endarterectomy     History of neck surgery     Hypertension     Kidney stones     Osteoarthritis     [facility] artery disease) [facility]     S/P CABG x 3 09/2018    Staghorn renal calculus     Left sided    Status post ileal conduit ([address] 2014    Tobacco use disorder, continuous     0.5 ppd since age 15       Past Surgical History:   Procedure Laterality Date    CABG, ARTERY-VEIN, THREE 09/18/2018    HX CAROTID ENDARTERECTOMY Left 12/06/2018    HX CERVICAL FUSION  2014    Dr. Hina Gallagher    PYOSI [address] GRAFT  09/18/2018    triple bypass    HX GI  06/24/2019    upper/lower GI    HX HEART CATHETERIZATION  08/2018    HX HEMORRHOIDECTOMY      HX KNEE ARTHROSCOPY Left          HX UROLOGICAL      benign tumor removed from L testicle      HX UROLOGICAL  2015    TURBT- bladder removal- urostomy    HX UROLOGICAL Left     Nephrostomy    HX VASCULAR ACCESS Right 2014    Port, removed 2016         Family History:   Problem Relation Age of Onset    Cancer Mother     Lung Disease Mother     Cancer Father         bladder CA    No Known Problems Sister     Cancer Sister         hx of breast CA       Social History     Socioeconomic History    Marital status:      Spouse name: Not on file    Number of children: 2    Years of education: Not on file    Highest education level: Not on file   Occupational History    Occupation: Unemployed, 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 Social Needs    Financial resource strain: Not on file    Food insecurity     Worry: Not on file     Inability: Not on file    Transportation needs     Medical: Not on file     Non-medical: Not on file   Tobacco Use    Smoking status: Current Every Day Smoker     Packs/day: 0.50     Years: 50.00     Pack years: 25.00    Smokeless tobacco: Never Used   Substance and Sexual Activity    Alcohol use:  Yes     Alcohol/week: 21.0 standard drinks     Types: 21 Cans of beer per week     Frequency: 4 or more times a week     Binge frequency: Daily or almost daily    Drug use: Yes     Comment: stopped 2 months ago    Sexual activity: Not Currently   Lifestyle    Physical activity     Days per week: Not on file     Minutes per session: Not on file    Stress: Not on file   Relationships    Social connections     Talks on phone: Not on file     Gets together: Not on file     Attends [employer] service: Not on file Active member of club or organization: Not on file     Attends meetings of clubs or organizations: Not on file     Relationship status: Not on file    Intimate partner violence     Fear of current or ex partner: Not on file     Emotionally abused: Not on file     Physically abused: Not on file     Forced sexual activity: Not on file   Other Topics Concern    Not on file   Social History Narrative    Lives with girlfriend. His father is living in [place]. ALLERGIES: Codeine and Penicillins    Review of Systems   Cardiovascular: Positive for chest pain. Gastrointestinal: Positive for abdominal pain. All other systems reviewed and are negative. There were no vitals filed for this visit. Physical Exam  Vitals signs and nursing note reviewed. Constitutional:       Appearance: He is well-developed. HENT:      Head: Normocephalic and atraumatic. Eyes:      Conjunctiva/sclera: Conjunctivae normal.      Pupils: Pupils are equal, round, and reactive to light. Neck:      Musculoskeletal: Normal range of motion and neck supple. Cardiovascular:      Rate and Rhythm: Normal rate and regular rhythm. Heart sounds: Normal heart sounds. Pulmonary:      Effort: Pulmonary effort is normal. Tachypnea present. Breath sounds: Examination of the right-upper field reveals wheezing. Examination of the left-upper field reveals wheezing. Examination of the right-middle field reveals wheezing. Examination of the left-middle field reveals wheezing. Examination of the right-lower field reveals wheezing. Examination of the left-lower field reveals wheezing. Wheezing present. Abdominal:      General: Bowel sounds are normal.      Palpations: Abdomen is soft. Comments: Ileal conduit bag in the right lower quadrant   Musculoskeletal: Normal range of motion. General: No deformity. Skin:     General: Skin is warm and dry.    Neurological:      Mental Status: He is alert and oriented to person, place, and time. Cranial Nerves: No cranial nerve deficit. Psychiatric:         Behavior: Behavior normal.                          MDM  Number of Diagnoses or Management Options  Nontraumatic retroperitoneal hematoma:   Diagnosis management comments: 80-year-old male presenting for left flank pain that radiates into his chest.  My suspicion is he just had lithotripsy so one big stone was turned into a bunch of little stones. Amount and/or Complexity of Data Reviewed  Clinical lab tests: ordered and reviewed (Results for orders placed or performed during the hospital encounter of 08/24/20  -CBC WITH AUTOMATED DIFF       Result                      Value             Ref Range           WBC                         11.4 (H)          4.3 - 11.1 K*       RBC                         3.93 (L)          4.23 - 5.6 M*       HGB                         14.3              13.6 - 17.2 *       HCT                         39.7 (L)          41.1 - 50.3 %       MCV                         101.0 (H)         79.6 - 97.8 *       MCH                         36.4 (H)          26.1 - 32.9 *       MCHC                        36.0 (H)          31.4 - 35.0 *       RDW                         12.6              11.9 - 14.6 %       PLATELET                    427               150 - 450 K/*       MPV                         9.4               9.4 - 12.3 FL       ABSOLUTE NRBC               0.00              0.0 - 0.2 K/*       DF                          AUTOMATED                             NEUTROPHILS                 91 (H)            43 - 78 %           LYMPHOCYTES                 7 (L)             13 - 44 %           MONOCYTES                   1 (L)             4.0 - 12.0 %        EOSINOPHILS                 0 (L)             0.5 - 7.8 %         BASOPHILS                   0                 0.0 - 2.0 %         IMMATURE GRANULOCYTES       1                 0.0 - 5.0 %         ABS.  NEUTROPHILS            10.4 (H) 1.7 - 8.2 K/*       ABS. LYMPHOCYTES            0.8               0.5 - 4.6 K/*       ABS. MONOCYTES              0.2               0.1 - 1.3 K/*       ABS. EOSINOPHILS            0.0               0.0 - 0.8 K/*       ABS. BASOPHILS              0.0               0.0 - 0.2 K/*       ABS. IMM. GRANS.            0.1               0.0 - 0.5 K/*  -PROTHROMBIN TIME + INR       Result                      Value             Ref Range           Prothrombin time            12.6              12.0 - 14.7 *       INR                         0.9                              -PTT       Result                      Value             Ref Range           aPTT                        30.7              24.3 - 35.4 *  -LIPASE       Result                      Value             Ref Range           Lipase                      67 (L)            73 - 494 U/L   -METABOLIC PANEL, COMPREHENSIVE       Result                      Value             Ref Range           Sodium                      134 (L)           136 - 145 mm*       Potassium                   3.8               3.5 - 5.1 mm*       Chloride                    102               98 - 107 mmo*       CO2                         23                21 - 32 mmol*       Anion gap                   9                 7 - 16 mmol/L       Glucose                     163 (H)           65 - 100 mg/*       BUN                         12                8 - 23 MG/DL        Creatinine                  1.15              0.8 - 1.5 MG*       GFR est AA                  >60               >60 ml/min/1*       GFR est non-AA              >60               >60 ml/min/1*       Calcium                     9.1               8.3 - 10.4 M*       Bilirubin, total            0.3               0.2 - 1.1 MG*       ALT (SGPT)                  39                12 - 65 U/L         AST (SGOT)                  26                15 - 37 U/L         Alk.  phosphatase            85                50 - 136 U/L Protein, total              7.8               6.3 - 8.2 g/*       Albumin                     4.1               3.2 - 4.6 g/*       Globulin                    3.7 (H)           2.3 - 3.5 g/*       A-G Ratio                   1.1 (L)           1.2 - 3.5      -NT-PRO BNP       Result                      Value             Ref Range           NT pro-BNP                  149 (H)           5 - 125 PG/ML  )  Tests in the radiology section of CPT®: ordered and reviewed (Xr Abd Acute W 1 V Chest    Result Date: 8/24/2020  Clinical History: The Male patient is 59years old  presenting with symptoms of Abdominal Pain. Comparison:  Chest abdomen pelvis CT 4/15/2020 and chest x-ray 10/11/2018 Findings: The lungs are free of acute infiltrate. There is no pleural effusion or pneumothorax. The cardiomediastinal silhouette is within normal limits. There is no acute osseous abnormality. There is fusion hardware in the lower cervical spine. Sternotomy changes are demonstrated. The bowel gas pattern is nonspecific. There is no soft tissue mass or organomegaly. No abnormal calcifications are identified. There is prominent aortoiliac atherosclerotic disease There is no free intraperitoneal air. No acute osseous abnormality is demonstrated. Impression: 1. No acute cardiopulmonary disease. 2.   No free air or bowel obstruction. CPT code(s) 42971,76329     Ct Urogram Wo Cont    Result Date: 8/6/2020  CT of the abdomen and pelvis without contrast. CLINICAL INDICATION: Bleeding in the urostomy bag for one month, left flank pain, prior cystectomy PROCEDURE: Serial thin-section axial images obtained from the upper abdomen through the proximal femurs without the administration of intravenous or oral contrast.  Radiation dose reduction techniques were used for this study.  Our CT scanners use one or all of the following: Automated exposure control, adjusted of the mA and/or kV according to patient size, iterative reconstruction COMPARISON: CT the chest abdomen and pelvis dated 4/15/2020 FINDINGS:  Evaluation of the hollow and solid viscera is limited by the lack of intravenous contrast. CT ABDOMEN: Two small nonobstructing 3 to 4 mm stones are noted in the parapelvic left kidney. Atherosclerotic calcifications are noted in the right kidney. There is no hydronephrosis or hydroureter. No ureteral calculi evident. There is marked atherosclerotic calcification in the aorta which is normal in caliber. The gallbladder is contracted. The adrenal glands are normal. There is no hemoperitoneum or retroperitoneal hematoma. CT PELVIS: The ileal conduit is noted along the right anterior pelvic wall with an ostomy. No abnormality noted along the conduit although evaluation is limited by the lack of contrast. No stone or mass noted at the distal ureter. There is no inguinal hernia. The bowel is grossly unremarkable. No aggressive osseous is identified. IMPRESSION: 1. Two nonobstructing 3 to 4 mm stones noted in the left renal pelvis. There is no hydronephrosis or hydroureter. No ureteral calculi evident. 2. Marked atherosclerosis. 3. No gross abnormality of the ileal conduit in the right anterior pelvis.     )  Tests in the medicine section of CPT®: ordered and reviewed  Discuss the patient with other providers: yes  Independent visualization of images, tracings, or specimens: yes    Risk of Complications, Morbidity, and/or Mortality  Presenting problems: high  Diagnostic procedures: high  Management options: high  General comments: I personally reviewed the patient's vital signs, laboratory tests, and/or radiological findings. I discussed these findings with the patient and their significance. I answered all questions and explained that given these findings there is significant concern for increased morbidity and/or mortality without immediate intervention.   As a result, I recommended admission to the hospital, consulted the appropriate service, and transitioned care to that service in improved condition      Patient Progress  Patient progress: stable    ED Course as of Aug 24 1847   Mon Aug 24, 2020   1633 Patient seems more relaxed after 2nd dose. Still complaining of pain though    [JS]   1652 Patient's x-ray is unremarkable. Given that he is having terrible pain that radiates into his chest with significant hypertension we will do a CTA to rule out dissection    [JS]   1837 Huge retroperitoneum hematoma secondary to the patient's lithotripsy.     [JS]   [place] [id number] urology to discuss the findings of the CT    [JS]      ED Course User Index  [JS] Rasheed Henry MD       Procedures

## 2020-08-24 NOTE — BRIEF OP NOTE
Brief Postoperative Note    Patient: Martina Majano  YOB: 1955  MRN: [id number]    Date of Procedure: 8/24/2020     Pre-Op Diagnosis: Kidney stone [N20.0]    Post-Op Diagnosis:left renal lower pole stones    Procedure(s):  LEFT LITHOTRIPSY EXTRACORPOREAL SHOCKWAVE ESWL    Surgeon(s):  Kristin Sharma MD    Surgical Assistant: None    Anesthesia: General     Estimated Blood Loss (mL): ?     Complications: None    Specimens: * No specimens in log *     Implants: * No implants in log *    Drains: * No LDAs found *    Findings:     Electronically Signed by Yoli Ha MD on 8/24/2020 at 9:48 AM

## 2020-08-24 NOTE — PROGRESS NOTES
's pre-procedure visit and prayer with patient as requested.     Mackenzie Roque MDiv, BS  Board Certified

## 2020-08-24 NOTE — DISCHARGE INSTRUCTIONS
Patient Education        Lithotripsy: What to Expect at Home  Your Recovery  Lithotripsy is a way to treat kidney stones without surgery. It is also called extracorporeal shock wave lithotripsy, or ESWL. This treatment uses sound waves to break kidney stones into tiny pieces. These pieces can then pass out of the body in the urine. You may have a small amount of blood in your urine after this treatment. Your urine may be slightly pink or reddish. The blood in the urine often goes away after 2 days. You may have a plastic tube inside one of your ureters. Ureters are the tubes that connect the kidneys to the bladder. The plastic tube is called a stent. It takes urine from your kidney to your bladder. This lets the stone pass more easily. Your doctor may remove the stent in about a week or two. This care sheet gives you a general idea about how long it will take for you to recover. But each person recovers at a different pace. Follow the steps below to feel better as quickly as possible. How can you care for yourself at home? Activity  · Rest as much as you need to after you go home. · You may do your regular activities. But avoid hard exercise or sports for a week. Wait until there is no blood in your urine and the stent is out. Diet  · You can eat your normal diet. · Drink plenty of fluids, enough so that your urine is light yellow or clear like water. If you have kidney, heart, or liver disease and have to limit fluids, talk with your doctor before you increase the amount of fluids you drink. Medicines  · Your doctor will tell you if and when you can restart your medicines. He or she will also give you instructions about taking any new medicines. · If you take aspirin or some other blood thinner, ask your doctor if and when to start taking it again. Make sure that you understand exactly what your doctor wants you to do. · Be safe with medicines. Read and follow all instructions on the label.   ? If the doctor gave you a prescription medicine for pain, take it as prescribed. ? If you are not taking a prescription pain medicine, ask your doctor if you can take acetaminophen (Tylenol). Do not take ibuprofen (Advil, Motrin) or naproxen (Aleve), or similar medicines unless your doctor tells you to. ? Do not take two or more pain medicines at the same time unless the doctor told you to. Many pain medicines have acetaminophen, which is Tylenol. Too much acetaminophen (Tylenol) can be harmful. Other instructions  · Urinate through the strainer the doctor gives you. Save any stone pieces, including those that look like sand or gravel. Take these to your doctor. This will help your doctor find the cause of your stones. Follow-up care is a key part of your treatment and safety. Be sure to make and go to all appointments, and call your doctor if you are having problems. It's also a good idea to know your test results and keep a list of the medicines you take. When should you call for help? [id number] anytime you think you may need emergency care. For example, call if:  · You passed out (lost consciousness). · You have chest pain, are short of breath, or cough up blood. Call your doctor now or seek immediate medical care if:  · You have pain that does not get better after you take pain medicine. · You have new or more blood clots in your urine. (It is normal for the urine to be pink for a few days.)  · You cannot urinate. · You have symptoms of a urinary tract infection. These may include:  ? Pain or burning when you urinate. ? A frequent need to urinate without being able to pass much urine. ? Pain in the flank, which is just below the rib cage and above the waist on either side of the back. ? Blood in the urine. ? A fever. · You are sick to your stomach or cannot drink fluids.   · You have signs of a blood clot in your leg (called a deep vein thrombosis), such as:  ? Pain in the calf, back of the knee, thigh, or groin.  ? Redness and swelling in your leg. Watch closely for any changes in your health, and be sure to contact your doctor if you have any problems. Where can you learn more? Go to http://www.gray.com/  Enter X097 in the search box to learn more about \"Lithotripsy: What to Expect at Home. \"  Current as of: August 12, 2019               Content Version: 12.5  © 3157-7918 [employer]. Care instructions adapted under license by [employer] (which disclaims liability or warranty for this information). If you have questions about a medical condition or this instruction, always ask your healthcare professional. [address] any warranty or liability for your use of this information. After general anesthesia or intravenous sedation, for 24 hours or while taking prescription Narcotics:  · Limit your activities  · A responsible adult needs to be with you for the next 24 hours  · Do not drive and operate hazardous machinery  · Do not make important personal or business decisions  · Do not drink alcoholic beverages  · If you have not urinated within 8 hours after discharge, please contact your surgeon on call. · If you have sleep apnea and have a CPAP machine, please use it for all naps and sleeping. · Please use caution when taking narcotics and any of your home medications that may cause drowsiness. *  Please give a list of your current medications to your Primary Care Provider. *  Please update this list whenever your medications are discontinued, doses are      changed, or new medications (including over-the-counter products) are added. *  Please carry medication information at all times in case of emergency situations.     These are general instructions for a healthy lifestyle:  No smoking/ No tobacco products/ Avoid exposure to second hand smoke  Surgeon General's Warning:  Quitting smoking now greatly reduces serious risk to your health. Obesity, smoking, and sedentary lifestyle greatly increases your risk for illness  A healthy diet, regular physical exercise & weight monitoring are important for maintaining a healthy lifestyle    You may be retaining fluid if you have a history of heart failure or if you experience any of the following symptoms:  Weight gain of 3 pounds or more overnight or 5 pounds in a week, increased swelling in our hands or feet or shortness of breath while lying flat in bed. Please call your doctor as soon as you notice any of these symptoms; do not wait until your next office visit. Advance Care Planning  People with COVID-19 may have no symptoms, mild symptoms, such as fever, cough, and shortness of breath or they may have more severe illness, developing severe and fatal pneumonia. As a result, Advance Care Planning with attention to naming a health care decision maker (someone you trust to make healthcare decisions for you if you could not speak for yourself) and sharing other health care preferences is important BEFORE a possible health crisis. Please contact your Primary Care Provider to discuss Advance Care Planning. Preventing the Spread of Coronavirus Disease 2019 in Homes and Residential Communities  For the most recent information go to [employer]    Prevention steps for People with confirmed or suspected COVID-19 (including persons under investigation) who do not need to be hospitalized  and   People with confirmed COVID-19 who were hospitalized and determined to be medically stable to go home    Your healthcare provider and public health staff will evaluate whether you can be cared for at home. If it is determined that you do not need to be hospitalized and can be isolated at home, you will be monitored by staff from your local or state health department.  You should follow the prevention steps below until a healthcare provider or local or state health department says you can return to your normal activities. Stay home except to get medical care  People who are mildly ill with COVID-19 are able to isolate at home during their illness. You should restrict activities outside your home, except for getting medical care. Do not go to work, school, or public areas. Avoid using public transportation, ride-sharing, or taxis. Separate yourself from other people and animals in your home  People: As much as possible, you should stay in a specific room and away from other people in your home. Also, you should use a separate bathroom, if available. Animals: You should restrict contact with pets and other animals while you are sick with COVID-19, just like you would around other people. Although there have not been reports of pets or other animals becoming sick with COVID-19, it is still recommended that people sick with COVID-19 limit contact with animals until more information is known about the virus. When possible, have another member of your household care for your animals while you are sick. If you are sick with COVID-19, avoid contact with your pet, including petting, snuggling, being kissed or licked, and sharing food. If you must care for your pet or be around animals while you are sick, wash your hands before and after you interact with pets and wear a facemask. Call ahead before visiting your doctor  If you have a medical appointment, call the healthcare provider and tell them that you have or may have COVID-19. This will help the healthcare providers office take steps to keep other people from getting infected or exposed. Wear a facemask  You should wear a facemask when you are around other people (e.g., sharing a room or vehicle) or pets and before you enter a healthcare providers office.  If you are not able to wear a facemask (for example, because it causes trouble breathing), then people who live with you should not stay in the same room with you, or they should wear a facemask if they enter your room. Cover your coughs and sneezes  Cover your mouth and nose with a tissue when you cough or sneeze. Throw used tissues in a lined trash can. Immediately wash your hands with soap and water for at least 20 seconds or, if soap and water are not available, clean your hands with an alcohol-based hand  that contains at least 60% alcohol. Clean your hands often  Wash your hands often with soap and water for at least 20 seconds, especially after blowing your nose, coughing, or sneezing; going to the bathroom; and before eating or preparing food. If soap and water are not readily available, use an alcohol-based hand  with at least 60% alcohol, covering all surfaces of your hands and rubbing them together until they feel dry. Soap and water are the best option if hands are visibly dirty. Avoid touching your eyes, nose, and mouth with unwashed hands. Avoid sharing personal household items  You should not share dishes, drinking glasses, cups, eating utensils, towels, or bedding with other people or pets in your home. After using these items, they should be washed thoroughly with soap and water. Clean all high-touch surfaces everyday  High touch surfaces include counters, tabletops, doorknobs, bathroom fixtures, toilets, phones, keyboards, tablets, and bedside tables. Also, clean any surfaces that may have blood, stool, or body fluids on them. Use a household cleaning spray or wipe, according to the label instructions. Labels contain instructions for safe and effective use of the cleaning product including precautions you should take when applying the product, such as wearing gloves and making sure you have good ventilation during use of the product. Monitor your symptoms  Seek prompt medical attention if your illness is worsening (e.g., difficulty breathing).  Before seeking care, call your healthcare provider and tell them that you have, or are being evaluated for, COVID-19. Put on a facemask before you enter the facility. These steps will help the healthcare providers office to keep other people in the office or waiting room from getting infected or exposed. Ask your healthcare provider to call the local or state health department. Persons who are placed under active monitoring or facilitated self-monitoring should follow instructions provided by their local health department or occupational health professionals, as appropriate. When working with your local health department check their available hours. If you have a medical emergency and need to call 911, notify the dispatch personnel that you have, or are being evaluated for COVID-19. If possible, put on a facemask before emergency medical services arrive. Discontinuing home isolation  Patients with confirmed COVID-19 should remain under home isolation precautions until the risk of secondary transmission to others is thought to be low. The decision to discontinue home isolation precautions should be made on a case-by-case basis, in consultation with healthcare providers and state and local health departments.

## 2020-08-24 NOTE — PERIOP NOTES
PACU DISCHARGE NOTE    Vital signs stable, pain well controlled, alert and oriented times three or at baseline, follow up per surgeon, no anesthetic complications. Discharge instructions and prescriptions for Keflex and Percocet given to pt and his friend, Cynthia Ellington. Both voice understanding of instructions. Pt is without c/o pain or discomfort and has had his IV removed intact. Pt left in care of Cynthia Ellington who has his belongings.

## 2020-08-24 NOTE — ANESTHESIA POSTPROCEDURE EVALUATION
Procedure(s):  LEFT LITHOTRIPSY EXTRACORPOREAL SHOCKWAVE ESWL. general    Anesthesia Post Evaluation      Multimodal analgesia: multimodal analgesia used between 6 hours prior to anesthesia start to PACU discharge  Patient location during evaluation: PACU  Patient participation: complete - patient participated  Level of consciousness: awake and alert  Pain management: adequate  Airway patency: patent  Anesthetic complications: no  Cardiovascular status: acceptable  Respiratory status: acceptable, spontaneous ventilation and nonlabored ventilation  Hydration status: acceptable  Post anesthesia nausea and vomiting:  none      INITIAL Post-op Vital signs:   Vitals Value Taken Time   /77 8/24/2020 10:11 AM   Temp 36.5 °C (97.7 °F) 8/24/2020  9:56 AM   Pulse 73 8/24/2020 10:13 AM   Resp 16 8/24/2020  9:56 AM   SpO2 100 % 8/24/2020 10:13 AM   Vitals shown include unvalidated device data.

## 2020-08-24 NOTE — ANESTHESIA PREPROCEDURE EVALUATION
Anesthetic History     Increased risk of difficult airway (Anterior with grade III DL view during CABG) and other anesthesia complications     Comments: Slow to awaken     Review of Systems / Medical History  Patient summary reviewed, nursing notes reviewed and pertinent labs reviewed    Pulmonary    COPD (no home O2): moderate      Smoker         Neuro/Psych         Psychiatric history     Cardiovascular    Hypertension          CAD, PAD (s/p L CEA- repeat doppler Jan '20- Right 16-40%) and CABG    Exercise tolerance: >4 METS     GI/Hepatic/Renal         Renal disease: stones      Comments: S/p cystectomy and ileal conduit Endo/Other        Arthritis and cancer (bladder now with illeal conduit)     Other Findings   Comments:  ETOH use daily: per pt ~3 beers/day         Physical Exam    Airway  Mallampati: II  TM Distance: 4 - 6 cm  Neck ROM: normal range of motion   Mouth opening: Normal     Cardiovascular  Regular rate and rhythm,  S1 and S2 normal,  no murmur, click, rub, or gallop  Rhythm: regular  Rate: normal         Dental  No notable dental hx       Pulmonary  Breath sounds clear to auscultation               Abdominal  GI exam deferred       Other Findings            Anesthetic Plan    ASA: 3  Anesthesia type: general          Induction: Intravenous  Anesthetic plan and risks discussed with: Patient

## 2020-08-24 NOTE — ED TRIAGE NOTES
Pt had a stent placed for kidney stones this morning. States he began having pain in \"left kidney that goes up into his chest\" shortly after the procedure.

## 2020-08-24 NOTE — PERIOP NOTES
Preoperative flank skin condition: WARM DRY INTACT  Lead shield: Yes  Patient ear protection: Yes   Gel applied to patient's flank and Lithotripsy head: Yes   Starting power level: 07  Shock start time (first  fluoroscopy): 0913  Shock stop time (last lithotripsy shock): 0946  Ending power level: 11  Total shocks: 3000  Total fluoroscopy time: 2.32  Postoperative flank skin condition: WARM DRY INTACT

## 2020-08-24 NOTE — H&P
Kidney Stone           HPI      Gloria Alonso is a 59 y.o. male cystectomy and has an ileal conduit urinary diversion with ostomy bag in the right lower quadrant. Attempted a left percutaneous nephrostolithotomy the upper pole access but could not get into the lower pole moiety.  He had a stent and with a nephrostomy tube came out the stent came out as well.  Could not get access to left lower pole stone and had ESWL.   Left lower pole renal extracorporeal shockwave therapy on 6/26/18. KUB after ESWL showed a lot of bowel gas however and there was a small calcification near the left lower pole where the previous stone had been.      He returns today for gross hematuria. CT urogram showed 2 non-obstructing left renal pelvic stones (3-4 mm in size). No gross abnormalities of ileal conduit in the right anterior pelvis.     He reports 3 month h/o of intermittent left flank pain. Assoc with gross hematuria. Some nausea. No other sx.  No aggravating or alleviating factors known.           Past Medical History:   Diagnosis Date    CAD (coronary artery disease) 09/18/2018    Carotid stenosis, bilateral       S/P left endarterectomy,   CARLY 30% per CTA    Chronic neck pain      Chronic obstructive pulmonary disease (HCC)      Depression with anxiety      History of bladder cancer 07/2014     Cystectomy with ileo conduit urinary diversion ----and chemo    History of left-sided carotid endarterectomy      History of neck surgery      Hypertension      Kidney stones      Osteoarthritis      PAD (peripheral artery disease) (HCC)      S/P CABG x 3 09/2018    Staghorn renal calculus       Left sided    Status post ileal conduit ([facility] [address] 2014    Tobacco use disorder, continuous       0.5 ppd since age 15           Past Surgical History:   Procedure Laterality Date    CABG, ARTERY-VEIN, THREE   09/18/2018    HX CAROTID ENDARTERECTOMY Left 12/06/2018    HX CERVICAL FUSION   2014     Dr. Gregorio Ramos    [address] BYPASS GRAFT   09/18/2018     triple bypass    HX GI   06/24/2019     upper/lower GI    HX HEART CATHETERIZATION   08/2018    HX HEMORRHOIDECTOMY        HX KNEE ARTHROSCOPY Left            HX UROLOGICAL         benign tumor removed from L testicle      HX UROLOGICAL   2015     TURBT    HX UROLOGICAL Left       Nephrostomy    HX VASCULAR ACCESS Right 2014     Port, removed 2016            Current Outpatient Medications   Medication Sig Dispense Refill    metoprolol tartrate (LOPRESSOR) 50 mg tablet Take 0.5 Tabs by mouth every twelve (12) hours. TAKE 1/2 TAB=25MG BID 90 Tab 3    atorvastatin (LIPITOR) 40 mg tablet Take 1 Tab by mouth daily. 90 Tab 3    aspirin delayed-release 81 mg tablet Take 81 mg by mouth daily.        DISABLED PLACARD (DISABLED PLACARD) DMV Needs disabled parking placard 1 Each 0    OXYGEN-AIR DELIVERY SYSTEMS by Does Not Apply route. 2LPM QHS        Ostomy Supplies (NEW IMAGE SKIN BARRIER) 2 1/4 \" misc 1 Units by Does Not Apply route as needed. 10 Each 99           Allergies   Allergen Reactions    Codeine Hives       Has tolerated morphine previously. 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 Social Needs    Financial resource strain: Not on file    Food insecurity       Worry: Not on file       Inability: Not on file    Transportation needs       Medical: Not on file       Non-medical: Not on file   Tobacco Use    Smoking status: Current Every Day Smoker       Packs/day: 0.50       Years: 50.00       Pack years: 25.00    Smokeless tobacco: Never Used   Substance and Sexual Activity    Alcohol use:  Yes       Alcohol/week: 50.0 standard drinks       Types: 50 Cans of beer per week       Frequency: 4 or more times a week       Binge frequency: Daily or almost daily       Comment: 3-9 alcoloic beverages a day    Drug use: Yes       Types: Marijuana       Comment: stopped 2 months ago    Sexual activity: Not Currently   Lifestyle    Physical activity       Days per week: Not on file       Minutes per session: Not on file    Stress: Not on file   Relationships    Social connections       Talks on phone: Not on file       Gets together: Not on file       Attends [employer] service: Not on file       Active member of club or organization: Not on file       Attends meetings of clubs or organizations: Not on file       Relationship status: Not on file    Intimate partner violence       Fear of current or ex partner: Not on file       Emotionally abused: Not on file       Physically abused: Not on file       Forced sexual activity: Not on file   Other Topics Concern    Not on file   Social History Narrative     Lives with girlfriend. His father is living in [place]. Family History   Problem Relation Age of Onset    Cancer Mother      Lung Disease Mother      Cancer Father           bladder CA    No Known Problems Sister      Cancer Sister           hx of breast CA        Review of Systems  Constitutional: Negative  Cardiovascular: Neg cardio ROS  GI: Positive for abdominal pain. Genitourinary: Positive for hematuria, flank pain and history of urolithiasis.           PHYSICAL EXAM     Patient Vitals for the past 12 hrs:   Temp Pulse Resp BP SpO2   08/24/20 0800 98.3 °F (36.8 °C) 82 18 (!) 163/92 97 %          General appearance - well appearing and in no distress  Mental status - alert, oriented to person, place, and time  Neck - supple, no significant adenopathy   Heart-  Normal S1/S2, No murmurs  Chest/Lung-  Quiet, even and easy respiratory effort without use of accessory muscles, BS clear all lung fields  Neurological - normal speech, no focal findings or movement disorder noted on gross visual exam  Musculoskeletal - normal gait and station  Skin - normal coloration and turgor, no rashes     KUB in office today reviewed by myself and shows diffuse bowel gas. Unable to visual stones. These were previously noted on prior xray.     IMPRESSION:  1. Two nonobstructing 3 to 4 mm stones noted in the left renal pelvis. There is  no hydronephrosis or hydroureter. No ureteral calculi evident. 2. Marked atherosclerosis. 3. No gross abnormality of the ileal conduit in the right anterior pelvis.      Imaging     CT UROGRAM WO CONT (Order: [id number]) - 8/6/2020     Assessment and Plan     ICD-10-CM ICD-9-CM     1. Kidney stone on left side  N20.0 592.0 AMB POC URINALYSIS DIP STICK AUTO W/O MICRO (PGU)    intermintent left flank pain     AMB POC XRAY ABDOMEN 1 VIEW     Urine normal today. CT and KUB reviewed with patient. Discussed left ESWL. He is unsure as this did not help before.

## 2020-08-25 LAB
HCT VFR BLD AUTO: 23.5 % (ref 41.1–50.3)
HCT VFR BLD AUTO: 26.6 % (ref 41.1–50.3)
HCT VFR BLD AUTO: 26.7 % (ref 41.1–50.3)
HCT VFR BLD AUTO: 27.2 % (ref 41.1–50.3)
HCT VFR BLD AUTO: 28.8 % (ref 41.1–50.3)
HGB BLD-MCNC: 7.8 G/DL (ref 13.6–17.2)
HGB BLD-MCNC: 8.7 G/DL (ref 13.6–17.2)
HGB BLD-MCNC: 9.2 G/DL (ref 13.6–17.2)
HGB BLD-MCNC: 9.2 G/DL (ref 13.6–17.2)
HGB BLD-MCNC: 9.8 G/DL (ref 13.6–17.2)

## 2020-08-25 PROCEDURE — 36415 COLL VENOUS BLD VENIPUNCTURE: CPT

## 2020-08-25 PROCEDURE — 74011250636 HC RX REV CODE- 250/636: Performed by: UROLOGY

## 2020-08-25 PROCEDURE — 85018 HEMOGLOBIN: CPT

## 2020-08-25 PROCEDURE — 86900 BLOOD TYPING SEROLOGIC ABO: CPT

## 2020-08-25 PROCEDURE — 74011250637 HC RX REV CODE- 250/637: Performed by: UROLOGY

## 2020-08-25 PROCEDURE — 77030040361 HC SLV COMPR DVT MDII -B

## 2020-08-25 PROCEDURE — 86923 COMPATIBILITY TEST ELECTRIC: CPT

## 2020-08-25 PROCEDURE — 65270000029 HC RM PRIVATE

## 2020-08-25 RX ORDER — DIPHENHYDRAMINE HCL 25 MG
25 CAPSULE ORAL
Status: DISCONTINUED | OUTPATIENT
Start: 2020-08-25 | End: 2020-08-27 | Stop reason: HOSPADM

## 2020-08-25 RX ADMIN — ONDANSETRON 4 MG: 2 INJECTION INTRAMUSCULAR; INTRAVENOUS at 02:40

## 2020-08-25 RX ADMIN — METOPROLOL TARTRATE 25 MG: 25 TABLET, FILM COATED ORAL at 22:02

## 2020-08-25 RX ADMIN — HYDROMORPHONE HYDROCHLORIDE 1 MG: 1 INJECTION, SOLUTION INTRAMUSCULAR; INTRAVENOUS; SUBCUTANEOUS at 02:30

## 2020-08-25 RX ADMIN — Medication 10 ML: at 06:34

## 2020-08-25 RX ADMIN — OXYCODONE HYDROCHLORIDE AND ACETAMINOPHEN 1 TABLET: 5; 325 TABLET ORAL at 12:21

## 2020-08-25 RX ADMIN — HYDROMORPHONE HYDROCHLORIDE 1 MG: 1 INJECTION, SOLUTION INTRAMUSCULAR; INTRAVENOUS; SUBCUTANEOUS at 09:00

## 2020-08-25 RX ADMIN — HYDROMORPHONE HYDROCHLORIDE 1 MG: 1 INJECTION, SOLUTION INTRAMUSCULAR; INTRAVENOUS; SUBCUTANEOUS at 06:48

## 2020-08-25 RX ADMIN — TAMSULOSIN HYDROCHLORIDE 0.4 MG: 0.4 CAPSULE ORAL at 09:00

## 2020-08-25 RX ADMIN — HYDROMORPHONE HYDROCHLORIDE 1 MG: 1 INJECTION, SOLUTION INTRAMUSCULAR; INTRAVENOUS; SUBCUTANEOUS at 22:02

## 2020-08-25 RX ADMIN — ONDANSETRON 4 MG: 2 INJECTION INTRAMUSCULAR; INTRAVENOUS at 14:40

## 2020-08-25 RX ADMIN — SODIUM CHLORIDE 100 ML/HR: 900 INJECTION, SOLUTION INTRAVENOUS at 08:59

## 2020-08-25 RX ADMIN — Medication 10 ML: at 16:57

## 2020-08-25 RX ADMIN — ONDANSETRON 4 MG: 2 INJECTION INTRAMUSCULAR; INTRAVENOUS at 06:57

## 2020-08-25 RX ADMIN — Medication 10 ML: at 04:40

## 2020-08-25 RX ADMIN — Medication 10 ML: at 22:03

## 2020-08-25 RX ADMIN — METOPROLOL TARTRATE 25 MG: 25 TABLET, FILM COATED ORAL at 09:00

## 2020-08-25 RX ADMIN — HYDROMORPHONE HYDROCHLORIDE 1 MG: 1 INJECTION, SOLUTION INTRAMUSCULAR; INTRAVENOUS; SUBCUTANEOUS at 14:40

## 2020-08-25 NOTE — PROGRESS NOTES
Hourly rounds complete this shift, patient remains NPO: bed in low, locked position, call light and bedside table within reach,  all needs met. Will continue to monitor Report to day shift nurse.

## 2020-08-25 NOTE — H&P
[address] [place] [address]  HISTORY AND PHYSICAL    Name:  Gerlean Cabot  MR#:  [id number]  :  1955  ACCOUNT #:  [de-identified]  ADMIT DATE:  2020    CHIEF COMPLAINT:  Left abdominal pain. HISTORY OF PRESENT ILLNESS:  A 80-year-old man with history of bladder cancer, status post cystectomy and ileal conduit approximately 5 years ago. Also, has a history of stones and is followed by Dr. Carmella Meyers. The patient had complaints of intermittent left flank pain with CT urogram on 2020 showing two nonobstructing 4 mm stones located in the left renal pelvis. The patient was treated with left renal ESWL today by Dr. Katelyn Naylor. After he went home he began to experience severe unremitting left-sided abdominal pain. He called the office and was told to come back to the ER. He had a CT angiogram of the chest and pelvis done. This shows left perinephric hematoma with active extravasation in the region of the upper pole medially and extension into retroperitoneal down to the pelvis. There are several cortical perfusion defects involving the lower pole of the left kidney, nonobstructing left lower renal collecting system stone and there is a small amount of free fluid in the pelvis. Lab work, CBC shows a white count of 11.4, hemoglobin 14.3, hematocrit 39.7, platelets 091,408. Preoperative hemoglobin was 15.0. Chemistry shows a creatinine of 1.15, potassium of 3.8. The patient is still complaining of unremitting left-sided abdominal pain. He denies any nausea or vomiting. He has chronic shortness of breath secondary to COPD. PAST MEDICAL HISTORY:  1. Coronary artery disease, status post CABG x3 in 2018. 2.  Peripheral vascular disease with left carotid endarterectomy in 2018. 3.  COPD. 4.  History of bladder cancer, status post chemotherapy, cystectomy and ileal conduit urinary diversion. 5.  Hypertension. 6.  History of urolithiasis. 7.  Tobacco use disorder.     PAST SURGERIES:  1. He has had previous attempted percutaneous left renal surgery and also had followup ESWL for that in the past.  2.  CABG x3.  3.  Cervical fusion by Dr. Eduardo Sunshine in 2014. 4.  Carotid endarterectomy in 12/2018 on the left. 5.  Hemorrhoidectomy. 6.  Benign tumor removed from the left testicle. 7.  Cystectomy and ileal conduit for bladder cancer. MEDICATIONS:  1. Lopressor 25 mg every 12 hours. 2.  Lipitor 40 mg daily. 3.  Aspirin 81 mg daily. ALLERGIES:  CODEINE. PENICILLIN CAUSES RASH AND ITCHING. SOCIAL HISTORY:  He is . Continues to smoke 0.5 packs per day for 50 years. He does drink 50 standard drinks of alcohol per week. REVIEW OF SYSTEMS:  CARDIOVASCULAR:  Denies any chest pain. PULMONARY:  He does have some chronic shortness of breath with some wheezing noted on exam.    PHYSICAL EXAMINATION:  GENERAL:  The patient lying on the stretcher in moderate distress. VITAL SIGNS:  BP is 141/73. O2 sat is 96%. His pulse rate does appear to be somewhat tachycardic. NECK:  Shows a scar on the left carotid area with normal carotid pulses bilaterally. HEART:  Regular rate and rhythm without murmur. LUNGS:  Clear to auscultation bilaterally. ABDOMEN:  Shows an ileal conduit stoma In the right lower quadrant which appears healthy and the bag has clear urine. He has some mild pain on the left side of the abdomen, but there are normal bowel sounds present. No masses noted. GENITALS:  Normal.  Nontender testes with a normal penis. EXTREMITIES:  Without edema. LABORATORY FINDINGS:  Is as noted, hemoglobin 14.3. He did have coags. INR is 0.9. PT is 12.6. CT scan as noted. ASSESSMENT:  Left perinephric hematoma, status post ESWL. Currently hemodynamically stable. RECOMMENDATIONS:  Admit for pain control and serial hemoglobin checks. We will type and cross blood. I would treat this initially conservatively. We will hold his aspirin 85 mg.   He will be made MD ISABEL Connors/[id number]  D:  08/24/2020 19:41  T:  08/24/2020 21:08  JOB #:  [id number]

## 2020-08-25 NOTE — PROGRESS NOTES
Care Management Interventions  Mode of Transport at Discharge: Self  Transition of Care Consult (CM Consult): Discharge Planning  Physical Therapy Consult: No  Occupational Therapy Consult: No  Speech Therapy Consult: No  Confirm Follow Up Transport: Self  Discharge Location  Discharge Placement: Home    Chart screened by  for discharge planning. Patient will likely return home at discharge. No needs identified at this time. CM will continue to follow patient during hospitalization for discharge planning and needs. Please consult  if any new issues arise.

## 2020-08-25 NOTE — PROGRESS NOTES
Problem: Falls - Risk of  Goal: *Absence of Falls  Description: Document Dana Livingston Fall Risk and appropriate interventions in the flowsheet.   Outcome: Progressing Towards Goal  Note: Fall Risk Interventions:            Medication Interventions: Evaluate medications/consider consulting pharmacy, Patient to call before getting OOB                   Problem: Patient Education: Go to Patient Education Activity  Goal: Patient/Family Education  Outcome: Progressing Towards Goal     Problem: Pain  Goal: *Control of Pain  Outcome: Progressing Towards Goal  Goal: *PALLIATIVE CARE:  Alleviation of Pain  Outcome: Progressing Towards Goal     Problem: Patient Education: Go to Patient Education Activity  Goal: Patient/Family Education  Outcome: Progressing Towards Goal

## 2020-08-25 NOTE — PROGRESS NOTES
Admit Date: 8/24/2020    Subjective:     Shaila Sinclair is sitting up in bed. VSS. Afebrile. Pain currently controlled. Denies flank/chest pain. Objective:     Patient Vitals for the past 8 hrs:   BP Temp Pulse Resp SpO2   08/25/20 0722 119/74 97.8 °F (36.6 °C) 99 16 93 %   08/25/20 0628 157/88 97.7 °F (36.5 °C) 96 16 98 %   08/25/20 0329 124/84 97.5 °F (36.4 °C) 82 16 95 %     No intake/output data recorded. 08/23 1901 - 08/25 0700  In: -   Out: 1050 [Urine:1050]    Physical Exam:  GENERAL: alert, cooperative, no distress  LUNG: clear to auscultation bilaterally  HEART: regular rate and rhythm, S1, S2   ABDOMEN: soft, non-tender, no ecchymosis visualized on left flank  NEUROLOGIC: AOx3    Data Review   Recent Results (from the past 24 hour(s))   EKG, 12 LEAD, INITIAL    Collection Time: 08/24/20  2:49 PM   Result Value Ref Range    Ventricular Rate 91 BPM    Atrial Rate 104 BPM    P-R Interval 120 ms    QRS Duration 70 ms    Q-T Interval 350 ms    QTC Calculation (Bezet) 430 ms    Calculated P Axis 82 degrees    Calculated R Axis 76 degrees    Calculated T Axis 90 degrees    Diagnosis       !! AGE AND GENDER SPECIFIC ECG ANALYSIS !!   Sinus tachycardia with Fusion complexes  ST abnormality, possible digitalis effect  Abnormal ECG  When compared with ECG of 19-SEP-2018 07:13,  Fusion complexes are now Present  Confirmed by [facility] ([id number]) on 8/24/2020 7:02:03 PM     CBC WITH AUTOMATED DIFF    Collection Time: 08/24/20  3:03 PM   Result Value Ref Range    WBC 11.4 (H) 4.3 - 11.1 K/uL    RBC 3.93 (L) 4.23 - 5.6 M/uL    HGB 14.3 13.6 - 17.2 g/dL    HCT 39.7 (L) 41.1 - 50.3 %    .0 (H) 79.6 - 97.8 FL    MCH 36.4 (H) 26.1 - 32.9 PG    MCHC 36.0 (H) 31.4 - 35.0 g/dL    RDW 12.6 11.9 - 14.6 %    PLATELET 580 965 - 269 K/uL    MPV 9.4 9.4 - 12.3 FL    ABSOLUTE NRBC 0.00 0.0 - 0.2 K/uL    DF AUTOMATED      NEUTROPHILS 91 (H) 43 - 78 %    LYMPHOCYTES 7 (L) 13 - 44 %    MONOCYTES 1 (L) 4.0 - 12.0 % EOSINOPHILS 0 (L) 0.5 - 7.8 %    BASOPHILS 0 0.0 - 2.0 %    IMMATURE GRANULOCYTES 1 0.0 - 5.0 %    ABS. NEUTROPHILS 10.4 (H) 1.7 - 8.2 K/UL    ABS. LYMPHOCYTES 0.8 0.5 - 4.6 K/UL    ABS. MONOCYTES 0.2 0.1 - 1.3 K/UL    ABS. EOSINOPHILS 0.0 0.0 - 0.8 K/UL    ABS. BASOPHILS 0.0 0.0 - 0.2 K/UL    ABS. IMM. GRANS. 0.1 0.0 - 0.5 K/UL   LIPASE    Collection Time: 08/24/20  3:03 PM   Result Value Ref Range    Lipase 67 (L) 73 - 170 U/L   METABOLIC PANEL, COMPREHENSIVE    Collection Time: 08/24/20  3:03 PM   Result Value Ref Range    Sodium 134 (L) 136 - 145 mmol/L    Potassium 3.8 3.5 - 5.1 mmol/L    Chloride 102 98 - 107 mmol/L    CO2 23 21 - 32 mmol/L    Anion gap 9 7 - 16 mmol/L    Glucose 163 (H) 65 - 100 mg/dL    BUN 12 8 - 23 MG/DL    Creatinine 1.15 0.8 - 1.5 MG/DL    GFR est AA >60 >60 ml/min/1.73m2    GFR est non-AA >60 >60 ml/min/1.73m2    Calcium 9.1 8.3 - 10.4 MG/DL    Bilirubin, total 0.3 0.2 - 1.1 MG/DL    ALT (SGPT) 39 12 - 65 U/L    AST (SGOT) 26 15 - 37 U/L    Alk.  phosphatase 85 50 - 136 U/L    Protein, total 7.8 6.3 - 8.2 g/dL    Albumin 4.1 3.2 - 4.6 g/dL    Globulin 3.7 (H) 2.3 - 3.5 g/dL    A-G Ratio 1.1 (L) 1.2 - 3.5     NT-PRO BNP    Collection Time: 08/24/20  3:03 PM   Result Value Ref Range    NT pro- (H) 5 - 125 PG/ML   PROTHROMBIN TIME + INR    Collection Time: 08/24/20  3:10 PM   Result Value Ref Range    Prothrombin time 12.6 12.0 - 14.7 sec    INR 0.9     PTT    Collection Time: 08/24/20  3:10 PM   Result Value Ref Range    aPTT 30.7 24.3 - 35.4 SEC   HGB & HCT    Collection Time: 08/25/20 12:26 AM   Result Value Ref Range    HGB 9.8 (L) 13.6 - 17.2 g/dL    HCT 28.8 (L) 41.1 - 50.3 %   TYPE & SCREEN    Collection Time: 08/25/20  1:30 AM   Result Value Ref Range    Crossmatch Expiration 08/28/2020     ABO/Rh(D) Kwaku Crenshaw POSITIVE     Antibody screen NEG    HGB & HCT    Collection Time: 08/25/20  3:35 AM   Result Value Ref Range    HGB 9.2 (L) 13.6 - 17.2 g/dL    HCT 27.2 (L) 41.1 - 50.3 % Assessment:     Active Problems:    Renal hematoma (8/24/2020)      Left perinephric hematoma, status post ESWL. Hgb 9.2    Plan:     Currently hemodynamically stable. Continue Hgb checks every 6 hours. If next hgb is stable, will allow pt to eat. Will follow.           Sheri Salazar NP  [facility]

## 2020-08-25 NOTE — PROGRESS NOTES
Pt c/o itching on back of neck upper back. No redness noted but skin is hot. urologist paged and order received to give 25mg benadryl PO. Continue to monitor.

## 2020-08-25 NOTE — ED NOTES
TRANSFER - OUT REPORT:    Verbal report given to Von(name) on Edmund Bui  being transferred to [facility](unit) for routine progression of care       Report consisted of patients Situation, Background, Assessment and   Recommendations(SBAR). Information from the following report(s) SBAR was reviewed with the receiving nurse. Lines:   Peripheral IV 08/24/20 Right Antecubital (Active)   Site Assessment Clean, dry, & intact 08/24/20 1513   Phlebitis Assessment 0 08/24/20 1513   Infiltration Assessment 0 08/24/20 1513   Dressing Status Clean, dry, & intact 08/24/20 1513   Dressing Type Transparent 08/24/20 1513   Hub Color/Line Status Blue 08/24/20 1513       Peripheral IV 08/24/20 Left Antecubital (Active)   Site Assessment Clean, dry, & intact 08/24/20 1708   Phlebitis Assessment 0 08/24/20 1708   Infiltration Assessment 0 08/24/20 1708   Dressing Status Clean, dry, & intact 08/24/20 1708   Dressing Type Transparent 08/24/20 1708        Opportunity for questions and clarification was provided.       Patient transported with:   [employer]

## 2020-08-26 LAB
HCT VFR BLD AUTO: 23.1 % (ref 41.1–50.3)
HCT VFR BLD AUTO: 23.8 % (ref 41.1–50.3)
HCT VFR BLD AUTO: 30.1 % (ref 41.1–50.3)
HCT VFR BLD AUTO: 32.8 % (ref 41.1–50.3)
HGB BLD-MCNC: 10.2 G/DL (ref 13.6–17.2)
HGB BLD-MCNC: 11 G/DL (ref 13.6–17.2)
HGB BLD-MCNC: 7.7 G/DL (ref 13.6–17.2)
HGB BLD-MCNC: 8.1 G/DL (ref 13.6–17.2)

## 2020-08-26 PROCEDURE — 65270000029 HC RM PRIVATE

## 2020-08-26 PROCEDURE — 74011250637 HC RX REV CODE- 250/637: Performed by: NURSE PRACTITIONER

## 2020-08-26 PROCEDURE — P9016 RBC LEUKOCYTES REDUCED: HCPCS

## 2020-08-26 PROCEDURE — 36430 TRANSFUSION BLD/BLD COMPNT: CPT

## 2020-08-26 PROCEDURE — 36415 COLL VENOUS BLD VENIPUNCTURE: CPT

## 2020-08-26 PROCEDURE — 74011250637 HC RX REV CODE- 250/637: Performed by: UROLOGY

## 2020-08-26 PROCEDURE — 85018 HEMOGLOBIN: CPT

## 2020-08-26 PROCEDURE — 74011250636 HC RX REV CODE- 250/636: Performed by: UROLOGY

## 2020-08-26 PROCEDURE — 30233N1 TRANSFUSION OF NONAUTOLOGOUS RED BLOOD CELLS INTO PERIPHERAL VEIN, PERCUTANEOUS APPROACH: ICD-10-PCS | Performed by: NURSE PRACTITIONER

## 2020-08-26 RX ORDER — SODIUM CHLORIDE 9 MG/ML
250 INJECTION, SOLUTION INTRAVENOUS AS NEEDED
Status: DISCONTINUED | OUTPATIENT
Start: 2020-08-26 | End: 2020-08-27 | Stop reason: HOSPADM

## 2020-08-26 RX ORDER — POLYETHYLENE GLYCOL 3350 17 G/17G
17 POWDER, FOR SOLUTION ORAL AS NEEDED
Status: DISCONTINUED | OUTPATIENT
Start: 2020-08-26 | End: 2020-08-27 | Stop reason: HOSPADM

## 2020-08-26 RX ORDER — DOCUSATE SODIUM 100 MG/1
100 CAPSULE, LIQUID FILLED ORAL 2 TIMES DAILY
Status: DISCONTINUED | OUTPATIENT
Start: 2020-08-26 | End: 2020-08-27 | Stop reason: HOSPADM

## 2020-08-26 RX ADMIN — DOCUSATE SODIUM 100 MG: 100 CAPSULE, LIQUID FILLED ORAL at 14:45

## 2020-08-26 RX ADMIN — POLYETHYLENE GLYCOL 3350 17 G: 17 POWDER, FOR SOLUTION ORAL at 14:45

## 2020-08-26 RX ADMIN — Medication 10 ML: at 04:30

## 2020-08-26 RX ADMIN — DOCUSATE SODIUM 100 MG: 100 CAPSULE, LIQUID FILLED ORAL at 10:56

## 2020-08-26 RX ADMIN — HYDROMORPHONE HYDROCHLORIDE 1 MG: 1 INJECTION, SOLUTION INTRAMUSCULAR; INTRAVENOUS; SUBCUTANEOUS at 04:30

## 2020-08-26 RX ADMIN — SODIUM CHLORIDE 100 ML/HR: 900 INJECTION, SOLUTION INTRAVENOUS at 04:39

## 2020-08-26 RX ADMIN — LORAZEPAM 1 MG: 2 INJECTION INTRAMUSCULAR; INTRAVENOUS at 23:04

## 2020-08-26 RX ADMIN — METOPROLOL TARTRATE 25 MG: 25 TABLET, FILM COATED ORAL at 23:03

## 2020-08-26 RX ADMIN — TAMSULOSIN HYDROCHLORIDE 0.4 MG: 0.4 CAPSULE ORAL at 08:53

## 2020-08-26 RX ADMIN — METOPROLOL TARTRATE 25 MG: 25 TABLET, FILM COATED ORAL at 08:53

## 2020-08-26 RX ADMIN — Medication 10 ML: at 23:07

## 2020-08-26 RX ADMIN — Medication 10 ML: at 14:17

## 2020-08-26 NOTE — PROGRESS NOTES
Hourly rounds completed this shift. Patient complained of pain & medicated per MAR. Patient's urostomy drained yellow, clear urine. All needs met at this time. Will continue to monitor & give report to oncoming RN.

## 2020-08-26 NOTE — PROGRESS NOTES
Comprehensive Nutrition Assessment    Type and Reason for Visit: Initial, Positive nutrition screen    Nutrition Recommendations/Plan: Continue current diet. Encouraged pt to request crackers/snacks as needed from the floor kitchen. Provided crackers for pt to have now. Will order ensure enlive TID to pt to have the option to drink at meal times. Nutrition Assessment:  Pt admitted with gross hematuria, non-obstruction L renal pelvic stones. S/P L lithotripsy (8/24). PMH notable for cystectomy, ileal conduit and ostomy, CAD s/p CABG, COPD, PAD, tobacco abuse. Pt reports that he has not eaten at all today, but states that he does not eat much at home either. Pt endorses daily AM emesis and does not eat breakfast. Pt later reveals that he believes emesis may be r/t phlegm build up with smoking. Pt states \"I am 72 and i've been smoking since I was 16. . I don't plan to change. \"  He states that when he had nausea during chemotherapy treatments he would use a \"nausea medication that dissolved under his tongue\" and that was the only thing that would help. Pt reports making home-made soups (chicken noodle, vegetable beef) and will eat a small cup, or will have a ~3 oz portion of a steak. He does not drink supplements and states that he has never seen them work before, so he doesn't think that they would work with him. Pt reports a UBW of 128 pounds but states that he got down to a weight of 98 pounds ~3-4 months ago. He states that he saw a GI doctor but they never found out what was wrong with him. Pt reports gaining weight back and is happy to weigh ~113 pounds now. Pt is receiving IV zofran prn. Bowel regimen in place. IVF: NS @ 100 ml/hr.      WT / BMI 8/24/2020 8/24/2020 8/21/2020 8/5/2020 5/28/2020   WEIGHT  113 lb 114 lb 115 lb 116 lb 1.6 oz     WT / BMI 2/28/2020 1/6/2020 12/5/2019 10/21/2019   WEIGHT 118 lb 14.4 oz 117 lb 117 lb 12.8 oz 111 lb 3.2 oz     WT / BMI 8/27/2019   WEIGHT 116 lb 9.6 oz Per weights listed in EMR, pt has had a potential 5 pound, 4.2% weight loss over ~6 months. Estimated Daily Nutrient Needs:  Energy (kcal):  6344-3593 kcal/d (25-30 kcal/kg listed BW 51.2 kg)  Protein (g):  64-77 gm/d (20% kcal)       Fluid (ml/day):       Current Nutrition Therapies:   DIET REGULAR    Anthropometric Measures:  · Height:  5' 5\" (165.1 cm)  · Current Body Wt:  51.2 kg (112 lb 14 oz)   · Ideal Body Wt:  136 lbs:  83 %   · Body mass index is 18.8 kg/m². · BMI Category:  Normal weight (BMI 18.5-24. 9)       Nutrition Diagnosis:   · Predicted inadequate energy intake related to altered GI function(decreased appetite) as evidenced by (pt report of nausea, AM emesis, and no appetite. )    Nutrition Interventions:   Food and/or Nutrient Delivery: Continue current diet, Start oral nutrition supplement  Provided pt with gingerale and crackers. Goals:  Meet >75% of estimated kcal/protein needs. Nutrition Monitoring and Evaluation:   Food/Nutrient Intake Outcomes: Food and nutrient intake, Supplement intake  Physical Signs/Symptoms Outcomes: Biochemical data, GI status, Nausea/vomiting, Hemodynamic status    Discharge Planning:     Too soon to determine     Electronically signed by Rich Viramontes [address], [address] [place] [address], , [address] on 8/26/2020 at 4:12 PM

## 2020-08-26 NOTE — PROGRESS NOTES
Admit Date: 8/24/2020    Subjective:     Erlinda Cheng is resting. Reports he did not sleep well. Pain controlled. VSS. Hgb down to 7.7. Reports constipation. Objective:     Patient Vitals for the past 8 hrs:   BP Temp Pulse Resp SpO2   08/26/20 0631 134/78 98.2 °F (36.8 °C) 100 18 95 %   08/26/20 0432 165/81 98.4 °F (36.9 °C) (!) 104 17 93 %     08/26 0701 - 08/26 1900  In: 360 [P.O.:360]  Out: 300 [Urine:300]  08/24 1901 - 08/26 0700  In: 3292.4 [P.O.:480; I.V.:2812.4]  Out: 4300 [Urine:4300]    Physical Exam:  GENERAL: alert, cooperative, no distress  LUNG: clear to auscultation bilaterally  HEART: regular rate and rhythm, S1, S2   ABDOMEN: soft, non-tender, no flank ecchymosis, slightly distended, urostomy with clear yellow urine OP, stoma pink/moist  NEUROLOGIC: AOx3    Data Review   Recent Results (from the past 24 hour(s))   HGB & HCT    Collection Time: 08/25/20  9:42 AM   Result Value Ref Range    HGB 9.2 (L) 13.6 - 17.2 g/dL    HCT 26.6 (L) 41.1 - 50.3 %   HGB & HCT    Collection Time: 08/25/20  3:34 PM   Result Value Ref Range    HGB 8.7 (L) 13.6 - 17.2 g/dL    HCT 26.7 (L) 41.1 - 50.3 %   HGB & HCT    Collection Time: 08/25/20  9:59 PM   Result Value Ref Range    HGB 7.8 (L) 13.6 - 17.2 g/dL    HCT 23.5 (L) 41.1 - 50.3 %   HGB & HCT    Collection Time: 08/26/20  6:22 AM   Result Value Ref Range    HGB 7.7 (L) 13.6 - 17.2 g/dL    HCT 23.1 (L) 41.1 - 50.3 %       Assessment:     Active Problems:    Renal hematoma (8/24/2020)      Left perinephric hematoma, status post ESWL.         Hgb 7.7     Plan:      Transfuse 2 units PRBC. Will follow Hgb. Start colace BID.          Britney Salas NP  [facility]

## 2020-08-27 VITALS
OXYGEN SATURATION: 95 % | RESPIRATION RATE: 15 BRPM | BODY MASS INDEX: 18.8 KG/M2 | HEART RATE: 100 BPM | TEMPERATURE: 98.6 F | SYSTOLIC BLOOD PRESSURE: 110 MMHG | HEIGHT: 65 IN | DIASTOLIC BLOOD PRESSURE: 64 MMHG

## 2020-08-27 LAB
HCT VFR BLD AUTO: 30.7 % (ref 41.1–50.3)
HGB BLD-MCNC: 10.8 G/DL (ref 13.6–17.2)

## 2020-08-27 PROCEDURE — 74011250637 HC RX REV CODE- 250/637: Performed by: UROLOGY

## 2020-08-27 PROCEDURE — 36415 COLL VENOUS BLD VENIPUNCTURE: CPT

## 2020-08-27 PROCEDURE — 74011250637 HC RX REV CODE- 250/637: Performed by: NURSE PRACTITIONER

## 2020-08-27 PROCEDURE — 85018 HEMOGLOBIN: CPT

## 2020-08-27 RX ORDER — CIPROFLOXACIN 500 MG/1
500 TABLET ORAL 2 TIMES DAILY
Qty: 10 TAB | Refills: 0 | Status: SHIPPED | OUTPATIENT
Start: 2020-08-27 | End: 2020-12-07 | Stop reason: ALTCHOICE

## 2020-08-27 RX ADMIN — Medication 10 ML: at 06:08

## 2020-08-27 RX ADMIN — METOPROLOL TARTRATE 25 MG: 25 TABLET, FILM COATED ORAL at 08:20

## 2020-08-27 RX ADMIN — TAMSULOSIN HYDROCHLORIDE 0.4 MG: 0.4 CAPSULE ORAL at 08:20

## 2020-08-27 RX ADMIN — DOCUSATE SODIUM 100 MG: 100 CAPSULE, LIQUID FILLED ORAL at 08:20

## 2020-08-27 NOTE — DISCHARGE INSTRUCTIONS
DISCHARGE SUMMARY from Nurse    PATIENT INSTRUCTIONS:    After general anesthesia or intravenous sedation, for 24 hours or while taking prescription Narcotics:  · Limit your activities  · Do not drive and operate hazardous machinery  · Do not make important personal or business decisions  · Do  not drink alcoholic beverages  · If you have not urinated within 8 hours after discharge, please contact your surgeon on call. Report the following to your surgeon:  · Excessive pain, swelling, redness or odor of or around the surgical area  · Temperature over 100.5  · Nausea and vomiting lasting longer than 4 hours or if unable to take medications  · Any signs of decreased circulation or nerve impairment to extremity: change in color, persistent  numbness, tingling, coldness or increase pain  · Any questions    What to do at Home:  Recommended activity: Activity as tolerated      *  Please give a list of your current medications to your Primary Care Provider. *  Please update this list whenever your medications are discontinued, doses are      changed, or new medications (including over-the-counter products) are added. *  Please carry medication information at all times in case of emergency situations. These are general instructions for a healthy lifestyle:    No smoking/ No tobacco products/ Avoid exposure to second hand smoke  Surgeon General's Warning:  Quitting smoking now greatly reduces serious risk to your health. Obesity, smoking, and sedentary lifestyle greatly increases your risk for illness    A healthy diet, regular physical exercise & weight monitoring are important for maintaining a healthy lifestyle    You may be retaining fluid if you have a history of heart failure or if you experience any of the following symptoms:  Weight gain of 3 pounds or more overnight or 5 pounds in a week, increased swelling in our hands or feet or shortness of breath while lying flat in bed.   Please call your doctor as soon as you notice any of these symptoms; do not wait until your next office visit. The discharge information has been reviewed with the patient. The patient verbalized understanding. Discharge medications reviewed with the patient and appropriate educational materials and side effects teaching were provided.   ___________________________________________________________________________________________________________________________________

## 2020-08-27 NOTE — PROGRESS NOTES
All hourly rounds completed and all needs have been met. Patient is resting in bed. Patient received ativan once during the night because he was very anxious. Will continue to monitor and report to the oncoming nurse.

## 2020-08-27 NOTE — PROGRESS NOTES
Care Management Interventions  Mode of Transport at Discharge: Self  Transition of Care Consult (CM Consult): Discharge Planning  Physical Therapy Consult: No  Occupational Therapy Consult: No  Speech Therapy Consult: No  Confirm Follow Up Transport: Self  Discharge Location  Discharge Placement: Home    Patient to discharge home today. No CM needs have been identified. All milestones for discharge have been met. Patient will transport home with family. CM remains available should any needs arise.

## 2020-08-27 NOTE — DISCHARGE SUMMARY
Discharge Summary     Patient: Elkin Gerber MRN: [id number]  SSN: [id number]    YOB: 1955  Age: 59 y.o. Sex: male      Allergies: Codeine and Penicillins    Admit Date: 8/24/2020    Discharge Date: 8/27/2020     * Admission Diagnoses:  Renal hematoma [S37.019A]     * Discharge Diagnoses:   Hospital Problems as of 8/27/2020 Date Reviewed: 8/24/2020          Codes Class Noted - Resolved POA    Renal hematoma ICD-10-CM: S37.019A  ICD-9-CM: 866.01  8/24/2020 - Present Unknown               * Procedures for this admission:   * No surgery found *      * Disposition: Home    * Discharged Condition: improved    * Hospital Course:      Mr. Zohreh Keene presented here to ER with c/o severe left flank pain and chest pain s/p left ESWL on same day of procedure. He had CT angiogram of chest/pelvis and this showed left perinephric hematoma. Hgb initially 14. 3. He was given IVF and admitted for close observation. Hgb trended down to 7.7 and he was given 1 unit of PRBC. Hgb trended up to 10.8. Pt denies flank pain today, he is ambulating and eager to go home. He will continue to hold asa, he will follow-up in 1 week for Hgb check and symptom check with NP. He has been instructed to take it easy. D/c home with PO abx for prophylaxis and he has pain medication already that was recently sent to pharmacy s/p ESWL.       Patient Active Problem List   Diagnosis Code    Cervical stenosis of spinal canal M48.02    HTN (hypertension) I10    Urinary bladder cancer ([facility] [address] C67.9    Depression with anxiety F41.8    Chronic obstructive pulmonary disease ([facility] [address] J44.9    History of neck surgery Z98.890    Status post ileal conduit (HCC) Z93.6    CAD (coronary artery disease) I25.10    S/P CABG x 3 Z95.1    Bilateral carotid artery stenosis I65.23    Tobacco use Z72.0    Carotid stenosis I65.29    Intractable vomiting with nausea R11.2    History of left-sided carotid endarterectomy Z98.890    Severe episode of recurrent major depressive disorder, without psychotic features ([facility] [address] F33.2    Renal hematoma S37.019A         Discharge Medications:   Current Discharge Medication List      START taking these medications    Details   ciprofloxacin HCl (Cipro) 500 mg tablet Take 1 Tab by mouth two (2) times a day. Qty: 10 Tab, Refills: 0         CONTINUE these medications which have NOT CHANGED    Details   oxyCODONE-acetaminophen (PERCOCET) 5-325 mg per tablet Take 1 Tab by mouth every four (4) hours as needed for Pain for up to 3 days. Max Daily Amount: 6 Tabs. Qty: 20 Tab, Refills: 0    Associated Diagnoses: Kidney stone on left side      metoprolol tartrate (LOPRESSOR) 50 mg tablet Take 0.5 Tabs by mouth every twelve (12) hours. TAKE 1/2 TAB=25MG BID  Qty: 90 Tab, Refills: 3      atorvastatin (LIPITOR) 40 mg tablet Take 1 Tab by mouth daily. Qty: 90 Tab, Refills: 3      DISABLED PLACARD (DISABLED PLACARD) DMV Needs disabled parking placard  Qty: 1 Each, Refills: 0      OXYGEN-AIR DELIVERY SYSTEMS by Does Not Apply route. 2LPM QHS      Ostomy Supplies (NEW IMAGE SKIN BARRIER) 2 1/4 \" misc 1 Units by Does Not Apply route as needed. Qty: 10 Each, Refills: 99      tamsulosin (FLOMAX) 0.4 mg capsule Take 1 Cap by mouth daily. Indications: stones in the urinary tract  Qty: 7 Cap, Refills: 0         STOP taking these medications       aspirin delayed-release 81 mg tablet Comments:   Reason for Stopping:                * Follow-up Care/Patient Instructions: Activity: Activity as tolerated  Diet: Regular Diet  Wound Care: None needed    Follow-up Information     Follow up With Specialties Details Why Contact Info    Guille Villarreal MD Internal Medicine   [address] [place] [address]  [phone]      [facility] 1   Office will call patient with appointment date and time.  [address] [place], [place] [address]y 6  [phone]

## 2020-08-27 NOTE — PROGRESS NOTES
Admit Date: 8/24/2020    Subjective:     Zechariah Chamberlain denies flank pain. Ambulating. Hgb 10.8. Afebrile. BP up this morning. Objective:     Patient Vitals for the past 8 hrs:   BP Temp Pulse Resp SpO2   08/27/20 0752 (!) 148/91 98.6 °F (37 °C) (!) 110 15 95 %   08/27/20 0435 (!) 155/93 98.1 °F (36.7 °C) 97 16 95 %     No intake/output data recorded. 08/25 1901 - 08/27 0700  In: 3892.4 [P.O.:1080; I.V.:2812.4]  Out: 6350 [Urine:6350]    Physical Exam:  GENERAL: alert, cooperative, no distress  LUNG: clear to auscultation bilaterally  HEART: regular rate and rhythm, S1, S2   ABDOMEN: soft, non-tender, no flank ecchymosis   NEUROLOGIC: AOx3    Data Review   Recent Results (from the past 24 hour(s))   HGB & HCT    Collection Time: 08/26/20 10:06 AM   Result Value Ref Range    HGB 8.1 (L) 13.6 - 17.2 g/dL    HCT 23.8 (L) 41.1 - 50.3 %   HGB & HCT    Collection Time: 08/26/20  2:55 PM   Result Value Ref Range    HGB 10.2 (L) 13.6 - 17.2 g/dL    HCT 30.1 (L) 41.1 - 50.3 %   HGB & HCT    Collection Time: 08/26/20  9:51 PM   Result Value Ref Range    HGB 11.0 (L) 13.6 - 17.2 g/dL    HCT 32.8 (L) 41.1 - 50.3 %   HGB & HCT    Collection Time: 08/27/20  6:03 AM   Result Value Ref Range    HGB 10.8 (L) 13.6 - 17.2 g/dL    HCT 30.7 (L) 41.1 - 50.3 %       Assessment:     Active Problems:    Renal hematoma (8/24/2020)      Left perinephric hematoma, status post ESWL.           Hgb 10.8    Plan: Will have RN recheck BP. Hgb stable. D/C pt today with PRN pain medication and abx. He has been instructed to take it easy at home. He will follow up in 1 week with NP for Hgb check and symptom check.        Mitch Barlow NP  [facility]

## 2020-08-28 ENCOUNTER — PATIENT OUTREACH (OUTPATIENT)
Dept: CASE MANAGEMENT | Age: 65
End: 2020-08-28

## 2020-08-28 NOTE — PROGRESS NOTES
Transition of Care Hospital Discharge Follow-Up      Date/Time:  2020 10:30 AM    Patient was admitted to [facility] on 2020 and discharged on 2020 for Renal Hematoma. The physician discharge summary was available at the time of outreach. Patient was contacted within 7 business days of discharge. Inpatient RUR score:28  Was this a readmission? no   Patient stated reason for the readmission: none  Patients top risk factors for readmission: Renal Hematoma      LPN Care Coordinator contacted the patient by telephone to perform post hospital discharge assessment. Verified name and  with patient as identifiers. Provided introduction to self, and explanation of the Care Coordinator role. Patient received hospital discharge instructions. LPN reviewed discharge instructions and red flags with patient who verbalized understanding. Patient given an opportunity to ask questions and does not have any further questions or concerns at this time. The patient agrees to contact the PCP office for questions related to their healthcare. LPN provided contact information for future reference. Home Health orders at discharge: [address]:   Date of initial or scheduled visit:   (Assist with coordination of services if necessary.)    Durable Medical Equipment ordered at discharge: none  [address]:   [address] [facility] received:   (Assist patient in obtaining DME orders &/or equipment if necessary.)    Medication(s):   Medication review was performed with patient, who verbalizes understanding of administration of home medications. There were no barriers to obtaining medications identified at this time. Current Outpatient Medications   Medication Sig    ciprofloxacin HCl (Cipro) 500 mg tablet Take 1 Tab by mouth two (2) times a day.  tamsulosin (FLOMAX) 0.4 mg capsule Take 1 Cap by mouth daily.  Indications: stones in the urinary tract    metoprolol tartrate (LOPRESSOR) 50 mg tablet Take 0.5 Tabs by mouth every twelve (12) hours. TAKE 1/2 TAB=25MG BID    atorvastatin (LIPITOR) 40 mg tablet Take 1 Tab by mouth daily.  DISABLED PLACARD (DISABLED PLACARD) DMV Needs disabled parking placard    OXYGEN-AIR DELIVERY SYSTEMS by Does Not Apply route. 2LPM QHS    Ostomy Supplies (NEW IMAGE SKIN BARRIER) 2 1/4 \" misc 1 Units by Does Not Apply route as needed. No current facility-administered medications for this visit. There are no discontinued medications. ADL assessment:   (If patient is unable to perform ADLs  what is the limiting factor(s)? Do they have a support system that can assist? If no support system is present, discuss possible assistance that they may be able to obtain. Escalate for SW if ongoing issues are verbalized by pt or anticipated)    [facility] follow up appointment(s):   Future Appointments   Date Time Provider Abiel Adams   9/2/2020  9:45 AM WILLIAM Pacheco [id number] PGU   10/5/2020  9:45 AM WILLIAM Pacheco [id number] PGU   11/16/2020  9:30 [facility]   12/7/2020  9:00 AM [facility]   8/6/2021  7:30 AM [facility] OUTREACH INSURANCE GCCOIG GVL [facility]   8/6/2021  8:00 AM Franco Garcia MD [facility]      Non-[facility] follow up appointment(s):   7 Day follow up with PCP or Specialist:Jocelin Araujo NP 9/2/2020   Transportation arranged: none    Covid Risk Education    Patient has following risk factors of: Renal Hematoma. Education provided regarding infection prevention, and signs and symptoms of COVID-19 and when to seek medical attention with patient who verbalized understanding. Discussed exposure protocols and quarantine From CDC: Are you at higher risk for severe illness?  and given an opportunity for questions and concerns. The patient agrees to contact the COVID-19 hotline [phone] or PCP office for questions related to COVID-19.      For more information on steps you can take to protect yourself, see CDC's How to Protect Yourself     Patient/family/caregiver given information for [employer] and agrees to enroll no  Patient's preferred e-mail: declines  Patient's preferred phone number: declines      Any other questions or concerns expressed by patient? Patient voiced no concerns at this time    Scheduled next follow up call or referral to CTN/ ACM:  Next follow up call:within 14 days    Goals Addressed                 This Visit's Progress     Takes Medications as prescribed

## 2020-09-03 ENCOUNTER — HOSPITAL ENCOUNTER (OUTPATIENT)
Dept: CT IMAGING | Age: 65
Discharge: HOME | End: 2020-09-03
Attending: NURSE PRACTITIONER
Payer: MEDICARE

## 2020-09-03 DIAGNOSIS — N20.0 KIDNEY STONE ON LEFT SIDE: ICD-10-CM

## 2020-09-03 DIAGNOSIS — S37.012D HEMATOMA OF LEFT KIDNEY, SUBSEQUENT ENCOUNTER: ICD-10-CM

## 2020-09-03 PROCEDURE — 74176 CT ABD & PELVIS W/O CONTRAST: CPT

## 2020-09-03 NOTE — PROGRESS NOTES
CT showed improving hematoma. We would like to see him back in office in about 3 weeks. Rice could see him at 9 AM on Tuesday 9/22 or I could see him anytime the morning of Wednesday 9/23. Please notify him and schedule. If he starts to have high or low BP, worsening pain or other sx, he needs to notify us ASAP.

## 2020-09-11 ENCOUNTER — PATIENT OUTREACH (OUTPATIENT)
Dept: CASE MANAGEMENT | Age: 65
End: 2020-09-11

## 2020-09-11 NOTE — PROGRESS NOTES
Transition of Care Hospital Discharge Follow-Up      Date/Time:  2020 11:31 AM    LPN Care Coordinator contacted the patient by telephone to perform post hospital follow up. Verified name and  with patient as identifiers. LPN reinforced discharge instructions and red flags with patient who verbalized understanding. Patient given an opportunity to ask questions and does not have any further questions or concerns at this time. The patient agrees to contact the PCP office for questions related to their healthcare    [facility] [address] follow up appointment(s):   Future Appointments   Date Time Provider Abiel Adams   2020  8:15 AM Mesfin Sauce, FNP [id number] PGU   10/5/2020  9:45 AM Mesfin Sauce, FNP [id number] PGU   2020  9:30 AM [facility]   2020  9:00 AM [facility] Menard[facility]   2021  7:30 AM [address] [place] [address] OUTREACH INSURANCE GCCOIG GVL [address] [place] [address]   2021  8:00 AM Darrion Painting MD [facility]      Non-[facility] follow up appointment(s):   Patient attended follow up appointments since last contact: Dr. Gordon Boykin 2020  What was the outcome of the appointment:     [address]: [address] [place] [address] Road:   Any issues/ progress:  (Assist with coordination of services if necessary.)      Medication(s):   Medication changes since discharge:     Current Outpatient Medications   Medication Sig    ciprofloxacin HCl (Cipro) 500 mg tablet Take 1 Tab by mouth two (2) times a day.  tamsulosin (FLOMAX) 0.4 mg capsule Take 1 Cap by mouth daily. Indications: stones in the urinary tract    metoprolol tartrate (LOPRESSOR) 50 mg tablet Take 0.5 Tabs by mouth every twelve (12) hours. TAKE 1/2 TAB=25MG BID    atorvastatin (LIPITOR) 40 mg tablet Take 1 Tab by mouth daily.  DISABLED PLACARD (DISABLED PLACARD) DMV Needs disabled parking placard    OXYGEN-AIR DELIVERY SYSTEMS by Does Not Apply route.  2LPM QHS    Ostomy Supplies (NEW IMAGE SKIN BARRIER) 2 1/4 \" misc 1 Units by Does Not Apply route as needed. No current facility-administered medications for this visit. Other Issues/ Miscellaneous? (Transportation, access to meals, ability to perform ADLs, adequate caregiver support, etc.)  Referrals needed? (SW, Diabetes education, HH, etc.)    Any other questions or concerns expressed by patient? Patient voiced no concerns at this time    Schedule next appointment with JINNY or referral to CTN/ ACM:  Graduation:Yes  Next Outreach Scheduled:     Goals      Takes Medications as prescribed

## 2020-09-30 ENCOUNTER — HOSPITAL ENCOUNTER (OUTPATIENT)
Dept: CT IMAGING | Age: 65
Discharge: HOME | End: 2020-09-30
Attending: NURSE PRACTITIONER
Payer: MEDICARE

## 2020-09-30 DIAGNOSIS — N20.0 KIDNEY STONE ON LEFT SIDE: ICD-10-CM

## 2020-09-30 DIAGNOSIS — S37.012D HEMATOMA OF LEFT KIDNEY, SUBSEQUENT ENCOUNTER: ICD-10-CM

## 2020-09-30 PROCEDURE — 74176 CT ABD & PELVIS W/O CONTRAST: CPT

## 2020-09-30 NOTE — PROGRESS NOTES
CT showed resolution of hemorrhage. Decreased size of hematoma. Needs follow up in about 6 months in office w KUB. Call sooner for any pain or gross hematuria.

## 2021-07-22 PROBLEM — R11.2 INTRACTABLE VOMITING WITH NAUSEA: Status: RESOLVED | Noted: 2019-04-24 | Resolved: 2021-07-22

## 2021-07-22 PROBLEM — I65.29 CAROTID STENOSIS: Status: RESOLVED | Noted: 2018-12-06 | Resolved: 2021-07-22

## 2021-07-29 ENCOUNTER — HOSPITAL ENCOUNTER (OUTPATIENT)
Dept: LAB | Age: 66
Discharge: HOME | End: 2021-07-29
Payer: MEDICARE

## 2021-07-29 DIAGNOSIS — C67.9 MALIGNANT NEOPLASM OF URINARY BLADDER, UNSPECIFIED SITE (HCC): ICD-10-CM

## 2021-07-29 LAB
ALBUMIN SERPL-MCNC: 4 G/DL (ref 3.2–4.6)
ALBUMIN/GLOB SERPL: 1.2 {RATIO} (ref 1.2–3.5)
ALP SERPL-CCNC: 79 U/L (ref 50–136)
ALT SERPL-CCNC: 31 U/L (ref 12–65)
ANION GAP SERPL CALC-SCNC: 4 MMOL/L (ref 7–16)
AST SERPL-CCNC: 16 U/L (ref 15–37)
BASOPHILS # BLD: 0.1 K/UL (ref 0–0.2)
BASOPHILS NFR BLD: 1 % (ref 0–2)
BILIRUB SERPL-MCNC: 0.3 MG/DL (ref 0.2–1.1)
BUN SERPL-MCNC: 12 MG/DL (ref 8–23)
CALCIUM SERPL-MCNC: 8.9 MG/DL (ref 8.3–10.4)
CHLORIDE SERPL-SCNC: 104 MMOL/L (ref 98–107)
CO2 SERPL-SCNC: 26 MMOL/L (ref 21–32)
CREAT SERPL-MCNC: 1 MG/DL (ref 0.8–1.5)
DIFFERENTIAL METHOD BLD: ABNORMAL
EOSINOPHIL # BLD: 0.2 K/UL (ref 0–0.8)
EOSINOPHIL NFR BLD: 3 % (ref 0.5–7.8)
ERYTHROCYTE [DISTWIDTH] IN BLOOD BY AUTOMATED COUNT: 13.1 % (ref 11.9–14.6)
GLOBULIN SER CALC-MCNC: 3.4 G/DL (ref 2.3–3.5)
GLUCOSE SERPL-MCNC: 92 MG/DL (ref 65–100)
HCT VFR BLD AUTO: 39.7 %
HGB BLD-MCNC: 13.3 G/DL (ref 13.6–17.2)
IMM GRANULOCYTES # BLD AUTO: 0 K/UL (ref 0–0.5)
IMM GRANULOCYTES NFR BLD AUTO: 0 % (ref 0–5)
LYMPHOCYTES # BLD: 2 K/UL (ref 0.5–4.6)
LYMPHOCYTES NFR BLD: 30 % (ref 13–44)
MCH RBC QN AUTO: 34 PG (ref 26.1–32.9)
MCHC RBC AUTO-ENTMCNC: 33.5 G/DL (ref 31.4–35)
MCV RBC AUTO: 101.5 FL (ref 79.6–97.8)
MONOCYTES # BLD: 0.7 K/UL (ref 0.1–1.3)
MONOCYTES NFR BLD: 10 % (ref 4–12)
NEUTS SEG # BLD: 3.9 K/UL (ref 1.7–8.2)
NEUTS SEG NFR BLD: 57 % (ref 43–78)
NRBC # BLD: 0 K/UL (ref 0–0.2)
PLATELET # BLD AUTO: 396 K/UL (ref 150–450)
PMV BLD AUTO: 8.9 FL (ref 9.4–12.3)
POTASSIUM SERPL-SCNC: 4.4 MMOL/L (ref 3.5–5.1)
PROT SERPL-MCNC: 7.4 G/DL (ref 6.3–8.2)
RBC # BLD AUTO: 3.91 M/UL (ref 4.23–5.6)
SODIUM SERPL-SCNC: 134 MMOL/L (ref 136–145)
WBC # BLD AUTO: 6.9 K/UL (ref 4.3–11.1)

## 2021-07-29 PROCEDURE — 85025 COMPLETE CBC W/AUTO DIFF WBC: CPT

## 2021-07-29 PROCEDURE — 36415 COLL VENOUS BLD VENIPUNCTURE: CPT

## 2021-07-29 PROCEDURE — 80053 COMPREHEN METABOLIC PANEL: CPT

## 2021-08-09 ENCOUNTER — HOSPITAL ENCOUNTER (OUTPATIENT)
Dept: GENERAL RADIOLOGY | Age: 66
Discharge: HOME | End: 2021-08-09
Attending: INTERNAL MEDICINE
Payer: MEDICARE

## 2021-08-09 DIAGNOSIS — R07.0 THROAT PAIN: ICD-10-CM

## 2021-08-09 PROCEDURE — 74220 X-RAY XM ESOPHAGUS 1CNTRST: CPT

## 2021-08-09 PROCEDURE — 74011000250 HC RX REV CODE- 250: Performed by: INTERNAL MEDICINE

## 2021-08-09 RX ADMIN — ANTACID/ANTIFLATULENT 4 G: 380; 550; 10; 10 GRANULE, EFFERVESCENT ORAL at 08:28

## 2021-08-09 RX ADMIN — BARIUM SULFATE 50 ML: 0.6 SUSPENSION ORAL at 08:28

## 2021-08-09 RX ADMIN — BARIUM SULFATE 135 ML: 980 POWDER, FOR SUSPENSION ORAL at 08:28

## 2021-11-04 ENCOUNTER — HOSPITAL ENCOUNTER (OUTPATIENT)
Dept: GENERAL RADIOLOGY | Age: 66
Discharge: HOME | End: 2021-11-04
Payer: MEDICARE

## 2021-11-04 DIAGNOSIS — J44.9 CHRONIC OBSTRUCTIVE PULMONARY DISEASE, UNSPECIFIED COPD TYPE (HCC): ICD-10-CM

## 2021-11-04 PROCEDURE — 71046 X-RAY EXAM CHEST 2 VIEWS: CPT

## 2021-11-04 NOTE — PROGRESS NOTES
Please call pt and let him know that his CXR is normal. He has an old healed fracture of his left 8th rib which was noted but no other abnormalities were noted. We can discuss further at his appt next week. Thank you!

## 2022-03-18 PROBLEM — S37.019A RENAL HEMATOMA: Status: ACTIVE | Noted: 2020-08-24

## 2022-03-19 PROBLEM — Z72.0 TOBACCO USE: Status: ACTIVE | Noted: 2018-10-31

## 2022-03-19 PROBLEM — I65.23 BILATERAL CAROTID ARTERY STENOSIS: Status: ACTIVE | Noted: 2018-10-01

## 2022-03-19 PROBLEM — F33.2 SEVERE EPISODE OF RECURRENT MAJOR DEPRESSIVE DISORDER, WITHOUT PSYCHOTIC FEATURES (HCC): Status: ACTIVE | Noted: 2019-07-05

## 2022-03-19 PROBLEM — Z95.1 S/P CABG X 3: Status: ACTIVE | Noted: 2018-09-18

## 2022-06-09 ENCOUNTER — OFFICE VISIT (OUTPATIENT)
Dept: INTERNAL MEDICINE CLINIC | Facility: CLINIC | Age: 67
End: 2022-06-09
Payer: MEDICARE

## 2022-06-09 VITALS
WEIGHT: 115 LBS | DIASTOLIC BLOOD PRESSURE: 81 MMHG | OXYGEN SATURATION: 97 % | HEIGHT: 65 IN | BODY MASS INDEX: 19.16 KG/M2 | HEART RATE: 62 BPM | TEMPERATURE: 97.3 F | SYSTOLIC BLOOD PRESSURE: 137 MMHG

## 2022-06-09 DIAGNOSIS — J44.9 CHRONIC OBSTRUCTIVE PULMONARY DISEASE, UNSPECIFIED COPD TYPE (HCC): ICD-10-CM

## 2022-06-09 DIAGNOSIS — Z93.6 STATUS POST ILEAL CONDUIT (HCC): ICD-10-CM

## 2022-06-09 DIAGNOSIS — I25.10 CORONARY ARTERY DISEASE INVOLVING NATIVE HEART WITHOUT ANGINA PECTORIS, UNSPECIFIED VESSEL OR LESION TYPE: ICD-10-CM

## 2022-06-09 DIAGNOSIS — K21.9 LARYNGOPHARYNGEAL REFLUX (LPR): ICD-10-CM

## 2022-06-09 DIAGNOSIS — I10 PRIMARY HYPERTENSION: ICD-10-CM

## 2022-06-09 DIAGNOSIS — Z85.51 HISTORY OF BLADDER CANCER: ICD-10-CM

## 2022-06-09 DIAGNOSIS — Z95.1 S/P CABG X 3: ICD-10-CM

## 2022-06-09 DIAGNOSIS — I65.23 BILATERAL CAROTID ARTERY STENOSIS: ICD-10-CM

## 2022-06-09 DIAGNOSIS — I10 PRIMARY HYPERTENSION: Primary | ICD-10-CM

## 2022-06-09 DIAGNOSIS — Z12.5 ENCOUNTER FOR PROSTATE CANCER SCREENING: ICD-10-CM

## 2022-06-09 LAB
ALBUMIN SERPL-MCNC: 4.2 G/DL (ref 3.2–4.6)
ALBUMIN/GLOB SERPL: 1.4 {RATIO} (ref 1.2–3.5)
ALP SERPL-CCNC: 69 U/L (ref 50–136)
ALT SERPL-CCNC: 41 U/L (ref 12–65)
ANION GAP SERPL CALC-SCNC: 8 MMOL/L (ref 7–16)
AST SERPL-CCNC: 21 U/L (ref 15–37)
BILIRUB DIRECT SERPL-MCNC: 0.2 MG/DL
BILIRUB SERPL-MCNC: 0.5 MG/DL (ref 0.2–1.1)
BUN SERPL-MCNC: 7 MG/DL (ref 8–23)
CALCIUM SERPL-MCNC: 9.4 MG/DL (ref 8.3–10.4)
CHLORIDE SERPL-SCNC: 106 MMOL/L (ref 98–107)
CHOLEST SERPL-MCNC: 114 MG/DL
CO2 SERPL-SCNC: 21 MMOL/L (ref 21–32)
CREAT SERPL-MCNC: 0.9 MG/DL (ref 0.8–1.5)
GLOBULIN SER CALC-MCNC: 2.9 G/DL (ref 2.3–3.5)
GLUCOSE SERPL-MCNC: 91 MG/DL (ref 65–100)
HDLC SERPL-MCNC: 49 MG/DL (ref 40–60)
HDLC SERPL: 2.3 {RATIO}
LDLC SERPL CALC-MCNC: 43 MG/DL
POTASSIUM SERPL-SCNC: 4.2 MMOL/L (ref 3.5–5.1)
PROT SERPL-MCNC: 7.1 G/DL (ref 6.3–8.2)
SODIUM SERPL-SCNC: 135 MMOL/L (ref 136–145)
TRIGL SERPL-MCNC: 110 MG/DL (ref 35–150)
VLDLC SERPL CALC-MCNC: 22 MG/DL (ref 6–23)

## 2022-06-09 PROCEDURE — 99214 OFFICE O/P EST MOD 30 MIN: CPT | Performed by: NURSE PRACTITIONER

## 2022-06-09 PROCEDURE — 4004F PT TOBACCO SCREEN RCVD TLK: CPT | Performed by: NURSE PRACTITIONER

## 2022-06-09 PROCEDURE — 3023F SPIROM DOC REV: CPT | Performed by: NURSE PRACTITIONER

## 2022-06-09 PROCEDURE — 3017F COLORECTAL CA SCREEN DOC REV: CPT | Performed by: NURSE PRACTITIONER

## 2022-06-09 PROCEDURE — 1123F ACP DISCUSS/DSCN MKR DOCD: CPT | Performed by: NURSE PRACTITIONER

## 2022-06-09 PROCEDURE — G8420 CALC BMI NORM PARAMETERS: HCPCS | Performed by: NURSE PRACTITIONER

## 2022-06-09 PROCEDURE — G8427 DOCREV CUR MEDS BY ELIG CLIN: HCPCS | Performed by: NURSE PRACTITIONER

## 2022-06-09 RX ORDER — OMEPRAZOLE 20 MG/1
20 CAPSULE, DELAYED RELEASE ORAL 2 TIMES DAILY
Qty: 60 CAPSULE | Refills: 5 | Status: SHIPPED | OUTPATIENT
Start: 2022-06-09

## 2022-06-09 SDOH — ECONOMIC STABILITY: FOOD INSECURITY: WITHIN THE PAST 12 MONTHS, YOU WORRIED THAT YOUR FOOD WOULD RUN OUT BEFORE YOU GOT MONEY TO BUY MORE.: NEVER TRUE

## 2022-06-09 SDOH — ECONOMIC STABILITY: FOOD INSECURITY: WITHIN THE PAST 12 MONTHS, THE FOOD YOU BOUGHT JUST DIDN'T LAST AND YOU DIDN'T HAVE MONEY TO GET MORE.: NEVER TRUE

## 2022-06-09 ASSESSMENT — ENCOUNTER SYMPTOMS
NAUSEA: 1
VOMITING: 0
WHEEZING: 0
SHORTNESS OF BREATH: 0
COUGH: 0

## 2022-06-09 ASSESSMENT — PATIENT HEALTH QUESTIONNAIRE - PHQ9
SUM OF ALL RESPONSES TO PHQ9 QUESTIONS 1 & 2: 2
SUM OF ALL RESPONSES TO PHQ QUESTIONS 1-9: 6
7. TROUBLE CONCENTRATING ON THINGS, SUCH AS READING THE NEWSPAPER OR WATCHING TELEVISION: 0
SUM OF ALL RESPONSES TO PHQ QUESTIONS 1-9: 6
9. THOUGHTS THAT YOU WOULD BE BETTER OFF DEAD, OR OF HURTING YOURSELF: 0
3. TROUBLE FALLING OR STAYING ASLEEP: 1
5. POOR APPETITE OR OVEREATING: 1
4. FEELING TIRED OR HAVING LITTLE ENERGY: 1
SUM OF ALL RESPONSES TO PHQ QUESTIONS 1-9: 6
6. FEELING BAD ABOUT YOURSELF - OR THAT YOU ARE A FAILURE OR HAVE LET YOURSELF OR YOUR FAMILY DOWN: 0
SUM OF ALL RESPONSES TO PHQ QUESTIONS 1-9: 6
8. MOVING OR SPEAKING SO SLOWLY THAT OTHER PEOPLE COULD HAVE NOTICED. OR THE OPPOSITE, BEING SO FIGETY OR RESTLESS THAT YOU HAVE BEEN MOVING AROUND A LOT MORE THAN USUAL: 1
1. LITTLE INTEREST OR PLEASURE IN DOING THINGS: 1
2. FEELING DOWN, DEPRESSED OR HOPELESS: 1

## 2022-06-09 ASSESSMENT — SOCIAL DETERMINANTS OF HEALTH (SDOH): HOW HARD IS IT FOR YOU TO PAY FOR THE VERY BASICS LIKE FOOD, HOUSING, MEDICAL CARE, AND HEATING?: NOT HARD AT ALL

## 2022-06-09 NOTE — PROGRESS NOTES
[facility]  Office Visit Note    Subjective:  Chief Complaint   Patient presents with    Hypertension     6mo follow up    Abdominal Pain     abdominal pain/ not eating/ not sleeping/ loose stool for the past month        Patient ID: Florencia Cormier is a 77 y.o. male presenting to the office for the above. 59-year-old male for follow up. Was a patient of Dr. Lucas Cordero. He was a commercial printer. Has one son, who is mentally challenged. Lives with and cares for his 76yo girlfriend. His father recently passed away, and he is having difficulty with this.     Hypertension / CAD--  Follows with [facility], Dr. Isabella Garcia. Treated with amlodipine 5mg daily, metoprolol 25mg BID. On Lipitor 40mg daily and 81mg aspirin. Tolerating well without report of side effects. CABG x3 in 2018. Echo March 2019, normal.   He checks his blood pressure at home, but is not able to report any actual numbers. Denies chest pain, palpitations, shortness of breath, peripheral edema. --Advise low sodium diet, regular exercise. Notify office if home BP consistently >130/90.      Bilateral carotid artery stenosis--  Follows with [facility]. Left-sided carotid endarterectomy. On Lipitor and aspirin. Carotid ultrasound December 2020, TALYA ~50%. Hyperlipidemia--  Treated with Lipitor 40mg daily. Tolerating well without report of side effects, including myalgias. Last lipid panel July 2021, with LDL 83, LFTs wnl.  --Encouraged to follow a healthy low-fat diet, avoiding saturated/trans fats/fried and fatty foods, get regular exercise, and stop smoking. History of bladder cancer / kidney stones--  2014. Follows with urology, Ghada Ji NP, and [facility] oncology Dr. Shanthi eGrber. Had cystectomy with ileal conduit to RLQ osteomy. COPD--  Follows with [facility]. Was prescribed home oxygen at night, but states he rarely uses it.    Saw them in November 2021, started Trelegy. Previously stated he is unable to afford his inhalers; he states he shares his girlfriend's inhalers (does not know their names). --Improved on Trelegy.     Reflux--  Weight has been stable, but he continues to have intermittent throat pain, and states he is \"unable to gain weight. \"  ENT did laryngoscopy, diagnosed with LPR, started on omeprazole. Barium swallow was normal.   --States today that he is not taking his omeprazole. Is having worsened reflux symptoms. Advised to restart the omeprazole. States his appetite is not good, he believes due to a lot of stress caring for his girlfriend. Advise trial of nutritional shakes. --He was referred to GI and told he needs EGD, but has not yet gone. Advised of importance of following up with them. Depression/anxiety--  Previous HPI:   Lives with his 76yo girlfriend. He is doing a lot to care for her, and this causes increased stress and anxiety. There is no other family to help with her care. He has one son, who is mentally challenged. He reports poor appetite due to his nerves. He reports having had a mental breakdown years ago, with suicide attempt. Became an alcoholic; reports \"I don't drink much anymore\" (unable to give an actual amount that he drinks, but states it has been several days). States he was told at some time in the past that he needs to go to a psychiatric hospital for evaluation, but he adamantly states he will never do this, as he cannot leave his girlfriend alone. Denies current thoughts of hurting himself or others, though he has had suicidal thoughts in the past (denies a plan for suicide). Referral was placed to psychiatry; states he was unable to go for an appointment because he is scared to leave his girlfriend home alone, scared she will start a fire by falling asleep with a cigarette. States that she is sleeping right now while he is at this appointment.   States that he needs assistance coping with the stress of caring for her. 6/9/22:   Was referred to social work for assistance. Has been given multiple resources, but has not followed up on all these himself; advised him of the need to do what he can to help himself. States he had a virtual appointment with Dr. Heather Morrison at [facility]. Was prescribed Remeron; states it was not helping and he is no longer taking it. Advised to call them for follow up and to schedule counseling appointment. Advised to go immediately to the ER if he develops thoughts of hurting himself. Advise to stop drinking; states he is drinking less, but still a large amount in binges. Tobacco user--  Current every-day smoker, 1 packs per day. 50 pack-year history. Also endorses daily marijuana use; denies other illicit drug use. Discussed cardiovascular, cancer, and other risks of tobacco use, including earlier death; patient expresses understanding. Is not ready to quit. --Encouraged cessation efforts. Can call 1800QUITNOW. Health Maintenance:  *Medicare Wellness:  1/18/2021   *Colorectal cancer screening: colonoscopy 6/24/2019, 3-year follow up   *Lung cancer screening: CT chest August 2020   *Prostate cancer screening: PSA July 2019, normal   *TDAP: declines   *Pneumovax: declines   *Prevnar: declines   *Shingrix: declines   *Flu: declines   *Covid19: completed 3/17/21   We discussed the risks and benefits of these immunizations. Patient expresses understanding, and declines. History: Allergies   Allergen Reactions    Codeine Hives     Has tolerated morphine previously.     Penicillins Itching and Rash     Years ago       Past Medical History:   Diagnosis Date    Adverse effect of anesthesia     hard to awaken    CAD (coronary artery disease) 09/18/2018    Cancer ([facility]     bladder    Carotid stenosis, bilateral     S/P left endarterectomy,   TLAYA 30% per CTA    Chronic neck pain     Chronic obstructive pulmonary disease (HCC)     no inhalers  Depression with anxiety     GERD (gastroesophageal reflux disease)     no meds    History of bladder cancer 07/2014    Cystectomy with ileo conduit urinary diversion ----and chemo    History of left-sided carotid endarterectomy     History of neck surgery     Hypertension     Kidney stones     Osteoarthritis     PAD (peripheral artery disease) (HCC)     S/P CABG x 3 09/2018    Staghorn renal calculus     Left sided    Status post ileal conduit ([address] 2014    Tobacco use disorder, continuous     0.5 ppd since age 15       Past Surgical History:   Procedure Laterality Date    CABG, ARTERY-VEIN, THREE  09/18/2018    CARDIAC CATHETERIZATION  08/2018    CAROTID ENDARTERECTOMY Left 12/06/2018    CERVICAL FUSION  2014    Dr. Quynh Garcia    CORONARY ARTERY BYPASS GRAFT  09/18/2018    triple bypass    GI  06/24/2019    upper/lower GI    HEMORRHOID SURGERY      IR NEPHROURETERAL CATHETER PLACEMENT NEW ACCESS  5/15/2018    IR NEPHROURETERAL CATHETER PLACEMENT NEW ACCESS 5/15/2018 [facility] SPECIALS    KNEE ARTHROSCOPY Left          UROLOGICAL SURGERY  2015    TURBT- bladder removal- urostomy    UROLOGICAL SURGERY Left     Nephrostomy    UROLOGICAL SURGERY  05/2020    kidney stone    UROLOGICAL SURGERY      benign tumor removed from L testicle      VASCULAR SURGERY Right 2014    Port, removed 2016       Family History   Problem Relation Age of Onset    Cancer Father         bladder CA    No Known Problems Sister     Cancer Sister         hx of breast CA    Cancer Mother     Lung Disease Mother        Social History     Tobacco Use   Smoking Status Current Every Day Smoker    Packs/day: 1.00    Years: 15.00    Pack years: 15.00   Smokeless Tobacco Never Used       Social History     Substance and Sexual Activity   Alcohol Use Not Currently       Current Outpatient Medications   Medication Sig Dispense Refill    Ostomy Supplies MISC 1 Units by Other route as needed      omeprazole (PRILOSEC) 20 MG delayed release capsule Take 1 capsule by mouth in the morning and at bedtime 60 capsule 5    albuterol sulfate  (90 Base) MCG/ACT inhaler Inhale 1 puff into the lungs every 4 hours as needed      amLODIPine (NORVASC) 5 MG tablet Take 5 mg by mouth daily      aspirin 81 MG chewable tablet Take 81 mg by mouth daily      atorvastatin (LIPITOR) 40 MG tablet Take 40 mg by mouth      Fluticasone-Umeclidin-Vilant (TRELEGY ELLIPTA) 200-62.5-25 MCG/INH AEPB Inhale 1 puff into the lungs daily      metoprolol tartrate (LOPRESSOR) 50 MG tablet Take 25 mg by mouth every 12 hours       No current facility-administered medications for this visit. Review of Systems:  Review of Systems   Constitutional: Positive for fatigue. Negative for activity change, appetite change and fever. Respiratory: Negative for cough, shortness of breath and wheezing. Cardiovascular: Negative for chest pain, palpitations and leg swelling. Gastrointestinal: Positive for nausea. Negative for vomiting. Reflux    Skin: Negative for pallor. Neurological: Negative for dizziness, seizures, syncope, weakness and headaches. Psychiatric/Behavioral: Positive for dysphoric mood. Negative for suicidal ideas. The patient is nervous/anxious. See HPI for other pertinent positives and negatives. Objective:  Vitals:    06/09/22 1052   BP: 137/81   Site: Left Upper Arm   Position: Sitting   Cuff Size: Large Adult   Pulse: 62   Temp: 97.3 °F (36.3 °C)   TempSrc: Temporal   SpO2: 97%   Weight: 115 lb (52.2 kg)   Height: 5' 5\" (1.651 m)       Body mass index is 19.14 kg/m². Physical Exam  Vitals and nursing note reviewed. Constitutional:       General: He is not in acute distress. Appearance: Normal appearance. He is not ill-appearing. Eyes:      General: No scleral icterus. Right eye: No discharge. Left eye: No discharge. Pupils: Pupils are equal, round, and reactive to light. Cardiovascular:      Rate and Rhythm: Normal rate. Heart sounds: Normal heart sounds. Pulmonary:      Effort: Pulmonary effort is normal. No respiratory distress. Breath sounds: Normal breath sounds. Abdominal:      General: Bowel sounds are normal.   Musculoskeletal:      Right lower leg: No edema. Left lower leg: No edema. Skin:     General: Skin is warm and dry. Neurological:      Mental Status: He is alert. Lab Results   Component Value Date    WBC 8.4 12/14/2021    HGB 14.1 12/14/2021    HCT 41.0 12/14/2021     (H) 12/14/2021     12/14/2021   ,   Lab Results   Component Value Date     11/22/2021    K 5.2 11/22/2021    CL 98 11/22/2021    CO2 24 11/22/2021    BUN 13 11/22/2021    CREATININE 1.04 11/22/2021    GLUCOSE 98 11/22/2021    CALCIUM 9.7 11/22/2021    ,   Lab Results   Component Value Date    TSH 0.648 07/22/2021      Lab Results   Component Value Date    ALT 16 11/22/2021    AST 18 11/22/2021    ALKPHOS 91 11/22/2021    BILITOT 0.2 11/22/2021   ,   Lab Results   Component Value Date    LABA1C 5.6 07/22/2021     Lab Results   Component Value Date     07/22/2021       PHQ-9  6/9/2022   Little interest or pleasure in doing things 1   Little interest or pleasure in doing things -   Feeling down, depressed, or hopeless 1   Trouble falling or staying asleep, or sleeping too much 1   Feeling tired or having little energy 1   Poor appetite or overeating 1   Feeling bad about yourself - or that you are a failure or have let yourself or your family down 0   Trouble concentrating on things, such as reading the newspaper or watching television 0   Moving or speaking so slowly that other people could have noticed.  Or the opposite - being so fidgety or restless that you have been moving around a lot more than usual 1   Thoughts that you would be better off dead, or of hurting yourself in some way 0   PHQ-2 Score 2   Total Score PHQ 2 -   PHQ-9 Total Score 6        Assessment/Plan:      Health Maintenance Due   Topic Date Due    Annual Wellness Visit (AWV)  Never done    Pneumococcal 65+ years Vaccine (1 - PCV) Never done    Hepatitis C screen  Never done    DTaP/Tdap/Td vaccine (1 - Tdap) Never done    Shingles vaccine (1 of 2) Never done    Prostate Specific Antigen (PSA) Screening or Monitoring  07/30/2020    COVID-19 Vaccine (3 - Booster for Moderna series) 08/17/2021          Kirill Kang was seen today for hypertension and abdominal pain. Diagnoses and all orders for this visit:    Primary hypertension  Comments:  controlled; follows with cardiology   Orders:  -     Basic Metabolic Panel; Future  -     Hepatic Function Panel; Future  -     Lipid Panel; Future    Coronary artery disease involving native heart without angina pectoris, unspecified vessel or lesion type  Comments:   follows with cardiology     S/P CABG x 3    Bilateral carotid artery stenosis  Comments:  follows with vascular     Laryngopharyngeal reflux (LPR)  Comments:  restart omeprazole   Orders:  -     omeprazole (PRILOSEC) 20 MG delayed release capsule; Take 1 capsule by mouth in the morning and at bedtime    Chronic obstructive pulmonary disease, unspecified COPD type ([address]  Comments:  follows with pulmonology     Status post ileal conduit [facility])  Comments:  follows with urology     History of bladder cancer  Comments:  follows with urology and oncology     Encounter for prostate cancer screening        Patient states he is otherwise doing well; has no further questions or concerns at this visit. Encouraged to contact office with any concerns prior to next visit.      Return in about 6 months (around 12/9/2022), or if symptoms worsen or fail to improve, for Follow up, [address], APRN - CNP

## 2022-06-09 NOTE — PATIENT INSTRUCTIONS
Patient Education        Gastroesophageal Reflux Disease (GERD): Care Instructions  Overview     Gastroesophageal reflux disease (GERD) is the backward flow of stomach acid into the esophagus. The esophagus is the tube that leads from your throat to your stomach. A one-way valve prevents the stomach acid from backing up into this tube. But when you have GERD, this valve does not close tightly enough. This can also cause pain and swelling in your esophagus. (This is calledesophagitis.)  If you have mild GERD symptoms including heartburn, you may be able to control the problem with antacids or over-the-counter medicine. You can also make lifestyle changes to help reduce your symptoms. These include changing yourdiet and eating habits, such as not eating late at night and losing weight. Follow-up care is a key part of your treatment and safety. Be sure to make and go to all appointments, and call your doctor if you are having problems. It's also a good idea to know your test results and keep alist of the medicines you take. How can you care for yourself at home?  Take your medicines exactly as prescribed. Call your doctor if you think you are having a problem with your medicine.  Your doctor may recommend over-the-counter medicine. For mild or occasional indigestion, antacids, such as Tums, Gaviscon, Mylanta, or Maalox, may help. Your doctor also may recommend over-the-counter acid reducers, such as famotidine (Pepcid AC), cimetidine (Tagamet HB), or omeprazole (Prilosec). Read and follow all instructions on the label. If you use these medicines often, talk with your doctor.  Change your eating habits. ? It's best to eat several small meals instead of two or three large meals. ? After you eat, wait 2 to 3 hours before you lie down. ? Avoid foods that make your symptoms worse.  These may include chocolate, mint, alcohol, pepper, spicy foods, high-fat foods, or drinks with caffeine in them, such as tea, coffee, keli, or energy drinks. If your symptoms are worse after you eat a certain food, you may want to stop eating it to see if your symptoms get better.  Do not smoke or chew tobacco. Smoking can make GERD worse. If you need help quitting, talk to your doctor about stop-smoking programs and medicines. These can increase your chances of quitting for good.  If you have GERD symptoms at night, raise the head of your bed 6 to 8 inches by putting the frame on blocks or placing a foam wedge under the head of your mattress. (Adding extra pillows does not work.)   Do not wear tight clothing around your middle.  Lose weight if you need to. Losing just 5 to 10 pounds can help. When should you call for help? Call your doctor now or seek immediate medical care if:     You have new or different belly pain.      Your stools are black and tarlike or have streaks of blood. Watch closely for changes in your health, and be sure to contact your doctor if:     Your symptoms have not improved after 2 days.      Food seems to catch in your throat or chest.   Where can you learn more? Go to https://[employer]ie.[employer]. org and sign in to your [employer] account. Enter R428 in the FanHero box to learn more about \"Gastroesophageal Reflux Disease (GERD): Care Instructions. \"     If you do not have an account, please click on the \"Sign Up Now\" link. Current as of: September 8, 2021               Content Version: 13.2  © 6363-4964 [employer], [facility]. Care instructions adapted under license by [facility] ([facility]). If you have questions about a medical condition or this instruction, always ask your healthcare professional. [place] [address] any warranty or liability for your use of this information.

## 2022-07-25 ENCOUNTER — OFFICE VISIT (OUTPATIENT)
Dept: ENT CLINIC | Age: 67
End: 2022-07-25
Payer: MEDICARE

## 2022-07-25 VITALS
SYSTOLIC BLOOD PRESSURE: 120 MMHG | WEIGHT: 115.2 LBS | BODY MASS INDEX: 19.19 KG/M2 | HEIGHT: 65 IN | DIASTOLIC BLOOD PRESSURE: 62 MMHG

## 2022-07-25 DIAGNOSIS — R07.0 THROAT BURNING: Primary | ICD-10-CM

## 2022-07-25 DIAGNOSIS — K21.9 LARYNGOPHARYNGEAL REFLUX (LPR): ICD-10-CM

## 2022-07-25 PROCEDURE — G8428 CUR MEDS NOT DOCUMENT: HCPCS | Performed by: PHYSICIAN ASSISTANT

## 2022-07-25 PROCEDURE — 3017F COLORECTAL CA SCREEN DOC REV: CPT | Performed by: PHYSICIAN ASSISTANT

## 2022-07-25 PROCEDURE — 1123F ACP DISCUSS/DSCN MKR DOCD: CPT | Performed by: PHYSICIAN ASSISTANT

## 2022-07-25 PROCEDURE — G8420 CALC BMI NORM PARAMETERS: HCPCS | Performed by: PHYSICIAN ASSISTANT

## 2022-07-25 PROCEDURE — 99213 OFFICE O/P EST LOW 20 MIN: CPT | Performed by: PHYSICIAN ASSISTANT

## 2022-07-25 PROCEDURE — 4004F PT TOBACCO SCREEN RCVD TLK: CPT | Performed by: PHYSICIAN ASSISTANT

## 2022-07-25 ASSESSMENT — ENCOUNTER SYMPTOMS
EYE DISCHARGE: 0
COUGH: 0
ABDOMINAL PAIN: 0

## 2022-07-25 NOTE — PROGRESS NOTES
.  Chief Complaint   Patient presents with    Other     Patient here for burning in his throat. HPI:  Monica Acosta is a 77 y.o. male seen to follow up on LPR. He reports that he has scratchy throat. He states that he continues reports that he continues to cut his meat up. Patient denies any trouble with swallowing. He is taking omeprazole at 20 mg BID. Of note, patient was scheduled to see GI but not able to keep appointment due to taking care of his wife. He states that he does not have anyone to watch her while he has his procedure. He states that his wife is on hospice and he is under a great deal of stress. Past Medical History, Past Surgical History, Family history, Social History, and Medications were all reviewed with the patient today and updated as necessary. Allergies   Allergen Reactions    Codeine Hives     Has tolerated morphine previously.     Penicillins Itching and Rash     Years ago     Patient Active Problem List   Diagnosis    Chronic obstructive pulmonary disease (HCC)    CAD (coronary artery disease)    Cervical stenosis of spinal canal    Renal hematoma    History of left-sided carotid endarterectomy    Severe episode of recurrent major depressive disorder, without psychotic features ([facility] [address]    Status post ileal conduit (HCC)    S/P CABG x 3    Tobacco use    Depression with anxiety    Bilateral carotid artery stenosis    History of neck surgery    Urinary bladder cancer (HCC)    HTN (hypertension)     Current Outpatient Medications   Medication Sig    Ostomy Supplies MISC 1 Units by Other route as needed    omeprazole (PRILOSEC) 20 MG delayed release capsule Take 1 capsule by mouth in the morning and at bedtime    albuterol sulfate  (90 Base) MCG/ACT inhaler Inhale 1 puff into the lungs every 4 hours as needed    amLODIPine (NORVASC) 5 MG tablet Take 5 mg by mouth daily    aspirin 81 MG chewable tablet Take 81 mg by mouth daily    atorvastatin (LIPITOR) 40 MG tablet Take 40 mg by mouth    Fluticasone-Umeclidin-Vilant (TRELEGY ELLIPTA) 200-62.5-25 MCG/INH AEPB Inhale 1 puff into the lungs daily    metoprolol tartrate (LOPRESSOR) 50 MG tablet Take 25 mg by mouth every 12 hours     No current facility-administered medications for this visit.      Past Medical History:   Diagnosis Date    Adverse effect of anesthesia     hard to awaken    CAD (coronary artery disease) 09/18/2018    Cancer ([facility] [address]     bladder    Carotid stenosis, bilateral     S/P left endarterectomy,   TALYA 30% per CTA    Chronic neck pain     Chronic obstructive pulmonary disease (HCC)     no inhalers    Depression with anxiety     GERD (gastroesophageal reflux disease)     no meds    History of bladder cancer 07/2014    Cystectomy with ileo conduit urinary diversion ----and chemo    History of left-sided carotid endarterectomy     History of neck surgery     Hypertension     Kidney stones     Osteoarthritis     PAD (peripheral artery disease) (HCC)     S/P CABG x 3 09/2018    Staghorn renal calculus     Left sided    Status post ileal conduit ([facility] [address] 2014    Tobacco use disorder, continuous     0.5 ppd since age 15     Social History     Tobacco Use    Smoking status: Every Day     Packs/day: 1.00     Years: 15.00     Pack years: 15.00     Types: Cigarettes    Smokeless tobacco: Never   Substance Use Topics    Alcohol use: Not Currently     Past Surgical History:   Procedure Laterality Date    CABG, ARTERY-VEIN, THREE  09/18/2018    CARDIAC CATHETERIZATION  08/2018    CAROTID ENDARTERECTOMY Left 12/06/2018    CERVICAL FUSION  2014    Dr. Fernanda Aschoff GRAFT  09/18/2018    triple bypass    GI  06/24/2019    upper/lower GI    HEMORRHOID SURGERY      IR NEPHROURETERAL CATHETER PLACEMENT NEW ACCESS  5/15/2018    IR NEPHROURETERAL CATHETER PLACEMENT NEW ACCESS 5/15/2018 [facility]    KNEE ARTHROSCOPY Left          UROLOGICAL SURGERY  2015    TURBT- bladder removal- urostomy    UROLOGICAL SURGERY Left     Nephrostomy    UROLOGICAL SURGERY  05/2020    kidney stone    UROLOGICAL SURGERY      benign tumor removed from L testicle      VASCULAR SURGERY Right 2014    Port, removed 2016     Family History   Problem Relation Age of Onset    Cancer Father         bladder CA    No Known Problems Sister     Cancer Sister         hx of breast CA    Cancer Mother     Lung Disease Mother         ROS:    Review of Systems   Constitutional:  Negative for fever. HENT:  Negative for ear discharge and ear pain. Eyes:  Negative for discharge. Respiratory:  Negative for cough. Cardiovascular:  Negative for chest pain. Gastrointestinal:  Negative for abdominal pain. Genitourinary:  Negative for difficulty urinating. Musculoskeletal:  Negative for neck pain. Skin:  Negative for rash. Allergic/Immunologic: Negative for environmental allergies. Neurological:  Negative for dizziness. Hematological:  Negative for adenopathy. Psychiatric/Behavioral:  Negative for agitation. PHYSICAL EXAM:    /62 (Site: Left Upper Arm, Position: Sitting)   Ht 5' 5\" (1.651 m)   Wt 115 lb 3.2 oz (52.3 kg)   BMI 19.17 kg/m²     Head  Head and Face - The head and face are atraumatic, normocephalic. The salivary glands are intact and the facial appearance is symmetric. Head shape - No scars, lesions, or masses    Ear  Ear - Tympanic membranes are clear, the external auditory canal is without discharge and the tympanic membranes are mobile. There is no tympanic membrane erythema and no middle ear opacity is visualized. Pinna: bilateral - No hematomas or lacerations    Eye  Eyeball - bilateral - extraocular motions intact, equal in size and movement    Nose and Sinuses  Nose - mucosa is pink and the septum is deviated. There are no nasal lesions and there was mild turbinate hypertrophy. Mouth and Throat  Lips - upper lip - normal: no dryness, cracking, pallor, cyanosis, or vesicular eruption. Lower lip: normal: no dryness, cracking, pallor, cyanosis, or vesicular eruption. Teeth and Gums - No bleeding, no inflammation or ulceration. Lips - Pink and symmetrical  Oral Cavity - Oral mucosa pink, soft and hard palates contiguous and tongue moist without ulcers. The mucosa is without ulcerations. No oral cavity masses present. Parotid Gland - Bilateral - Non tender, not swollen. Oropharynx - No discharge or Erythema  Nasopharynx - Non obstructed, mucosa pink and moist.    Hypopharynx - No erythema  Submandibular Gland - Non tender, not swollen. Tonsils - Normal    Neck   Neck - Full range of motion and Supple. Non Tender. No Masses. Trachea - Midline. Thyroid - Gland - Symmetric. Non Tender. Nodules - No nodules. Neurologic - II - XII Grossly intact bilaterally    Cardiac  Inspection - Jugular Vein:  Bilateral - non distended, no prominent pulsations    Chest and Lung  Inspection - Movements:  Chest symmetrical with bilateral expansion, respirations even and non labored    Laryngoscopy Procedure Note        Indications   A laryngoscopy will be performed due to dysphagia, laryngopharyngeal reflux , and throat irritation . Procedure Details     Laryngoscopy was performed today under Rhinocaine was applied topically. Findings  The findings include: septum deviated, nasal cavity and nasopharynx without masses or obstructions, base of tongue without mass or asymmetry, true vocal cords mobile bilaterally, extraesophageal reflux, erythema of the laryngeal surface of the epiglottis, erythema of arytenoids, no true vocal cord lesion or mass, no pyriform sinus mass, and no supraglottic laryngeal mass. Patient Response   The patient tolerated the procedure well. .            Procedure Impression  Extraesophageal reflux (LPR). Moderate    ASSESSMENT and PLAN      ICD-10-CM    1. Throat burning  R07.0       2.  Laryngopharyngeal reflux (LPR)  K21.9           Patient will continue his reflux medication. We again discussed EGD with GI. I will try to have  reach out to him to possibly see if we can get information for  to assist so that patient can see GI. He can return in one year for reevaluation.      Iraida Moran PA-C  7/25/2022

## 2022-07-28 DIAGNOSIS — C67.9 MALIGNANT NEOPLASM OF URINARY BLADDER, UNSPECIFIED SITE (HCC): Primary | ICD-10-CM

## 2022-08-02 ENCOUNTER — OFFICE VISIT (OUTPATIENT)
Dept: ONCOLOGY | Age: 67
End: 2022-08-02
Payer: MEDICARE

## 2022-08-02 ENCOUNTER — HOSPITAL ENCOUNTER (OUTPATIENT)
Dept: LAB | Age: 67
Discharge: HOME | End: 2022-08-05
Payer: MEDICARE

## 2022-08-02 VITALS
HEIGHT: 65 IN | WEIGHT: 113.5 LBS | HEART RATE: 71 BPM | RESPIRATION RATE: 18 BRPM | SYSTOLIC BLOOD PRESSURE: 124 MMHG | TEMPERATURE: 98.1 F | OXYGEN SATURATION: 99 % | BODY MASS INDEX: 18.91 KG/M2 | DIASTOLIC BLOOD PRESSURE: 78 MMHG

## 2022-08-02 DIAGNOSIS — C67.9 MALIGNANT NEOPLASM OF URINARY BLADDER, UNSPECIFIED SITE (HCC): Primary | ICD-10-CM

## 2022-08-02 DIAGNOSIS — C67.9 MALIGNANT NEOPLASM OF URINARY BLADDER, UNSPECIFIED SITE (HCC): ICD-10-CM

## 2022-08-02 LAB
ALBUMIN SERPL-MCNC: 4 G/DL (ref 3.2–4.6)
ALBUMIN/GLOB SERPL: 1.1 {RATIO} (ref 1.2–3.5)
ALP SERPL-CCNC: 73 U/L (ref 50–136)
ALT SERPL-CCNC: 42 U/L (ref 12–65)
ANION GAP SERPL CALC-SCNC: 4 MMOL/L (ref 7–16)
AST SERPL-CCNC: 29 U/L (ref 15–37)
BASOPHILS # BLD: 0 K/UL (ref 0–0.2)
BASOPHILS NFR BLD: 1 % (ref 0–2)
BILIRUB SERPL-MCNC: 0.4 MG/DL (ref 0.2–1.1)
BUN SERPL-MCNC: 6 MG/DL (ref 8–23)
CALCIUM SERPL-MCNC: 9.1 MG/DL (ref 8.3–10.4)
CHLORIDE SERPL-SCNC: 100 MMOL/L (ref 98–107)
CO2 SERPL-SCNC: 28 MMOL/L (ref 21–32)
CREAT SERPL-MCNC: 1 MG/DL (ref 0.8–1.5)
DIFFERENTIAL METHOD BLD: ABNORMAL
EOSINOPHIL # BLD: 0 K/UL (ref 0–0.8)
EOSINOPHIL NFR BLD: 1 % (ref 0.5–7.8)
ERYTHROCYTE [DISTWIDTH] IN BLOOD BY AUTOMATED COUNT: 13.7 % (ref 11.9–14.6)
GLOBULIN SER CALC-MCNC: 3.8 G/DL (ref 2.3–3.5)
GLUCOSE SERPL-MCNC: 109 MG/DL (ref 65–100)
HCT VFR BLD AUTO: 43 %
HGB BLD-MCNC: 14.4 G/DL (ref 13.6–17.2)
IMM GRANULOCYTES # BLD AUTO: 0 K/UL (ref 0–0.5)
IMM GRANULOCYTES NFR BLD AUTO: 0 % (ref 0–5)
LYMPHOCYTES # BLD: 1.2 K/UL (ref 0.5–4.6)
LYMPHOCYTES NFR BLD: 21 % (ref 13–44)
MCH RBC QN AUTO: 34 PG (ref 26.1–32.9)
MCHC RBC AUTO-ENTMCNC: 33.5 G/DL (ref 31.4–35)
MCV RBC AUTO: 101.4 FL (ref 79.6–97.8)
MONOCYTES # BLD: 0.6 K/UL (ref 0.1–1.3)
MONOCYTES NFR BLD: 10 % (ref 4–12)
NEUTS SEG # BLD: 3.9 K/UL (ref 1.7–8.2)
NEUTS SEG NFR BLD: 68 % (ref 43–78)
NRBC # BLD: 0 K/UL (ref 0–0.2)
PLATELET # BLD AUTO: 348 K/UL (ref 150–450)
PMV BLD AUTO: 8.8 FL (ref 9.4–12.3)
POTASSIUM SERPL-SCNC: 4.1 MMOL/L (ref 3.5–5.1)
PROT SERPL-MCNC: 7.8 G/DL (ref 6.3–8.2)
RBC # BLD AUTO: 4.24 M/UL (ref 4.23–5.6)
SODIUM SERPL-SCNC: 132 MMOL/L (ref 136–145)
WBC # BLD AUTO: 5.8 K/UL (ref 4.3–11.1)

## 2022-08-02 PROCEDURE — 3017F COLORECTAL CA SCREEN DOC REV: CPT | Performed by: INTERNAL MEDICINE

## 2022-08-02 PROCEDURE — 85025 COMPLETE CBC W/AUTO DIFF WBC: CPT

## 2022-08-02 PROCEDURE — G8420 CALC BMI NORM PARAMETERS: HCPCS | Performed by: INTERNAL MEDICINE

## 2022-08-02 PROCEDURE — 99213 OFFICE O/P EST LOW 20 MIN: CPT | Performed by: INTERNAL MEDICINE

## 2022-08-02 PROCEDURE — 4004F PT TOBACCO SCREEN RCVD TLK: CPT | Performed by: INTERNAL MEDICINE

## 2022-08-02 PROCEDURE — G8428 CUR MEDS NOT DOCUMENT: HCPCS | Performed by: INTERNAL MEDICINE

## 2022-08-02 PROCEDURE — 36415 COLL VENOUS BLD VENIPUNCTURE: CPT

## 2022-08-02 PROCEDURE — 80053 COMPREHEN METABOLIC PANEL: CPT

## 2022-08-02 PROCEDURE — 1123F ACP DISCUSS/DSCN MKR DOCD: CPT | Performed by: INTERNAL MEDICINE

## 2022-08-02 NOTE — PATIENT INSTRUCTIONS
Patient Instructions from Today's Visit    Reason for Visit:  Follow up - Bladder    Diagnosis Information:  [email or web address]      Plan:  - labs reviewed    Follow Up:  - as needed    Recent Lab Results:  Hospital Outpatient Visit on 08/02/2022   Component Date Value Ref Range Status    WBC 08/02/2022 5.8  4.3 - 11.1 K/uL Final    RBC 08/02/2022 4.24  4.23 - 5.6 M/uL Final    Hemoglobin 08/02/2022 14.4  13.6 - 17.2 g/dL Final    Hematocrit 08/02/2022 43.0  % Final    MCV 08/02/2022 101.4 (A) 79.6 - 97.8 FL Final    MCH 08/02/2022 34.0 (A) 26.1 - 32.9 PG Final    MCHC 08/02/2022 33.5  31.4 - 35.0 g/dL Final    RDW 08/02/2022 13.7  11.9 - 14.6 % Final    Platelets 94/20/5302 348  150 - 450 K/uL Final    MPV 08/02/2022 8.8 (A) 9.4 - 12.3 FL Final    nRBC 08/02/2022 0.00  0.0 - 0.2 K/uL Final    **Note: Absolute NRBC parameter is now reported with Hemogram**    Differential Type 08/02/2022 AUTOMATED    Final    Seg Neutrophils 08/02/2022 68  43 - 78 % Final    Lymphocytes 08/02/2022 21  13 - 44 % Final    Monocytes 08/02/2022 10  4.0 - 12.0 % Final    Eosinophils % 08/02/2022 1  0.5 - 7.8 % Final    Basophils 08/02/2022 1  0.0 - 2.0 % Final    Immature Granulocytes 08/02/2022 0  0.0 - 5.0 % Final    Segs Absolute 08/02/2022 3.9  1.7 - 8.2 K/UL Final    Absolute Lymph # 08/02/2022 1.2  0.5 - 4.6 K/UL Final    Absolute Mono # 08/02/2022 0.6  0.1 - 1.3 K/UL Final    Absolute Eos # 08/02/2022 0.0  0.0 - 0.8 K/UL Final    Basophils Absolute 08/02/2022 0.0  0.0 - 0.2 K/UL Final    Absolute Immature Granulocyte 08/02/2022 0.0  0.0 - 0.5 K/UL Final    Sodium 08/02/2022 132 (A) 136 - 145 mmol/L Final    Potassium 08/02/2022 4.1  3.5 - 5.1 mmol/L Final    Chloride 08/02/2022 100  98 - 107 mmol/L Final    CO2 08/02/2022 28  21 - 32 mmol/L Final    Anion Gap 08/02/2022 4 (A) 7 - 16 mmol/L Final    Glucose 08/02/2022 109 (A) 65 - 100 mg/dL Final BUN 08/02/2022 6 (A) 8 - 23 MG/DL Final    Creatinine 08/02/2022 1.00  0.8 - 1.5 MG/DL Final    GFR  08/02/2022 >60  >60 ml/min/1.73m2 Final    GFR Non- 08/02/2022 >60  >60 ml/min/1.73m2 Final    Comment:      Estimated GFR is calculated using the Modification of Diet in Renal Disease (MDRD) Study equation, reported for both  Americans (GFRAA) and non- Americans (GFRNA), and normalized to 1.73m2 body surface area. The physician must decide which value applies to the patient. The MDRD study equation should only be used in individuals age 25 or older. It has not been validated for the following: pregnant women, patients with serious comorbid conditions,or on certain medications, or persons with extremes of body size, muscle mass, or nutritional status. Calcium 08/02/2022 9.1  8.3 - 10.4 MG/DL Final    Total Bilirubin 08/02/2022 0.4  0.2 - 1.1 MG/DL Final    ALT 08/02/2022 42  12 - 65 U/L Final    AST 08/02/2022 29  15 - 37 U/L Final    Alk Phosphatase 08/02/2022 73  50 - 136 U/L Final    Total Protein 08/02/2022 7.8  6.3 - 8.2 g/dL Final    Albumin 08/02/2022 4.0  3.2 - 4.6 g/dL Final    Globulin 08/02/2022 3.8 (A) 2.3 - 3.5 g/dL Final    Albumin/Globulin Ratio 08/02/2022 1.1 (A) 1.2 - 3.5   Final         Treatment Summary has been discussed and given to patient: n/a        -------------------------------------------------------------------------------------------------------------------  Please call our office at [phone] if you have any  of the following symptoms:   Fever of 100.5 or greater  Chills  Shortness of breath  Swelling or pain in one leg    After office hours an answering service is available and will contact a provider for emergencies or if you are experiencing any of the above symptoms. Patient did express an interest in My Chart. My Chart log in information explained on the after visit summary printout at the [address] desk.     Mj Velasco BRI Carter

## 2022-08-02 NOTE — PROGRESS NOTES
[facility] Hematology and Oncology: Office Visit Established Patient    Chief Complaint:    Chief Complaint   Patient presents with    Follow-up         History of Present Illness:  Mr. Reuben Herrera is a 77 y.o. male who returns today for management of muscle invasive bladder cancer. He had a solitary episode of hematuria in early 2014, but did not seek care until mid-2014 when he has a recurrent episode of hematuria including clots. He went to his PCP and was referred to urology, where he had a CT showing thickening of the urinary  bladder but no other areas of apparent disease. He underwent cystoscopy which showed extensive bladder tumor with suspected left ureteral obstruction. Maximal TURBT was performed which showed urothelial carcinoma with extension into muscularis propria. His ureteral obstruction did not resolve with the stent, so a left nephrostomy tube was placed. We recommended neoadjuvant chemotherapy with GC after port placement and chest CT. He completed 4 cycles with adequate tolerance. CT pre-op showed no distant  disease. He went to cystectomy on April 17th after some delay from scheduled surgery in March for fever in pre-op. He had residual T2 disease at surgery but no evidence of distant disease. We recommended routine surveillance with imaging every 3 months. Here today for follow up. He has been doing well overall. He continues to follow with urology for nephrolithiasis, observation only right now. He saw ENT because of his sore throat, he has been diagnosed with severe reflux which has been challenging to manage, he has been referred to GI for EGD but recent laryngoscopy was negative. His partner is under hospice care for her COPD and his role as caregiver is very taxing, but he is committed to her. Weight is down, appetite is not great, he develops abdominal discomfort with eating most meals.   No other active symptoms, he continues to follow with primary care for other medical issues as well. Review of Systems:  Constitutional: Negative. HENT: Negative. Eyes: Negative. Respiratory: Negative. Cardiovascular: Negative. Gastrointestinal: Positive for GERD. Genitourinary: Negative. Musculoskeletal: Negative. Skin: Negative. Neurological: Negative. Endo/Heme/Allergies: Negative. Psychiatric/Behavioral: Negative. All other systems reviewed and are negative. Allergies   Allergen Reactions    Codeine Hives     Has tolerated morphine previously.     Penicillins Itching and Rash     Years ago     Past Medical History:   Diagnosis Date    Adverse effect of anesthesia     hard to awaken    CAD (coronary artery disease) 09/18/2018    Cancer ([facility] [address]     bladder    Carotid stenosis, bilateral     S/P left endarterectomy,   TALYA 30% per CTA    Chronic neck pain     Chronic obstructive pulmonary disease (HCC)     no inhalers    Depression with anxiety     GERD (gastroesophageal reflux disease)     no meds    History of bladder cancer 07/2014    Cystectomy with ileo conduit urinary diversion ----and chemo    History of left-sided carotid endarterectomy     History of neck surgery     Hypertension     Kidney stones     Osteoarthritis     PAD (peripheral artery disease) ([facility])     S/P CABG x 3 09/2018    Staghorn renal calculus     Left sided    Status post ileal conduit ([facility] [address] 2014    Tobacco use disorder, continuous     0.5 ppd since age 15     Past Surgical History:   Procedure Laterality Date    CABG, ARTERY-VEIN, THREE  09/18/2018    CARDIAC CATHETERIZATION  08/2018    CAROTID ENDARTERECTOMY Left 12/06/2018    CERVICAL FUSION  2014    Dr. Milka Sweeney GRAFT  09/18/2018    triple bypass    GI  06/24/2019    upper/lower GI    HEMORRHOID SURGERY      IR NEPHROURETERAL CATHETER PLACEMENT NEW ACCESS  5/15/2018    IR NEPHROURETERAL CATHETER PLACEMENT NEW ACCESS 5/15/2018 [facility]IOLOGY SPECIALS    KNEE ARTHROSCOPY Left          UROLOGICAL SURGERY  2015 TURBT- bladder removal- urostomy    UROLOGICAL SURGERY Left     Nephrostomy    UROLOGICAL SURGERY  05/2020    kidney stone    UROLOGICAL SURGERY      benign tumor removed from L testicle      VASCULAR SURGERY Right 2014    Port, removed 2016     Family History   Problem Relation Age of Onset    Cancer Father         bladder CA    No Known Problems Sister     Cancer Sister         hx of breast CA    Cancer Mother     Lung Disease Mother      Social History     Socioeconomic History    Marital status:      Spouse name: Not on file    Number of children: Not on file    Years of education: Not on file    Highest education level: Not on file   Occupational History    Not on file   Tobacco Use    Smoking status: Every Day     Packs/day: 1.00     Years: 15.00     Pack years: 15.00     Types: Cigarettes    Smokeless tobacco: Never   Substance and Sexual Activity    Alcohol use: Not Currently    Drug use: Yes     Types: Marijuana Nathalia Abel)    Sexual activity: Not on file   Other Topics Concern    Not on file   Social History Narrative    Lives with girlfriend. His father is living in [place].       Social Determinants of Health     Financial Resource Strain: Low Risk     Difficulty of Paying Living Expenses: Not hard at all   Food Insecurity: No Food Insecurity    Worried About Running Out of Food in the Last Year: Never true    Ran Out of Food in the Last Year: Never true   Transportation Needs: Not on file   Physical Activity: Not on file   Stress: Not on file   Social Connections: Not on file   Intimate Partner Violence: Not on file   Housing Stability: Not on file     Current Outpatient Medications   Medication Sig Dispense Refill    Ostomy Supplies MISC 1 Units by Other route as needed      omeprazole (PRILOSEC) 20 MG delayed release capsule Take 1 capsule by mouth in the morning and at bedtime 60 capsule 5    albuterol sulfate  (90 Base) MCG/ACT inhaler Inhale 1 puff into the lungs every 4 hours as needed      amLODIPine (NORVASC) 5 MG tablet Take 5 mg by mouth daily      aspirin 81 MG chewable tablet Take 81 mg by mouth daily      atorvastatin (LIPITOR) 40 MG tablet Take 40 mg by mouth      Fluticasone-Umeclidin-Vilant (TRELEGY ELLIPTA) 200-62.5-25 MCG/INH AEPB Inhale 1 puff into the lungs daily      metoprolol tartrate (LOPRESSOR) 50 MG tablet Take 25 mg by mouth every 12 hours       No current facility-administered medications for this visit. OBJECTIVE:  /78 (Site: Left Upper Arm, Position: Sitting, Cuff Size: Small Adult)   Pulse 71   Temp 98.1 °F (36.7 °C) (Oral)   Resp 18   Ht 5' 5\" (1.651 m)   Wt 113 lb 8 oz (51.5 kg)   SpO2 99%   BMI 18.89 kg/m²     Physical Exam:  Constitutional: Well developed, well nourished male in no acute distress, sitting comfortably in the exam room chair. HEENT: Normocephalic and atraumatic. Sclerae anicteric. Neck supple without JVD. No thyromegaly present. Lymph node   No palpable submandibular, cervical, supraclavicular lymph nodes. Skin Warm and dry. No bruising and no rash noted. No erythema. No pallor. Respiratory Lungs are clear to auscultation bilaterally without wheezes, rales or rhonchi, normal air exchange without accessory muscle use. CVS Normal rate, regular rhythm and normal S1 and S2. No murmurs, gallops, or rubs. Abdomen Soft, nontender and nondistended, normoactive bowel sounds. No palpable mass. No hepatosplenomegaly. Neuro Grossly nonfocal with no obvious sensory or motor deficits. MSK Normal range of motion in general.  No edema and no tenderness. Psych Appropriate mood and affect.       Labs:  Recent Results (from the past 96 hour(s))   CBC with Auto Differential    Collection Time: 08/02/22 10:24 AM   Result Value Ref Range    WBC 5.8 4.3 - 11.1 K/uL    RBC 4.24 4.23 - 5.6 M/uL    Hemoglobin 14.4 13.6 - 17.2 g/dL    Hematocrit 43.0 %    .4 (H) 79.6 - 97.8 FL    MCH 34.0 (H) 26.1 - 32.9 PG    MCHC 33.5 31.4 - 35.0 g/dL    RDW 13.7 11.9 - 14.6 %    Platelets 557 906 - 668 K/uL    MPV 8.8 (L) 9.4 - 12.3 FL    nRBC 0.00 0.0 - 0.2 K/uL    Differential Type AUTOMATED      Seg Neutrophils 68 43 - 78 %    Lymphocytes 21 13 - 44 %    Monocytes 10 4.0 - 12.0 %    Eosinophils % 1 0.5 - 7.8 %    Basophils 1 0.0 - 2.0 %    Immature Granulocytes 0 0.0 - 5.0 %    Segs Absolute 3.9 1.7 - 8.2 K/UL    Absolute Lymph # 1.2 0.5 - 4.6 K/UL    Absolute Mono # 0.6 0.1 - 1.3 K/UL    Absolute Eos # 0.0 0.0 - 0.8 K/UL    Basophils Absolute 0.0 0.0 - 0.2 K/UL    Absolute Immature Granulocyte 0.0 0.0 - 0.5 K/UL   Comprehensive Metabolic Panel    Collection Time: 08/02/22 10:24 AM   Result Value Ref Range    Sodium 132 (L) 136 - 145 mmol/L    Potassium 4.1 3.5 - 5.1 mmol/L    Chloride 100 98 - 107 mmol/L    CO2 28 21 - 32 mmol/L    Anion Gap 4 (L) 7 - 16 mmol/L    Glucose 109 (H) 65 - 100 mg/dL    BUN 6 (L) 8 - 23 MG/DL    Creatinine 1.00 0.8 - 1.5 MG/DL    GFR African American >60 >60 ml/min/1.73m2    GFR Non- >60 >60 ml/min/1.73m2    Calcium 9.1 8.3 - 10.4 MG/DL    Total Bilirubin 0.4 0.2 - 1.1 MG/DL    ALT 42 12 - 65 U/L    AST 29 15 - 37 U/L    Alk Phosphatase 73 50 - 136 U/L    Total Protein 7.8 6.3 - 8.2 g/dL    Albumin 4.0 3.2 - 4.6 g/dL    Globulin 3.8 (H) 2.3 - 3.5 g/dL    Albumin/Globulin Ratio 1.1 (L) 1.2 - 3.5         Imaging:  No results found. ASSESSMENT:   Diagnosis Orders   1. Malignant neoplasm of urinary bladder, unspecified site [facility])              PLAN:  Lab studies were personally reviewed. TCC: invasive into muscularis propria, at least stage II ([id number]). Left sided ureteral obstruction relieved with nephrostomy. Maximal TURBT is not sufficient for long term disease control. Cystectomy would be recommended. However, to lower the risk  of recurrent disease and prolong overall survival, neoadjuvant chemotherapy would be recommended prior to cystectomy. His renal function is adequate.   I would recommend cisplatin 70 mg/m2 on day 1 and gemcitabine 1000 mg/m2 on days 1 and 8 of a 21 day cycle, with Neulasta support. CT chest pretherapy shows no evidence of metastatic disease. He completed 4 cycles of therapy with adequate tolerance. WBC normalized, no new issues. Restaging imaging is negative for recurrent/residual disease, referred  back to Dr. Rosalie Rivera for discussion of cystectomy. Cystectomy completed 4/17/15 and showed residual disease into muscularis propria, but with negative lymph nodes and negative margins, [id number]. Observed. Follow-up imaging has shown no sign of recurrent  disease. Here today for follow up. He has been doing well overall. He continues to follow with urology for nephrolithiasis, observation only right now. He saw ENT because of his sore throat, he has been diagnosed with severe reflux which has been challenging to manage, he has been referred to GI for EGD but recent laryngoscopy was negative. His partner is under hospice care for her COPD and his role as caregiver is very taxing, but he is committed to her. Weight is down, appetite is not great, he develops abdominal discomfort with eating most meals. No other active symptoms, he continues to follow with primary care for other medical issues as well. Labs reviewed and unremarkable. CT from September 2020 showed no evidence of metastatic disease, he does not require surveillance imaging. From an oncologic standpoint, he will continue observation and supportive care, with his multiple other issues and physicians I am fine with seeing him on a PRN basis. All questions were asked and answered to the best of my ability.              Santiago Burnette MD, MD  [address] [place] [address] Hematology and Oncology  [address] [place] [address]  Office : [phone]  Fax : [phone]

## 2022-08-10 DIAGNOSIS — K21.9 LARYNGOPHARYNGEAL REFLUX (LPR): Primary | ICD-10-CM

## 2022-08-15 ENCOUNTER — CARE COORDINATION (OUTPATIENT)
Dept: CARE COORDINATION | Facility: CLINIC | Age: 67
End: 2022-08-15

## 2022-08-15 NOTE — CARE COORDINATION
Initial Contact Social Work Note - Ambulatory          Date of referral: 8/11/22  Referral received from: Stefanie Castaneda  Reason for referral: Needs someone to stay with sig other while pt undergoes EGD   Previous SW Referral: Yes. If yes, brief summary and outcome:  2018 - needed assistance affording inhalers and urostomy supplies. Pt lost to follow up .  CM was unable to reach pt again after initial assessment. 2021 - C'giving assistance with sig other; med cost assistance     Two Identifiers Verified: Yes     Insurance Provider: Medicare     Support System: Spouse/Partner . Has an estranged relationship with his adult children. Olar Status: N/A     Community Providers: na      ADL Assistance: N/A  Indep with ambulation and ADLs     Housing/Living Concerns or Home Modification Needs: pvt residence  Transportation Concern: Pt has a vehicle and drives     Medication Cost Concern: NA  Medication Education or Adherence Concern: states he takes meds as directed     Financial Concern(s): limited income     Income (only if applicable): approx $0982/HO      Ability to Read/Write: Yes     Advance Care Plan:  N/A   Will defer to next outreach    Other: Resides with sig other of many years. Sig other is current with [facility] . Pt needs EGD. No support system other than hospice. Identified Needs:      Caregiver respite      Social Work Plan:     Pt to speak with pt's [employer] worker to inquire if volunteer sitter could be arranged to stay with pt when he has EGD  Provided pt with contact #s for non medical home care agencies to hire aides to stay with sig other during procedure     Next Steps: Follow pt for 30 days. Outreach next week. Cc: Stefanie Bobo

## 2022-08-23 ENCOUNTER — CARE COORDINATION (OUTPATIENT)
Dept: CARE COORDINATION | Facility: CLINIC | Age: 67
End: 2022-08-23

## 2022-09-01 ENCOUNTER — CARE COORDINATION (OUTPATIENT)
Dept: CARE COORDINATION | Facility: CLINIC | Age: 67
End: 2022-09-01

## 2022-09-08 ENCOUNTER — CARE COORDINATION (OUTPATIENT)
Dept: CARE COORDINATION | Facility: CLINIC | Age: 67
End: 2022-09-08

## 2022-09-08 NOTE — CARE COORDINATION
Third and final unsuccessful attempt to reach pt for follow up. Msg left today. If no call back by Wed, Sept 14, will close case.       Cc: MINNIE Pressley
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
## 2022-09-16 ENCOUNTER — OFFICE VISIT (OUTPATIENT)
Dept: PULMONOLOGY | Age: 67
End: 2022-09-16
Payer: MEDICARE

## 2022-09-16 VITALS
HEIGHT: 65 IN | DIASTOLIC BLOOD PRESSURE: 78 MMHG | OXYGEN SATURATION: 95 % | SYSTOLIC BLOOD PRESSURE: 114 MMHG | BODY MASS INDEX: 18.49 KG/M2 | WEIGHT: 111 LBS | HEART RATE: 68 BPM | TEMPERATURE: 96.8 F

## 2022-09-16 DIAGNOSIS — J44.9 STAGE 4 VERY SEVERE COPD BY GOLD CLASSIFICATION (HCC): Primary | ICD-10-CM

## 2022-09-16 DIAGNOSIS — G47.34 NOCTURNAL HYPOXEMIA: ICD-10-CM

## 2022-09-16 DIAGNOSIS — J96.11 CHRONIC RESPIRATORY FAILURE WITH HYPOXIA (HCC): ICD-10-CM

## 2022-09-16 PROCEDURE — G8419 CALC BMI OUT NRM PARAM NOF/U: HCPCS | Performed by: PHYSICIAN ASSISTANT

## 2022-09-16 PROCEDURE — 99214 OFFICE O/P EST MOD 30 MIN: CPT | Performed by: PHYSICIAN ASSISTANT

## 2022-09-16 PROCEDURE — 4004F PT TOBACCO SCREEN RCVD TLK: CPT | Performed by: PHYSICIAN ASSISTANT

## 2022-09-16 PROCEDURE — 3017F COLORECTAL CA SCREEN DOC REV: CPT | Performed by: PHYSICIAN ASSISTANT

## 2022-09-16 PROCEDURE — 1123F ACP DISCUSS/DSCN MKR DOCD: CPT | Performed by: PHYSICIAN ASSISTANT

## 2022-09-16 PROCEDURE — G8427 DOCREV CUR MEDS BY ELIG CLIN: HCPCS | Performed by: PHYSICIAN ASSISTANT

## 2022-09-16 PROCEDURE — 3023F SPIROM DOC REV: CPT | Performed by: PHYSICIAN ASSISTANT

## 2022-09-16 NOTE — PROGRESS NOTES
Pt was seen in the office today. He had a lot of social concerns and it appears that you had reached out multiple times with no return calls. Could you try reaching out to him again or would you need a new referral? If so, I can place a new referral. I am not sure his issues are at all \"fixable\" but I am sure there are some resources that could help relieve some of his stress. Thank you!     MINNIE Hagan

## 2022-09-20 ENCOUNTER — CARE COORDINATION (OUTPATIENT)
Dept: CARE COORDINATION | Facility: CLINIC | Age: 67
End: 2022-09-20

## 2022-09-20 NOTE — CARE COORDINATION
At the request of MINNIE Coates with [facility], following pt's OV on 9/16, VANDANA FORD made another outreach attempt today. Left message on voice mail informing pt this writer had received request to call . Of note, VANDANA FORD did speak with pt on 8/25 and provided resources/suggestions about arranging someone to stay with pt's sig other/accompany him to EGD. Three unsuccessful attempts were made to follow up with pt after that and case was closed on Sept. 14.    In the past 2 years VANDANA FORD has attempted to link pt with psychiatry and therapy for support. Inconsistent follow through. Have offered to assist pt with application for pt assistance with inhalers in the past. Pt did not apply for a Medicare prescription drug plan and declines to do so. Lost to follow up. No further attempts will be made. If no return call is received by Monday, Sept 26, will close case.     Cc: MINNIE Tejada

## 2022-10-01 DIAGNOSIS — I25.10 ATHEROSCLEROTIC HEART DISEASE OF NATIVE CORONARY ARTERY WITHOUT ANGINA PECTORIS: ICD-10-CM

## 2022-10-01 DIAGNOSIS — I10 ESSENTIAL (PRIMARY) HYPERTENSION: ICD-10-CM

## 2022-10-03 RX ORDER — ATORVASTATIN CALCIUM 40 MG/1
TABLET, FILM COATED ORAL
Qty: 90 TABLET | Refills: 3 | Status: SHIPPED | OUTPATIENT
Start: 2022-10-03

## 2022-10-03 RX ORDER — AMLODIPINE BESYLATE 5 MG/1
TABLET ORAL
Qty: 90 TABLET | Refills: 3 | Status: SHIPPED | OUTPATIENT
Start: 2022-10-03

## 2022-10-06 ENCOUNTER — CARE COORDINATION (OUTPATIENT)
Dept: CARE COORDINATION | Facility: CLINIC | Age: 67
End: 2022-10-06

## 2022-10-06 NOTE — CARE COORDINATION
Incoming call from pt. Wife  on 10/1. Not sleeping. Wants VANDANA FORD assistance with scheduling PCP appt. Needs something to help him sleep. VANDANA FORD contacted [facility]. Appt scheduled with SANDRA Rao tomorrow at 10:20 a.m.  VANDANA FORD will follow up with pt early next week and inquire about his further interest in VANDANA FORD services.

## 2022-10-07 ENCOUNTER — OFFICE VISIT (OUTPATIENT)
Dept: INTERNAL MEDICINE CLINIC | Facility: CLINIC | Age: 67
End: 2022-10-07
Payer: MEDICARE

## 2022-10-07 ENCOUNTER — CARE COORDINATION (OUTPATIENT)
Dept: CARE COORDINATION | Facility: CLINIC | Age: 67
End: 2022-10-07

## 2022-10-07 VITALS
OXYGEN SATURATION: 98 % | WEIGHT: 106.8 LBS | BODY MASS INDEX: 17.79 KG/M2 | TEMPERATURE: 98.8 F | SYSTOLIC BLOOD PRESSURE: 137 MMHG | HEIGHT: 65 IN | HEART RATE: 93 BPM | DIASTOLIC BLOOD PRESSURE: 77 MMHG

## 2022-10-07 DIAGNOSIS — G47.00 INSOMNIA, UNSPECIFIED TYPE: ICD-10-CM

## 2022-10-07 DIAGNOSIS — F33.2 MAJOR DEPRESSIVE DISORDER, RECURRENT SEVERE WITHOUT PSYCHOTIC FEATURES (HCC): ICD-10-CM

## 2022-10-07 DIAGNOSIS — F41.8 DEPRESSION WITH ANXIETY: Primary | ICD-10-CM

## 2022-10-07 DIAGNOSIS — F10.20 ALCOHOL DEPENDENCE, UNCOMPLICATED (HCC): ICD-10-CM

## 2022-10-07 PROCEDURE — 99214 OFFICE O/P EST MOD 30 MIN: CPT | Performed by: NURSE PRACTITIONER

## 2022-10-07 PROCEDURE — 1123F ACP DISCUSS/DSCN MKR DOCD: CPT | Performed by: NURSE PRACTITIONER

## 2022-10-07 PROCEDURE — G8427 DOCREV CUR MEDS BY ELIG CLIN: HCPCS | Performed by: NURSE PRACTITIONER

## 2022-10-07 PROCEDURE — G8419 CALC BMI OUT NRM PARAM NOF/U: HCPCS | Performed by: NURSE PRACTITIONER

## 2022-10-07 PROCEDURE — 3017F COLORECTAL CA SCREEN DOC REV: CPT | Performed by: NURSE PRACTITIONER

## 2022-10-07 PROCEDURE — G8484 FLU IMMUNIZE NO ADMIN: HCPCS | Performed by: NURSE PRACTITIONER

## 2022-10-07 PROCEDURE — 4004F PT TOBACCO SCREEN RCVD TLK: CPT | Performed by: NURSE PRACTITIONER

## 2022-10-07 RX ORDER — MIRTAZAPINE 15 MG/1
15 TABLET, FILM COATED ORAL NIGHTLY
Qty: 30 TABLET | Refills: 0 | Status: SHIPPED | OUTPATIENT
Start: 2022-10-07

## 2022-10-07 ASSESSMENT — PATIENT HEALTH QUESTIONNAIRE - PHQ9
9. THOUGHTS THAT YOU WOULD BE BETTER OFF DEAD, OR OF HURTING YOURSELF: 0
10. IF YOU CHECKED OFF ANY PROBLEMS, HOW DIFFICULT HAVE THESE PROBLEMS MADE IT FOR YOU TO DO YOUR WORK, TAKE CARE OF THINGS AT HOME, OR GET ALONG WITH OTHER PEOPLE: 2
1. LITTLE INTEREST OR PLEASURE IN DOING THINGS: 3
7. TROUBLE CONCENTRATING ON THINGS, SUCH AS READING THE NEWSPAPER OR WATCHING TELEVISION: 2
2. FEELING DOWN, DEPRESSED OR HOPELESS: 3
SUM OF ALL RESPONSES TO PHQ QUESTIONS 1-9: 15
SUM OF ALL RESPONSES TO PHQ QUESTIONS 1-9: 15
SUM OF ALL RESPONSES TO PHQ9 QUESTIONS 1 & 2: 6
5. POOR APPETITE OR OVEREATING: 2
8. MOVING OR SPEAKING SO SLOWLY THAT OTHER PEOPLE COULD HAVE NOTICED. OR THE OPPOSITE, BEING SO FIGETY OR RESTLESS THAT YOU HAVE BEEN MOVING AROUND A LOT MORE THAN USUAL: 0
3. TROUBLE FALLING OR STAYING ASLEEP: 3
SUM OF ALL RESPONSES TO PHQ QUESTIONS 1-9: 15
4. FEELING TIRED OR HAVING LITTLE ENERGY: 2
SUM OF ALL RESPONSES TO PHQ QUESTIONS 1-9: 15
6. FEELING BAD ABOUT YOURSELF - OR THAT YOU ARE A FAILURE OR HAVE LET YOURSELF OR YOUR FAMILY DOWN: 0

## 2022-10-07 ASSESSMENT — ENCOUNTER SYMPTOMS
SHORTNESS OF BREATH: 0
COUGH: 0

## 2022-10-07 NOTE — PROGRESS NOTES
[facility]  Office Visit Note    Subjective:  Chief Complaint   Patient presents with    Depression       Patient ID: America Hartman is a 79 y.o. male presenting to the office for the above. 49-year-old male to discuss depression. Depression/anxiety--  Previous HPI:   Lives with his 76yo girlfriend. He is doing a lot to care for her, and this causes increased stress and anxiety. There is no other family to help with her care. He has one son, who is mentally challenged. He reports poor appetite due to his nerves. He reports having had a mental breakdown years ago, with suicide attempt. Became an alcoholic; reports \"I don't drink much anymore\" (unable to give an actual amount that he drinks, but states it has been several days). States he was told at some time in the past that he needs to go to a psychiatric hospital for evaluation, but he adamantly states he will never do this, as he cannot leave his girlfriend alone. Denies current thoughts of hurting himself or others, though he has had suicidal thoughts in the past (denies a plan for suicide). Referral was placed to psychiatry; states he was unable to go for an appointment because he is scared to leave his girlfriend home alone, scared she will start a fire by falling asleep with a cigarette. States that she is sleeping right now while he is at this appointment. States that he needs assistance coping with the stress of caring for her. 6/9/22:   Was referred to social work for assistance. Has been given multiple resources, but has not followed up on all these himself; advised him of the need to do what he can to help himself. States he had a virtual appointment with Dr. Sadie Romero at [facility]. Was prescribed Remeron; states it was not helping and he is no longer taking it. Advised to call them for follow up and to schedule counseling appointment.    Advised to go immediately to the ER if he develops thoughts of hurting himself. Advise to stop drinking; states he is drinking less, but still a large amount in binges. 10/7/22:   His girlfriend recently passed away on 10/1; he was her primary caregiver. Grieving, and having difficulty sleeping since her passing. He is established with Dr. Louis Mulligan at [address] Psychiatry, but was inconsistent with follow ups. He was previously prescribed Remeron, but quit taking it. He is concerned about losing his home, as it was in [facility] name, and they were not legally  (states they were together for 25 years). He states he is dealing with a lot of stress and legal situations. States Buddy Dean dealt with all that, and he is uncertain how to proceed with things (states he is concerned about several hundred thousand dollars that was supposed to be left to him but he doesn't know how to obtain it), and he cannot get people to talk to him about the accounts because they were not legally . Social work has been trying to work with him for various means of assistance over past few years; he has been inconsistent with follow up; they plan to reach out to him again next week. Encouraged him to reach out to them for assistance as well. States \"I can't get my nerves calm\" and is having trouble sleeping, and requests medication today. --Trial of restarting Remeron. Discussed risks/benefits, alternatives, potential side effects, proper administration of medication. --Advised to follow up with psychiatry. Provided him with a list of resources in the area that he can reach out to for mental health assistance, including 988 suicide and crisis hotline. Encouraged him to reach out for assistance. He denies any suicidal ideation. Was a patient of Dr. Marquis Gomez. He was a commercial printer. Has one son, who is mentally challenged. Lives with and cares for his 74yo girlfriend. His father recently passed away, and he is having difficulty with this.      Hypertension / CAD--  Follows with [facility], Dr. Gaby Burnham. Treated with amlodipine 5mg daily, metoprolol 25mg BID. On Lipitor 40mg daily and 81mg aspirin. Tolerating well without report of side effects. CABG x3 in 2018. Echo March 2019, normal.   He checks his blood pressure at home, but is not able to report any actual numbers. Denies chest pain, palpitations, shortness of breath, peripheral edema. --Advise low sodium diet, regular exercise. Notify office if home BP consistently >130/90. Bilateral carotid artery stenosis--  Follows with [facility]. Left-sided carotid endarterectomy. On Lipitor and aspirin. Carotid ultrasound December 2020, TALYA ~50%. Hyperlipidemia--  Treated with Lipitor 40mg daily. Tolerating well without report of side effects, including myalgias. Last lipid panel July 2021, with LDL 83, LFTs wnl.  --Encouraged to follow a healthy low-fat diet, avoiding saturated/trans fats/fried and fatty foods, get regular exercise, and stop smoking. History of bladder cancer / kidney stones--  2014. Follows with urology, Devyn Barreto NP, and [facility] oncology Dr. Isrrael Estrada. Had cystectomy with ileal conduit to RLQ osteomy. COPD--  Follows with [facility]. Was prescribed home oxygen at night, but states he rarely uses it. Saw them in November 2021, started Trelegy. Previously stated he is unable to afford his inhalers; he states he shares his girlfriend's inhalers (does not know their names). --Improved on Trelegy. Reflux--  Weight has been stable, but he continues to have intermittent throat pain, and states he is \"unable to gain weight. \"  ENT did laryngoscopy, diagnosed with LPR, started on omeprazole. Barium swallow was normal.   --States today that he is not taking his omeprazole. Is having worsened reflux symptoms. Advised to restart the omeprazole.   States his appetite is not good, he believes due to a lot of stress caring for his girlfriend. Advise trial of nutritional shakes. --He was referred to GI and told he needs EGD, but has not yet gone. Advised of importance of following up with them. Tobacco user--  Current every-day smoker, 1 packs per day. 50 pack-year history. Also endorses daily marijuana use; denies other illicit drug use. Discussed cardiovascular, cancer, and other risks of tobacco use, including earlier death; patient expresses understanding. Is not ready to quit. --Encouraged cessation efforts. Can call [employer]. Health Maintenance:  *Medicare Wellness:  1/18/2021   *Colorectal cancer screening: colonoscopy 6/24/2019, 3-year follow up   *Lung cancer screening: CT chest August 2020   *Prostate cancer screening: PSA July 2019, normal   *TDAP: declines   *Pneumovax: declines   *Prevnar: declines   *Shingrix: declines   *Flu: declines   *Covid19: completed 3/17/21   We discussed the risks and benefits of these immunizations. Patient expresses understanding, and declines. History: Allergies   Allergen Reactions    Codeine Hives     Has tolerated morphine previously.     Penicillins Itching and Rash     Years ago       Past Medical History:   Diagnosis Date    Adverse effect of anesthesia     hard to awaken    CAD (coronary artery disease) 09/18/2018    Cancer ([facility] [address]     bladder    Carotid stenosis, bilateral     S/P left endarterectomy,   TALYA 30% per CTA    Chronic neck pain     Chronic obstructive pulmonary disease (HCC)     no inhalers    Depression with anxiety     GERD (gastroesophageal reflux disease)     no meds    History of bladder cancer 07/2014    Cystectomy with ileo conduit urinary diversion ----and chemo    History of left-sided carotid endarterectomy     History of neck surgery     Hypertension     Kidney stones     Osteoarthritis     PAD (peripheral artery disease) (HCC)     S/P CABG x 3 09/2018    Staghorn renal calculus     Left sided    Status post ileal conduit ([address] 2014 Tobacco use disorder, continuous     0.5 ppd since age 15       Past Surgical History:   Procedure Laterality Date    CABG, ARTERY-VEIN, THREE  09/18/2018    CARDIAC CATHETERIZATION  08/2018    CAROTID ENDARTERECTOMY Left 12/06/2018    CERVICAL FUSION  2014    Dr. Gerard Mis GRAFT  09/18/2018    triple bypass    GI  06/24/2019    upper/lower GI    HEMORRHOID SURGERY      IR NEPHROURETERAL CATHETER PLACEMENT NEW ACCESS  5/15/2018    IR NEPHROURETERAL CATHETER PLACEMENT NEW ACCESS 5/15/2018 [facility]    KNEE ARTHROSCOPY Left          UROLOGICAL SURGERY  2015    TURBT- bladder removal- urostomy    UROLOGICAL SURGERY Left     Nephrostomy    UROLOGICAL SURGERY  05/2020    kidney stone    UROLOGICAL SURGERY      benign tumor removed from L testicle      VASCULAR SURGERY Right 2014    Port, removed 2016       Family History   Problem Relation Age of Onset    Cancer Father         bladder CA    No Known Problems Sister     Cancer Sister         hx of breast CA    Cancer Mother     Lung Disease Mother        Social History     Tobacco Use   Smoking Status Every Day    Packs/day: 1.00    Years: 15.00    Pack years: 15.00    Types: Cigarettes   Smokeless Tobacco Never       Social History     Substance and Sexual Activity   Alcohol Use Not Currently       Current Outpatient Medications   Medication Sig Dispense Refill    mirtazapine (REMERON) 15 MG tablet Take 1 tablet by mouth nightly 30 tablet 0    atorvastatin (LIPITOR) 40 MG tablet TAKE 1 TABLET BY MOUTH EVERY DAY AT NIGHT 90 tablet 3    amLODIPine (NORVASC) 5 MG tablet TAKE 1 TABLET BY MOUTH EVERY DAY 90 tablet 3    Ostomy Supplies MISC 1 Units by Other route as needed      omeprazole (PRILOSEC) 20 MG delayed release capsule Take 1 capsule by mouth in the morning and at bedtime 60 capsule 5    albuterol sulfate  (90 Base) MCG/ACT inhaler Inhale 1 puff into the lungs every 4 hours as needed      aspirin 81 MG chewable tablet Take 81 mg by mouth daily      Fluticasone-Umeclidin-Vilant (TRELEGY ELLIPTA) 200-62.5-25 MCG/INH AEPB Inhale 1 puff into the lungs daily      metoprolol tartrate (LOPRESSOR) 50 MG tablet Take 25 mg by mouth every 12 hours       No current facility-administered medications for this visit. Review of Systems:  Review of Systems   Constitutional:  Negative for fever. Respiratory:  Negative for cough and shortness of breath. Cardiovascular:  Negative for chest pain. Psychiatric/Behavioral:  Positive for dysphoric mood and sleep disturbance. Negative for self-injury and suicidal ideas. The patient is nervous/anxious. See HPI for other pertinent positives and negatives. Objective:  Vitals:    10/07/22 0959   BP: 137/77   Site: Right Upper Arm   Position: Sitting   Cuff Size: Small Adult   Pulse: 93   Temp: 98.8 °F (37.1 °C)   TempSrc: Temporal   SpO2: 98%   Weight: 106 lb 12.8 oz (48.4 kg)   Height: 5' 5\" (1.651 m)       Body mass index is 17.77 kg/m². Physical Exam  Vitals and nursing note reviewed. Cardiovascular:      Rate and Rhythm: Normal rate. Pulmonary:      Effort: Pulmonary effort is normal. No respiratory distress. Neurological:      Mental Status: He is alert and oriented to person, place, and time. Psychiatric:         Mood and Affect: Mood is depressed. Affect is tearful. Speech: Speech normal. Speech is not rapid and pressured or tangential.         Behavior: Behavior is not agitated.                 Lab Results   Component Value Date    WBC 5.8 08/02/2022    HGB 14.4 08/02/2022    HCT 43.0 08/02/2022    .4 (H) 08/02/2022     08/02/2022   ,   Lab Results   Component Value Date/Time     08/02/2022 10:24 AM    K 4.1 08/02/2022 10:24 AM     08/02/2022 10:24 AM    CO2 28 08/02/2022 10:24 AM    BUN 6 08/02/2022 10:24 AM    CREATININE 1.00 08/02/2022 10:24 AM    GLUCOSE 109 08/02/2022 10:24 AM    CALCIUM 9.1 08/02/2022 10:24 AM    ,   Lab Results Component Value Date    TSH 0.648 07/22/2021   ,   Lab Results   Component Value Date    ALT 42 08/02/2022    AST 29 08/02/2022    ALKPHOS 73 08/02/2022    BILITOT 0.4 08/02/2022   ,   Hemoglobin A1C   Date Value Ref Range Status   07/22/2021 5.6 4.8 - 5.6 % Final     Comment:              Prediabetes: 5.7 - 6.4           Diabetes: >6.4           Glycemic control for adults with diabetes: <7.0     ,     PHQ-9  10/7/2022   Little interest or pleasure in doing things 3   Little interest or pleasure in doing things -   Feeling down, depressed, or hopeless 3   Trouble falling or staying asleep, or sleeping too much 3   Feeling tired or having little energy 2   Poor appetite or overeating 2   Feeling bad about yourself - or that you are a failure or have let yourself or your family down 0   Trouble concentrating on things, such as reading the newspaper or watching television 2   Moving or speaking so slowly that other people could have noticed. Or the opposite - being so fidgety or restless that you have been moving around a lot more than usual 0   Thoughts that you would be better off dead, or of hurting yourself in some way 0   PHQ-2 Score 6   Total Score PHQ 2 -   PHQ-9 Total Score 15   If you checked off any problems, how difficult have these problems made it for you to do your work, take care of things at home, or get along with other people? 2        Assessment/Plan:      Health Maintenance Due   Topic Date Due    Pneumococcal 65+ years Vaccine (1 - PCV) Never done    DTaP/Tdap/Td vaccine (1 - Tdap) Never done    Shingles vaccine (1 of 2) Never done    COVID-19 Vaccine (3 - Booster for Derrel Klippel series) 08/17/2021    Annual Wellness Visit (AWV)  05/23/2022    Colorectal Cancer Screen  06/24/2022    Flu vaccine (1) Never done          [employer] was seen today for depression. Diagnoses and all orders for this visit:    Depression with anxiety  -     mirtazapine (REMERON) 15 MG tablet;  Take 1 tablet by mouth nightly    Insomnia, unspecified type  -     mirtazapine (REMERON) 15 MG tablet; Take 1 tablet by mouth nightly    Major depressive disorder, recurrent severe without psychotic features ([address]    Alcohol dependence, uncomplicated ([address]      Patient states he is otherwise doing well; has no further questions or concerns at this visit. Encouraged to contact office with any concerns prior to next visit. Advise patient to notify office if they do not receive results of any labs or tests ordered within 2-3 days of the lab/test.     Return in about 4 weeks (around 11/4/2022), or if symptoms worsen or fail to improve, for Follow up.     Rolanda Cavanaugh, APRN - CNP

## 2022-10-07 NOTE — CARE COORDINATION
Inc call from pt. Confused about process of dealing with his  sig other's estate. Will need to wait for death certifs before he can probate will. Apparently, he is executor. Directed him to call Navin Moreno  for ?s about property tax bill he rec'd in mail. States he knows minimal info about sig other's accounts/ savings/investments. Encouraged him to begin looking around at home to see what he can find with regard to statements, important papers, etc.  Also encouraged him to enlist the aid of pt's niece or a friend in this effort. Needs to obtain death certif for pt's son as son is listed as benef on one of the life insurance policies. Pt listed as contingent. Only legal next of kin can request. He will need to disc this with pt's niece who , per pt, is legal next of kin. Outreach next week.

## 2022-10-12 ENCOUNTER — CARE COORDINATION (OUTPATIENT)
Dept: CARE COORDINATION | Facility: CLINIC | Age: 67
End: 2022-10-12

## 2022-10-12 NOTE — CARE COORDINATION
VANDANA CM outreach with pt. Rec'd death certificates in the mail. A friend of his will go with him to shoutr to submit the will and the bank. Next outreach will discuss past issues addressed during prev encounters:    EGD scheduling  Pt assistance for inhalers - states he's using inhalers his  sig other had  Interest in going back to [facility] to see psych and therapist  Applying for Med D drug plan.  Open enrollment begins 10/15

## 2022-10-18 ENCOUNTER — CARE COORDINATION (OUTPATIENT)
Dept: CARE COORDINATION | Facility: CLINIC | Age: 67
End: 2022-10-18

## 2022-10-18 NOTE — CARE COORDINATION
VANDANA CM outreach. Went to [facility]. Struggling probating will. \"Lots of forms to fill out. \"  \"I don't know what to do. \"  Has called pt's niece who is to come over to his house Friday and assist.  Also has a friend in town who has offered to help. Provided support and encouragement. Pending issues once pt is able to discuss    EGD scheduling  Pt assistance for inhalers - states he's using inhalers his  sig other had  Interest in going back to [facility] to see psych and therapist  Applying for Med D drug plan.  Open enrollment begins 10/15    Outreach 2 weeks

## 2022-11-01 ENCOUNTER — CARE COORDINATION (OUTPATIENT)
Dept: CARE COORDINATION | Facility: CLINIC | Age: 67
End: 2022-11-01

## 2022-11-01 NOTE — CARE COORDINATION
VANDANA CM outreach. Niece of  sig other has been assisting pt with will execution, financial affairs. Pt states feeling less stressed and more stable. Pt has appt with SANDRA Hicks Ma on . Will discuss with NP making another referral for EGD. Disc applying for Part D plan given open enrollment period;  penalties that will occur since pt didn't apply for benefit at time of eligibility. Pt declines. Will continue to pay for meds out of pocket and get samples. Has the option of applying for pt assistance with inhalers if he is unable to afford.           Pending: assess interest in going back to [facility] to see psych and therapist       Outreach 3 weeks    Cc: SANDRA Hicks Ma

## 2022-11-03 ENCOUNTER — TELEPHONE (OUTPATIENT)
Dept: CARDIOLOGY CLINIC | Age: 67
End: 2022-11-03

## 2022-11-03 NOTE — TELEPHONE ENCOUNTER
----- Message from Teto Olvera DO sent at 11/2/2022  4:23 PM EDT -----  Please call the patient. CAROTID US was ok, nothing major or concerning, NO BAD blockages SEEN on the study. Keep regular appointment time, no med changes. Will review more at follow up and get plan for the future.     Thanks,   Danae Hutchison

## 2022-11-09 ENCOUNTER — OFFICE VISIT (OUTPATIENT)
Dept: CARDIOLOGY CLINIC | Age: 67
End: 2022-11-09
Payer: MEDICARE

## 2022-11-09 VITALS
SYSTOLIC BLOOD PRESSURE: 122 MMHG | DIASTOLIC BLOOD PRESSURE: 72 MMHG | WEIGHT: 113 LBS | HEIGHT: 65 IN | BODY MASS INDEX: 18.83 KG/M2 | HEART RATE: 76 BPM

## 2022-11-09 DIAGNOSIS — Z72.0 TOBACCO USE: ICD-10-CM

## 2022-11-09 DIAGNOSIS — I25.10 ATHEROSCLEROSIS OF NATIVE CORONARY ARTERY OF NATIVE HEART WITHOUT ANGINA PECTORIS: ICD-10-CM

## 2022-11-09 DIAGNOSIS — I65.23 BILATERAL CAROTID ARTERY STENOSIS: ICD-10-CM

## 2022-11-09 DIAGNOSIS — I10 PRIMARY HYPERTENSION: ICD-10-CM

## 2022-11-09 DIAGNOSIS — Z95.1 S/P CABG X 3: ICD-10-CM

## 2022-11-09 DIAGNOSIS — I10 ESSENTIAL (PRIMARY) HYPERTENSION: ICD-10-CM

## 2022-11-09 DIAGNOSIS — I25.10 CORONARY ARTERY DISEASE INVOLVING NATIVE CORONARY ARTERY OF NATIVE HEART WITHOUT ANGINA PECTORIS: Primary | ICD-10-CM

## 2022-11-09 PROCEDURE — G8420 CALC BMI NORM PARAMETERS: HCPCS | Performed by: INTERNAL MEDICINE

## 2022-11-09 PROCEDURE — 3078F DIAST BP <80 MM HG: CPT | Performed by: INTERNAL MEDICINE

## 2022-11-09 PROCEDURE — G8484 FLU IMMUNIZE NO ADMIN: HCPCS | Performed by: INTERNAL MEDICINE

## 2022-11-09 PROCEDURE — 1123F ACP DISCUSS/DSCN MKR DOCD: CPT | Performed by: INTERNAL MEDICINE

## 2022-11-09 PROCEDURE — G8428 CUR MEDS NOT DOCUMENT: HCPCS | Performed by: INTERNAL MEDICINE

## 2022-11-09 PROCEDURE — 3017F COLORECTAL CA SCREEN DOC REV: CPT | Performed by: INTERNAL MEDICINE

## 2022-11-09 PROCEDURE — 99214 OFFICE O/P EST MOD 30 MIN: CPT | Performed by: INTERNAL MEDICINE

## 2022-11-09 PROCEDURE — 4004F PT TOBACCO SCREEN RCVD TLK: CPT | Performed by: INTERNAL MEDICINE

## 2022-11-09 PROCEDURE — 3074F SYST BP LT 130 MM HG: CPT | Performed by: INTERNAL MEDICINE

## 2022-11-09 RX ORDER — AMLODIPINE BESYLATE 5 MG/1
TABLET ORAL
Qty: 90 TABLET | Refills: 3 | Status: SHIPPED | OUTPATIENT
Start: 2022-11-09

## 2022-11-09 RX ORDER — METOPROLOL TARTRATE 50 MG/1
25 TABLET, FILM COATED ORAL EVERY 12 HOURS
Qty: 180 TABLET | Refills: 3 | Status: SHIPPED | OUTPATIENT
Start: 2022-11-09

## 2022-11-09 RX ORDER — ATORVASTATIN CALCIUM 40 MG/1
TABLET, FILM COATED ORAL
Qty: 90 TABLET | Refills: 3 | Status: SHIPPED | OUTPATIENT
Start: 2022-11-09

## 2022-11-09 NOTE — PROGRESS NOTES
[address], [address] [employer] [facility], [address] [place] [address]  PHONE: [phone]     22    NAME:  Hao Bolanos  : 1955  MRN: [id number]       SUBJECTIVE:   Hao Bolanos is a 79 y.o. male seen for a follow up visit regarding the following:     Chief Complaint   Patient presents with    Coronary Artery Disease     6 month f/u, Carotid         HPI:   Here after 3v CABG 18. EF normal.  (left AMA before the CABG)   Left CEA 2018----followed by vascular surgery   Echo 3/2019: normal EF, no valve dz. Carotid US 2020 and 2022: TALYA ~50%       now. Struggling now. He is drinking more alcohol and smoking. Smoking less now though. No pressure in chest.   No CP. No new angina. He has h/o bladder CA, has supra-pubic catheter. Patient denies recent history of orthopnea, PND, excessive dizziness and/or syncope. Past Medical History, Past Surgical History, Family history, Social History, and Medications were all reviewed with the patient today and updated as necessary. Current Outpatient Medications   Medication Sig Dispense Refill    amLODIPine (NORVASC) 5 MG tablet TAKE 1 TABLET BY MOUTH EVERY DAY 90 tablet 3    atorvastatin (LIPITOR) 40 MG tablet TAKE 1 TABLET BY MOUTH EVERY DAY AT NIGHT 90 tablet 3    metoprolol tartrate (LOPRESSOR) 50 MG tablet Take 0.5 tablets by mouth in the morning and 0.5 tablets in the evening. 180 tablet 3    Ostomy Supplies MISC 1 Units by Other route as needed      albuterol sulfate  (90 Base) MCG/ACT inhaler Inhale 1 puff into the lungs every 4 hours as needed      aspirin 81 MG chewable tablet Take 81 mg by mouth daily      Fluticasone-Umeclidin-Vilant (TRELEGY ELLIPTA) 200-62.5-25 MCG/INH AEPB Inhale 1 puff into the lungs daily       No current facility-administered medications for this visit. Allergies   Allergen Reactions    Codeine Hives     Has tolerated morphine previously.     Penicillins Itching and Rash Years ago     Patient Active Problem List    Diagnosis Date Noted    Al[facility] dependence, uncomplicated [facility] 82/63/2121     Priority: Medium    Renal hematoma 08/24/2020    Severe episode of recurrent major depressive disorder, without psychotic features ([facility] 07/05/2019    History of left-sided carotid endarterectomy     Tobacco use 10/31/2018    Bilateral carotid artery stenosis 10/01/2018    CAD (coronary artery disease)     Status post ileal conduit (HCC)     History of neck surgery     S/P CABG x 3 09/18/2018    Chronic obstructive pulmonary disease ([facility] 03/03/2016 9/2018        Depression with anxiety 03/03/2016    Urinary bladder cancer ([facility] 07/01/2014     Left periureteral        HTN (hypertension) 04/19/2014    Cervical stenosis of spinal canal 04/18/2014      Past Surgical History:   Procedure Laterality Date    CABG, ARTERY-VEIN, THREE  09/18/2018    CARDIAC CATHETERIZATION  08/2018    CAROTID ENDARTERECTOMY Left 12/06/2018    CERVICAL FUSION  2014    Dr. Gerard Mis GRAFT  09/18/2018    triple bypass    GI  06/24/2019    upper/lower GI    HEMORRHOID SURGERY      IR NEPHROURETERAL CATHETER PLACEMENT NEW ACCESS  5/15/2018    IR NEPHROURETERAL CATHETER PLACEMENT NEW ACCESS 5/15/2018 [facility] SPECIALS    KNEE ARTHROSCOPY Left          UROLOGICAL SURGERY  2015    TURBT- bladder removal- urostomy    UROLOGICAL SURGERY Left     Nephrostomy    UROLOGICAL SURGERY  05/2020    kidney stone    UROLOGICAL SURGERY      benign tumor removed from L testicle      VASCULAR SURGERY Right 2014    Port, removed 2016     Family History   Problem Relation Age of Onset    Cancer Father         bladder CA    No Known Problems Sister     Cancer Sister         hx of breast CA    Cancer Mother     Lung Disease Mother      Social History     Tobacco Use    Smoking status: Every Day     Packs/day: 1.00     Years: 15.00     Pack years: 15.00     Types: Cigarettes    Smokeless tobacco: Never   Substance Use Topics    Alcohol use: Not Currently         ROS:    No obvious pertinent positives on review of systems except for what was outlined in the HPI today.       PHYSICAL EXAM:     /72   Pulse 76   Ht 5' 5\" (1.651 m)   Wt 113 lb (51.3 kg)   BMI 18.80 kg/m²    General/Constitutional:   Alert and oriented x 3, no acute distress  HEENT:   normocephalic, atraumatic, moist mucous membranes  Neck:   No JVD or carotid bruits bilaterally  Cardiovascular:   regular rate and rhythm, no murmur/rub/gallop appreciated  Pulmonary:   clear to auscultation bilaterally, no respiratory distress  Abdomen:   soft, non-tender, non-distended  Ext:   No sig LE edema bilaterally  Skin:  warm and dry, no obvious rashes seen  Neuro:   no obvious sensory or motor deficits  Psychiatric:   normal mood and affect      Lab Results   Component Value Date/Time     08/02/2022 10:24 AM    K 4.1 08/02/2022 10:24 AM     08/02/2022 10:24 AM    CO2 28 08/02/2022 10:24 AM    BUN 6 08/02/2022 10:24 AM    CREATININE 1.00 08/02/2022 10:24 AM    GLUCOSE 109 08/02/2022 10:24 AM    CALCIUM 9.1 08/02/2022 10:24 AM        Lab Results   Component Value Date    WBC 5.8 08/02/2022    HGB 14.4 08/02/2022    HCT 43.0 08/02/2022    .4 (H) 08/02/2022     08/02/2022       Lab Results   Component Value Date    TSH 0.648 07/22/2021       Lab Results   Component Value Date    LABA1C 5.6 07/22/2021     Lab Results   Component Value Date     07/22/2021       Lab Results   Component Value Date    CHOL 114 06/09/2022    CHOL 142 07/22/2021    CHOL 149 05/28/2020     Lab Results   Component Value Date    TRIG 110 06/09/2022    TRIG 119 07/22/2021    TRIG 86 05/28/2020     Lab Results   Component Value Date    HDL 49 06/09/2022    HDL 37 (L) 07/22/2021    HDL 61 05/28/2020     Lab Results   Component Value Date    LDLCALC 43 06/09/2022    LDLCALC 83 07/22/2021    LDLCALC 71 05/28/2020     Lab Results   Component Value Date    LABVLDL 22 06/09/2022    LABVLDL 17 05/28/2020    LABVLDL 28 07/30/2019    VLDL 22 07/22/2021     Lab Results   Component Value Date    CHOLHDLRATIO 2.3 06/09/2022           I have Independently reviewed prior care notes, any ER records available, cardiac testing, labs and results with the patient and before seeing the patient today. Also independently reviewed outside records when available. ASSESSMENT:    Deepali Rubio was seen today for coronary artery disease. Diagnoses and all orders for this visit:    Coronary artery disease involving native coronary artery of native heart without angina pectoris    Essential (primary) hypertension  -     amLODIPine (NORVASC) 5 MG tablet; TAKE 1 TABLET BY MOUTH EVERY DAY    Atherosclerotic heart disease of native coronary artery without angina pectoris  -     atorvastatin (LIPITOR) 40 MG tablet; TAKE 1 TABLET BY MOUTH EVERY DAY AT NIGHT    Bilateral carotid artery stenosis    Primary hypertension    S/P CABG x 3    Tobacco use    Other orders  -     metoprolol tartrate (LOPRESSOR) 50 MG tablet; Take 0.5 tablets by mouth in the morning and 0.5 tablets in the evening. PLAN:         1. HTN: on norvasc and BB.  better now. 2. CAD:  On ASA, and statin. Follow [facility], [facility] for more angina. Call PRN. The patient has been instructed to call with any angina or equivalent as reviewed today. All questions were answered with the patient voicing complete understanding. 3. Carotid Dz: On ASA and lipitor. 4. Tobacco Use: cessation needed, more stressed now. Less alcohol needed. 5. HPL: remain on lipitor, goal LDL < 70. Patient has been instructed and agrees to call our office with any issues or other concerns related to their cardiac condition(s) and/or complaint(s). Return in about 6 months (around 5/9/2023).        MIRIAN NEVAREZ DO  11/9/2022

## 2022-11-11 ENCOUNTER — OFFICE VISIT (OUTPATIENT)
Dept: INTERNAL MEDICINE CLINIC | Facility: CLINIC | Age: 67
End: 2022-11-11
Payer: MEDICARE

## 2022-11-11 VITALS
WEIGHT: 113.6 LBS | OXYGEN SATURATION: 96 % | TEMPERATURE: 98.1 F | BODY MASS INDEX: 18.93 KG/M2 | HEART RATE: 67 BPM | DIASTOLIC BLOOD PRESSURE: 73 MMHG | SYSTOLIC BLOOD PRESSURE: 126 MMHG | HEIGHT: 65 IN

## 2022-11-11 DIAGNOSIS — F10.20: ICD-10-CM

## 2022-11-11 DIAGNOSIS — F41.8 DEPRESSION WITH ANXIETY: Primary | ICD-10-CM

## 2022-11-11 PROCEDURE — G8484 FLU IMMUNIZE NO ADMIN: HCPCS | Performed by: NURSE PRACTITIONER

## 2022-11-11 PROCEDURE — 4004F PT TOBACCO SCREEN RCVD TLK: CPT | Performed by: NURSE PRACTITIONER

## 2022-11-11 PROCEDURE — 3078F DIAST BP <80 MM HG: CPT | Performed by: NURSE PRACTITIONER

## 2022-11-11 PROCEDURE — 99214 OFFICE O/P EST MOD 30 MIN: CPT | Performed by: NURSE PRACTITIONER

## 2022-11-11 PROCEDURE — G8427 DOCREV CUR MEDS BY ELIG CLIN: HCPCS | Performed by: NURSE PRACTITIONER

## 2022-11-11 PROCEDURE — G8420 CALC BMI NORM PARAMETERS: HCPCS | Performed by: NURSE PRACTITIONER

## 2022-11-11 PROCEDURE — 1123F ACP DISCUSS/DSCN MKR DOCD: CPT | Performed by: NURSE PRACTITIONER

## 2022-11-11 PROCEDURE — 3017F COLORECTAL CA SCREEN DOC REV: CPT | Performed by: NURSE PRACTITIONER

## 2022-11-11 PROCEDURE — 3074F SYST BP LT 130 MM HG: CPT | Performed by: NURSE PRACTITIONER

## 2022-11-11 RX ORDER — MIRTAZAPINE 15 MG/1
15 TABLET, FILM COATED ORAL NIGHTLY
Qty: 30 TABLET | Refills: 2 | Status: SHIPPED | OUTPATIENT
Start: 2022-11-11

## 2022-11-11 RX ORDER — MIRTAZAPINE 15 MG/1
15 TABLET, FILM COATED ORAL NIGHTLY
COMMUNITY
End: 2022-11-11 | Stop reason: SDUPTHER

## 2022-11-11 ASSESSMENT — ANXIETY QUESTIONNAIRES
3. WORRYING TOO MUCH ABOUT DIFFERENT THINGS: 2
2. NOT BEING ABLE TO STOP OR CONTROL WORRYING: 2
GAD7 TOTAL SCORE: 14
5. BEING SO RESTLESS THAT IT IS HARD TO SIT STILL: 2
7. FEELING AFRAID AS IF SOMETHING AWFUL MIGHT HAPPEN: 2
6. BECOMING EASILY ANNOYED OR IRRITABLE: 2
IF YOU CHECKED OFF ANY PROBLEMS ON THIS QUESTIONNAIRE, HOW DIFFICULT HAVE THESE PROBLEMS MADE IT FOR YOU TO DO YOUR WORK, TAKE CARE OF THINGS AT HOME, OR GET ALONG WITH OTHER PEOPLE: VERY DIFFICULT
4. TROUBLE RELAXING: 2
1. FEELING NERVOUS, ANXIOUS, OR ON EDGE: 2

## 2022-11-11 ASSESSMENT — PATIENT HEALTH QUESTIONNAIRE - PHQ9
SUM OF ALL RESPONSES TO PHQ QUESTIONS 1-9: 24
10. IF YOU CHECKED OFF ANY PROBLEMS, HOW DIFFICULT HAVE THESE PROBLEMS MADE IT FOR YOU TO DO YOUR WORK, TAKE CARE OF THINGS AT HOME, OR GET ALONG WITH OTHER PEOPLE: 2
3. TROUBLE FALLING OR STAYING ASLEEP: 3
SUM OF ALL RESPONSES TO PHQ QUESTIONS 1-9: 24
SUM OF ALL RESPONSES TO PHQ QUESTIONS 1-9: 21
1. LITTLE INTEREST OR PLEASURE IN DOING THINGS: 3
SUM OF ALL RESPONSES TO PHQ QUESTIONS 1-9: 24
6. FEELING BAD ABOUT YOURSELF - OR THAT YOU ARE A FAILURE OR HAVE LET YOURSELF OR YOUR FAMILY DOWN: 0
5. POOR APPETITE OR OVEREATING: 3
9. THOUGHTS THAT YOU WOULD BE BETTER OFF DEAD, OR OF HURTING YOURSELF: 3
7. TROUBLE CONCENTRATING ON THINGS, SUCH AS READING THE NEWSPAPER OR WATCHING TELEVISION: 3
4. FEELING TIRED OR HAVING LITTLE ENERGY: 3
2. FEELING DOWN, DEPRESSED OR HOPELESS: 3
8. MOVING OR SPEAKING SO SLOWLY THAT OTHER PEOPLE COULD HAVE NOTICED. OR THE OPPOSITE, BEING SO FIGETY OR RESTLESS THAT YOU HAVE BEEN MOVING AROUND A LOT MORE THAN USUAL: 3
SUM OF ALL RESPONSES TO PHQ9 QUESTIONS 1 & 2: 6

## 2022-11-11 ASSESSMENT — LIFESTYLE VARIABLES
HAVE YOU OR SOMEONE ELSE BEEN INJURED AS A RESULT OF YOUR DRINKING: 0
HOW OFTEN DO YOU HAVE A DRINK CONTAINING ALCOHOL: 2-4 TIMES A MONTH
HOW OFTEN DO YOU HAVE A DRINK CONTAINING ALCOHOL: 2-3 TIMES A WEEK
HOW OFTEN DURING THE LAST YEAR HAVE YOU BEEN UNABLE TO REMEMBER WHAT HAPPENED THE NIGHT BEFORE BECAUSE YOU HAD BEEN DRINKING: 0
HAS A RELATIVE, FRIEND, DOCTOR, OR ANOTHER HEALTH PROFESSIONAL EXPRESSED CONCERN ABOUT YOUR DRINKING OR SUGGESTED YOU CUT DOWN: 0
HOW OFTEN DURING THE LAST YEAR HAVE YOU HAD A FEELING OF GUILT OR REMORSE AFTER DRINKING: 0
HOW MANY STANDARD DRINKS CONTAINING ALCOHOL DO YOU HAVE ON A TYPICAL DAY: 3 OR 4
HOW OFTEN DURING THE LAST YEAR HAVE YOU FOUND THAT YOU WERE NOT ABLE TO STOP DRINKING ONCE YOU HAD STARTED: 0
HOW OFTEN DURING THE LAST YEAR HAVE YOU NEEDED AN ALCOHOLIC DRINK FIRST THING IN THE MORNING TO GET YOURSELF GOING AFTER A NIGHT OF HEAVY DRINKING: 0
HOW MANY STANDARD DRINKS CONTAINING ALCOHOL DO YOU HAVE ON A TYPICAL DAY: 3 OR 4
HOW OFTEN DURING THE LAST YEAR HAVE YOU FAILED TO DO WHAT WAS NORMALLY EXPECTED FROM YOU BECAUSE OF DRINKING: 0

## 2022-11-11 ASSESSMENT — ENCOUNTER SYMPTOMS
SHORTNESS OF BREATH: 0
COUGH: 0

## 2022-11-11 ASSESSMENT — COLUMBIA-SUICIDE SEVERITY RATING SCALE - C-SSRS
1. WITHIN THE PAST MONTH, HAVE YOU WISHED YOU WERE DEAD OR WISHED YOU COULD GO TO SLEEP AND NOT WAKE UP?: NO
2. HAVE YOU ACTUALLY HAD ANY THOUGHTS OF KILLING YOURSELF?: NO
6. HAVE YOU EVER DONE ANYTHING, STARTED TO DO ANYTHING, OR PREPARED TO DO ANYTHING TO END YOUR LIFE?: NO

## 2022-11-11 NOTE — PROGRESS NOTES
[facility]  Office Visit Note    Subjective:  Chief Complaint   Patient presents with    Depression       Patient ID: Ellis Antonio is a 79 y.o. male presenting to the office for the above. 51-year-old male to discuss depression. Depression/anxiety--  Previous HPI:   Lives with his 74yo girlfriend. He is doing a lot to care for her, and this causes increased stress and anxiety. There is no other family to help with her care. He has one son, who is mentally challenged. He reports poor appetite due to his nerves. He reports having had a mental breakdown years ago, with suicide attempt. Became an alcoholic; reports \"I don't drink much anymore\" (unable to give an actual amount that he drinks, but states it has been several days). States he was told at some time in the past that he needs to go to a psychiatric hospital for evaluation, but he adamantly states he will never do this, as he cannot leave his girlfriend alone. Denies current thoughts of hurting himself or others, though he has had suicidal thoughts in the past (denies a plan for suicide). Referral was placed to psychiatry; states he was unable to go for an appointment because he is scared to leave his girlfriend home alone, scared she will start a fire by falling asleep with a cigarette. States that she is sleeping right now while he is at this appointment. States that he needs assistance coping with the stress of caring for her. 6/9/22:   Was referred to social work for assistance. Has been given multiple resources, but has not followed up on all these himself; advised him of the need to do what he can to help himself. States he had a virtual appointment with Dr. Gregorio Shaw at [facility]. Was prescribed Remeron; states it was not helping and he is no longer taking it. Advised to call them for follow up and to schedule counseling appointment.    Advised to go immediately to the ER if he develops thoughts of hurting himself. Advise to stop drinking; states he is drinking less, but still a large amount in binges. 10/7/22:   His girlfriend recently passed away on 10/1; he was her primary caregiver. Grieving, and having difficulty sleeping since her passing. He is established with Dr. Marshall Mast at [facility], but was inconsistent with follow ups. He was previously prescribed Remeron, but quit taking it. He is concerned about losing his home, as it was in Oliverio Russell name, and they were not legally  (states they were together for 25 years). He states he is dealing with a lot of stress and legal situations. States Bassam Merida dealt with all that, and he is uncertain how to proceed with things (states he is concerned about several hundred thousand dollars that was supposed to be left to him but he doesn't know how to obtain it), and he cannot get people to talk to him about the accounts because they were not legally . Social work has been trying to work with him for various means of assistance over past few years; he has been inconsistent with follow up; they plan to reach out to him again next week. Encouraged him to reach out to them for assistance as well. States \"I can't get my nerves calm\" and is having trouble sleeping, and requests medication today. 11/11/22:   He is now followed by case management. Helping him work through probate, etc.  [place] nirupesh and a friend in town are also helping him. States he is having trouble getting death certificates. Has had several stressors, such as having to get his heat fixed, and make an unexpected house payment, getting two flat tires. He is tearful in office today, states he has no one to help him. He restarted Remeron 15mg nightly; tolerating well (states he is taking 1/2 a tablet every night, and sometimes takes the second half later during the night or early morning). His weight is up several pounds.   States he can tell a difference with the medication; he is not as tense and jittery. He was advised to follow up with psychiatry [facility]) but has not yet done so. PHQ today is 24. States he often feels he would be better off dead, but denies any thoughts of actually hurting himself or others. --Advised to follow up with psychiatry. Provided him with [facility]'s telephone number, and he states he that he will call them today to schedule a follow up appointment. --Provided him with a list of resources in the area that he can reach out to for mental health assistance, including [facility] suicide and crisis hotline. Encouraged him to reach out for assistance. --He saw [facility] on 1/27/22, was told he needs EGD. Provided him with their contact information and advised to call to schedule follow up. Was a patient of Dr. Ritchie Calvert. He was a commercial printer. Has one son, who is mentally challenged. Lives with and cares for his 74yo girlfriend. His father recently passed away, and he is having difficulty with this. Hypertension / CAD--  Follows with [facility], Dr. Tami Neil. Treated with amlodipine 5mg daily, metoprolol 25mg BID. On Lipitor 40mg daily and 81mg aspirin. Tolerating well without report of side effects. CABG x3 in 2018. Echo March 2019, normal.   He checks his blood pressure at home, but is not able to report any actual numbers. Denies chest pain, palpitations, shortness of breath, peripheral edema. --Advise low sodium diet, regular exercise. Notify office if home BP consistently >130/90. Bilateral carotid artery stenosis--  Follows with [facility]. Left-sided carotid endarterectomy. On Lipitor and aspirin. Carotid ultrasound December 2020, TALYA ~50%. Hyperlipidemia--  Treated with Lipitor 40mg daily. Tolerating well without report of side effects, including myalgias.   Last lipid panel July 2021, with LDL 83, LFTs wnl.  --Encouraged to follow a healthy low-fat diet, avoiding saturated/trans fats/fried and fatty foods, get regular exercise, and stop smoking. History of bladder cancer / kidney stones--  2014. Follows with urology, Dexter Guzman NP, and [facility] Dr. Betsey Florian. Had cystectomy with ileal conduit to RLQ osteomy. COPD--  Follows with [facility]. Was prescribed home oxygen at night, but states he rarely uses it. Saw them in November 2021, started Trelegy. Previously stated he is unable to afford his inhalers; he states he shares his girlfriend's inhalers (does not know their names). --Improved on Trelegy. Reflux--  Weight has been stable, but he continues to have intermittent throat pain, and states he is \"unable to gain weight. \"  ENT did laryngoscopy, diagnosed with LPR, started on omeprazole. Barium swallow was normal.   --States today that he is not taking his omeprazole. Is having worsened reflux symptoms. Advised to restart the omeprazole. States his appetite is not good, he believes due to a lot of stress caring for his girlfriend. Advise trial of nutritional shakes. --He was referred to GI and told he needs EGD, but has not yet gone. Advised of importance of following up with them. Tobacco user--  Current every-day smoker, 1 packs per day. 50 pack-year history. Also endorses daily marijuana use; denies other illicit drug use. Discussed cardiovascular, cancer, and other risks of tobacco use, including earlier death; patient expresses understanding. Is not ready to quit. --Encouraged cessation efforts. Can call [email or web address].       Health Maintenance:  *Medicare Wellness:  1/18/2021   *Colorectal cancer screening: colonoscopy 6/24/2019, 3-year follow up   *Lung cancer screening: CT chest August 2020   *Prostate cancer screening: PSA July 2019, normal   *TDAP: declines   *Pneumovax: declines   *Prevnar: declines   *Shingrix: declines   *Flu: declines *[id number]: completed 3/17/21   We discussed the risks and benefits of these immunizations. Patient expresses understanding, and declines. History: Allergies   Allergen Reactions    Codeine Hives     Has tolerated morphine previously.     Penicillins Itching and Rash     Years ago       Past Medical History:   Diagnosis Date    Adverse effect of anesthesia     hard to awaken    CAD (coronary artery disease) 09/18/2018    Cancer ([facility]     bladder    Carotid stenosis, bilateral     S/P left endarterectomy,   TALYA 30% per CTA    Chronic neck pain     Chronic obstructive pulmonary disease (HCC)     no inhalers    Depression with anxiety     GERD (gastroesophageal reflux disease)     no meds    History of bladder cancer 07/2014    Cystectomy with ileo conduit urinary diversion ----and chemo    History of left-sided carotid endarterectomy     History of neck surgery     Hypertension     Kidney stones     Osteoarthritis     PAD (peripheral artery disease) (HCC)     S/P CABG x 3 09/2018    Staghorn renal calculus     Left sided    Status post ileal conduit ([facility] 2014    Tobacco use disorder, continuous     0.5 ppd since age 15       Past Surgical History:   Procedure Laterality Date    CABG, ARTERY-VEIN, THREE  09/18/2018    CARDIAC CATHETERIZATION  08/2018    CAROTID ENDARTERECTOMY Left 12/06/2018    CERVICAL FUSION  2014    Dr. Meg Elenas GRAFT  09/18/2018    triple bypass    GI  06/24/2019    upper/lower GI    HEMORRHOID SURGERY      IR NEPHROURETERAL CATHETER PLACEMENT NEW ACCESS  5/15/2018    IR NEPHROURETERAL CATHETER PLACEMENT NEW ACCESS 5/15/2018 [facility] SPECI[facility]    KNEE ARTHROSCOPY Left          UROLOGICAL SURGERY  2015    TURBT- bladder removal- urostomy    UROLOGICAL SURGERY Left     Nephrostomy    UROLOGICAL SURGERY  05/2020    kidney stone    UROLOGICAL SURGERY      benign tumor removed from L testicle      VASCULAR SURGERY Right 2014    Port, removed 2016       Family History Problem Relation Age of Onset    Cancer Father         bladder CA    No Known Problems Sister     Cancer Sister         hx of breast CA    Cancer Mother     Lung Disease Mother        Social History     Tobacco Use   Smoking Status Every Day    Packs/day: 1.00    Years: 15.00    Pack years: 15.00    Types: Cigarettes   Smokeless Tobacco Never       Social History     Substance and Sexual Activity   Alcohol Use Not Currently       Current Outpatient Medications   Medication Sig Dispense Refill    mirtazapine (REMERON) 15 MG tablet Take 1 tablet by mouth nightly 30 tablet 2    amLODIPine (NORVASC) 5 MG tablet TAKE 1 TABLET BY MOUTH EVERY DAY 90 tablet 3    atorvastatin (LIPITOR) 40 MG tablet TAKE 1 TABLET BY MOUTH EVERY DAY AT NIGHT 90 tablet 3    metoprolol tartrate (LOPRESSOR) 50 MG tablet Take 0.5 tablets by mouth in the morning and 0.5 tablets in the evening. 180 tablet 3    Ostomy Supplies MISC 1 Units by Other route as needed      albuterol sulfate  (90 Base) MCG/ACT inhaler Inhale 1 puff into the lungs every 4 hours as needed      aspirin 81 MG chewable tablet Take 81 mg by mouth daily      Fluticasone-Umeclidin-Vilant (TRELEGY ELLIPTA) 200-62.5-25 MCG/INH AEPB Inhale 1 puff into the lungs daily       No current facility-administered medications for this visit. Review of Systems:  Review of Systems   Constitutional:  Negative for activity change, appetite change, fever and unexpected weight change. Respiratory:  Negative for cough and shortness of breath. Cardiovascular:  Negative for chest pain. Skin:  Negative for pallor and rash. Neurological:  Negative for dizziness, seizures, syncope, weakness and headaches. Psychiatric/Behavioral:  Positive for dysphoric mood and sleep disturbance (improved). Negative for self-injury and suicidal ideas. The patient is nervous/anxious (improved). See HPI for other pertinent positives and negatives.      Objective:  Vitals:    11/11/22 0914   BP: 126/73   Site: Right Upper Arm   Position: Sitting   Cuff Size: Large Adult   Pulse: 67   Temp: 98.1 °F (36.7 °C)   TempSrc: Temporal   SpO2: 96%   Weight: 113 lb 9.6 oz (51.5 kg)   Height: 5' 5\" (1.651 m)       Body mass index is 18.9 kg/m². Physical Exam  Vitals and nursing note reviewed. Constitutional:       General: He is not in acute distress. Appearance: Normal appearance. Comments: Strong smell of marijuana    Eyes:      General: No scleral icterus. Right eye: No discharge. Left eye: No discharge. Cardiovascular:      Rate and Rhythm: Normal rate. Pulmonary:      Effort: Pulmonary effort is normal. No respiratory distress. Neurological:      Mental Status: He is alert and oriented to person, place, and time.                 Lab Results   Component Value Date    WBC 5.8 08/02/2022    HGB 14.4 08/02/2022    HCT 43.0 08/02/2022    .4 (H) 08/02/2022     08/02/2022   ,   Lab Results   Component Value Date/Time     08/02/2022 10:24 AM    K 4.1 08/02/2022 10:24 AM     08/02/2022 10:24 AM    CO2 28 08/02/2022 10:24 AM    BUN 6 08/02/2022 10:24 AM    CREATININE 1.00 08/02/2022 10:24 AM    GLUCOSE 109 08/02/2022 10:24 AM    CALCIUM 9.1 08/02/2022 10:24 AM    ,   Lab Results   Component Value Date    TSH 0.648 07/22/2021   ,   Lab Results   Component Value Date    ALT 42 08/02/2022    AST 29 08/02/2022    ALKPHOS 73 08/02/2022    BILITOT 0.4 08/02/2022   ,   Hemoglobin A1C   Date Value Ref Range Status   07/22/2021 5.6 4.8 - 5.6 % Final     Comment:              Prediabetes: 5.7 - 6.4           Diabetes: >6.4           Glycemic control for adults with diabetes: <7.0     ,    PHQ-9  11/11/2022   Little interest or pleasure in doing things 3   Little interest or pleasure in doing things -   Feeling down, depressed, or hopeless 3   Trouble falling or staying asleep, or sleeping too much 3   Feeling tired or having little energy 3   Poor appetite or overeating 3   Feeling bad about yourself - or that you are a failure or have let yourself or your family down 0   Trouble concentrating on things, such as reading the newspaper or watching television 3   Moving or speaking so slowly that other people could have noticed. Or the opposite - being so fidgety or restless that you have been moving around a lot more than usual 3   Thoughts that you would be better off dead, or of hurting yourself in some way 3   PHQ-2 Score 6   Total Score PHQ 2 -   PHQ-9 Total Score 24   If you checked off any problems, how difficult have these problems made it for you to do your work, take care of things at home, or get along with other people? 2        Assessment/Plan:      Health Maintenance Due   Topic Date Due    Pneumococcal 65+ years Vaccine (1 - PCV) Never done    DTaP/Tdap/Td vaccine (1 - Tdap) Never done    Shingles vaccine (1 of 2) Never done    COVID-19 Vaccine (3 - Booster for Verdie Barbone series) 05/12/2021    Annual Wellness Visit (AWV)  05/23/2022    Colorectal Cancer Screen  06/24/2022    Flu vaccine (1) Never done          Elmer Moorees was seen today for depression. Diagnoses and all orders for this visit:    Depression with anxiety  -     mirtazapine (REMERON) 15 MG tablet; Take 1 tablet by mouth nightly    Continuous alcohol dependence ([address]    Encouraged to contact office with any concerns prior to next visit. Return in about 3 months (around 2/11/2023), or if symptoms worsen or fail to improve, for Follow up.     Britany Worthy, APRN - CNP

## 2022-11-22 ENCOUNTER — CARE COORDINATION (OUTPATIENT)
Dept: CARE COORDINATION | Facility: CLINIC | Age: 67
End: 2022-11-22

## 2022-12-01 ENCOUNTER — CARE COORDINATION (OUTPATIENT)
Dept: CARE COORDINATION | Facility: CLINIC | Age: 67
End: 2022-12-01

## 2022-12-01 NOTE — CARE COORDINATION
VANDANA FORD attempted follow up outreach with pt. LM for call back on VM. ADDENDUM:    Inc call from pt. Still working on settling sig other's estate. Making progress. Feeling overwhelmed, stressed. Provided active listening , support, encouragement. Assessed pt's interest in scheduling follow up appt with [facility] for med assessment to help him better cope with his situation; also suggested consideration of therapy. Pt agreeable to VANDANA FORD assist in making an appt. Cc: SANDRA Greco

## 2022-12-05 ENCOUNTER — CARE COORDINATION (OUTPATIENT)
Dept: CARE COORDINATION | Facility: CLINIC | Age: 67
End: 2022-12-05

## 2022-12-05 NOTE — CARE COORDINATION
Outreach with [facility]. Scheduled appt for pt to see Dr. Owen Underwood on Dec 8 at 2:30 p.m. Outreach with pt to inform. Msg left for call back.

## 2022-12-06 ENCOUNTER — CARE COORDINATION (OUTPATIENT)
Dept: CARE COORDINATION | Facility: CLINIC | Age: 67
End: 2022-12-06

## 2022-12-07 ENCOUNTER — CARE COORDINATION (OUTPATIENT)
Dept: CARE COORDINATION | Facility: CLINIC | Age: 67
End: 2022-12-07

## 2022-12-07 NOTE — CARE COORDINATION
VANDANA CM outreach. Informed pt of appt tomorrow at 2:30 p.m. with Dr. Latisha Fenton at [facility]. Pt plans to attend and also ask about counseling. Outreach next week. Cc: NP E. Marylee Grit

## 2022-12-15 ENCOUNTER — CARE COORDINATION (OUTPATIENT)
Dept: CARE COORDINATION | Facility: CLINIC | Age: 67
End: 2022-12-15

## 2022-12-21 ENCOUNTER — CARE COORDINATION (OUTPATIENT)
Dept: CARE COORDINATION | Facility: CLINIC | Age: 67
End: 2022-12-21

## 2022-12-21 NOTE — CARE COORDINATION
VANDANA FORD outreach with pt. Went to appt with Dr. April Arana, [address], recently  States appt was \"a waste of time. \"  Unclear about what happened during visit. Attempts made by VANDANA FORD to clarify were unsuccessful. Apparently, no meds were prescribed. He won't be making another appt with Dr. April Arana. Continues to make his way through finalizing his  sig other's will. VANDANA FORD suggested a grief support group. Pt appeared agreeable. New group starting at [facility] near his home in 2023. Provided contact # for . Outreach 3 weeks. Move toward case closure.     Cc: SANDRA Dee

## 2023-01-11 ENCOUNTER — CARE COORDINATION (OUTPATIENT)
Dept: CARE COORDINATION | Facility: CLINIC | Age: 68
End: 2023-01-11

## 2023-01-11 NOTE — CARE COORDINATION
VANDANA FORD outreach. Pt continues to work toward settling his  sig other's estate. Has not checked in to a local grief support group, info about which was provided at last outreach. In summary, pt has declined participation with psychiatry, counseling. Declines Part D enrollment. Pt has been informed there might be a pt assistance option through the drug  for his inhalers if he can't afford them. Nothing further to offer pt at this time. He has VANDANA 's contact # if future needs arise. Case will be closed.     Cc: Tam Malik NP

## 2023-02-09 ENCOUNTER — OFFICE VISIT (OUTPATIENT)
Dept: CARDIOLOGY CLINIC | Age: 68
End: 2023-02-09
Payer: MEDICARE

## 2023-02-09 VITALS
HEIGHT: 65 IN | SYSTOLIC BLOOD PRESSURE: 132 MMHG | DIASTOLIC BLOOD PRESSURE: 60 MMHG | WEIGHT: 112.8 LBS | HEART RATE: 84 BPM | BODY MASS INDEX: 18.8 KG/M2

## 2023-02-09 DIAGNOSIS — Z72.0 TOBACCO USE: ICD-10-CM

## 2023-02-09 DIAGNOSIS — I10 PRIMARY HYPERTENSION: ICD-10-CM

## 2023-02-09 DIAGNOSIS — I65.23 BILATERAL CAROTID ARTERY STENOSIS: ICD-10-CM

## 2023-02-09 DIAGNOSIS — I25.10 CORONARY ARTERY DISEASE INVOLVING NATIVE CORONARY ARTERY OF NATIVE HEART WITHOUT ANGINA PECTORIS: Primary | ICD-10-CM

## 2023-02-09 DIAGNOSIS — Z95.1 S/P CABG X 3: ICD-10-CM

## 2023-02-09 PROCEDURE — G8484 FLU IMMUNIZE NO ADMIN: HCPCS | Performed by: INTERNAL MEDICINE

## 2023-02-09 PROCEDURE — 99214 OFFICE O/P EST MOD 30 MIN: CPT | Performed by: INTERNAL MEDICINE

## 2023-02-09 PROCEDURE — 3017F COLORECTAL CA SCREEN DOC REV: CPT | Performed by: INTERNAL MEDICINE

## 2023-02-09 PROCEDURE — 1123F ACP DISCUSS/DSCN MKR DOCD: CPT | Performed by: INTERNAL MEDICINE

## 2023-02-09 PROCEDURE — 4004F PT TOBACCO SCREEN RCVD TLK: CPT | Performed by: INTERNAL MEDICINE

## 2023-02-09 PROCEDURE — G8427 DOCREV CUR MEDS BY ELIG CLIN: HCPCS | Performed by: INTERNAL MEDICINE

## 2023-02-09 PROCEDURE — 3078F DIAST BP <80 MM HG: CPT | Performed by: INTERNAL MEDICINE

## 2023-02-09 PROCEDURE — 3075F SYST BP GE 130 - 139MM HG: CPT | Performed by: INTERNAL MEDICINE

## 2023-02-09 PROCEDURE — G8420 CALC BMI NORM PARAMETERS: HCPCS | Performed by: INTERNAL MEDICINE

## 2023-02-09 NOTE — PROGRESS NOTES
[address], [address] [facility], [address] [place] [address]  PHONE: [phone]     23    NAME:  Corrine Powell  : 1955  MRN: [id number]       SUBJECTIVE:   Corrine Powell is a 79 y.o. male seen for a follow up visit regarding the following:     Chief Complaint   Patient presents with    Coronary Artery Disease     3 month follow up     HPI:   Here after 3v CABG 18. EF normal.  (left AMA before the CABG)   Left CEA 2018----followed by vascular surgery   Echo 3/2019: normal EF, no valve dz. Carotid US 2020 and 2022: TALYA ~50%       now. Struggling now. NO edema. More stressed. No pressure in chest.   No CP. No new angina. He has h/o bladder CA, has supra-pubic catheter. Patient denies recent history of orthopnea, PND, excessive dizziness and/or syncope. Past Medical History, Past Surgical History, Family history, Social History, and Medications were all reviewed with the patient today and updated as necessary. Current Outpatient Medications   Medication Sig Dispense Refill    mirtazapine (REMERON) 15 MG tablet Take 1 tablet by mouth nightly 30 tablet 2    amLODIPine (NORVASC) 5 MG tablet TAKE 1 TABLET BY MOUTH EVERY DAY 90 tablet 3    atorvastatin (LIPITOR) 40 MG tablet TAKE 1 TABLET BY MOUTH EVERY DAY AT NIGHT 90 tablet 3    metoprolol tartrate (LOPRESSOR) 50 MG tablet Take 0.5 tablets by mouth in the morning and 0.5 tablets in the evening. 180 tablet 3    Ostomy Supplies MISC 1 Units by Other route as needed      aspirin 81 MG chewable tablet Take 81 mg by mouth daily      Fluticasone-Umeclidin-Vilant (TRELEGY ELLIPTA) 200-62.5-25 MCG/INH AEPB Inhale 1 puff into the lungs daily       No current facility-administered medications for this visit. Allergies   Allergen Reactions    Codeine Hives     Has tolerated morphine previously.     Penicillins Itching and Rash     Years ago     Patient Active Problem List    Diagnosis Date Noted    Alcohol dependence, uncomplicated ([facility] 62/05/2578     Priority: Medium    Renal hematoma 08/24/2020    Severe episode of recurrent major depressive disorder, without psychotic features ([facility] 07/05/2019    History of left-sided carotid endarterectomy     Tobacco use 10/31/2018    Bilateral carotid artery stenosis 10/01/2018    CAD (coronary artery disease)     Status post ileal conduit ([facility]     History of neck surgery     S/P CABG x 3 09/18/2018    Chronic obstructive pulmonary disease ([facility] 03/03/2016 9/2018        Depression with anxiety 03/03/2016    Urinary bladder cancer ([facility] 07/01/2014     Left periureteral        HTN (hypertension) 04/19/2014    Cervical stenosis of spinal canal 04/18/2014      Past Surgical History:   Procedure Laterality Date    CABG, ARTERY-VEIN, THREE  09/18/2018    CARDIAC CATHETERIZATION  08/2018    CAROTID ENDARTERECTOMY Left 12/06/2018    CERVICAL FUSION  2014    Dr. Brenna Mccoy GRAFT  09/18/2018    triple bypass    GI  06/24/2019    upper/lower GI    HEMORRHOID SURGERY      IR NEPHROURETERAL CATHETER PLACEMENT NEW ACCESS  5/15/2018    IR NEPHROURETERAL CATHETER PLACEMENT NEW ACCESS 5/15/2018 [facility] RADIOLOGY SPECIALS    KNEE ARTHROSCOPY Left          UROLOGICAL SURGERY  2015    TURBT- bladder removal- urostomy    UROLOGICAL SURGERY Left     Nephrostomy    UROLOGICAL SURGERY  05/2020    kidney stone    UROLOGICAL SURGERY      benign tumor removed from L testicle      VASCULAR SURGERY Right 2014    Port, removed 2016     Family History   Problem Relation Age of Onset    Cancer Father         bladder CA    No Known Problems Sister     Cancer Sister         hx of breast CA    Cancer Mother     Lung Disease Mother      Social History     Tobacco Use    Smoking status: Every Day     Packs/day: 1.00     Years: 15.00     Pack years: 15.00     Types: Cigarettes    Smokeless tobacco: Never   Substance Use Topics    Alcohol use: Not Currently         ROS:    No obvious pertinent positives on review of systems except for what was outlined in the HPI today.       PHYSICAL EXAM:     /60   Pulse 84   Ht 5' 5\" (1.651 m)   Wt 112 lb 12.8 oz (51.2 kg)   BMI 18.77 kg/m²    General/Constitutional:   Alert and oriented x 3, no acute distress  HEENT:   normocephalic, atraumatic, moist mucous membranes  Neck:   No JVD or carotid bruits bilaterally  Cardiovascular:   regular rate and rhythm, no murmur/rub/gallop appreciated  Pulmonary:   clear to auscultation bilaterally, no respiratory distress  Abdomen:   soft, non-tender, non-distended  Ext:   No sig LE edema bilaterally  Skin:  warm and dry, no obvious rashes seen  Neuro:   no obvious sensory or motor deficits  Psychiatric:   normal mood and affect      Lab Results   Component Value Date/Time     08/02/2022 10:24 AM    K 4.1 08/02/2022 10:24 AM     08/02/2022 10:24 AM    CO2 28 08/02/2022 10:24 AM    BUN 6 08/02/2022 10:24 AM    CREATININE 1.00 08/02/2022 10:24 AM    GLUCOSE 109 08/02/2022 10:24 AM    CALCIUM 9.1 08/02/2022 10:24 AM        Lab Results   Component Value Date    WBC 5.8 08/02/2022    HGB 14.4 08/02/2022    HCT 43.0 08/02/2022    .4 (H) 08/02/2022     08/02/2022       Lab Results   Component Value Date    TSH 0.648 07/22/2021       Lab Results   Component Value Date    LABA1C 5.6 07/22/2021     Lab Results   Component Value Date     07/22/2021       Lab Results   Component Value Date    CHOL 114 06/09/2022    CHOL 142 07/22/2021    CHOL 149 05/28/2020     Lab Results   Component Value Date    TRIG 110 06/09/2022    TRIG 119 07/22/2021    TRIG 86 05/28/2020     Lab Results   Component Value Date    HDL 49 06/09/2022    HDL 37 (L) 07/22/2021    HDL 61 05/28/2020     Lab Results   Component Value Date    LDLCALC 43 06/09/2022    LDLCALC 83 07/22/2021    LDLCALC 71 05/28/2020     Lab Results   Component Value Date    LABVLDL 22 06/09/2022    LABVLDL 17 05/28/2020    LABVLDL 28 07/30/2019    [facility] 22 07/22/2021     Lab Results   Component Value Date    CHOLHDLRATIO 2.3 06/09/2022           I have Independently reviewed prior care notes, any ER records available, cardiac testing, labs and results with the patient and before seeing the patient today. Also independently reviewed outside records when available. ASSESSMENT:    Curt Patrick was seen today for coronary artery disease. Diagnoses and all orders for this visit:    Coronary artery disease involving native coronary artery of native heart without angina pectoris    Primary hypertension    Bilateral carotid artery stenosis    S/P CABG x 3    Tobacco use        PLAN:          1. HTN: on norvasc and BB.  better now. 2. CAD:  On ASA, and statin. Follow LOZ[facility], [facility] for more angina. needs to exercise. Call PRN. The patient has been instructed to call with any angina or equivalent as reviewed today. All questions were answered with the patient voicing complete understanding. 3. Carotid Dz: On ASA and lipitor. 4. Tobacco Use: cessation needed, 2ppd now      5. HPL: remain on lipitor, goal LDL < 70. Patient was encouraged to engage in regular moderate physical activity for at least 30-45 minutes at least 4-5 days of the week. We also reviewed heart healthy diets such as a whole foods plant based diet and a mediterranean diet rich in whole grains, fruits and vegetables. The importance of a healthy lifestyle was reviewed as well. The patient voiced understanding. Patient has been instructed and agrees to call our office with any issues or other concerns related to their cardiac condition(s) and/or complaint(s). Return in about 6 months (around 8/9/2023).        MIRIAN NEVAREZ DO  2/9/2023

## 2023-02-10 ENCOUNTER — TELEPHONE (OUTPATIENT)
Dept: INTERNAL MEDICINE CLINIC | Facility: CLINIC | Age: 68
End: 2023-02-10

## 2023-02-10 NOTE — TELEPHONE ENCOUNTER
Left message that patient didn't come in foe his confirmed appointment this morning. Mr Verna Nehemias asked to please call the office to reschedule.

## 2023-02-21 DIAGNOSIS — F41.8 DEPRESSION WITH ANXIETY: ICD-10-CM

## 2023-02-21 RX ORDER — MIRTAZAPINE 15 MG/1
15 TABLET, FILM COATED ORAL NIGHTLY
Qty: 30 TABLET | Refills: 0 | Status: SHIPPED | OUTPATIENT
Start: 2023-02-21

## 2023-02-21 NOTE — TELEPHONE ENCOUNTER
----- Message from Sudha Lucasbons sent at 2/21/2023 10:09 AM EST -----  Subject: Refill Request    QUESTIONS  Name of Medication? mirtazapine (REMERON) 15 MG tablet  Patient-reported dosage and instructions? 1x at night for sleep  How many days do you have left? 20  Preferred Pharmacy? [id number]  Pharmacy phone number (if available)? [phone]  Additional Information for Provider? Pt will run out before appt, but it   was PCP's first available - please fill for pt or contact him about   earlier appt so he is not without.  ---------------------------------------------------------------------------  --------------  CALL BACK INFO  What is the best way for the office to contact you? OK to leave message on   voicemail  Preferred Call Back Phone Number? [phone]  ---------------------------------------------------------------------------  --------------  SCRIPT ANSWERS  Relationship to Patient?  Self

## 2023-02-28 ENCOUNTER — OFFICE VISIT (OUTPATIENT)
Dept: UROLOGY | Age: 68
End: 2023-02-28
Payer: MEDICARE

## 2023-02-28 DIAGNOSIS — C67.9 MALIGNANT NEOPLASM OF URINARY BLADDER, UNSPECIFIED SITE (HCC): ICD-10-CM

## 2023-02-28 DIAGNOSIS — N20.0 RENAL STONE: Primary | ICD-10-CM

## 2023-02-28 LAB
BILIRUBIN, URINE, POC: NEGATIVE
BLOOD URINE, POC: NEGATIVE
GLUCOSE URINE, POC: NEGATIVE
KETONES, URINE, POC: NEGATIVE
LEUKOCYTE ESTERASE, URINE, POC: ABNORMAL
NITRITE, URINE, POC: POSITIVE
PH, URINE, POC: 8.5 (ref 4.6–8)
PROTEIN,URINE, POC: 30
SPECIFIC GRAVITY, URINE, POC: 1.01 (ref 1–1.03)
URINALYSIS CLARITY, POC: ABNORMAL
URINALYSIS COLOR, POC: ABNORMAL
UROBILINOGEN, POC: ABNORMAL

## 2023-02-28 PROCEDURE — 3017F COLORECTAL CA SCREEN DOC REV: CPT | Performed by: NURSE PRACTITIONER

## 2023-02-28 PROCEDURE — G8427 DOCREV CUR MEDS BY ELIG CLIN: HCPCS | Performed by: NURSE PRACTITIONER

## 2023-02-28 PROCEDURE — 99214 OFFICE O/P EST MOD 30 MIN: CPT | Performed by: NURSE PRACTITIONER

## 2023-02-28 PROCEDURE — G8484 FLU IMMUNIZE NO ADMIN: HCPCS | Performed by: NURSE PRACTITIONER

## 2023-02-28 PROCEDURE — 74018 RADEX ABDOMEN 1 VIEW: CPT | Performed by: NURSE PRACTITIONER

## 2023-02-28 PROCEDURE — 4004F PT TOBACCO SCREEN RCVD TLK: CPT | Performed by: NURSE PRACTITIONER

## 2023-02-28 PROCEDURE — 1123F ACP DISCUSS/DSCN MKR DOCD: CPT | Performed by: NURSE PRACTITIONER

## 2023-02-28 PROCEDURE — G8420 CALC BMI NORM PARAMETERS: HCPCS | Performed by: NURSE PRACTITIONER

## 2023-02-28 PROCEDURE — 81003 URINALYSIS AUTO W/O SCOPE: CPT | Performed by: NURSE PRACTITIONER

## 2023-02-28 NOTE — PROGRESS NOTES
[facility]  [address]   [address]  [facility], [address] [facility]  [phone]    Gelacio Timmons  : 1955    Chief Complaint   Patient presents with    1 Year Follow Up          HPI     Gelacio Timmons is a 79 y.o. male w h/o cystectomy and has an ileal conduit urinary diversion with ostomy bag in the right lower quadrant. Attempted a left percutaneous nephrostolithotomy the upper pole access but could not get into the lower pole moiety. He had a stent and with a nephrostomy tube came out the stent came out as well. Could not get access to left lower pole stone and had ESWL. Left lower pole renal extracorporeal shockwave therapy on 18. KUB after ESWL showed a lot of bowel gas however and there was a small calcification near the left lower pole where the previous stone had been. He was recently seen for gross hematuria. CT urogram showed 2 non-obstructing left renal pelvic stones (3-4 mm in size). No gross abnormalities of ileal conduit in the right anterior pelvis. He was also having h/o of intermittent left flank pain. Assoc with gross hematuria. Some nausea. After discussing his case with Dr Michael Vazquez, he elected to try L ESWL. Unfortunately, the patient developed retroperitoneal hematoma. Repeat CT showed improving hematoma. Creatinine was stable at 0.82 and HGB up to 12. 3. Here today for follow up. Reports occ L flank pain. No gross hematuria. No passed stone. He orders his stomy supplies using [employer].      **his girlfriend, who was also a patient of mine, Ms Alcira Zaidi, passed away 10/22. Of note, his father also passed away in . Under a lot of stress.           Past Medical History:   Diagnosis Date    Adverse effect of anesthesia     hard to awaken    CAD (coronary artery disease) 2018    Cancer ([facility]     bladder    Carotid stenosis, bilateral     S/P left endarterectomy,   TALYA 30% per CTA    Chronic neck pain     Chronic obstructive pulmonary disease (HCC)     no inhalers    Depression with anxiety     GERD (gastroesophageal reflux disease)     no meds    History of bladder cancer 07/2014    Cystectomy with ileo conduit urinary diversion ----and chemo    History of left-sided carotid endarterectomy     History of neck surgery     Hypertension     Kidney stones     Osteoarthritis     PAD (peripheral artery disease) (HCC)     S/P CABG x 3 09/2018    Staghorn renal calculus     Left sided    Status post ileal conduit ([facility] 2014    Tobacco use disorder, continuous     0.5 ppd since age 15     Past Surgical History:   Procedure Laterality Date    CABG, ARTERY-VEIN, THREE  09/18/2018    CARDIAC CATHETERIZATION  08/2018    CAROTID ENDARTERECTOMY Left 12/06/2018    CERVICAL FUSION  2014    Dr. Kelley Fofana GRAFT  09/18/2018    triple bypass    GI  06/24/2019    upper/lower GI    HEMORRHOID SURGERY      IR NEPHROURETERAL CATHETER PLACEMENT NEW ACCESS  5/15/2018    IR NEPHROURETERAL CATHETER PLACEMENT NEW ACCESS 5/15/2018 [facility] RADIOLOGY SPECIALS    KNEE ARTHROSCOPY Left          UROLOGICAL SURGERY  2015    TURBT- bladder removal- urostomy    UROLOGICAL SURGERY Left     Nephrostomy    UROLOGICAL SURGERY  05/2020    kidney stone    UROLOGICAL SURGERY      benign tumor removed from L testicle      VASCULAR SURGERY Right 2014    Port, removed 2016     Current Outpatient Medications   Medication Sig Dispense Refill    mirtazapine (REMERON) 15 MG tablet Take 1 tablet by mouth nightly 30 tablet 0    amLODIPine (NORVASC) 5 MG tablet TAKE 1 TABLET BY MOUTH EVERY DAY 90 tablet 3    atorvastatin (LIPITOR) 40 MG tablet TAKE 1 TABLET BY MOUTH EVERY DAY AT NIGHT 90 tablet 3    metoprolol tartrate (LOPRESSOR) 50 MG tablet Take 0.5 tablets by mouth in the morning and 0.5 tablets in the evening.  180 tablet 3    Ostomy Supplies MISC 1 Units by Other route as needed      aspirin 81 MG chewable tablet Take 81 mg by mouth daily Fluticasone-Umeclidin-Vilant (TRELEGY ELLIPTA) 200-62.5-25 MCG/INH AEPB Inhale 1 puff into the lungs daily       No current facility-administered medications for this visit. Allergies   Allergen Reactions    Codeine Hives     Has tolerated morphine previously. Penicillins Itching and Rash     Years ago     Social History     Socioeconomic History    Marital status:      Spouse name: Not on file    Number of children: Not on file    Years of education: Not on file    Highest education level: Not on file   Occupational History    Not on file   Tobacco Use    Smoking status: Every Day     Packs/day: 1.00     Years: 15.00     Pack years: 15.00     Types: Cigarettes    Smokeless tobacco: Never   Substance and Sexual Activity    Alcohol use: Not Currently    Drug use: Yes     Types: Marijuana Jovany Forward)    Sexual activity: Not on file   Other Topics Concern    Not on file   Social History Narrative    Lives with girlfriend. His father is living in [place].       Social Determinants of Health     Financial Resource Strain: Low Risk     Difficulty of Paying Living Expenses: Not hard at all   Food Insecurity: No Food Insecurity    Worried About Running Out of Food in the Last Year: Never true    Ran Out of Food in the Last Year: Never true   Transportation Needs: Not on file   Physical Activity: Inactive    Days of Exercise per Week: 0 days    Minutes of Exercise per Session: 0 min   Stress: Not on file   Social Connections: Not on file   Intimate Partner Violence: Not on file   Housing Stability: Not on file     Family History   Problem Relation Age of Onset    Cancer Father         bladder CA    No Known Problems Sister     Cancer Sister         hx of breast CA    Cancer Mother     Lung Disease Mother        ROS    Results for orders placed or performed in visit on 02/28/23   AMB POC URINALYSIS DIP STICK AUTO W/O MICRO   Result Value Ref Range    Color, Urine, POC      Clarity, Urine, POC      Glucose, Urine, POC Negative Negative    Bilirubin, Urine, POC Negative Negative    Ketones, Urine, POC Negative Negative    Specific Gravity, Urine, POC 1.01 1.001 - 1.035    Blood, Urine, POC Negative Negative    pH, Urine, POC 8.5 (A) 4.6 - 8.0    Protein, Urine, POC 30 Negative    Urobilinogen, POC 0.2 mg/dL     Nitrite, Urine, POC Positive Negative    Leukocyte Esterase, Urine, POC Large Negative       PHYSICAL EXAM    General appearance - well appearing and in no distress  Mental status - alert, oriented to person, place, and time  Neck - supple, no significant adenopathy  Chest/Lung-  Quiet, even and easy respiratory effort without use of accessory muscles  Skin - normal coloration and turgor, no rashes    KUB in office today reviewed by myself and shows stable L renal stones. Assessment and Plan    ICD-10-CM    1. Renal stone  N20.0 KUB - POC for Juan ONLY      2. Malignant neoplasm of urinary bladder, unspecified site [facility])  C67.9         Renal stone- KUB reviewed. HOLD on any intervention on stones. I have educated pt. about diet modifications that can decrease the risk of future calcium stone formation. These include decreasing things high in oxalate such as teas and keli. Also, I have encouraged pt. to increase clear liquid intake, especially H2O. Advised the recommended water consumption is 3 liter per day. Things high in citrate such as lemon juice should also be increased. Bladder CA- urine w bacteria but he is asx. HOLD on antibiotic tx. Advised to call this office if develops sx of UTI for specimen check. If unable to come to this office, encouraged to have UC performed and records sent to this office. RTO in 1 year for follow up OV with KUB. Advised to call sooner if symptoms worsen. Vishnu Siddiqi is supervising physician today and he approves plan of care.

## 2023-03-23 ENCOUNTER — OFFICE VISIT (OUTPATIENT)
Dept: INTERNAL MEDICINE CLINIC | Facility: CLINIC | Age: 68
End: 2023-03-23
Payer: MEDICARE

## 2023-03-23 VITALS
DIASTOLIC BLOOD PRESSURE: 68 MMHG | HEIGHT: 65 IN | BODY MASS INDEX: 18.99 KG/M2 | SYSTOLIC BLOOD PRESSURE: 110 MMHG | WEIGHT: 114 LBS | HEART RATE: 82 BPM | TEMPERATURE: 98 F | OXYGEN SATURATION: 94 %

## 2023-03-23 DIAGNOSIS — F10.20 ALCOHOL DEPENDENCE, UNCOMPLICATED (HCC): ICD-10-CM

## 2023-03-23 DIAGNOSIS — Z93.6 STATUS POST ILEAL CONDUIT (HCC): ICD-10-CM

## 2023-03-23 DIAGNOSIS — I10 PRIMARY HYPERTENSION: ICD-10-CM

## 2023-03-23 DIAGNOSIS — I65.23 BILATERAL CAROTID ARTERY STENOSIS: ICD-10-CM

## 2023-03-23 DIAGNOSIS — F41.8 DEPRESSION WITH ANXIETY: Primary | ICD-10-CM

## 2023-03-23 DIAGNOSIS — I25.10 CORONARY ARTERY DISEASE INVOLVING NATIVE CORONARY ARTERY OF NATIVE HEART WITHOUT ANGINA PECTORIS: ICD-10-CM

## 2023-03-23 DIAGNOSIS — Z95.1 S/P CABG X 3: ICD-10-CM

## 2023-03-23 DIAGNOSIS — J44.9 CHRONIC OBSTRUCTIVE PULMONARY DISEASE, UNSPECIFIED COPD TYPE (HCC): ICD-10-CM

## 2023-03-23 DIAGNOSIS — Z72.0 TOBACCO USE: ICD-10-CM

## 2023-03-23 LAB
ANION GAP SERPL CALC-SCNC: 5 MMOL/L (ref 2–11)
BASOPHILS # BLD: 0.1 K/UL (ref 0–0.2)
BASOPHILS NFR BLD: 1 % (ref 0–2)
BUN SERPL-MCNC: 15 MG/DL (ref 8–23)
CALCIUM SERPL-MCNC: 10.2 MG/DL (ref 8.3–10.4)
CHLORIDE SERPL-SCNC: 104 MMOL/L (ref 101–110)
CHOLEST SERPL-MCNC: 156 MG/DL
CO2 SERPL-SCNC: 27 MMOL/L (ref 21–32)
CREAT SERPL-MCNC: 1 MG/DL (ref 0.8–1.5)
DIFFERENTIAL METHOD BLD: ABNORMAL
EOSINOPHIL # BLD: 0.1 K/UL (ref 0–0.8)
EOSINOPHIL NFR BLD: 1 % (ref 0.5–7.8)
ERYTHROCYTE [DISTWIDTH] IN BLOOD BY AUTOMATED COUNT: 13.8 % (ref 11.9–14.6)
GLUCOSE SERPL-MCNC: 77 MG/DL (ref 65–100)
HCT VFR BLD AUTO: 47 % (ref 41.1–50.3)
HDLC SERPL-MCNC: 71 MG/DL (ref 40–60)
HDLC SERPL: 2.2
HGB BLD-MCNC: 15.7 G/DL (ref 13.6–17.2)
IMM GRANULOCYTES # BLD AUTO: 0 K/UL (ref 0–0.5)
IMM GRANULOCYTES NFR BLD AUTO: 0 % (ref 0–5)
LDLC SERPL CALC-MCNC: 64.6 MG/DL
LYMPHOCYTES # BLD: 2.7 K/UL (ref 0.5–4.6)
LYMPHOCYTES NFR BLD: 26 % (ref 13–44)
MCH RBC QN AUTO: 34.5 PG (ref 26.1–32.9)
MCHC RBC AUTO-ENTMCNC: 33.4 G/DL (ref 31.4–35)
MCV RBC AUTO: 103.3 FL (ref 82–102)
MONOCYTES # BLD: 1 K/UL (ref 0.1–1.3)
MONOCYTES NFR BLD: 10 % (ref 4–12)
NEUTS SEG # BLD: 6.2 K/UL (ref 1.7–8.2)
NEUTS SEG NFR BLD: 62 % (ref 43–78)
NRBC # BLD: 0 K/UL (ref 0–0.2)
PLATELET # BLD AUTO: 450 K/UL (ref 150–450)
PMV BLD AUTO: 9.9 FL (ref 9.4–12.3)
POTASSIUM SERPL-SCNC: 4.6 MMOL/L (ref 3.5–5.1)
RBC # BLD AUTO: 4.55 M/UL (ref 4.23–5.6)
SODIUM SERPL-SCNC: 136 MMOL/L (ref 133–143)
TRIGL SERPL-MCNC: 102 MG/DL (ref 35–150)
VLDLC SERPL CALC-MCNC: 20.4 MG/DL (ref 6–23)
WBC # BLD AUTO: 10.1 K/UL (ref 4.3–11.1)

## 2023-03-23 PROCEDURE — G8427 DOCREV CUR MEDS BY ELIG CLIN: HCPCS | Performed by: NURSE PRACTITIONER

## 2023-03-23 PROCEDURE — 3074F SYST BP LT 130 MM HG: CPT | Performed by: NURSE PRACTITIONER

## 2023-03-23 PROCEDURE — 3078F DIAST BP <80 MM HG: CPT | Performed by: NURSE PRACTITIONER

## 2023-03-23 PROCEDURE — 3017F COLORECTAL CA SCREEN DOC REV: CPT | Performed by: NURSE PRACTITIONER

## 2023-03-23 PROCEDURE — 99214 OFFICE O/P EST MOD 30 MIN: CPT | Performed by: NURSE PRACTITIONER

## 2023-03-23 PROCEDURE — 1123F ACP DISCUSS/DSCN MKR DOCD: CPT | Performed by: NURSE PRACTITIONER

## 2023-03-23 PROCEDURE — G8484 FLU IMMUNIZE NO ADMIN: HCPCS | Performed by: NURSE PRACTITIONER

## 2023-03-23 PROCEDURE — 4004F PT TOBACCO SCREEN RCVD TLK: CPT | Performed by: NURSE PRACTITIONER

## 2023-03-23 PROCEDURE — 3023F SPIROM DOC REV: CPT | Performed by: NURSE PRACTITIONER

## 2023-03-23 PROCEDURE — G8420 CALC BMI NORM PARAMETERS: HCPCS | Performed by: NURSE PRACTITIONER

## 2023-03-23 RX ORDER — MIRTAZAPINE 15 MG/1
30 TABLET, FILM COATED ORAL NIGHTLY
Qty: 60 TABLET | Refills: 5 | Status: SHIPPED | OUTPATIENT
Start: 2023-03-23

## 2023-03-23 SDOH — ECONOMIC STABILITY: INCOME INSECURITY: HOW HARD IS IT FOR YOU TO PAY FOR THE VERY BASICS LIKE FOOD, HOUSING, MEDICAL CARE, AND HEATING?: NOT HARD AT ALL

## 2023-03-23 SDOH — ECONOMIC STABILITY: FOOD INSECURITY: WITHIN THE PAST 12 MONTHS, THE FOOD YOU BOUGHT JUST DIDN'T LAST AND YOU DIDN'T HAVE MONEY TO GET MORE.: NEVER TRUE

## 2023-03-23 SDOH — ECONOMIC STABILITY: FOOD INSECURITY: WITHIN THE PAST 12 MONTHS, YOU WORRIED THAT YOUR FOOD WOULD RUN OUT BEFORE YOU GOT MONEY TO BUY MORE.: NEVER TRUE

## 2023-03-23 SDOH — ECONOMIC STABILITY: HOUSING INSECURITY
IN THE LAST 12 MONTHS, WAS THERE A TIME WHEN YOU DID NOT HAVE A STEADY PLACE TO SLEEP OR SLEPT IN A SHELTER (INCLUDING NOW)?: NO

## 2023-03-23 ASSESSMENT — PATIENT HEALTH QUESTIONNAIRE - PHQ9
4. FEELING TIRED OR HAVING LITTLE ENERGY: 0
SUM OF ALL RESPONSES TO PHQ QUESTIONS 1-9: 0
7. TROUBLE CONCENTRATING ON THINGS, SUCH AS READING THE NEWSPAPER OR WATCHING TELEVISION: 0
8. MOVING OR SPEAKING SO SLOWLY THAT OTHER PEOPLE COULD HAVE NOTICED. OR THE OPPOSITE, BEING SO FIGETY OR RESTLESS THAT YOU HAVE BEEN MOVING AROUND A LOT MORE THAN USUAL: 0
SUM OF ALL RESPONSES TO PHQ9 QUESTIONS 1 & 2: 0
SUM OF ALL RESPONSES TO PHQ QUESTIONS 1-9: 0
SUM OF ALL RESPONSES TO PHQ QUESTIONS 1-9: 0
9. THOUGHTS THAT YOU WOULD BE BETTER OFF DEAD, OR OF HURTING YOURSELF: 0
SUM OF ALL RESPONSES TO PHQ QUESTIONS 1-9: 0
3. TROUBLE FALLING OR STAYING ASLEEP: 0
10. IF YOU CHECKED OFF ANY PROBLEMS, HOW DIFFICULT HAVE THESE PROBLEMS MADE IT FOR YOU TO DO YOUR WORK, TAKE CARE OF THINGS AT HOME, OR GET ALONG WITH OTHER PEOPLE: 0
2. FEELING DOWN, DEPRESSED OR HOPELESS: 0
1. LITTLE INTEREST OR PLEASURE IN DOING THINGS: 0
5. POOR APPETITE OR OVEREATING: 0
6. FEELING BAD ABOUT YOURSELF - OR THAT YOU ARE A FAILURE OR HAVE LET YOURSELF OR YOUR FAMILY DOWN: 0

## 2023-03-23 ASSESSMENT — ANXIETY QUESTIONNAIRES
6. BECOMING EASILY ANNOYED OR IRRITABLE: 0
2. NOT BEING ABLE TO STOP OR CONTROL WORRYING: 0
5. BEING SO RESTLESS THAT IT IS HARD TO SIT STILL: 0
3. WORRYING TOO MUCH ABOUT DIFFERENT THINGS: 0
7. FEELING AFRAID AS IF SOMETHING AWFUL MIGHT HAPPEN: 0
4. TROUBLE RELAXING: 0
1. FEELING NERVOUS, ANXIOUS, OR ON EDGE: 0
GAD7 TOTAL SCORE: 0
IF YOU CHECKED OFF ANY PROBLEMS ON THIS QUESTIONNAIRE, HOW DIFFICULT HAVE THESE PROBLEMS MADE IT FOR YOU TO DO YOUR WORK, TAKE CARE OF THINGS AT HOME, OR GET ALONG WITH OTHER PEOPLE: NOT DIFFICULT AT ALL

## 2023-03-23 ASSESSMENT — ENCOUNTER SYMPTOMS
NAUSEA: 0
DIARRHEA: 0
SHORTNESS OF BREATH: 0
TROUBLE SWALLOWING: 0
WHEEZING: 0
VOMITING: 0
COUGH: 0
ABDOMINAL PAIN: 0

## 2023-03-23 NOTE — PATIENT INSTRUCTIONS
Please arrive 20 minutes prior to your scheduled time to see the provider to allow sufficient time for check-in and rooming. Please bring ALL medications to the appointment for review and refill.

## 2023-03-23 NOTE — PROGRESS NOTES
10:24 AM    CALCIUM 9.1 08/02/2022 10:24 AM    ,   Lab Results   Component Value Date    TSH 0.648 07/22/2021   ,   Lab Results   Component Value Date    ALT 42 08/02/2022    AST 29 08/02/2022    ALKPHOS 73 08/02/2022    BILITOT 0.4 08/02/2022   ,   Hemoglobin A1C   Date Value Ref Range Status   07/22/2021 5.6 4.8 - 5.6 % Final     Comment:              Prediabetes: 5.7 - 6.4           Diabetes: >6.4           Glycemic control for adults with diabetes: <7.0         PHQ-9  3/23/2023   Little interest or pleasure in doing things 0   Little interest or pleasure in doing things -   Feeling down, depressed, or hopeless 0   Trouble falling or staying asleep, or sleeping too much 0   Feeling tired or having little energy 0   Poor appetite or overeating 0   Feeling bad about yourself - or that you are a failure or have let yourself or your family down 0   Trouble concentrating on things, such as reading the newspaper or watching television 0   Moving or speaking so slowly that other people could have noticed. Or the opposite - being so fidgety or restless that you have been moving around a lot more than usual 0   Thoughts that you would be better off dead, or of hurting yourself in some way 0   PHQ-2 Score 0   Total Score PHQ 2 -   PHQ-9 Total Score 0   If you checked off any problems, how difficult have these problems made it for you to do your work, take care of things at home, or get along with other people? 0        Assessment/Plan:      Health Maintenance Due   Topic Date Due    Pneumococcal 65+ years Vaccine (1 - PCV) Never done    DTaP/Tdap/Td vaccine (1 - Tdap) Never done    Shingles vaccine (1 of 2) Never done    COVID-19 Vaccine (3 - Booster for [place] series) 05/12/2021    Annual Wellness Visit (AWV)  05/23/2022    Colorectal Cancer Screen  06/24/2022    Flu vaccine (1) Never done          Kevin Lindacydney was seen today for hypertension.     Diagnoses and all orders for this visit:    Depression with anxiety  -

## 2023-06-06 NOTE — PROGRESS NOTES
previously 6.9 x  3.3 cm. There is persistent extension into the left retroperitoneum which is  also become progressively hypodense. The inferior retroperitoneal component  measures 3.2 x 2.8 cm, previously 6-7 0.3 x 5.4 cm. The complex hematoma has a  peripheral hyperdense rim, likely reflecting loculation. No hydronephrosis. Prior cystectomy with ileal conduit. Nonobstructive left renal calculus    Bowel: No bowel obstruction or bowel wall thickening. Normal appendix    Peritoneum: No residual intraperitoneal hemorrhage. No free air or free fluid. Vessels/lymph nodes: Abdominal aortic ectasia. No adenopathy. Abdominal wall: Unchanged right lower abdomen conduit    Bones: No suspicious osseous lesion. Impression  IMPRESSION:  1. Decreased size of left subcapsular renal hematoma with decreased adjacent  left retroperitoneal hematoma with expected evolution of blood products  2. Resolution of intraperitoneal hemorrhage  3. Prior cystectomy with ileal conduit    Nuclear Medicine: No results found for this or any previous visit from the past 3650 days. PFTs:   No flowsheet data found. No results found for this or any previous visit. No results found for this or any previous visit. FeNO: No results found for this or any previous visit. FeNO and Likelihood of Eosinophilic Asthma   Unlikely Intermediate Likely   <25 ppb 25-50 ppb >50ppb     Exercise Oximetry:    Echo: No results found for this or any previous visit from the past 3650 days.       Immunization History   Administered Date(s) Administered    COVID-19, MODERNA BLUE border, Primary or Immunocompromised, (age 12y+), IM, 100 mcg/0.5mL 02/17/2021, 03/17/2021    PPD Test 09/18/2018     Past Medical History:   Diagnosis Date    Adverse effect of anesthesia     hard to awaken    CAD (coronary artery disease) 09/18/2018    Cancer ([facility]     bladder    Carotid stenosis, bilateral     S/P left endarterectomy,   TALYA 30% per CTA    Chronic neck pain

## 2023-06-07 ENCOUNTER — OFFICE VISIT (OUTPATIENT)
Dept: PULMONOLOGY | Age: 68
End: 2023-06-07
Payer: MEDICARE

## 2023-06-07 VITALS
TEMPERATURE: 98 F | HEART RATE: 70 BPM | BODY MASS INDEX: 19.33 KG/M2 | HEIGHT: 65 IN | OXYGEN SATURATION: 100 % | DIASTOLIC BLOOD PRESSURE: 80 MMHG | SYSTOLIC BLOOD PRESSURE: 110 MMHG | RESPIRATION RATE: 20 BRPM | WEIGHT: 116 LBS

## 2023-06-07 DIAGNOSIS — J44.9 STAGE 4 VERY SEVERE COPD BY GOLD CLASSIFICATION (HCC): Primary | ICD-10-CM

## 2023-06-07 DIAGNOSIS — G47.34 NOCTURNAL HYPOXEMIA: ICD-10-CM

## 2023-06-07 PROCEDURE — 3017F COLORECTAL CA SCREEN DOC REV: CPT | Performed by: PHYSICIAN ASSISTANT

## 2023-06-07 PROCEDURE — 1123F ACP DISCUSS/DSCN MKR DOCD: CPT | Performed by: PHYSICIAN ASSISTANT

## 2023-06-07 PROCEDURE — 99213 OFFICE O/P EST LOW 20 MIN: CPT | Performed by: PHYSICIAN ASSISTANT

## 2023-06-07 PROCEDURE — G8420 CALC BMI NORM PARAMETERS: HCPCS | Performed by: PHYSICIAN ASSISTANT

## 2023-06-07 PROCEDURE — 3079F DIAST BP 80-89 MM HG: CPT | Performed by: PHYSICIAN ASSISTANT

## 2023-06-07 PROCEDURE — 3023F SPIROM DOC REV: CPT | Performed by: PHYSICIAN ASSISTANT

## 2023-06-07 PROCEDURE — 4004F PT TOBACCO SCREEN RCVD TLK: CPT | Performed by: PHYSICIAN ASSISTANT

## 2023-06-07 PROCEDURE — 3074F SYST BP LT 130 MM HG: CPT | Performed by: PHYSICIAN ASSISTANT

## 2023-06-07 PROCEDURE — G8427 DOCREV CUR MEDS BY ELIG CLIN: HCPCS | Performed by: PHYSICIAN ASSISTANT

## 2023-06-07 RX ORDER — ALBUTEROL SULFATE 90 UG/1
2 AEROSOL, METERED RESPIRATORY (INHALATION) EVERY 6 HOURS PRN
Qty: 1 EACH | Refills: 11 | Status: SHIPPED | OUTPATIENT
Start: 2023-06-07

## 2023-06-07 RX ORDER — GUAIFENESIN 600 MG/1
1200 TABLET, EXTENDED RELEASE ORAL 2 TIMES DAILY
Qty: 120 TABLET | Refills: 2 | Status: SHIPPED | OUTPATIENT
Start: 2023-06-07 | End: 2023-09-05

## 2023-08-16 ENCOUNTER — OFFICE VISIT (OUTPATIENT)
Age: 68
End: 2023-08-16

## 2023-08-16 VITALS
HEART RATE: 80 BPM | SYSTOLIC BLOOD PRESSURE: 118 MMHG | WEIGHT: 120.2 LBS | BODY MASS INDEX: 20.03 KG/M2 | DIASTOLIC BLOOD PRESSURE: 76 MMHG | HEIGHT: 65 IN

## 2023-08-16 DIAGNOSIS — Z95.1 S/P CABG X 3: ICD-10-CM

## 2023-08-16 DIAGNOSIS — I10 PRIMARY HYPERTENSION: ICD-10-CM

## 2023-08-16 DIAGNOSIS — I10 ESSENTIAL (PRIMARY) HYPERTENSION: ICD-10-CM

## 2023-08-16 DIAGNOSIS — I25.10 CORONARY ARTERY DISEASE INVOLVING NATIVE CORONARY ARTERY OF NATIVE HEART WITHOUT ANGINA PECTORIS: Primary | ICD-10-CM

## 2023-08-16 DIAGNOSIS — I73.9 PVD (PERIPHERAL VASCULAR DISEASE) (HCC): ICD-10-CM

## 2023-08-16 DIAGNOSIS — I65.23 BILATERAL CAROTID ARTERY STENOSIS: ICD-10-CM

## 2023-08-16 DIAGNOSIS — I25.10 ATHEROSCLEROSIS OF NATIVE CORONARY ARTERY OF NATIVE HEART WITHOUT ANGINA PECTORIS: ICD-10-CM

## 2023-08-16 RX ORDER — AMLODIPINE BESYLATE 5 MG/1
TABLET ORAL
Qty: 90 TABLET | Refills: 3 | Status: SHIPPED | OUTPATIENT
Start: 2023-08-16

## 2023-08-16 RX ORDER — ATORVASTATIN CALCIUM 40 MG/1
TABLET, FILM COATED ORAL
Qty: 90 TABLET | Refills: 3 | Status: SHIPPED | OUTPATIENT
Start: 2023-08-16

## 2023-08-16 RX ORDER — METOPROLOL TARTRATE 50 MG/1
50 TABLET, FILM COATED ORAL EVERY 12 HOURS
Qty: 180 TABLET | Refills: 3 | Status: SHIPPED | OUTPATIENT
Start: 2023-08-16

## 2023-08-16 NOTE — PROGRESS NOTES
07/22/2021     Lab Results   Component Value Date    TRIG 102 03/23/2023    TRIG 110 06/09/2022    TRIG 119 07/22/2021     Lab Results   Component Value Date    HDL 71 (H) 03/23/2023    HDL 49 06/09/2022    HDL 37 (L) 07/22/2021     Lab Results   Component Value Date    LDLCALC 64.6 03/23/2023    LDLCALC 43 06/09/2022    LDLCALC 83 07/22/2021     Lab Results   Component Value Date    LABVLDL 20.4 03/23/2023    LABVLDL 22 06/09/2022    LABVLDL 17 05/28/2020    VLDL 22 07/22/2021     Lab Results   Component Value Date    CHOLHDLRATIO 2.2 03/23/2023    CHOLHDLRATIO 2.3 06/09/2022           I have Independently reviewed prior care notes, any ER records available, cardiac testing, labs and results with the patient and before seeing the patient today. Also independently reviewed outside records when available. ASSESSMENT:    Kinsey Chambers was seen today for coronary artery disease. Diagnoses and all orders for this visit:    CAD (coronary artery disease)  -     EKG 12 Lead  -     Vascular duplex lower extremity arteries bilateral with exercise CIPRIANO; Future    Essential (primary) hypertension  -     amLODIPine (NORVASC) 5 MG tablet; TAKE 1 TABLET BY MOUTH EVERY DAY    Atherosclerosis of native coronary artery of native heart without angina pectoris  -     atorvastatin (LIPITOR) 40 MG tablet; TAKE 1 TABLET BY MOUTH EVERY DAY AT NIGHT  -     Vascular duplex lower extremity arteries bilateral with exercise CIPRIANO; Future    Primary hypertension    Bilateral carotid artery stenosis    S/P CABG x 3    PVD (peripheral vascular disease) ([address])    Other orders  -     metoprolol tartrate (LOPRESSOR) 50 MG tablet; Take 1 tablet by mouth in the morning and 1 tablet in the evening. PLAN:      1. HTN: on norvasc and BB.  better now. 2. CAD:  On ASA, and statin. Follow LOZADA, C for more angina. needs to exercise. Call PRN. Smoking is key for him, needs to reduce and stop. Follow with COPD as well.          The

## 2023-09-12 DIAGNOSIS — F41.8 DEPRESSION WITH ANXIETY: ICD-10-CM

## 2023-09-12 RX ORDER — MIRTAZAPINE 15 MG/1
30 TABLET, FILM COATED ORAL NIGHTLY
Qty: 60 TABLET | Refills: 1 | Status: SHIPPED | OUTPATIENT
Start: 2023-09-12

## 2023-09-12 NOTE — TELEPHONE ENCOUNTER
I called pt to get his appt on 09/26/2023 rescheduled due to Miss Fredi Roca not being in the office . I got the patient another appt on 10/17/2023 . Patient stated that he was out of his Remeron 15 mg . I called [facility] and they informed me that patient has no refills on file . Last script written on 03/23/2023 for 6 month supply . Patient stated that he is completely out and will need some.

## 2023-10-17 ENCOUNTER — OFFICE VISIT (OUTPATIENT)
Dept: INTERNAL MEDICINE CLINIC | Facility: CLINIC | Age: 68
End: 2023-10-17
Payer: MEDICARE

## 2023-10-17 VITALS
SYSTOLIC BLOOD PRESSURE: 123 MMHG | DIASTOLIC BLOOD PRESSURE: 77 MMHG | BODY MASS INDEX: 20.16 KG/M2 | HEART RATE: 55 BPM | TEMPERATURE: 97.1 F | HEIGHT: 65 IN | OXYGEN SATURATION: 95 % | WEIGHT: 121 LBS

## 2023-10-17 DIAGNOSIS — F41.8 DEPRESSION WITH ANXIETY: ICD-10-CM

## 2023-10-17 DIAGNOSIS — Z95.1 S/P CABG X 3: ICD-10-CM

## 2023-10-17 DIAGNOSIS — Z93.6 STATUS POST ILEAL CONDUIT (HCC): ICD-10-CM

## 2023-10-17 DIAGNOSIS — F10.20 ALCOHOL DEPENDENCE, UNCOMPLICATED (HCC): ICD-10-CM

## 2023-10-17 DIAGNOSIS — Z00.00 MEDICARE ANNUAL WELLNESS VISIT, SUBSEQUENT: Primary | ICD-10-CM

## 2023-10-17 DIAGNOSIS — I25.10 CORONARY ARTERY DISEASE INVOLVING NATIVE CORONARY ARTERY OF NATIVE HEART WITHOUT ANGINA PECTORIS: ICD-10-CM

## 2023-10-17 DIAGNOSIS — J44.9 CHRONIC OBSTRUCTIVE PULMONARY DISEASE, UNSPECIFIED COPD TYPE (HCC): ICD-10-CM

## 2023-10-17 DIAGNOSIS — Z12.5 ENCOUNTER FOR PROSTATE CANCER SCREENING: ICD-10-CM

## 2023-10-17 DIAGNOSIS — I10 PRIMARY HYPERTENSION: ICD-10-CM

## 2023-10-17 PROCEDURE — G8427 DOCREV CUR MEDS BY ELIG CLIN: HCPCS | Performed by: NURSE PRACTITIONER

## 2023-10-17 PROCEDURE — 3078F DIAST BP <80 MM HG: CPT | Performed by: NURSE PRACTITIONER

## 2023-10-17 PROCEDURE — 1123F ACP DISCUSS/DSCN MKR DOCD: CPT | Performed by: NURSE PRACTITIONER

## 2023-10-17 PROCEDURE — 99214 OFFICE O/P EST MOD 30 MIN: CPT | Performed by: NURSE PRACTITIONER

## 2023-10-17 PROCEDURE — 3017F COLORECTAL CA SCREEN DOC REV: CPT | Performed by: NURSE PRACTITIONER

## 2023-10-17 PROCEDURE — 3074F SYST BP LT 130 MM HG: CPT | Performed by: NURSE PRACTITIONER

## 2023-10-17 PROCEDURE — 3023F SPIROM DOC REV: CPT | Performed by: NURSE PRACTITIONER

## 2023-10-17 PROCEDURE — 4004F PT TOBACCO SCREEN RCVD TLK: CPT | Performed by: NURSE PRACTITIONER

## 2023-10-17 PROCEDURE — G8484 FLU IMMUNIZE NO ADMIN: HCPCS | Performed by: NURSE PRACTITIONER

## 2023-10-17 PROCEDURE — G8420 CALC BMI NORM PARAMETERS: HCPCS | Performed by: NURSE PRACTITIONER

## 2023-10-17 PROCEDURE — G0439 PPPS, SUBSEQ VISIT: HCPCS | Performed by: NURSE PRACTITIONER

## 2023-10-17 RX ORDER — MIRTAZAPINE 15 MG/1
30 TABLET, FILM COATED ORAL NIGHTLY
Qty: 180 TABLET | Refills: 1 | Status: SHIPPED | OUTPATIENT
Start: 2023-10-17

## 2023-10-17 ASSESSMENT — ENCOUNTER SYMPTOMS
SHORTNESS OF BREATH: 0
VOMITING: 0
ABDOMINAL PAIN: 0
WHEEZING: 0
COUGH: 0
DIARRHEA: 0
NAUSEA: 0

## 2023-10-17 ASSESSMENT — PATIENT HEALTH QUESTIONNAIRE - PHQ9
SUM OF ALL RESPONSES TO PHQ QUESTIONS 1-9: 0
1. LITTLE INTEREST OR PLEASURE IN DOING THINGS: 0
4. FEELING TIRED OR HAVING LITTLE ENERGY: 0
5. POOR APPETITE OR OVEREATING: 0
SUM OF ALL RESPONSES TO PHQ9 QUESTIONS 1 & 2: 0
3. TROUBLE FALLING OR STAYING ASLEEP: 0
8. MOVING OR SPEAKING SO SLOWLY THAT OTHER PEOPLE COULD HAVE NOTICED. OR THE OPPOSITE, BEING SO FIGETY OR RESTLESS THAT YOU HAVE BEEN MOVING AROUND A LOT MORE THAN USUAL: 0
SUM OF ALL RESPONSES TO PHQ QUESTIONS 1-9: 0
10. IF YOU CHECKED OFF ANY PROBLEMS, HOW DIFFICULT HAVE THESE PROBLEMS MADE IT FOR YOU TO DO YOUR WORK, TAKE CARE OF THINGS AT HOME, OR GET ALONG WITH OTHER PEOPLE: 0
9. THOUGHTS THAT YOU WOULD BE BETTER OFF DEAD, OR OF HURTING YOURSELF: 0
SUM OF ALL RESPONSES TO PHQ QUESTIONS 1-9: 0
7. TROUBLE CONCENTRATING ON THINGS, SUCH AS READING THE NEWSPAPER OR WATCHING TELEVISION: 0
SUM OF ALL RESPONSES TO PHQ QUESTIONS 1-9: 0
2. FEELING DOWN, DEPRESSED OR HOPELESS: 0
6. FEELING BAD ABOUT YOURSELF - OR THAT YOU ARE A FAILURE OR HAVE LET YOURSELF OR YOUR FAMILY DOWN: 0

## 2023-10-17 ASSESSMENT — LIFESTYLE VARIABLES
HOW MANY STANDARD DRINKS CONTAINING ALCOHOL DO YOU HAVE ON A TYPICAL DAY: PATIENT DOES NOT DRINK
HOW OFTEN DO YOU HAVE A DRINK CONTAINING ALCOHOL: NEVER

## 2023-10-17 NOTE — PROGRESS NOTES
your work, take care of things at home, or get along with other people? 0        Assessment/Plan:      Health Maintenance Due   Topic Date Due    Pneumococcal 65+ years Vaccine (1 - PCV) Never done    DTaP/Tdap/Td vaccine (1 - Tdap) Never done    Shingles vaccine (1 of 2) Never done    COVID-19 Vaccine (3 - Moderna series) 05/12/2021    Colorectal Cancer Screen  06/24/2022          Patricia Sigala was seen today for medicare awv. Diagnoses and all orders for this visit:    Medicare annual wellness visit, subsequent    Primary hypertension  -     mirtazapine (REMERON) 15 MG tablet; Take 2 tablets by mouth nightly  -     Comprehensive Metabolic Panel; Future  -     TSH with Reflex; Future    Coronary artery disease involving native coronary artery of native heart without angina pectoris    S/P CABG x 3    Chronic obstructive pulmonary disease, unspecified COPD type ([address])    Depression with anxiety  -     mirtazapine (REMERON) 15 MG tablet; Take 2 tablets by mouth nightly    Alcohol dependence, uncomplicated (HCC)    Status post ileal conduit ([address])    Encounter for prostate cancer screening  -     PSA Screening; Future        Patient states he is otherwise doing well; has no further questions or concerns at this visit. Encouraged to contact office with any concerns prior to next visit. Advise patient to notify office if they do not receive results of any labs or tests ordered within 2-3 days of the lab/test.     Return in about 6 months (around 4/17/2024), or if symptoms worsen or fail to improve, for Follow up, Fasting labs. Tomasa Morrison, APRN - CNP          Medicare Annual Wellness Visit    Irlanda Hong is here for Medicare AWV    Assessment & Plan   Medicare annual wellness visit, subsequent  Primary hypertension  -     mirtazapine (REMERON) 15 MG tablet; Take 2 tablets by mouth nightly, Disp-180 tablet, R-1Normal  -     Comprehensive Metabolic Panel; Future  -     TSH with Reflex;  Future  Coronary artery

## 2023-11-03 ENCOUNTER — CLINICAL DOCUMENTATION (OUTPATIENT)
Dept: PULMONOLOGY | Age: 68
End: 2023-11-03

## 2023-11-03 NOTE — PROGRESS NOTES
Left message for patient to call for appointment with Dr. Priscilla Chavez.       Return in about 6 months (around 12/7/2023)

## 2023-12-15 RX ORDER — METOPROLOL TARTRATE 50 MG/1
TABLET, FILM COATED ORAL
Qty: 90 TABLET | Refills: 7 | Status: SHIPPED | OUTPATIENT
Start: 2023-12-15

## 2024-01-24 NOTE — PROGRESS NOTES
Outpatient Medications   Medication Instructions    albuterol sulfate HFA (PROVENTIL;VENTOLIN;PROAIR) 108 (90 Base) MCG/ACT inhaler 2 puffs, Inhalation, EVERY 6 HOURS PRN    amLODIPine (NORVASC) 5 MG tablet TAKE 1 TABLET BY MOUTH EVERY DAY    aspirin 81 mg, Oral, DAILY    atorvastatin (LIPITOR) 40 MG tablet TAKE 1 TABLET BY MOUTH EVERY DAY AT NIGHT    Fluticasone-Umeclidin-Vilant (TRELEGY ELLIPTA) 200-62.5-25 MCG/INH AEPB 1 puff, Inhalation, DAILY    metoprolol tartrate (LOPRESSOR) 50 MG tablet TAKE 1/2 TABLET BY MOUTH EVERY MORNING AND 1/2 TABLET EVERY EVENING.    mirtazapine (REMERON) 30 mg, Oral, NIGHTLY    Ostomy Supplies MISC 1 Units, Other, PRN

## 2024-01-25 ENCOUNTER — OFFICE VISIT (OUTPATIENT)
Dept: PULMONOLOGY | Age: 69
End: 2024-01-25

## 2024-01-25 VITALS
RESPIRATION RATE: 16 BRPM | OXYGEN SATURATION: 97 % | HEIGHT: 65 IN | TEMPERATURE: 97.9 F | BODY MASS INDEX: 19.83 KG/M2 | HEART RATE: 67 BPM | SYSTOLIC BLOOD PRESSURE: 122 MMHG | DIASTOLIC BLOOD PRESSURE: 80 MMHG | WEIGHT: 119 LBS

## 2024-01-25 DIAGNOSIS — J44.9 CHRONIC OBSTRUCTIVE PULMONARY DISEASE, UNSPECIFIED COPD TYPE (HCC): Primary | ICD-10-CM

## 2024-01-25 DIAGNOSIS — Z87.891 PERSONAL HISTORY OF TOBACCO USE, PRESENTING HAZARDS TO HEALTH: ICD-10-CM

## 2024-01-25 DIAGNOSIS — G47.34 NOCTURNAL HYPOXEMIA: ICD-10-CM

## 2024-01-25 DIAGNOSIS — R42 DIZZINESS: ICD-10-CM

## 2024-01-25 NOTE — PATIENT INSTRUCTIONS
Plans for today:   Lets work on some airway clearance strategies that might make your life better.  We will help with the forms for Trelegy ellipta.   Bring with you a list of what treatments of Adrianna's you have at home so I can help you use it the best way possible.  We will arrange a CT of the chest to screen for lung cancer.  You are due for some lung function tests before your next visit.  Please cut back how much coffee you drink.  I'm going to order a Holter monitor to investigate the episodes of dizziness you have been having.      For airway clearance:    Hot drinks help.    Nebulized albuterol helps  A flutter valve can be helpful.  It is cheapest if purchased off of [employer] for around 30 dollars.  You breathe out through it after an albuterol treatment.  Below is a recipe for hypertonic 3% saline that can be used 1-2 times daily for tenacious mucus.  I have a patient that swears by lemon juice and local honey for her cough and mucus.    Home Made 3% Saline for Nebulizer:    Homemade saline solution     Ingredients:  4 cups of distilled or boiled (for at least 20 minutes) water  6 teaspoons (tsp) of noniodized salt  an airtight storage container with a lid, such as a bottle  a mixing utensil  To make a smaller batch, use 1 cup of water with 1 & 1/2 tsp of salt.    If using tap water, boil it first for at least 20 minutes to sterilize the water and remove any bacteria and chemicals. Let it cool before use. Avoid using sea salt, as it contains additional minerals.    Instructions:  Wash the hands thoroughly  sterilize the container and mixing utensil by using a  or boiling them in water  pour the water into the container  mix in the salt and stir until completely dissolved  let the mixture cool before use  Store the saline solution in the airtight container. Research suggests that bacteria can grow in homemade saline solution within 24 hours, and that bacteria are less likely to grow when saline is

## 2024-02-08 ENCOUNTER — HOSPITAL ENCOUNTER (OUTPATIENT)
Dept: CT IMAGING | Age: 69
Discharge: HOME | End: 2024-02-08
Attending: INTERNAL MEDICINE
Payer: MEDICARE

## 2024-02-08 VITALS — WEIGHT: 120 LBS | HEIGHT: 65 IN | BODY MASS INDEX: 19.99 KG/M2

## 2024-02-08 DIAGNOSIS — Z87.891 PERSONAL HISTORY OF TOBACCO USE, PRESENTING HAZARDS TO HEALTH: ICD-10-CM

## 2024-02-08 PROCEDURE — 71271 CT THORAX LUNG CANCER SCR C-: CPT

## 2024-02-14 ENCOUNTER — TELEPHONE (OUTPATIENT)
Dept: PULMONOLOGY | Age: 69
End: 2024-02-14

## 2024-02-14 DIAGNOSIS — R91.8 ABNORMAL CT LUNG SCREENING: Primary | ICD-10-CM

## 2024-02-14 NOTE — TELEPHONE ENCOUNTER
has call transferred urgently as patient reporting he is upset and in the parking lot, needs to have conversation with staff immediately. Demanding results from recent CT.    Notes he has been trying to call since Monday of this week.   While he was enroute to the office was able to connect the call.  Notes he is worried b/c of dizziness and history of cancer.     Able to reassure patient that information is not being withheld, and that an electronic message can be sent to MD today. Confirmed phone number and advised that as MD is able will provide impression from report (that he has reviewed on MyChart). Advised will call back with MD response.

## 2024-02-14 NOTE — TELEPHONE ENCOUNTER
Perfect Serve communication w/ Dr. Frederick  2/14/2024 12:19 PM  Please let him know that overall interpretation recommends close follow up in 3 mos for what appears to be an inflammatory 1.5cm nodule in RUL. The Ct of chest doesn’t explain the reason for the dizziness. If he has significant concern about cancer recurrence due to other symptoms, a PET scan could be considered rather than 3 month CT. My recommendation is same as radiology’s, repeat CT in mid-May    Contacted patient and relayed MD recommendations, order placed for f/u CT. Patient will ensure scheduled and make office appt after CT.

## 2024-02-21 ENCOUNTER — OFFICE VISIT (OUTPATIENT)
Age: 69
End: 2024-02-21
Payer: MEDICARE

## 2024-02-21 VITALS
WEIGHT: 119 LBS | HEIGHT: 65 IN | BODY MASS INDEX: 19.83 KG/M2 | SYSTOLIC BLOOD PRESSURE: 116 MMHG | DIASTOLIC BLOOD PRESSURE: 68 MMHG | HEART RATE: 64 BPM

## 2024-02-21 DIAGNOSIS — I65.23 BILATERAL CAROTID ARTERY STENOSIS: ICD-10-CM

## 2024-02-21 DIAGNOSIS — I10 PRIMARY HYPERTENSION: ICD-10-CM

## 2024-02-21 DIAGNOSIS — I25.10 CORONARY ARTERY DISEASE INVOLVING NATIVE CORONARY ARTERY OF NATIVE HEART WITHOUT ANGINA PECTORIS: ICD-10-CM

## 2024-02-21 DIAGNOSIS — I73.9 PVD (PERIPHERAL VASCULAR DISEASE) (HCC): Primary | ICD-10-CM

## 2024-02-21 PROCEDURE — 3017F COLORECTAL CA SCREEN DOC REV: CPT | Performed by: INTERNAL MEDICINE

## 2024-02-21 PROCEDURE — 99214 OFFICE O/P EST MOD 30 MIN: CPT | Performed by: INTERNAL MEDICINE

## 2024-02-21 PROCEDURE — 3078F DIAST BP <80 MM HG: CPT | Performed by: INTERNAL MEDICINE

## 2024-02-21 PROCEDURE — G8484 FLU IMMUNIZE NO ADMIN: HCPCS | Performed by: INTERNAL MEDICINE

## 2024-02-21 PROCEDURE — 1123F ACP DISCUSS/DSCN MKR DOCD: CPT | Performed by: INTERNAL MEDICINE

## 2024-02-21 PROCEDURE — G8420 CALC BMI NORM PARAMETERS: HCPCS | Performed by: INTERNAL MEDICINE

## 2024-02-21 PROCEDURE — G8428 CUR MEDS NOT DOCUMENT: HCPCS | Performed by: INTERNAL MEDICINE

## 2024-02-21 PROCEDURE — 4004F PT TOBACCO SCREEN RCVD TLK: CPT | Performed by: INTERNAL MEDICINE

## 2024-02-21 PROCEDURE — 3074F SYST BP LT 130 MM HG: CPT | Performed by: INTERNAL MEDICINE

## 2024-02-21 NOTE — PROGRESS NOTES
[address] [employer] [facility], [address] [place] [address]  PHONE: [phone]     24    NAME:  Ulises Cobos  : 1955  MRN: [id number]       SUBJECTIVE:   Ulises Cobos is a 68 y.o. male seen for a follow up visit regarding the following:     Chief Complaint   Patient presents with    Coronary Artery Disease       HPI:   Here after 3v CABG 18.  EF normal.  (left AMA before the CABG)   Left CEA 2018----followed by vascular surgery   Echo 3/2019: normal EF, no valve dz.    Carotid US 2020 and 2022: TALYA ~50%       now.  Struggling now.  More home stress, more legal issues since passing of spouse.    He admits to worsening anxiety.    Some dizziness now.  BP lower now.   More leg pains, worsening some days now.    NO new angina, CP.  NO new LOZADA. No SOB.  No edema.       He has h/o bladder CA, has supra-pubic catheter.     Patient denies recent history of orthopnea, PND, excessive dizziness and/or syncope.    Stopped alcohol, still smoking.       Past Medical History, Past Surgical History, Family history, Social History, and Medications were all reviewed with the patient today and updated as necessary.     Current Outpatient Medications   Medication Sig Dispense Refill    fluticasone-umeclidin-vilant (TRELEGY ELLIPTA) 200-62.5-25 MCG/ACT AEPB inhaler Inhale 1 puff into the lungs daily 3 each 3    metoprolol tartrate (LOPRESSOR) 50 MG tablet TAKE 1/2 TABLET BY MOUTH EVERY MORNING AND 1/2 TABLET EVERY EVENING. 90 tablet 7    mirtazapine (REMERON) 15 MG tablet Take 2 tablets by mouth nightly 180 tablet 1    atorvastatin (LIPITOR) 40 MG tablet TAKE 1 TABLET BY MOUTH EVERY DAY AT NIGHT 90 tablet 3    albuterol sulfate HFA (PROVENTIL;VENTOLIN;PROAIR) 108 (90 Base) MCG/ACT inhaler Inhale 2 puffs into the lungs every 6 hours as needed for Wheezing 1 each 11    Ostomy Supplies MISC 1 Units by Other route as needed      aspirin 81 MG chewable tablet Take 1 tablet by mouth daily

## 2024-02-27 ENCOUNTER — OFFICE VISIT (OUTPATIENT)
Dept: VASCULAR SURGERY | Age: 69
End: 2024-02-27
Payer: MEDICARE

## 2024-02-27 VITALS
HEART RATE: 64 BPM | WEIGHT: 120 LBS | OXYGEN SATURATION: 97 % | DIASTOLIC BLOOD PRESSURE: 75 MMHG | BODY MASS INDEX: 19.99 KG/M2 | SYSTOLIC BLOOD PRESSURE: 157 MMHG | HEIGHT: 65 IN

## 2024-02-27 DIAGNOSIS — I73.9 PVD (PERIPHERAL VASCULAR DISEASE) WITH CLAUDICATION (HCC): Primary | ICD-10-CM

## 2024-02-27 PROCEDURE — 3078F DIAST BP <80 MM HG: CPT | Performed by: SURGERY

## 2024-02-27 PROCEDURE — 4004F PT TOBACCO SCREEN RCVD TLK: CPT | Performed by: SURGERY

## 2024-02-27 PROCEDURE — G8420 CALC BMI NORM PARAMETERS: HCPCS | Performed by: SURGERY

## 2024-02-27 PROCEDURE — 99213 OFFICE O/P EST LOW 20 MIN: CPT | Performed by: SURGERY

## 2024-02-27 PROCEDURE — 3077F SYST BP >= 140 MM HG: CPT | Performed by: SURGERY

## 2024-02-27 PROCEDURE — G8427 DOCREV CUR MEDS BY ELIG CLIN: HCPCS | Performed by: SURGERY

## 2024-02-27 PROCEDURE — 3017F COLORECTAL CA SCREEN DOC REV: CPT | Performed by: SURGERY

## 2024-02-27 PROCEDURE — G8484 FLU IMMUNIZE NO ADMIN: HCPCS | Performed by: SURGERY

## 2024-02-27 PROCEDURE — 1123F ACP DISCUSS/DSCN MKR DOCD: CPT | Performed by: SURGERY

## 2024-02-27 NOTE — PROGRESS NOTES
disease) ([facility])     S/P CABG x 3 09/2018    Staghorn renal calculus     Left sided    Status post ileal conduit ([facility]) 2014    Tobacco use disorder, continuous     0.5 ppd since age 14       Physical Examination:   Height: 1.651 m (5' 5\"), Weight - Scale: 54.4 kg (120 lb), BP: (!) 157/75    Constitutional: he appears well-developed. No distress.   HENT:   Head: Atraumatic.   Eyes: Pupils are equal, round, and reactive to light.   Neck: Normal range of motion.   Cardiovascular: Regular rhythm.    Pulmonary/Chest: Effort normal and breath sounds normal. No respiratory distress.   Abdominal: Soft. Bowel sounds are normal. he exhibits no distension. There is no tenderness. There is no guarding. No hernia.   Musculoskeletal: Normal range of motion.   Neurological: He is alert. CN II- XII grossly intact   Vascular: Palpable femoral pulses nonpalpable pedal pulses    Imaging:  CIPRIANO 0.85 on the right and toe pressure of 86 mmHg  On the left CIPRIANO 0.66 and toe pressure 48 mmHg        Recommendations/Plans:   Mr. Ulises Cobos is a 68 y.o. year old male with mild left common femoral disease with ABIs grade .6 and toe pressure gradient 40 mmHg.  Patient does not have any evidence of any claudication or rest pain symptoms.  Will follow-up in 6 months with a duplex study.  If he were develop at may be of benefit from a left common femoral endarterectomy.  Patient to continue his aspirin cholesterol medication    TERRELL BRUSH MD    Elements of this note have been dictated using speech recognition software. As a result, errors of speech recognition may have occurred.    20 minutes of time was spent on this encounter including chart review, assessment, evaluation and coordination of patient care

## 2024-02-29 ENCOUNTER — TELEPHONE (OUTPATIENT)
Dept: UROLOGY | Age: 69
End: 2024-02-29

## 2024-02-29 ENCOUNTER — OFFICE VISIT (OUTPATIENT)
Dept: UROLOGY | Age: 69
End: 2024-02-29
Payer: MEDICARE

## 2024-02-29 DIAGNOSIS — C67.9 MALIGNANT NEOPLASM OF URINARY BLADDER, UNSPECIFIED SITE (HCC): ICD-10-CM

## 2024-02-29 DIAGNOSIS — N20.0 RENAL STONE: Primary | ICD-10-CM

## 2024-02-29 LAB
BILIRUBIN, URINE, POC: NEGATIVE
BLOOD URINE, POC: NEGATIVE
GLUCOSE URINE, POC: NEGATIVE
KETONES, URINE, POC: NEGATIVE
LEUKOCYTE ESTERASE, URINE, POC: ABNORMAL
NITRITE, URINE, POC: POSITIVE
PH, URINE, POC: 8.5 (ref 4.6–8)
PROTEIN,URINE, POC: NEGATIVE
SPECIFIC GRAVITY, URINE, POC: 1.01 (ref 1–1.03)
URINALYSIS CLARITY, POC: ABNORMAL
URINALYSIS COLOR, POC: ABNORMAL
UROBILINOGEN, POC: ABNORMAL

## 2024-02-29 PROCEDURE — 3017F COLORECTAL CA SCREEN DOC REV: CPT | Performed by: NURSE PRACTITIONER

## 2024-02-29 PROCEDURE — G8420 CALC BMI NORM PARAMETERS: HCPCS | Performed by: NURSE PRACTITIONER

## 2024-02-29 PROCEDURE — G8484 FLU IMMUNIZE NO ADMIN: HCPCS | Performed by: NURSE PRACTITIONER

## 2024-02-29 PROCEDURE — G8427 DOCREV CUR MEDS BY ELIG CLIN: HCPCS | Performed by: NURSE PRACTITIONER

## 2024-02-29 PROCEDURE — 4004F PT TOBACCO SCREEN RCVD TLK: CPT | Performed by: NURSE PRACTITIONER

## 2024-02-29 PROCEDURE — 1123F ACP DISCUSS/DSCN MKR DOCD: CPT | Performed by: NURSE PRACTITIONER

## 2024-02-29 PROCEDURE — 81003 URINALYSIS AUTO W/O SCOPE: CPT | Performed by: NURSE PRACTITIONER

## 2024-02-29 PROCEDURE — 99214 OFFICE O/P EST MOD 30 MIN: CPT | Performed by: NURSE PRACTITIONER

## 2024-02-29 ASSESSMENT — ENCOUNTER SYMPTOMS
BACK PAIN: 0
NAUSEA: 0

## 2024-02-29 NOTE — PROGRESS NOTES
[facility]  [address]  [place] [address]  [phone]          Ulises Cobos  : 1955    Chief Complaint   Patient presents with    Follow-up          HPI     Ulises Cobos is a 68 y.o. male w h/o cystectomy and has an ileal conduit urinary diversion with ostomy bag in the right lower quadrant. Attempted a left percutaneous nephrostolithotomy the upper pole access but could not get into the lower pole moiety.  He had a stent and with a nephrostomy tube came out the stent came out as well.  Could not get access to left lower pole stone and had ESWL.  Left lower pole renal extracorporeal shockwave therapy on 18. KUB after ESWL showed a lot of bowel gas however and there was a small calcification near the left lower pole where the previous stone had been.      He was recently seen for gross hematuria. CT urogram showed 2 non-obstructing left renal pelvic stones (3-4 mm in size). No gross abnormalities of ileal conduit in the right anterior pelvis.     He was also having h/o of intermittent left flank pain. Assoc with gross hematuria. Some nausea.      After discussing his case with Dr Singleton, he elected to try L ESWL. Unfortunately, the patient developed retroperitoneal hematoma.      Repeat CT showed improving hematoma. Creatinine was stable at 0.82 and HGB up to 12.3.     Here today for follow up. Reports occ L flank pain. No gross hematuria. No passed stone.     He orders his stomy supplies using [email or web address].     **his girlfriend, who was also a patient of mine, Ms Adrianna Dwyer, passed away 10/22. Of note, his father also passed away in . Under a lot of stress.             Past Medical History:   Diagnosis Date    Adverse effect of anesthesia     hard to awaken    CAD (coronary artery disease) 2018    Cancer (HCC)     bladder    Carotid stenosis, bilateral     S/P left endarterectomy,   TALYA 30% per CTA    Chronic neck pain     Chronic obstructive

## 2024-02-29 NOTE — TELEPHONE ENCOUNTER
Please contact [facility] to update patient's Rx for ostomy supplies.     Pari Briggs, APRN - CNP

## 2024-02-29 NOTE — PROGRESS NOTES
[facility]  [address] [place] [id number]  [phone]          Ulises Cobos  : 1955    Chief Complaint   Patient presents with   • Follow-up          HPI     Ulises Cobos is a 68 y.o. male          Past Medical History:   Diagnosis Date   • Adverse effect of anesthesia     hard to awaken   • CAD (coronary artery disease) 2018   • Cancer (HCC)     bladder   • Carotid stenosis, bilateral     S/P left endarterectomy,   TALYA 30% per CTA   • Chronic neck pain    • Chronic obstructive pulmonary disease (HCC)     no inhalers   • Depression with anxiety    • GERD (gastroesophageal reflux disease)     no meds   • History of bladder cancer 2014    Cystectomy with ileo conduit urinary diversion ----and chemo   • History of left-sided carotid endarterectomy    • History of neck surgery    • Hypertension    • Kidney stones    • Osteoarthritis    • PAD (peripheral artery disease) (HCC)    • S/P CABG x 3 2018   • Staghorn renal calculus     Left sided   • Status post ileal conduit ([facility])    • Tobacco use disorder, continuous     0.5 ppd since age 14     Past Surgical History:   Procedure Laterality Date   • CABG, ARTERY-VEIN, THREE  2018   • CARDIAC CATHETERIZATION  2018   • CAROTID ENDARTERECTOMY Left 2018   • CERVICAL FUSION      Dr. Spear   • CORONARY ARTERY BYPASS GRAFT  2018    triple bypass   • GI  2019    upper/lower GI   • HEMORRHOID SURGERY     • IR NEPHROURETERAL CATHETER PLACEMENT NEW ACCESS  5/15/2018    IR NEPHROURETERAL CATHETER PLACEMENT NEW ACCESS 5/15/2018 [facility]   • KNEE ARTHROSCOPY Left         • UROLOGICAL SURGERY  2015    TURBT- bladder removal- urostomy   • UROLOGICAL SURGERY Left     Nephrostomy   • UROLOGICAL SURGERY  2020    kidney stone   • UROLOGICAL SURGERY      benign tumor removed from L testicle     • VASCULAR SURGERY Right 2014    Port, removed 2016     Current Outpatient

## 2024-03-07 ENCOUNTER — TELEPHONE (OUTPATIENT)
Dept: SLEEP MEDICINE | Age: 69
End: 2024-03-07

## 2024-03-07 NOTE — TELEPHONE ENCOUNTER
----- Message from Elizabeth Frederick MD sent at 2/29/2024  5:25 PM EST -----  Please notify patient that the alpha-1 test was normal.  We performed this test to look a genetic form of COPD or airways disease.  We can review more fully in the future, but this is good news.

## 2024-04-17 ENCOUNTER — OFFICE VISIT (OUTPATIENT)
Dept: INTERNAL MEDICINE CLINIC | Facility: CLINIC | Age: 69
End: 2024-04-17
Payer: MEDICARE

## 2024-04-17 VITALS
HEART RATE: 54 BPM | DIASTOLIC BLOOD PRESSURE: 75 MMHG | HEIGHT: 65 IN | WEIGHT: 119.4 LBS | OXYGEN SATURATION: 96 % | TEMPERATURE: 97.5 F | SYSTOLIC BLOOD PRESSURE: 104 MMHG | BODY MASS INDEX: 19.89 KG/M2

## 2024-04-17 DIAGNOSIS — J44.9 CHRONIC OBSTRUCTIVE PULMONARY DISEASE, UNSPECIFIED COPD TYPE (HCC): ICD-10-CM

## 2024-04-17 DIAGNOSIS — I25.10 CORONARY ARTERY DISEASE INVOLVING NATIVE CORONARY ARTERY OF NATIVE HEART WITHOUT ANGINA PECTORIS: ICD-10-CM

## 2024-04-17 DIAGNOSIS — I10 PRIMARY HYPERTENSION: ICD-10-CM

## 2024-04-17 DIAGNOSIS — I73.9 PVD (PERIPHERAL VASCULAR DISEASE) (HCC): ICD-10-CM

## 2024-04-17 DIAGNOSIS — Z93.6 STATUS POST ILEAL CONDUIT (HCC): ICD-10-CM

## 2024-04-17 DIAGNOSIS — F41.8 DEPRESSION WITH ANXIETY: Primary | ICD-10-CM

## 2024-04-17 DIAGNOSIS — Z95.1 S/P CABG X 3: ICD-10-CM

## 2024-04-17 DIAGNOSIS — K21.9 GASTROESOPHAGEAL REFLUX DISEASE, UNSPECIFIED WHETHER ESOPHAGITIS PRESENT: ICD-10-CM

## 2024-04-17 DIAGNOSIS — Z85.51 HISTORY OF BLADDER CANCER: ICD-10-CM

## 2024-04-17 LAB
ANION GAP SERPL CALC-SCNC: 5 MMOL/L (ref 2–11)
BASOPHILS # BLD: 0.1 K/UL (ref 0–0.2)
BASOPHILS NFR BLD: 1 % (ref 0–2)
BUN SERPL-MCNC: 24 MG/DL (ref 8–23)
CALCIUM SERPL-MCNC: 9.3 MG/DL (ref 8.3–10.4)
CHLORIDE SERPL-SCNC: 106 MMOL/L (ref 103–113)
CHOLEST SERPL-MCNC: 144 MG/DL
CO2 SERPL-SCNC: 24 MMOL/L (ref 21–32)
CREAT SERPL-MCNC: 1.3 MG/DL (ref 0.8–1.5)
DIFFERENTIAL METHOD BLD: ABNORMAL
EOSINOPHIL # BLD: 0.2 K/UL (ref 0–0.8)
EOSINOPHIL NFR BLD: 2 % (ref 0.5–7.8)
ERYTHROCYTE [DISTWIDTH] IN BLOOD BY AUTOMATED COUNT: 13.6 % (ref 11.9–14.6)
GLUCOSE SERPL-MCNC: 100 MG/DL (ref 65–100)
HCT VFR BLD AUTO: 47.7 % (ref 41.1–50.3)
HDLC SERPL-MCNC: 48 MG/DL (ref 40–60)
HDLC SERPL: 3
HGB BLD-MCNC: 15.6 G/DL (ref 13.6–17.2)
IMM GRANULOCYTES # BLD AUTO: 0 K/UL (ref 0–0.5)
IMM GRANULOCYTES NFR BLD AUTO: 0 % (ref 0–5)
LDLC SERPL CALC-MCNC: 74.8 MG/DL
LYMPHOCYTES # BLD: 1.8 K/UL (ref 0.5–4.6)
LYMPHOCYTES NFR BLD: 26 % (ref 13–44)
MCH RBC QN AUTO: 34.6 PG (ref 26.1–32.9)
MCHC RBC AUTO-ENTMCNC: 32.7 G/DL (ref 31.4–35)
MCV RBC AUTO: 105.8 FL (ref 82–102)
MONOCYTES # BLD: 0.6 K/UL (ref 0.1–1.3)
MONOCYTES NFR BLD: 9 % (ref 4–12)
NEUTS SEG # BLD: 4.4 K/UL (ref 1.7–8.2)
NEUTS SEG NFR BLD: 62 % (ref 43–78)
NRBC # BLD: 0 K/UL (ref 0–0.2)
PLATELET # BLD AUTO: 373 K/UL (ref 150–450)
PMV BLD AUTO: 10.1 FL (ref 9.4–12.3)
POTASSIUM SERPL-SCNC: 4.7 MMOL/L (ref 3.5–5.1)
RBC # BLD AUTO: 4.51 M/UL (ref 4.23–5.6)
SODIUM SERPL-SCNC: 135 MMOL/L (ref 136–146)
TRIGL SERPL-MCNC: 106 MG/DL (ref 35–150)
VLDLC SERPL CALC-MCNC: 21.2 MG/DL (ref 6–23)
WBC # BLD AUTO: 7.1 K/UL (ref 4.3–11.1)

## 2024-04-17 PROCEDURE — 99214 OFFICE O/P EST MOD 30 MIN: CPT | Performed by: NURSE PRACTITIONER

## 2024-04-17 PROCEDURE — 3017F COLORECTAL CA SCREEN DOC REV: CPT | Performed by: NURSE PRACTITIONER

## 2024-04-17 PROCEDURE — G8427 DOCREV CUR MEDS BY ELIG CLIN: HCPCS | Performed by: NURSE PRACTITIONER

## 2024-04-17 PROCEDURE — G8420 CALC BMI NORM PARAMETERS: HCPCS | Performed by: NURSE PRACTITIONER

## 2024-04-17 PROCEDURE — G2211 COMPLEX E/M VISIT ADD ON: HCPCS | Performed by: NURSE PRACTITIONER

## 2024-04-17 PROCEDURE — 3074F SYST BP LT 130 MM HG: CPT | Performed by: NURSE PRACTITIONER

## 2024-04-17 PROCEDURE — 4004F PT TOBACCO SCREEN RCVD TLK: CPT | Performed by: NURSE PRACTITIONER

## 2024-04-17 PROCEDURE — 3023F SPIROM DOC REV: CPT | Performed by: NURSE PRACTITIONER

## 2024-04-17 PROCEDURE — 1123F ACP DISCUSS/DSCN MKR DOCD: CPT | Performed by: NURSE PRACTITIONER

## 2024-04-17 PROCEDURE — 3078F DIAST BP <80 MM HG: CPT | Performed by: NURSE PRACTITIONER

## 2024-04-17 RX ORDER — MIRTAZAPINE 15 MG/1
30 TABLET, FILM COATED ORAL NIGHTLY
Qty: 60 TABLET | Refills: 5 | Status: SHIPPED | OUTPATIENT
Start: 2024-04-17

## 2024-04-17 RX ORDER — OMEPRAZOLE 20 MG/1
20 CAPSULE, DELAYED RELEASE ORAL
Qty: 30 CAPSULE | Refills: 5 | Status: SHIPPED | OUTPATIENT
Start: 2024-04-17

## 2024-04-17 SDOH — ECONOMIC STABILITY: INCOME INSECURITY: HOW HARD IS IT FOR YOU TO PAY FOR THE VERY BASICS LIKE FOOD, HOUSING, MEDICAL CARE, AND HEATING?: NOT HARD AT ALL

## 2024-04-17 SDOH — ECONOMIC STABILITY: FOOD INSECURITY: WITHIN THE PAST 12 MONTHS, THE FOOD YOU BOUGHT JUST DIDN'T LAST AND YOU DIDN'T HAVE MONEY TO GET MORE.: NEVER TRUE

## 2024-04-17 SDOH — ECONOMIC STABILITY: FOOD INSECURITY: WITHIN THE PAST 12 MONTHS, YOU WORRIED THAT YOUR FOOD WOULD RUN OUT BEFORE YOU GOT MONEY TO BUY MORE.: NEVER TRUE

## 2024-04-17 ASSESSMENT — PATIENT HEALTH QUESTIONNAIRE - PHQ9
6. FEELING BAD ABOUT YOURSELF - OR THAT YOU ARE A FAILURE OR HAVE LET YOURSELF OR YOUR FAMILY DOWN: NOT AT ALL
9. THOUGHTS THAT YOU WOULD BE BETTER OFF DEAD, OR OF HURTING YOURSELF: NOT AT ALL
SUM OF ALL RESPONSES TO PHQ QUESTIONS 1-9: 0
10. IF YOU CHECKED OFF ANY PROBLEMS, HOW DIFFICULT HAVE THESE PROBLEMS MADE IT FOR YOU TO DO YOUR WORK, TAKE CARE OF THINGS AT HOME, OR GET ALONG WITH OTHER PEOPLE: NOT DIFFICULT AT ALL
4. FEELING TIRED OR HAVING LITTLE ENERGY: NOT AT ALL
SUM OF ALL RESPONSES TO PHQ QUESTIONS 1-9: 0
2. FEELING DOWN, DEPRESSED OR HOPELESS: NOT AT ALL
SUM OF ALL RESPONSES TO PHQ QUESTIONS 1-9: 0
3. TROUBLE FALLING OR STAYING ASLEEP: NOT AT ALL
1. LITTLE INTEREST OR PLEASURE IN DOING THINGS: NOT AT ALL
7. TROUBLE CONCENTRATING ON THINGS, SUCH AS READING THE NEWSPAPER OR WATCHING TELEVISION: NOT AT ALL
8. MOVING OR SPEAKING SO SLOWLY THAT OTHER PEOPLE COULD HAVE NOTICED. OR THE OPPOSITE, BEING SO FIGETY OR RESTLESS THAT YOU HAVE BEEN MOVING AROUND A LOT MORE THAN USUAL: NOT AT ALL
SUM OF ALL RESPONSES TO PHQ QUESTIONS 1-9: 0
SUM OF ALL RESPONSES TO PHQ9 QUESTIONS 1 & 2: 0
5. POOR APPETITE OR OVEREATING: NOT AT ALL

## 2024-04-17 ASSESSMENT — ENCOUNTER SYMPTOMS
ABDOMINAL PAIN: 0
VOMITING: 0
DIARRHEA: 0
WHEEZING: 0
COUGH: 0
NAUSEA: 0
SHORTNESS OF BREATH: 0

## 2024-04-17 NOTE — PROGRESS NOTES
[facility]  Office Visit Note    Subjective:  Chief Complaint   Patient presents with    Hypertension       Patient ID: Ulises Cobos is a 68 y.o. male presenting to the office for the above.    68-year-old male for follow up.  Was a patient of Dr. Allen.   He was a commercial printer.  Has one son, who is mentally challenged.       Depression/anxiety--  Previous HPI:   Lives with his 75yo girlfriend.  He is doing a lot to care for her, and this causes increased stress and anxiety. There is no other family to help with her care.  He has one son, who is mentally challenged. He reports poor appetite due to his nerves.   He reports having had a mental breakdown years ago, with suicide attempt.  Became an alcoholic; reports \"I don't drink much anymore\" (unable to give an actual amount that he drinks, but states it has been several days).    States he was told at some time in the past that he needs to go to a psychiatric hospital for evaluation, but he adamantly states he will never do this, as he cannot leave his girlfriend alone.  Denies current thoughts of hurting himself or others, though he has had suicidal thoughts in the past (denies a plan for suicide).    Referral was placed to psychiatry; states he was unable to go for an appointment because he is scared to leave his girlfriend home alone, scared she will start a fire by falling asleep with a cigarette.  States that she is sleeping right now while he is at this appointment.  States that he needs assistance coping with the stress of caring for her.   6/9/22:   Was referred to social work for assistance.  Has been given multiple resources, but has not followed up on all these himself; advised him of the need to do what he can to help himself.    States he had a virtual appointment with Dr. Fuller at [facility].  Was prescribed Remeron; states it was not helping and he is no longer taking it.  Advised to call them for follow up and to

## 2024-05-15 ENCOUNTER — HOSPITAL ENCOUNTER (OUTPATIENT)
Dept: CT IMAGING | Age: 69
Discharge: HOME | End: 2024-05-18
Attending: INTERNAL MEDICINE
Payer: MEDICARE

## 2024-05-15 DIAGNOSIS — R91.8 ABNORMAL CT LUNG SCREENING: ICD-10-CM

## 2024-05-15 PROCEDURE — 71250 CT THORAX DX C-: CPT

## 2024-05-17 ENCOUNTER — TELEPHONE (OUTPATIENT)
Dept: PULMONOLOGY | Age: 69
End: 2024-05-17

## 2024-05-17 DIAGNOSIS — R91.1 PULMONARY NODULE: Primary | ICD-10-CM

## 2024-05-17 NOTE — TELEPHONE ENCOUNTER
----- Message from Elizabeth Frederick MD sent at 5/16/2024  2:42 PM EDT -----  Please let patient know that there are some persistent changes on CT.  Please confirm he is no longer smoking.  Follow up imaging is recommended in 6 months.  If he agrees, please arrange.  Doesn't look like his appointment has been scheduled in late July, but would try to get that set up, and we can discuss the imaging in more detail.

## 2024-05-17 NOTE — TELEPHONE ENCOUNTER
Spoke to patient and went over ct chest results and he agrees to follow up ct in 6 months. Made follow up appt for 8/21/24 with Otoniel 1st appointment available. Order placed for ct and he voices understanding.

## 2024-06-04 ENCOUNTER — OFFICE VISIT (OUTPATIENT)
Age: 69
End: 2024-06-04
Payer: MEDICARE

## 2024-06-04 VITALS
BODY MASS INDEX: 20.16 KG/M2 | WEIGHT: 121 LBS | SYSTOLIC BLOOD PRESSURE: 136 MMHG | HEIGHT: 65 IN | HEART RATE: 72 BPM | DIASTOLIC BLOOD PRESSURE: 82 MMHG

## 2024-06-04 DIAGNOSIS — I25.10 ATHEROSCLEROSIS OF NATIVE CORONARY ARTERY OF NATIVE HEART WITHOUT ANGINA PECTORIS: ICD-10-CM

## 2024-06-04 DIAGNOSIS — Z72.0 TOBACCO USE: ICD-10-CM

## 2024-06-04 DIAGNOSIS — I65.23 BILATERAL CAROTID ARTERY STENOSIS: ICD-10-CM

## 2024-06-04 DIAGNOSIS — I25.10 CORONARY ARTERY DISEASE INVOLVING NATIVE CORONARY ARTERY OF NATIVE HEART WITHOUT ANGINA PECTORIS: ICD-10-CM

## 2024-06-04 DIAGNOSIS — I73.9 PVD (PERIPHERAL VASCULAR DISEASE) (HCC): Primary | ICD-10-CM

## 2024-06-04 DIAGNOSIS — I10 PRIMARY HYPERTENSION: ICD-10-CM

## 2024-06-04 DIAGNOSIS — Z95.1 S/P CABG X 3: ICD-10-CM

## 2024-06-04 PROCEDURE — G8427 DOCREV CUR MEDS BY ELIG CLIN: HCPCS | Performed by: INTERNAL MEDICINE

## 2024-06-04 PROCEDURE — 3017F COLORECTAL CA SCREEN DOC REV: CPT | Performed by: INTERNAL MEDICINE

## 2024-06-04 PROCEDURE — 1123F ACP DISCUSS/DSCN MKR DOCD: CPT | Performed by: INTERNAL MEDICINE

## 2024-06-04 PROCEDURE — 99214 OFFICE O/P EST MOD 30 MIN: CPT | Performed by: INTERNAL MEDICINE

## 2024-06-04 PROCEDURE — G8420 CALC BMI NORM PARAMETERS: HCPCS | Performed by: INTERNAL MEDICINE

## 2024-06-04 PROCEDURE — 3079F DIAST BP 80-89 MM HG: CPT | Performed by: INTERNAL MEDICINE

## 2024-06-04 PROCEDURE — 3075F SYST BP GE 130 - 139MM HG: CPT | Performed by: INTERNAL MEDICINE

## 2024-06-04 PROCEDURE — 4004F PT TOBACCO SCREEN RCVD TLK: CPT | Performed by: INTERNAL MEDICINE

## 2024-06-04 RX ORDER — METOPROLOL TARTRATE 50 MG/1
TABLET, FILM COATED ORAL
Qty: 90 TABLET | Refills: 3 | Status: SHIPPED | OUTPATIENT
Start: 2024-06-04

## 2024-06-04 RX ORDER — ATORVASTATIN CALCIUM 40 MG/1
TABLET, FILM COATED ORAL
Qty: 90 TABLET | Refills: 3 | Status: SHIPPED | OUTPATIENT
Start: 2024-06-04

## 2024-06-04 NOTE — PROGRESS NOTES
[address] [employer] [facility], [address] [place] [address]  PHONE: [phone]     24    NAME:  Ulises Cobos  : 1955  MRN: [id number]       SUBJECTIVE:   Ulises Cobos is a 68 y.o. male seen for a follow up visit regarding the following:     Chief Complaint   Patient presents with    Coronary Artery Disease       HPI:   Here after 3v CABG 18.  EF normal.  (left AMA before the CABG)   Left CEA 2018----followed by vascular surgery   Echo 3/2019: normal EF, no valve dz.    Carotid US 2020 and 2022: TALYA ~50%      now, 2 yrs now.  Struggling now.  More home stress, more legal issues since passing of spouse.    Off the norvasc.  Some msk pains, not active.    No new angina, CP.  NO new LOZADA. No SOB.  No edema.    Some left neck pains.      He has h/o bladder CA, has supra-pubic catheter.     Patient denies recent history of orthopnea, PND, excessive dizziness and/or syncope.     Stopped alcohol, still smoking.         Past Medical History, Past Surgical History, Family history, Social History, and Medications were all reviewed with the patient today and updated as necessary.     Current Outpatient Medications   Medication Sig Dispense Refill    metoprolol tartrate (LOPRESSOR) 50 MG tablet TAKE 1/2 TABLET BY MOUTH EVERY MORNING AND 1/2 TABLET EVERY EVENING. 90 tablet 3    atorvastatin (LIPITOR) 40 MG tablet TAKE 1 TABLET BY MOUTH EVERY DAY AT NIGHT 90 tablet 3    mirtazapine (REMERON) 15 MG tablet Take 2 tablets by mouth nightly 60 tablet 5    omeprazole (PRILOSEC) 20 MG delayed release capsule Take 1 capsule by mouth every morning (before breakfast) 30 capsule 5    fluticasone-umeclidin-vilant (TRELEGY ELLIPTA) 200-62.5-25 MCG/ACT AEPB inhaler Inhale 1 puff into the lungs daily 3 each 3    albuterol sulfate HFA (PROVENTIL;VENTOLIN;PROAIR) 108 (90 Base) MCG/ACT inhaler Inhale 2 puffs into the lungs every 6 hours as needed for Wheezing 1 each 11    Ostomy Supplies MISC 1 Units by

## 2024-08-16 NOTE — PROGRESS NOTES
Patient Name:  Ulises Cobos                               YOB: 1955  MRN: [id number]                                                Office Visit 8/21/2024    ASSESSMENT AND PLAN:  (Medical Decision Making)    1. Chronic obstructive pulmonary disease, unspecified COPD type (HCC)  [x]  Smoking cessation-the vital impact of smoking cessation/abstinence was emphasized. Patient's tobacco use confirmed as documented in the medical record.  Discussed various smoking cessation products including pills, patches, inhaler, gum, weaning self off, \"cold turkey\", and smoking cessation classes.  Discussed also with patient disease risk of ongoing smoking including CVA, lung cancer, and heart disease.  At this point, patient's commitment to quitting is high.  7 minutes were spent counseling patient regarding tobacco cessation.  [x]  Pneumococcal vaccinationis due  [x]  Supplemental oxygen and its benefits for those with hypoxemia were discussed.   [x]  Alpha-1 antitrypsin testing was recommended and was performed. PI*MM 2/23/24  []  Pulmonary rehab may be beneficial in the future.  [x]  Bronchodilator therapy- Educated the patient about the role of  bronchodilators for long-term improvement in symptoms, exercise capacity and airflow limitation.  The specific uses of short-term & long-term bronchodilators were reviewed.  Plan to use 3% saline for mucus clearance followed by albuterol/yupelri mixed every morning for one month to see if it helps with his cough and dyspnea.      2. Personal history of tobacco use, presenting hazards to health  Cessation discussed as above.      3. Pulmonary nodule  F/u CT recommended in November.    No orders of the defined types were placed in this encounter.    No orders of the defined types were placed in this encounter.    Follow-up and Dispositions    Return in about 6 months (around 2/21/2025) for with Otoniel.       Elizabeth Frederick MD    Total time for encounter on day of

## 2024-08-21 ENCOUNTER — OFFICE VISIT (OUTPATIENT)
Dept: PULMONOLOGY | Age: 69
End: 2024-08-21

## 2024-08-21 VITALS
DIASTOLIC BLOOD PRESSURE: 76 MMHG | SYSTOLIC BLOOD PRESSURE: 126 MMHG | OXYGEN SATURATION: 99 % | HEART RATE: 81 BPM | WEIGHT: 120 LBS | HEIGHT: 65 IN | TEMPERATURE: 98.1 F | RESPIRATION RATE: 16 BRPM | BODY MASS INDEX: 19.99 KG/M2

## 2024-08-21 DIAGNOSIS — J44.9 CHRONIC OBSTRUCTIVE PULMONARY DISEASE, UNSPECIFIED COPD TYPE (HCC): Primary | ICD-10-CM

## 2024-08-21 DIAGNOSIS — R91.1 PULMONARY NODULE: ICD-10-CM

## 2024-08-21 DIAGNOSIS — Z87.891 PERSONAL HISTORY OF TOBACCO USE, PRESENTING HAZARDS TO HEALTH: ICD-10-CM

## 2024-08-21 RX ORDER — SODIUM CHLORIDE FOR INHALATION 3 %
4 VIAL, NEBULIZER (ML) INHALATION DAILY
Qty: 120 ML | Refills: 5 | Status: SHIPPED | OUTPATIENT
Start: 2024-08-21

## 2024-08-21 RX ORDER — ALBUTEROL SULFATE 2.5 MG/3ML
2.5 SOLUTION RESPIRATORY (INHALATION) EVERY 6 HOURS PRN
Qty: 120 EACH | Refills: 3 | Status: SHIPPED | OUTPATIENT
Start: 2024-08-21

## 2024-08-21 NOTE — PATIENT INSTRUCTIONS
medications, such as nicotine replacement therapy, varenicline (brand name: Chantix), or bupropion (brand names: Zyban, Wellbutrin).    ?  Pick a date to quit smoking. Tell friends and family about your plan.    ?  Seek support through free telephone quitlines (eg, in the [place], 1-800-QUIT-NOW), text messaging programs, or other tools that can be accessed for free online (www.smokefree.gov).    ?  Begin making changes in your behavior; avoid situations that may tempt you to smoke.    ?  Start varenicline (one to four weeks before your quit date) or bupropion (two weeks before your quit date) or start nicotine replacement (on the day you quit).    ?  Prepare for withdrawal symptoms. Consider using nicotine replacement therapy (such as nicotine patch, gum, or lozenge) to help manage your symptoms. Try to resist the urge to smoke \"just one\" cigarette to get through a rough day. Consider support groups for encouragement as well as tips on coping with withdrawal.    WHERE TO GET MORE INFORMATION    Your health care provider is the best source of information for questions and concerns related to your medical problem.    This article will be updated as needed on our web site ([email or web address]). Related topics for patients, as well as selected articles written for health care professionals, are also available. Some of the most relevant are listed below.  The following organizations also provide useful health information.    ?  Smokefree.gov  (https://smokefree.gov/)    ?  National Heart, Lung and Blood [place]  (www.nhlbi.nih.gov/)    ?  American Lung Association  (www.lungusa.org/)    ?  American Heart Association  (www.americanheart.org)    ?  [place] Department of Health and Human Services  (www.BeTobaccoFree.gov)    ?  [place] Department of Public Health  (www.makesmokinghistory.org/)    ?  Agency for Healthcare Research and Quality  (www.ahrq.gov/consumer/tobacco/)   If the 3% saline

## 2024-08-27 ENCOUNTER — OFFICE VISIT (OUTPATIENT)
Dept: VASCULAR SURGERY | Age: 69
End: 2024-08-27
Payer: MEDICARE

## 2024-08-27 VITALS
DIASTOLIC BLOOD PRESSURE: 98 MMHG | OXYGEN SATURATION: 97 % | HEART RATE: 70 BPM | HEIGHT: 65 IN | SYSTOLIC BLOOD PRESSURE: 193 MMHG | BODY MASS INDEX: 19.97 KG/M2

## 2024-08-27 DIAGNOSIS — I73.9 PVD (PERIPHERAL VASCULAR DISEASE) WITH CLAUDICATION (HCC): Primary | ICD-10-CM

## 2024-08-27 PROCEDURE — 3080F DIAST BP >= 90 MM HG: CPT | Performed by: SURGERY

## 2024-08-27 PROCEDURE — G8420 CALC BMI NORM PARAMETERS: HCPCS | Performed by: SURGERY

## 2024-08-27 PROCEDURE — 99213 OFFICE O/P EST LOW 20 MIN: CPT | Performed by: SURGERY

## 2024-08-27 PROCEDURE — 3017F COLORECTAL CA SCREEN DOC REV: CPT | Performed by: SURGERY

## 2024-08-27 PROCEDURE — G8427 DOCREV CUR MEDS BY ELIG CLIN: HCPCS | Performed by: SURGERY

## 2024-08-27 PROCEDURE — 4004F PT TOBACCO SCREEN RCVD TLK: CPT | Performed by: SURGERY

## 2024-08-27 PROCEDURE — 3077F SYST BP >= 140 MM HG: CPT | Performed by: SURGERY

## 2024-08-27 PROCEDURE — 1123F ACP DISCUSS/DSCN MKR DOCD: CPT | Performed by: SURGERY

## 2024-08-27 NOTE — PROGRESS NOTES
[address] [address]  [phone] FAX: [phone]    Ulises Cobos  : 1955    Chief Complaint:     History of Present Illness:   Patient follows up today for follow-up duplex study.  Patient denies any claudication or rest pain symptoms.  He has a known bilateral popliteal artery stenosis.    CURRENT MEDICATIONS:  Current Outpatient Medications   Medication Sig Dispense Refill    albuterol (PROVENTIL) (2.5 MG/3ML) 0.083% nebulizer solution Take 3 mLs by nebulization every 6 hours as needed for Wheezing File to Medicare part B for COPD J44.9 120 each 3    sodium chloride, Inhalant, 3 % nebulizer solution Take 4 mLs by nebulization daily File to Medicare Part B for J44.9 COPD 120 mL 5    metoprolol tartrate (LOPRESSOR) 50 MG tablet TAKE 1/2 TABLET BY MOUTH EVERY MORNING AND 1/2 TABLET EVERY EVENING. 90 tablet 3    atorvastatin (LIPITOR) 40 MG tablet TAKE 1 TABLET BY MOUTH EVERY DAY AT NIGHT 90 tablet 3    mirtazapine (REMERON) 15 MG tablet Take 2 tablets by mouth nightly 60 tablet 5    omeprazole (PRILOSEC) 20 MG delayed release capsule Take 1 capsule by mouth every morning (before breakfast) 30 capsule 5    fluticasone-umeclidin-vilant (TRELEGY ELLIPTA) 200-62.5-25 MCG/ACT AEPB inhaler Inhale 1 puff into the lungs daily 3 each 3    albuterol sulfate HFA (PROVENTIL;VENTOLIN;PROAIR) 108 (90 Base) MCG/ACT inhaler Inhale 2 puffs into the lungs every 6 hours as needed for Wheezing 1 each 11    aspirin 81 MG chewable tablet Take 1 tablet by mouth daily      Ostomy Supplies MISC 1 Units by Other route as needed       No current facility-administered medications for this visit.       Past Medical History:   Diagnosis Date    Adverse effect of anesthesia     hard to awaken    CAD (coronary artery disease) 2018    Cancer (HCC)     bladder    Carotid stenosis, bilateral     S/P left endarterectomy,   TALYA 30% per CTA    Chronic neck pain     Chronic obstructive

## 2024-10-23 ENCOUNTER — OFFICE VISIT (OUTPATIENT)
Dept: INTERNAL MEDICINE CLINIC | Facility: CLINIC | Age: 69
End: 2024-10-23

## 2024-10-23 VITALS
BODY MASS INDEX: 19.56 KG/M2 | HEIGHT: 65 IN | DIASTOLIC BLOOD PRESSURE: 88 MMHG | OXYGEN SATURATION: 96 % | SYSTOLIC BLOOD PRESSURE: 130 MMHG | TEMPERATURE: 98.1 F | WEIGHT: 117.38 LBS | HEART RATE: 67 BPM

## 2024-10-23 DIAGNOSIS — J44.1 COPD EXACERBATION (HCC): ICD-10-CM

## 2024-10-23 DIAGNOSIS — J40 BRONCHITIS: Primary | ICD-10-CM

## 2024-10-23 RX ORDER — METHYLPREDNISOLONE 4 MG/1
TABLET ORAL
Qty: 1 KIT | Refills: 0 | Status: SHIPPED | OUTPATIENT
Start: 2024-10-23 | End: 2024-10-29

## 2024-10-23 RX ORDER — DOXYCYCLINE 100 MG/1
100 TABLET ORAL 2 TIMES DAILY
Qty: 14 TABLET | Refills: 0 | Status: SHIPPED | OUTPATIENT
Start: 2024-10-23 | End: 2024-10-30

## 2024-10-23 ASSESSMENT — ENCOUNTER SYMPTOMS
ABDOMINAL PAIN: 0
DIARRHEA: 0
COUGH: 1
SHORTNESS OF BREATH: 0
VOMITING: 0
NAUSEA: 0
WHEEZING: 1

## 2024-10-23 NOTE — PROGRESS NOTES
Mouth/Throat:      Mouth: Mucous membranes are moist.      Pharynx: Oropharynx is clear. No oropharyngeal exudate or posterior oropharyngeal erythema.   Eyes:      General: No scleral icterus.        Right eye: No discharge.         Left eye: No discharge.      Pupils: Pupils are equal, round, and reactive to light.   Cardiovascular:      Rate and Rhythm: Normal rate and regular rhythm.      Heart sounds: Normal heart sounds.   Pulmonary:      Effort: Pulmonary effort is normal. No respiratory distress.      Breath sounds: Wheezing (scattered expiratory wheezing) present. No rhonchi or rales.   Abdominal:      General: Bowel sounds are normal. There is no distension.      Palpations: Abdomen is soft.      Tenderness: There is no abdominal tenderness.   Musculoskeletal:      Cervical back: No rigidity.      Right lower leg: No edema.      Left lower leg: No edema.   Skin:     General: Skin is warm and dry.   Neurological:      Mental Status: He is alert and oriented to person, place, and time.   Psychiatric:         Mood and Affect: Mood normal.         Speech: Speech normal.         Behavior: Behavior normal.                  Lab Results   Component Value Date    WBC 7.1 04/17/2024    HGB 15.6 04/17/2024    HCT 47.7 04/17/2024    .8 (H) 04/17/2024     04/17/2024   ,   Lab Results   Component Value Date/Time     04/17/2024 09:05 AM    K 4.7 04/17/2024 09:05 AM     04/17/2024 09:05 AM    CO2 24 04/17/2024 09:05 AM    BUN 24 04/17/2024 09:05 AM    CREATININE 1.30 04/17/2024 09:05 AM    GLUCOSE 100 04/17/2024 09:05 AM    CALCIUM 9.3 04/17/2024 09:05 AM    LABGLOM 60 04/17/2024 09:05 AM    ,   Lab Results   Component Value Date    TSH 0.648 07/22/2021    TSHELE 2.08 10/17/2023   , @lastlipid3@,   Lab Results   Component Value Date    ALT 41 10/17/2023    AST 25 10/17/2023    ALKPHOS 85 10/17/2023    BILITOT 0.4 10/17/2023   ,   Hemoglobin A1C   Date Value Ref Range Status   07/22/2021 5.6 4.8 -

## 2024-11-19 ENCOUNTER — HOSPITAL ENCOUNTER (OUTPATIENT)
Dept: CT IMAGING | Age: 69
Discharge: HOME | End: 2024-11-22
Attending: INTERNAL MEDICINE
Payer: MEDICARE

## 2024-11-19 DIAGNOSIS — R91.1 PULMONARY NODULE: ICD-10-CM

## 2024-11-19 PROCEDURE — 71250 CT THORAX DX C-: CPT

## 2024-11-27 ENCOUNTER — TELEPHONE (OUTPATIENT)
Dept: PULMONOLOGY | Age: 69
End: 2024-11-27

## 2024-11-27 DIAGNOSIS — R91.1 PULMONARY NODULE: Primary | ICD-10-CM

## 2024-11-27 NOTE — TELEPHONE ENCOUNTER
----- Message from Dr. Elizabeth Frederick MD sent at 11/20/2024  5:31 PM EST -----  Please let the patient know that nodules are unchanged.  Annual screening should be continued.  If he agrees, please arrange.

## 2024-11-27 NOTE — TELEPHONE ENCOUNTER
Spoke to patient and let him know that nodules are unchanged. Annual screening should be continued. He agrees to follow up ct and voices understanding. Order placed.

## 2024-12-27 ENCOUNTER — OFFICE VISIT (OUTPATIENT)
Age: 69
End: 2024-12-27
Payer: MEDICARE

## 2024-12-27 VITALS
DIASTOLIC BLOOD PRESSURE: 80 MMHG | HEIGHT: 65 IN | WEIGHT: 120 LBS | HEART RATE: 69 BPM | SYSTOLIC BLOOD PRESSURE: 160 MMHG | BODY MASS INDEX: 19.99 KG/M2

## 2024-12-27 DIAGNOSIS — I25.10 ATHEROSCLEROSIS OF NATIVE CORONARY ARTERY OF NATIVE HEART WITHOUT ANGINA PECTORIS: ICD-10-CM

## 2024-12-27 DIAGNOSIS — I73.9 PVD (PERIPHERAL VASCULAR DISEASE) (HCC): ICD-10-CM

## 2024-12-27 DIAGNOSIS — I65.23 BILATERAL CAROTID ARTERY STENOSIS: ICD-10-CM

## 2024-12-27 DIAGNOSIS — I25.119 CORONARY ARTERY DISEASE INVOLVING NATIVE CORONARY ARTERY OF NATIVE HEART WITH ANGINA PECTORIS (HCC): Primary | ICD-10-CM

## 2024-12-27 DIAGNOSIS — R06.02 SHORTNESS OF BREATH: ICD-10-CM

## 2024-12-27 DIAGNOSIS — I10 PRIMARY HYPERTENSION: ICD-10-CM

## 2024-12-27 PROCEDURE — 93000 ELECTROCARDIOGRAM COMPLETE: CPT | Performed by: INTERNAL MEDICINE

## 2024-12-27 PROCEDURE — 3079F DIAST BP 80-89 MM HG: CPT | Performed by: INTERNAL MEDICINE

## 2024-12-27 PROCEDURE — 4004F PT TOBACCO SCREEN RCVD TLK: CPT | Performed by: INTERNAL MEDICINE

## 2024-12-27 PROCEDURE — G8420 CALC BMI NORM PARAMETERS: HCPCS | Performed by: INTERNAL MEDICINE

## 2024-12-27 PROCEDURE — 3017F COLORECTAL CA SCREEN DOC REV: CPT | Performed by: INTERNAL MEDICINE

## 2024-12-27 PROCEDURE — 1126F AMNT PAIN NOTED NONE PRSNT: CPT | Performed by: INTERNAL MEDICINE

## 2024-12-27 PROCEDURE — G8484 FLU IMMUNIZE NO ADMIN: HCPCS | Performed by: INTERNAL MEDICINE

## 2024-12-27 PROCEDURE — G8427 DOCREV CUR MEDS BY ELIG CLIN: HCPCS | Performed by: INTERNAL MEDICINE

## 2024-12-27 PROCEDURE — 1123F ACP DISCUSS/DSCN MKR DOCD: CPT | Performed by: INTERNAL MEDICINE

## 2024-12-27 PROCEDURE — 1159F MED LIST DOCD IN RCRD: CPT | Performed by: INTERNAL MEDICINE

## 2024-12-27 PROCEDURE — 3077F SYST BP >= 140 MM HG: CPT | Performed by: INTERNAL MEDICINE

## 2024-12-27 PROCEDURE — 99214 OFFICE O/P EST MOD 30 MIN: CPT | Performed by: INTERNAL MEDICINE

## 2024-12-27 RX ORDER — AMLODIPINE BESYLATE 5 MG/1
5 TABLET ORAL DAILY
Qty: 90 TABLET | Refills: 3 | Status: SHIPPED | OUTPATIENT
Start: 2024-12-27

## 2024-12-27 RX ORDER — METOPROLOL TARTRATE 50 MG
TABLET ORAL
Qty: 90 TABLET | Refills: 3 | Status: SHIPPED | OUTPATIENT
Start: 2024-12-27

## 2024-12-27 RX ORDER — ATORVASTATIN CALCIUM 40 MG/1
TABLET, FILM COATED ORAL
Qty: 90 TABLET | Refills: 3 | Status: SHIPPED | OUTPATIENT
Start: 2024-12-27

## 2024-12-27 NOTE — PROGRESS NOTES
independently reviewed by me: sinus rhythm, normal intervals and non-specific ST/T wave changes.        Lab Results   Component Value Date/Time     04/17/2024 09:05 AM    K 4.7 04/17/2024 09:05 AM     04/17/2024 09:05 AM    CO2 24 04/17/2024 09:05 AM    BUN 24 04/17/2024 09:05 AM    CREATININE 1.30 04/17/2024 09:05 AM    GLUCOSE 100 04/17/2024 09:05 AM    CALCIUM 9.3 04/17/2024 09:05 AM        Lab Results   Component Value Date    WBC 7.1 04/17/2024    HGB 15.6 04/17/2024    HCT 47.7 04/17/2024    .8 (H) 04/17/2024     04/17/2024       Lab Results   Component Value Date    TSH 0.648 07/22/2021       Lab Results   Component Value Date    LABA1C 5.6 07/22/2021     Lab Results   Component Value Date     07/22/2021       Lab Results   Component Value Date    CHOL 144 04/17/2024    CHOL 156 03/23/2023    CHOL 114 06/09/2022     Lab Results   Component Value Date    TRIG 106 04/17/2024    TRIG 102 03/23/2023    TRIG 110 06/09/2022     Lab Results   Component Value Date    HDL 48 04/17/2024    HDL 71 (H) 03/23/2023    HDL 49 06/09/2022     No components found for: \"LDLCHOLESTEROL\", \"LDLCALC\"  Lab Results   Component Value Date    VLDL 21.2 04/17/2024    VLDL 20.4 03/23/2023    VLDL 22 06/09/2022     Lab Results   Component Value Date    CHOLHDLRATIO 3.0 04/17/2024    CHOLHDLRATIO 2.2 03/23/2023    CHOLHDLRATIO 2.3 06/09/2022     Lab Results   Component Value Date    LDL 74.8 04/17/2024           I have Independently reviewed prior care notes, any ER records available, cardiac testing, labs and results with the patient and before seeing the patient today.  Also independently reviewed outside records when available.       ASSESSMENT:    Ulises \"Pilo\" was seen today for pvd (peripheral vascular disease)  and coronary artery disease involving native coronary artery of.    Diagnoses and all orders for this visit:    CAD (coronary artery disease)  -     EKG 12 Lead  -     Nuclear stress test with

## 2025-01-08 ENCOUNTER — OFFICE VISIT (OUTPATIENT)
Dept: INTERNAL MEDICINE CLINIC | Facility: CLINIC | Age: 70
End: 2025-01-08

## 2025-01-08 VITALS
SYSTOLIC BLOOD PRESSURE: 154 MMHG | DIASTOLIC BLOOD PRESSURE: 77 MMHG | HEIGHT: 65 IN | OXYGEN SATURATION: 96 % | WEIGHT: 122 LBS | BODY MASS INDEX: 20.33 KG/M2 | TEMPERATURE: 97.8 F | HEART RATE: 65 BPM

## 2025-01-08 DIAGNOSIS — C67.9 MALIGNANT NEOPLASM OF URINARY BLADDER, UNSPECIFIED SITE (HCC): ICD-10-CM

## 2025-01-08 DIAGNOSIS — F10.20 ALCOHOL DEPENDENCE, UNCOMPLICATED (HCC): ICD-10-CM

## 2025-01-08 DIAGNOSIS — I10 PRIMARY HYPERTENSION: ICD-10-CM

## 2025-01-08 DIAGNOSIS — F33.2 SEVERE EPISODE OF RECURRENT MAJOR DEPRESSIVE DISORDER, WITHOUT PSYCHOTIC FEATURES (HCC): ICD-10-CM

## 2025-01-08 DIAGNOSIS — I73.9 PVD (PERIPHERAL VASCULAR DISEASE) (HCC): ICD-10-CM

## 2025-01-08 DIAGNOSIS — I65.23 BILATERAL CAROTID ARTERY STENOSIS: ICD-10-CM

## 2025-01-08 DIAGNOSIS — Z93.6 STATUS POST ILEAL CONDUIT (HCC): ICD-10-CM

## 2025-01-08 DIAGNOSIS — Z00.00 MEDICARE ANNUAL WELLNESS VISIT, SUBSEQUENT: Primary | ICD-10-CM

## 2025-01-08 DIAGNOSIS — Z12.11 ENCOUNTER FOR SCREENING COLONOSCOPY: ICD-10-CM

## 2025-01-08 DIAGNOSIS — I25.10 CORONARY ARTERY DISEASE INVOLVING NATIVE CORONARY ARTERY OF NATIVE HEART WITHOUT ANGINA PECTORIS: ICD-10-CM

## 2025-01-08 DIAGNOSIS — Z00.00 MEDICARE ANNUAL WELLNESS VISIT, SUBSEQUENT: ICD-10-CM

## 2025-01-08 DIAGNOSIS — J44.9 CHRONIC OBSTRUCTIVE PULMONARY DISEASE, UNSPECIFIED COPD TYPE (HCC): ICD-10-CM

## 2025-01-08 LAB
ALBUMIN SERPL-MCNC: 4.6 G/DL (ref 3.2–4.6)
ALBUMIN/GLOB SERPL: 1.3 (ref 1–1.9)
ALP SERPL-CCNC: 84 U/L (ref 40–129)
ALT SERPL-CCNC: 42 U/L (ref 8–55)
ANION GAP SERPL CALC-SCNC: 11 MMOL/L (ref 7–16)
AST SERPL-CCNC: 41 U/L (ref 15–37)
BASOPHILS # BLD: 0.07 K/UL (ref 0–0.2)
BASOPHILS NFR BLD: 1 % (ref 0–2)
BILIRUB SERPL-MCNC: 0.5 MG/DL (ref 0–1.2)
BUN SERPL-MCNC: 10 MG/DL (ref 8–23)
CALCIUM SERPL-MCNC: 10 MG/DL (ref 8.8–10.2)
CHLORIDE SERPL-SCNC: 100 MMOL/L (ref 98–107)
CHOLEST SERPL-MCNC: 128 MG/DL (ref 0–200)
CO2 SERPL-SCNC: 24 MMOL/L (ref 20–29)
CREAT SERPL-MCNC: 1.16 MG/DL (ref 0.8–1.3)
DIFFERENTIAL METHOD BLD: ABNORMAL
EOSINOPHIL # BLD: 0.12 K/UL (ref 0–0.8)
EOSINOPHIL NFR BLD: 1.7 % (ref 0.5–7.8)
ERYTHROCYTE [DISTWIDTH] IN BLOOD BY AUTOMATED COUNT: 13.9 % (ref 11.9–14.6)
GLOBULIN SER CALC-MCNC: 3.5 G/DL (ref 2.3–3.5)
GLUCOSE SERPL-MCNC: 103 MG/DL (ref 70–99)
HCT VFR BLD AUTO: 48 % (ref 41.1–50.3)
HDLC SERPL-MCNC: 56 MG/DL (ref 40–60)
HDLC SERPL: 2.3 (ref 0–5)
HGB BLD-MCNC: 15.8 G/DL (ref 13.6–17.2)
IMM GRANULOCYTES # BLD AUTO: 0.02 K/UL (ref 0–0.5)
IMM GRANULOCYTES NFR BLD AUTO: 0.3 % (ref 0–5)
LDLC SERPL CALC-MCNC: 54 MG/DL (ref 0–100)
LYMPHOCYTES # BLD: 2.01 K/UL (ref 0.5–4.6)
LYMPHOCYTES NFR BLD: 28.3 % (ref 13–44)
MCH RBC QN AUTO: 34.7 PG (ref 26.1–32.9)
MCHC RBC AUTO-ENTMCNC: 32.9 G/DL (ref 31.4–35)
MCV RBC AUTO: 105.5 FL (ref 82–102)
MONOCYTES # BLD: 0.68 K/UL (ref 0.1–1.3)
MONOCYTES NFR BLD: 9.6 % (ref 4–12)
NEUTS SEG # BLD: 4.21 K/UL (ref 1.7–8.2)
NEUTS SEG NFR BLD: 59.1 % (ref 43–78)
NRBC # BLD: 0 K/UL (ref 0–0.2)
PLATELET # BLD AUTO: 370 K/UL (ref 150–450)
PMV BLD AUTO: 10.2 FL (ref 9.4–12.3)
POTASSIUM SERPL-SCNC: 4.8 MMOL/L (ref 3.5–5.1)
PROT SERPL-MCNC: 8.1 G/DL (ref 6.3–8.2)
RBC # BLD AUTO: 4.55 M/UL (ref 4.23–5.6)
SODIUM SERPL-SCNC: 135 MMOL/L (ref 136–145)
TRIGL SERPL-MCNC: 94 MG/DL (ref 0–150)
VLDLC SERPL CALC-MCNC: 19 MG/DL (ref 6–23)
WBC # BLD AUTO: 7.1 K/UL (ref 4.3–11.1)

## 2025-01-08 SDOH — ECONOMIC STABILITY: FOOD INSECURITY: WITHIN THE PAST 12 MONTHS, THE FOOD YOU BOUGHT JUST DIDN'T LAST AND YOU DIDN'T HAVE MONEY TO GET MORE.: NEVER TRUE

## 2025-01-08 SDOH — ECONOMIC STABILITY: FOOD INSECURITY: WITHIN THE PAST 12 MONTHS, YOU WORRIED THAT YOUR FOOD WOULD RUN OUT BEFORE YOU GOT MONEY TO BUY MORE.: NEVER TRUE

## 2025-01-08 ASSESSMENT — ENCOUNTER SYMPTOMS
DIARRHEA: 0
VOMITING: 0
ABDOMINAL PAIN: 0
NAUSEA: 0
SHORTNESS OF BREATH: 0
WHEEZING: 0
COUGH: 0

## 2025-01-08 ASSESSMENT — LIFESTYLE VARIABLES
HOW OFTEN DO YOU HAVE A DRINK CONTAINING ALCOHOL: NEVER
HOW MANY STANDARD DRINKS CONTAINING ALCOHOL DO YOU HAVE ON A TYPICAL DAY: PATIENT DOES NOT DRINK

## 2025-01-08 ASSESSMENT — PATIENT HEALTH QUESTIONNAIRE - PHQ9: DEPRESSION UNABLE TO ASSESS: PT REFUSES

## 2025-01-08 NOTE — PATIENT INSTRUCTIONS
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
## 2025-01-09 DIAGNOSIS — R74.01 ELEVATED AST (SGOT): Primary | ICD-10-CM

## 2025-01-22 ENCOUNTER — TELEPHONE (OUTPATIENT)
Age: 70
End: 2025-01-22

## 2025-01-22 NOTE — TELEPHONE ENCOUNTER
----- Message from Dr. Saleem Sánchez, DO sent at 1/21/2025 12:28 PM EST -----  Please call the patient.  ECHO was ok, nothing major or concerning.  If having more angina (worsening LOZADA, CP, SOB), please let us know.  Will review more at follow up appointment and get plan for the future.    ThanksGonzalo

## 2025-01-27 ENCOUNTER — TELEPHONE (OUTPATIENT)
Age: 70
End: 2025-01-27

## 2025-01-27 NOTE — TELEPHONE ENCOUNTER
----- Message from Dr. Saleem Sánchez, DO sent at 1/26/2025  1:32 PM EST -----  Please call the patient.  NST was ok, nothing major or concerning.  If having more angina (worsening LOZADA, CP, SOB), please let us know.  Will review more at their follow up appointment and get plan for the future.    ThanksGonzalo

## 2025-02-03 ENCOUNTER — TELEPHONE (OUTPATIENT)
Dept: INTERNAL MEDICINE CLINIC | Facility: CLINIC | Age: 70
End: 2025-02-03

## 2025-02-03 NOTE — TELEPHONE ENCOUNTER
Yoli with [facility] states that patient declined to schedule with them and is requesting a one on one type counseling that is close to his home. She states she looked up who to send the patient to. Offices are listed below.     Tanya Hernadez [phone]    Katie Clement [phone]

## 2025-02-03 NOTE — TELEPHONE ENCOUNTER
----- Message from Jami JOHNSON sent at 2/3/2025  2:17 PM EST -----  Regarding: ECC Message to Provider  ECC Message to Provider    Relationship to Patient: Covered Entity, Yoli from [facility]    Additional Information, called in because she had received a referral for patient's mental health.   --------------------------------------------------------------------------------------------------------------------------    Call Back Information: n/a  Preferred Call Back Number: Phone [phone]

## 2025-02-06 DIAGNOSIS — F41.8 DEPRESSION WITH ANXIETY: Primary | ICD-10-CM

## 2025-02-07 DIAGNOSIS — R74.01 ELEVATED AST (SGOT): ICD-10-CM

## 2025-02-07 LAB
ALBUMIN SERPL-MCNC: 3.8 G/DL (ref 3.2–4.6)
ALBUMIN/GLOB SERPL: 1 (ref 1–1.9)
ALP SERPL-CCNC: 98 U/L (ref 40–129)
ALT SERPL-CCNC: 33 U/L (ref 8–55)
AST SERPL-CCNC: 22 U/L (ref 15–37)
BILIRUB DIRECT SERPL-MCNC: <0.2 MG/DL (ref 0–0.4)
BILIRUB SERPL-MCNC: <0.2 MG/DL (ref 0–1.2)
GLOBULIN SER CALC-MCNC: 3.7 G/DL (ref 2.3–3.5)
PROT SERPL-MCNC: 7.5 G/DL (ref 6.3–8.2)

## 2025-02-24 ENCOUNTER — OFFICE VISIT (OUTPATIENT)
Age: 70
End: 2025-02-24
Payer: MEDICARE

## 2025-02-24 VITALS
SYSTOLIC BLOOD PRESSURE: 118 MMHG | DIASTOLIC BLOOD PRESSURE: 84 MMHG | BODY MASS INDEX: 20.8 KG/M2 | WEIGHT: 125 LBS | HEART RATE: 68 BPM

## 2025-02-24 DIAGNOSIS — I73.9 PVD (PERIPHERAL VASCULAR DISEASE): ICD-10-CM

## 2025-02-24 DIAGNOSIS — I25.10 CORONARY ARTERY DISEASE INVOLVING NATIVE CORONARY ARTERY OF NATIVE HEART WITHOUT ANGINA PECTORIS: ICD-10-CM

## 2025-02-24 DIAGNOSIS — I65.23 BILATERAL CAROTID ARTERY STENOSIS: Primary | ICD-10-CM

## 2025-02-24 DIAGNOSIS — Z95.1 S/P CABG X 3: ICD-10-CM

## 2025-02-24 DIAGNOSIS — Z72.0 TOBACCO USE: ICD-10-CM

## 2025-02-24 DIAGNOSIS — I10 PRIMARY HYPERTENSION: ICD-10-CM

## 2025-02-24 PROCEDURE — 99214 OFFICE O/P EST MOD 30 MIN: CPT | Performed by: INTERNAL MEDICINE

## 2025-02-24 PROCEDURE — 1159F MED LIST DOCD IN RCRD: CPT | Performed by: INTERNAL MEDICINE

## 2025-02-24 PROCEDURE — 4004F PT TOBACCO SCREEN RCVD TLK: CPT | Performed by: INTERNAL MEDICINE

## 2025-02-24 PROCEDURE — 1123F ACP DISCUSS/DSCN MKR DOCD: CPT | Performed by: INTERNAL MEDICINE

## 2025-02-24 PROCEDURE — G8420 CALC BMI NORM PARAMETERS: HCPCS | Performed by: INTERNAL MEDICINE

## 2025-02-24 PROCEDURE — 3017F COLORECTAL CA SCREEN DOC REV: CPT | Performed by: INTERNAL MEDICINE

## 2025-02-24 PROCEDURE — 3079F DIAST BP 80-89 MM HG: CPT | Performed by: INTERNAL MEDICINE

## 2025-02-24 PROCEDURE — 3074F SYST BP LT 130 MM HG: CPT | Performed by: INTERNAL MEDICINE

## 2025-02-24 PROCEDURE — G8427 DOCREV CUR MEDS BY ELIG CLIN: HCPCS | Performed by: INTERNAL MEDICINE

## 2025-02-24 PROCEDURE — 1126F AMNT PAIN NOTED NONE PRSNT: CPT | Performed by: INTERNAL MEDICINE

## 2025-02-24 NOTE — PROGRESS NOTES
2 [facility], [address] [place] [address]  PHONE: [phone]     25    NAME:  Ulises Cobos  : 1955  MRN: [id number]       SUBJECTIVE:   Ulises Cobos is a 69 y.o. male seen for a follow up visit regarding the following:     Chief Complaint   Patient presents with    Follow-up    Coronary Artery Disease       HPI: Here for CAD:   3v CABG 18.     Left CEA 2018----followed by vascular surgery   Carotid US 2020 and 2022: TALYA ~50%  NST 2025: LV perfusion is normal.   Echo 2025: normal EF, mild MR      10/2022.  Ongoing stress, but better.   Some msk pains, not active.    No new CP, pressure.   NO new LOZADA.  No edema.     He has h/o bladder CA, has supra-pubic catheter.       Patient denies recent history of orthopnea, PND, excessive dizziness and/or syncope.     Stopped alcohol, still smoking.               Past Medical History, Past Surgical History, Family history, Social History, and Medications were all reviewed with the patient today and updated as necessary.     Current Outpatient Medications   Medication Sig Dispense Refill    atorvastatin (LIPITOR) 40 MG tablet TAKE 1 TABLET BY MOUTH EVERY DAY AT NIGHT 90 tablet 3    metoprolol tartrate (LOPRESSOR) 50 MG tablet TAKE 1/2 TABLET BY MOUTH EVERY MORNING AND 1/2 TABLET EVERY EVENING. 90 tablet 3    amLODIPine (NORVASC) 5 MG tablet Take 1 tablet by mouth daily 90 tablet 3    albuterol (PROVENTIL) (2.5 MG/3ML) 0.083% nebulizer solution Take 3 mLs by nebulization every 6 hours as needed for Wheezing File to Medicare part B for COPD J44.9 120 each 3    sodium chloride, Inhalant, 3 % nebulizer solution Take 4 mLs by nebulization daily File to Medicare Part B for J44.9 COPD 120 mL 5    mirtazapine (REMERON) 15 MG tablet Take 2 tablets by mouth nightly 60 tablet 5    aspirin 81 MG chewable tablet Take 1 tablet by mouth daily      Ostomy Supplies MISC 1 Units by Other route as needed       No current

## 2025-02-27 ENCOUNTER — OFFICE VISIT (OUTPATIENT)
Dept: UROLOGY | Age: 70
End: 2025-02-27
Payer: MEDICARE

## 2025-02-27 ENCOUNTER — TELEPHONE (OUTPATIENT)
Dept: UROLOGY | Age: 70
End: 2025-02-27

## 2025-02-27 DIAGNOSIS — N20.0 RENAL STONE: Primary | ICD-10-CM

## 2025-02-27 DIAGNOSIS — C67.9 MALIGNANT NEOPLASM OF URINARY BLADDER, UNSPECIFIED SITE (HCC): ICD-10-CM

## 2025-02-27 PROCEDURE — 1123F ACP DISCUSS/DSCN MKR DOCD: CPT | Performed by: NURSE PRACTITIONER

## 2025-02-27 PROCEDURE — G8427 DOCREV CUR MEDS BY ELIG CLIN: HCPCS | Performed by: NURSE PRACTITIONER

## 2025-02-27 PROCEDURE — G8420 CALC BMI NORM PARAMETERS: HCPCS | Performed by: NURSE PRACTITIONER

## 2025-02-27 PROCEDURE — 3017F COLORECTAL CA SCREEN DOC REV: CPT | Performed by: NURSE PRACTITIONER

## 2025-02-27 PROCEDURE — 74018 RADEX ABDOMEN 1 VIEW: CPT | Performed by: NURSE PRACTITIONER

## 2025-02-27 PROCEDURE — 4004F PT TOBACCO SCREEN RCVD TLK: CPT | Performed by: NURSE PRACTITIONER

## 2025-02-27 PROCEDURE — 1159F MED LIST DOCD IN RCRD: CPT | Performed by: NURSE PRACTITIONER

## 2025-02-27 PROCEDURE — 1160F RVW MEDS BY RX/DR IN RCRD: CPT | Performed by: NURSE PRACTITIONER

## 2025-02-27 PROCEDURE — 99213 OFFICE O/P EST LOW 20 MIN: CPT | Performed by: NURSE PRACTITIONER

## 2025-02-27 ASSESSMENT — ENCOUNTER SYMPTOMS
BACK PAIN: 0
NAUSEA: 0

## 2025-02-27 NOTE — TELEPHONE ENCOUNTER
Reorder ostomy supplies w [facility]. Needs more barrier wipes. Would like 5 packs per order.    Pari Briggs, APRN - CNP

## 2025-02-27 NOTE — PROGRESS NOTES
[facility]  [address]  [place] [address]  [phone]          Ulises Cobos  : 1955    Chief Complaint   Patient presents with    Follow-up          HPI     Ulises Cobos is a 69 y.o. male  w h/o cystectomy and has an ileal conduit urinary diversion with ostomy bag in the right lower quadrant. Attempted a left percutaneous nephrostolithotomy the upper pole access but could not get into the lower pole moiety.  He had a stent and with a nephrostomy tube came out the stent came out as well.  Could not get access to left lower pole stone and had ESWL.  Left lower pole renal extracorporeal shockwave therapy on 18. KUB after ESWL showed a lot of bowel gas however and there was a small calcification near the left lower pole where the previous stone had been.      He was recently seen for gross hematuria. CT urogram showed 2 non-obstructing left renal pelvic stones (3-4 mm in size). No gross abnormalities of ileal conduit in the right anterior pelvis.     He was also having h/o of intermittent left flank pain. Assoc with gross hematuria. Some nausea.      After discussing his case with Dr Singleton, he elected to try L ESWL. Unfortunately, the patient developed retroperitoneal hematoma.      Repeat CT showed improving hematoma. Creatinine was stable at 0.82 and HGB up to 12.3.     Here today for follow up. Reports occ L flank pain. No gross hematuria. No passed stone.     He orders his stoma supplies using [facility].     **his girlfriend, who was also a patient of mine, Ms Adrianna Dwyer, passed away 10/22. Of note, his father also passed away in .           Past Medical History:   Diagnosis Date    Adverse effect of anesthesia     hard to awaken    CAD (coronary artery disease) 2018    Cancer (HCC)     bladder    Carotid stenosis, bilateral     S/P left endarterectomy,   TALYA 30% per CTA    Chronic neck pain     Chronic obstructive pulmonary disease (HCC)

## 2025-02-28 ENCOUNTER — OFFICE VISIT (OUTPATIENT)
Dept: VASCULAR SURGERY | Age: 70
End: 2025-02-28

## 2025-02-28 VITALS
HEIGHT: 65 IN | OXYGEN SATURATION: 96 % | DIASTOLIC BLOOD PRESSURE: 86 MMHG | HEART RATE: 63 BPM | BODY MASS INDEX: 20.66 KG/M2 | SYSTOLIC BLOOD PRESSURE: 129 MMHG | WEIGHT: 124 LBS

## 2025-02-28 DIAGNOSIS — I73.9 PVD (PERIPHERAL VASCULAR DISEASE) WITH CLAUDICATION: Primary | ICD-10-CM

## 2025-02-28 NOTE — PROGRESS NOTES
[address] [address]  [phone] FAX: [phone]    Ulises Cobos  : 1955    Chief Complaint:     History of Present Illness:   Patient follows up today for follow-up duplex study.  Patient denies any claudication or rest pain symptoms.    CURRENT MEDICATIONS:  Current Outpatient Medications   Medication Sig Dispense Refill    atorvastatin (LIPITOR) 40 MG tablet TAKE 1 TABLET BY MOUTH EVERY DAY AT NIGHT 90 tablet 3    metoprolol tartrate (LOPRESSOR) 50 MG tablet TAKE 1/2 TABLET BY MOUTH EVERY MORNING AND 1/2 TABLET EVERY EVENING. 90 tablet 3    amLODIPine (NORVASC) 5 MG tablet Take 1 tablet by mouth daily 90 tablet 3    albuterol (PROVENTIL) (2.5 MG/3ML) 0.083% nebulizer solution Take 3 mLs by nebulization every 6 hours as needed for Wheezing File to Medicare part B for COPD J44.9 120 each 3    sodium chloride, Inhalant, 3 % nebulizer solution Take 4 mLs by nebulization daily File to Medicare Part B for J44.9 COPD 120 mL 5    mirtazapine (REMERON) 15 MG tablet Take 2 tablets by mouth nightly 60 tablet 5    aspirin 81 MG chewable tablet Take 1 tablet by mouth daily      Ostomy Supplies MISC 1 Units by Other route as needed       No current facility-administered medications for this visit.       Past Medical History:   Diagnosis Date    Adverse effect of anesthesia     hard to awaken    CAD (coronary artery disease) 2018    Cancer (HCC)     bladder    Carotid stenosis, bilateral     S/P left endarterectomy,   TALYA 30% per CTA    Chronic neck pain     Chronic obstructive pulmonary disease (HCC)     no inhalers    Depression with anxiety     GERD (gastroesophageal reflux disease)     no meds    History of bladder cancer 2014    Cystectomy with ileo conduit urinary diversion ----and chemo    History of left-sided carotid endarterectomy     History of neck surgery     Hypertension     Kidney stones     Osteoarthritis     PAD (peripheral artery disease)

## 2025-03-25 ENCOUNTER — OFFICE VISIT (OUTPATIENT)
Dept: BEHAVIORAL/MENTAL HEALTH CLINIC | Age: 70
End: 2025-03-25
Payer: MEDICARE

## 2025-03-25 DIAGNOSIS — F33.1 MDD (MAJOR DEPRESSIVE DISORDER), RECURRENT EPISODE, MODERATE (HCC): Primary | ICD-10-CM

## 2025-03-25 PROCEDURE — 1123F ACP DISCUSS/DSCN MKR DOCD: CPT | Performed by: SOCIAL WORKER

## 2025-03-25 PROCEDURE — 4004F PT TOBACCO SCREEN RCVD TLK: CPT | Performed by: SOCIAL WORKER

## 2025-03-25 PROCEDURE — 90837 PSYTX W PT 60 MINUTES: CPT | Performed by: SOCIAL WORKER

## 2025-03-25 ASSESSMENT — PATIENT HEALTH QUESTIONNAIRE - PHQ9
SUM OF ALL RESPONSES TO PHQ QUESTIONS 1-9: 19
2. FEELING DOWN, DEPRESSED OR HOPELESS: NEARLY EVERY DAY
6. FEELING BAD ABOUT YOURSELF - OR THAT YOU ARE A FAILURE OR HAVE LET YOURSELF OR YOUR FAMILY DOWN: NEARLY EVERY DAY
SUM OF ALL RESPONSES TO PHQ QUESTIONS 1-9: 19
SUM OF ALL RESPONSES TO PHQ QUESTIONS 1-9: 19
4. FEELING TIRED OR HAVING LITTLE ENERGY: NEARLY EVERY DAY
1. LITTLE INTEREST OR PLEASURE IN DOING THINGS: NEARLY EVERY DAY
3. TROUBLE FALLING OR STAYING ASLEEP: MORE THAN HALF THE DAYS
8. MOVING OR SPEAKING SO SLOWLY THAT OTHER PEOPLE COULD HAVE NOTICED. OR THE OPPOSITE, BEING SO FIGETY OR RESTLESS THAT YOU HAVE BEEN MOVING AROUND A LOT MORE THAN USUAL: NOT AT ALL
10. IF YOU CHECKED OFF ANY PROBLEMS, HOW DIFFICULT HAVE THESE PROBLEMS MADE IT FOR YOU TO DO YOUR WORK, TAKE CARE OF THINGS AT HOME, OR GET ALONG WITH OTHER PEOPLE: VERY DIFFICULT
5. POOR APPETITE OR OVEREATING: NEARLY EVERY DAY
SUM OF ALL RESPONSES TO PHQ QUESTIONS 1-9: 19
7. TROUBLE CONCENTRATING ON THINGS, SUCH AS READING THE NEWSPAPER OR WATCHING TELEVISION: MORE THAN HALF THE DAYS
9. THOUGHTS THAT YOU WOULD BE BETTER OFF DEAD, OR OF HURTING YOURSELF: NOT AT ALL

## 2025-03-25 ASSESSMENT — ANXIETY QUESTIONNAIRES
4. TROUBLE RELAXING: NEARLY EVERY DAY
6. BECOMING EASILY ANNOYED OR IRRITABLE: NEARLY EVERY DAY
GAD7 TOTAL SCORE: 21
5. BEING SO RESTLESS THAT IT IS HARD TO SIT STILL: NEARLY EVERY DAY
1. FEELING NERVOUS, ANXIOUS, OR ON EDGE: NEARLY EVERY DAY
2. NOT BEING ABLE TO STOP OR CONTROL WORRYING: NEARLY EVERY DAY
3. WORRYING TOO MUCH ABOUT DIFFERENT THINGS: NEARLY EVERY DAY
IF YOU CHECKED OFF ANY PROBLEMS ON THIS QUESTIONNAIRE, HOW DIFFICULT HAVE THESE PROBLEMS MADE IT FOR YOU TO DO YOUR WORK, TAKE CARE OF THINGS AT HOME, OR GET ALONG WITH OTHER PEOPLE: VERY DIFFICULT
7. FEELING AFRAID AS IF SOMETHING AWFUL MIGHT HAPPEN: NEARLY EVERY DAY

## 2025-03-25 NOTE — PROGRESS NOTES
[facility] ASSESSMENT      Patient Name: Ulises Cobos       Date: 3/25/2025        Duration: 60 minutes   Start Time: 1130 AM  End Time:  1230 PM     Chief Complaint: had concerns including Anxiety and Depression.     Presenting Problem: Familial trauma     Diagnosis:   1. MDD (major depressive disorder), recurrent episode, moderate (HCC)        Current Medications:   Current Outpatient Medications:     atorvastatin (LIPITOR) 40 MG tablet, TAKE 1 TABLET BY MOUTH EVERY DAY AT NIGHT, Disp: 90 tablet, Rfl: 3    metoprolol tartrate (LOPRESSOR) 50 MG tablet, TAKE 1/2 TABLET BY MOUTH EVERY MORNING AND 1/2 TABLET EVERY EVENING., Disp: 90 tablet, Rfl: 3    amLODIPine (NORVASC) 5 MG tablet, Take 1 tablet by mouth daily, Disp: 90 tablet, Rfl: 3    albuterol (PROVENTIL) (2.5 MG/3ML) 0.083% nebulizer solution, Take 3 mLs by nebulization every 6 hours as needed for Wheezing File to Medicare part B for COPD J44.9, Disp: 120 each, Rfl: 3    sodium chloride, Inhalant, 3 % nebulizer solution, Take 4 mLs by nebulization daily File to Medicare Part B for J44.9 COPD, Disp: 120 mL, Rfl: 5    mirtazapine (REMERON) 15 MG tablet, Take 2 tablets by mouth nightly, Disp: 60 tablet, Rfl: 5    Ostomy Supplies MISC, 1 Units by Other route as needed, Disp: , Rfl:     aspirin 81 MG chewable tablet, Take 1 tablet by mouth daily, Disp: , Rfl:     Past Medications:     -Depression:       3/25/2025    11:57 AM 1/8/2025     9:58 AM 4/17/2024     8:15 AM   PHQ-9    Depression Unable to Assess  Pt refuses    Little interest or pleasure in doing things 3  0   Feeling down, depressed, or hopeless 3  0   Trouble falling or staying asleep, or sleeping too much 2  0   Feeling tired or having little energy 3  0   Poor appetite or overeating 3  0   Feeling bad about yourself - or that you are a failure or have let yourself or your family down 3  0   Trouble concentrating on things, such as reading the newspaper or

## 2025-04-09 ENCOUNTER — OFFICE VISIT (OUTPATIENT)
Dept: BEHAVIORAL/MENTAL HEALTH CLINIC | Age: 70
End: 2025-04-09
Payer: MEDICARE

## 2025-04-09 DIAGNOSIS — Z65.8 PSYCHOSOCIAL STRESSORS: ICD-10-CM

## 2025-04-09 DIAGNOSIS — F33.1 MDD (MAJOR DEPRESSIVE DISORDER), RECURRENT EPISODE, MODERATE (HCC): Primary | ICD-10-CM

## 2025-04-09 PROCEDURE — 1123F ACP DISCUSS/DSCN MKR DOCD: CPT | Performed by: SOCIAL WORKER

## 2025-04-09 PROCEDURE — 90837 PSYTX W PT 60 MINUTES: CPT | Performed by: SOCIAL WORKER

## 2025-04-09 PROCEDURE — 4004F PT TOBACCO SCREEN RCVD TLK: CPT | Performed by: SOCIAL WORKER

## 2025-04-09 ASSESSMENT — PATIENT HEALTH QUESTIONNAIRE - PHQ9
3. TROUBLE FALLING OR STAYING ASLEEP: MORE THAN HALF THE DAYS
2. FEELING DOWN, DEPRESSED OR HOPELESS: NEARLY EVERY DAY
SUM OF ALL RESPONSES TO PHQ QUESTIONS 1-9: 19
8. MOVING OR SPEAKING SO SLOWLY THAT OTHER PEOPLE COULD HAVE NOTICED. OR THE OPPOSITE, BEING SO FIGETY OR RESTLESS THAT YOU HAVE BEEN MOVING AROUND A LOT MORE THAN USUAL: NOT AT ALL
6. FEELING BAD ABOUT YOURSELF - OR THAT YOU ARE A FAILURE OR HAVE LET YOURSELF OR YOUR FAMILY DOWN: NEARLY EVERY DAY
SUM OF ALL RESPONSES TO PHQ QUESTIONS 1-9: 19
4. FEELING TIRED OR HAVING LITTLE ENERGY: NEARLY EVERY DAY
SUM OF ALL RESPONSES TO PHQ QUESTIONS 1-9: 19
7. TROUBLE CONCENTRATING ON THINGS, SUCH AS READING THE NEWSPAPER OR WATCHING TELEVISION: SEVERAL DAYS
5. POOR APPETITE OR OVEREATING: NEARLY EVERY DAY
SUM OF ALL RESPONSES TO PHQ QUESTIONS 1-9: 18
9. THOUGHTS THAT YOU WOULD BE BETTER OFF DEAD, OR OF HURTING YOURSELF: SEVERAL DAYS
1. LITTLE INTEREST OR PLEASURE IN DOING THINGS: NEARLY EVERY DAY

## 2025-04-09 ASSESSMENT — ANXIETY QUESTIONNAIRES
5. BEING SO RESTLESS THAT IT IS HARD TO SIT STILL: MORE THAN HALF THE DAYS
2. NOT BEING ABLE TO STOP OR CONTROL WORRYING: NEARLY EVERY DAY
4. TROUBLE RELAXING: MORE THAN HALF THE DAYS
7. FEELING AFRAID AS IF SOMETHING AWFUL MIGHT HAPPEN: NEARLY EVERY DAY
6. BECOMING EASILY ANNOYED OR IRRITABLE: NEARLY EVERY DAY
1. FEELING NERVOUS, ANXIOUS, OR ON EDGE: NEARLY EVERY DAY
GAD7 TOTAL SCORE: 19
3. WORRYING TOO MUCH ABOUT DIFFERENT THINGS: NEARLY EVERY DAY
IF YOU CHECKED OFF ANY PROBLEMS ON THIS QUESTIONNAIRE, HOW DIFFICULT HAVE THESE PROBLEMS MADE IT FOR YOU TO DO YOUR WORK, TAKE CARE OF THINGS AT HOME, OR GET ALONG WITH OTHER PEOPLE: VERY DIFFICULT

## 2025-04-09 ASSESSMENT — COLUMBIA-SUICIDE SEVERITY RATING SCALE - C-SSRS
2. HAVE YOU ACTUALLY HAD ANY THOUGHTS OF KILLING YOURSELF?: NO
1. WITHIN THE PAST MONTH, HAVE YOU WISHED YOU WERE DEAD OR WISHED YOU COULD GO TO SLEEP AND NOT WAKE UP?: YES
6. HAVE YOU EVER DONE ANYTHING, STARTED TO DO ANYTHING, OR PREPARED TO DO ANYTHING TO END YOUR LIFE?: NO
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
## 2025-04-28 NOTE — PROGRESS NOTES
Patient Name:  Ulises Cobos                               YOB: 1955  MRN: [id number]                                                Office Visit 4/29/2025    ASSESSMENT AND PLAN:  (Medical Decision Making)    1. Chronic obstructive pulmonary disease, unspecified COPD type (HCC)  [x]  Smoking cessation-the vital impact of smoking cessation/abstinence was emphasized. Patient's tobacco use confirmed as documented in the medical record.  Discussed various smoking cessation products including pills, patches, inhaler, gum, weaning self off, \"cold turkey\", and smoking cessation classes.  Discussed also with patient disease risk of ongoing smoking including CVA, lung cancer, and heart disease.  At this point, patient's commitment to quitting is high.  9 minutes were spent counseling patient regarding tobacco cessation.  [x]  Pneumococcal vaccinationis due  [x]  Supplemental oxygen and its benefits for those with hypoxemia were discussed.   [x]  Alpha-1 antitrypsin testing was recommended and was performed. PI*MM 2/23/24  []  Pulmonary rehab may be beneficial in the future if he develops shortness of breath with exertion  [x]  Bronchodilator therapy- Educated the patient about the role of  bronchodilators for long-term improvement in symptoms, exercise capacity and airflow limitation.  The specific uses of short-term & long-term bronchodilators were reviewed.  Plan to use 3% saline for mucus clearance followed by albuterol/yupelri mixed every morning for one month to see if it helps with his cough and dyspnea.      2. Personal history of tobacco use, presenting hazards to health  Discussed with patient current guidelines for screening for lung cancer.  Current recommendations are to obtain yearly screening LDCT yearly from age 50-80, or until smoke free for 15 years.  Patient has 56 pack year history of cigarette smoking and currently smokes half a pack a day.  Discussed with patient risks and

## 2025-04-29 ENCOUNTER — OFFICE VISIT (OUTPATIENT)
Dept: PULMONOLOGY | Age: 70
End: 2025-04-29
Payer: MEDICARE

## 2025-04-29 VITALS
RESPIRATION RATE: 16 BRPM | HEIGHT: 65 IN | HEART RATE: 62 BPM | BODY MASS INDEX: 20.83 KG/M2 | WEIGHT: 125 LBS | SYSTOLIC BLOOD PRESSURE: 134 MMHG | OXYGEN SATURATION: 95 % | TEMPERATURE: 97.2 F | DIASTOLIC BLOOD PRESSURE: 85 MMHG

## 2025-04-29 DIAGNOSIS — Z87.891 PERSONAL HISTORY OF TOBACCO USE, PRESENTING HAZARDS TO HEALTH: ICD-10-CM

## 2025-04-29 DIAGNOSIS — R05.8 PRODUCTIVE COUGH: ICD-10-CM

## 2025-04-29 DIAGNOSIS — J44.9 CHRONIC OBSTRUCTIVE PULMONARY DISEASE, UNSPECIFIED COPD TYPE (HCC): ICD-10-CM

## 2025-04-29 DIAGNOSIS — Z87.891 PERSONAL HISTORY OF TOBACCO USE: ICD-10-CM

## 2025-04-29 DIAGNOSIS — J44.9 STAGE 2 MODERATE COPD BY GOLD CLASSIFICATION (HCC): Primary | ICD-10-CM

## 2025-04-29 PROCEDURE — 3075F SYST BP GE 130 - 139MM HG: CPT | Performed by: INTERNAL MEDICINE

## 2025-04-29 PROCEDURE — 1159F MED LIST DOCD IN RCRD: CPT | Performed by: INTERNAL MEDICINE

## 2025-04-29 PROCEDURE — 1126F AMNT PAIN NOTED NONE PRSNT: CPT | Performed by: INTERNAL MEDICINE

## 2025-04-29 PROCEDURE — 1160F RVW MEDS BY RX/DR IN RCRD: CPT | Performed by: INTERNAL MEDICINE

## 2025-04-29 PROCEDURE — G0296 VISIT TO DETERM LDCT ELIG: HCPCS | Performed by: INTERNAL MEDICINE

## 2025-04-29 PROCEDURE — 1123F ACP DISCUSS/DSCN MKR DOCD: CPT | Performed by: INTERNAL MEDICINE

## 2025-04-29 PROCEDURE — 3023F SPIROM DOC REV: CPT | Performed by: INTERNAL MEDICINE

## 2025-04-29 PROCEDURE — 3017F COLORECTAL CA SCREEN DOC REV: CPT | Performed by: INTERNAL MEDICINE

## 2025-04-29 PROCEDURE — 4004F PT TOBACCO SCREEN RCVD TLK: CPT | Performed by: INTERNAL MEDICINE

## 2025-04-29 PROCEDURE — 99214 OFFICE O/P EST MOD 30 MIN: CPT | Performed by: INTERNAL MEDICINE

## 2025-04-29 PROCEDURE — 3079F DIAST BP 80-89 MM HG: CPT | Performed by: INTERNAL MEDICINE

## 2025-04-29 PROCEDURE — G8427 DOCREV CUR MEDS BY ELIG CLIN: HCPCS | Performed by: INTERNAL MEDICINE

## 2025-04-29 PROCEDURE — 99406 BEHAV CHNG SMOKING 3-10 MIN: CPT | Performed by: INTERNAL MEDICINE

## 2025-04-29 PROCEDURE — G8420 CALC BMI NORM PARAMETERS: HCPCS | Performed by: INTERNAL MEDICINE

## 2025-04-29 RX ORDER — SODIUM CHLORIDE FOR INHALATION 3 %
4 VIAL, NEBULIZER (ML) INHALATION DAILY
Qty: 120 ML | Refills: 5 | Status: SHIPPED | OUTPATIENT
Start: 2025-04-29

## 2025-04-29 RX ORDER — ALBUTEROL SULFATE 90 UG/1
2 INHALANT RESPIRATORY (INHALATION) EVERY 6 HOURS PRN
Qty: 1 EACH | Refills: 5 | Status: SHIPPED | OUTPATIENT
Start: 2025-04-29

## 2025-04-29 RX ORDER — ALBUTEROL SULFATE 0.83 MG/ML
2.5 SOLUTION RESPIRATORY (INHALATION) EVERY 6 HOURS PRN
Qty: 120 EACH | Refills: 3 | Status: SHIPPED | OUTPATIENT
Start: 2025-04-29

## 2025-04-29 NOTE — PATIENT INSTRUCTIONS
Recommend you buy a flutter valve to help you cough up mucus that is thick.  Go to [employer] and it should look like this:      RADIOLOGY PROCEDURE    A radiology procedure has been ordered for you during your visit today. You should receive a call from Radiology Scheduling within 2-3 business days.  If you do not hear from them, please call [phone] to schedule your test.  It should be in November 2025              You can get your CT of the chest done at [facility] or at [facility] locations.  Sometimes there is a lower copay at [facility].      [facility] Rad Department Scheduling  [phone]  Locations:  [facility], [facility] or [facility]      ------------------------------------------------------------------------  [facility]  [phone]    [facility] Location  [address] [facility]  [place], [place] [id number] [place] [address] Location  [address] [place] [address]    You will need a copy of your order to schedule with [facility].    When your CT has been done, we recommend you call us if you haven't heard about your results within a week.

## 2025-07-10 ENCOUNTER — OFFICE VISIT (OUTPATIENT)
Dept: INTERNAL MEDICINE CLINIC | Facility: CLINIC | Age: 70
End: 2025-07-10
Payer: MEDICARE

## 2025-07-10 VITALS
WEIGHT: 119.38 LBS | TEMPERATURE: 98.2 F | OXYGEN SATURATION: 93 % | SYSTOLIC BLOOD PRESSURE: 118 MMHG | HEIGHT: 65 IN | DIASTOLIC BLOOD PRESSURE: 70 MMHG | BODY MASS INDEX: 19.89 KG/M2 | HEART RATE: 53 BPM

## 2025-07-10 DIAGNOSIS — J44.9 CHRONIC OBSTRUCTIVE PULMONARY DISEASE, UNSPECIFIED COPD TYPE (HCC): ICD-10-CM

## 2025-07-10 DIAGNOSIS — I10 PRIMARY HYPERTENSION: Primary | ICD-10-CM

## 2025-07-10 DIAGNOSIS — Z85.51 HISTORY OF BLADDER CANCER: ICD-10-CM

## 2025-07-10 DIAGNOSIS — F41.8 DEPRESSION WITH ANXIETY: ICD-10-CM

## 2025-07-10 DIAGNOSIS — I73.9 PVD (PERIPHERAL VASCULAR DISEASE): ICD-10-CM

## 2025-07-10 DIAGNOSIS — I25.10 CORONARY ARTERY DISEASE INVOLVING NATIVE CORONARY ARTERY OF NATIVE HEART WITHOUT ANGINA PECTORIS: ICD-10-CM

## 2025-07-10 DIAGNOSIS — F33.2 SEVERE EPISODE OF RECURRENT MAJOR DEPRESSIVE DISORDER, WITHOUT PSYCHOTIC FEATURES (HCC): ICD-10-CM

## 2025-07-10 DIAGNOSIS — I65.23 BILATERAL CAROTID ARTERY STENOSIS: ICD-10-CM

## 2025-07-10 DIAGNOSIS — C67.9 MALIGNANT NEOPLASM OF URINARY BLADDER, UNSPECIFIED SITE (HCC): ICD-10-CM

## 2025-07-10 DIAGNOSIS — F10.20 ALCOHOL DEPENDENCE, UNCOMPLICATED (HCC): ICD-10-CM

## 2025-07-10 DIAGNOSIS — Z93.6 STATUS POST ILEAL CONDUIT (HCC): ICD-10-CM

## 2025-07-10 PROCEDURE — 1123F ACP DISCUSS/DSCN MKR DOCD: CPT | Performed by: NURSE PRACTITIONER

## 2025-07-10 PROCEDURE — G2211 COMPLEX E/M VISIT ADD ON: HCPCS | Performed by: NURSE PRACTITIONER

## 2025-07-10 PROCEDURE — G8420 CALC BMI NORM PARAMETERS: HCPCS | Performed by: NURSE PRACTITIONER

## 2025-07-10 PROCEDURE — 99214 OFFICE O/P EST MOD 30 MIN: CPT | Performed by: NURSE PRACTITIONER

## 2025-07-10 PROCEDURE — 3079F DIAST BP 80-89 MM HG: CPT | Performed by: NURSE PRACTITIONER

## 2025-07-10 PROCEDURE — 1159F MED LIST DOCD IN RCRD: CPT | Performed by: NURSE PRACTITIONER

## 2025-07-10 PROCEDURE — 3023F SPIROM DOC REV: CPT | Performed by: NURSE PRACTITIONER

## 2025-07-10 PROCEDURE — 4004F PT TOBACCO SCREEN RCVD TLK: CPT | Performed by: NURSE PRACTITIONER

## 2025-07-10 PROCEDURE — 3017F COLORECTAL CA SCREEN DOC REV: CPT | Performed by: NURSE PRACTITIONER

## 2025-07-10 PROCEDURE — G8427 DOCREV CUR MEDS BY ELIG CLIN: HCPCS | Performed by: NURSE PRACTITIONER

## 2025-07-10 PROCEDURE — 1160F RVW MEDS BY RX/DR IN RCRD: CPT | Performed by: NURSE PRACTITIONER

## 2025-07-10 PROCEDURE — 3077F SYST BP >= 140 MM HG: CPT | Performed by: NURSE PRACTITIONER

## 2025-07-10 RX ORDER — MIRTAZAPINE 15 MG/1
30 TABLET, FILM COATED ORAL NIGHTLY
Qty: 60 TABLET | Refills: 5 | Status: SHIPPED | OUTPATIENT
Start: 2025-07-10

## 2025-07-10 ASSESSMENT — ENCOUNTER SYMPTOMS
SHORTNESS OF BREATH: 1
COUGH: 1
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
## 2025-08-06 ENCOUNTER — OFFICE VISIT (OUTPATIENT)
Age: 70
End: 2025-08-06
Payer: MEDICARE

## 2025-08-06 VITALS
BODY MASS INDEX: 20.49 KG/M2 | DIASTOLIC BLOOD PRESSURE: 72 MMHG | HEIGHT: 65 IN | HEART RATE: 72 BPM | SYSTOLIC BLOOD PRESSURE: 138 MMHG | WEIGHT: 123 LBS

## 2025-08-06 DIAGNOSIS — I25.10 ATHEROSCLEROSIS OF NATIVE CORONARY ARTERY OF NATIVE HEART WITHOUT ANGINA PECTORIS: ICD-10-CM

## 2025-08-06 DIAGNOSIS — I25.10 CORONARY ARTERY DISEASE INVOLVING NATIVE CORONARY ARTERY OF NATIVE HEART WITHOUT ANGINA PECTORIS: ICD-10-CM

## 2025-08-06 DIAGNOSIS — I65.23 BILATERAL CAROTID ARTERY STENOSIS: ICD-10-CM

## 2025-08-06 DIAGNOSIS — I73.9 PVD (PERIPHERAL VASCULAR DISEASE): Primary | ICD-10-CM

## 2025-08-06 DIAGNOSIS — Z95.1 S/P CABG X 3: ICD-10-CM

## 2025-08-06 DIAGNOSIS — I10 PRIMARY HYPERTENSION: ICD-10-CM

## 2025-08-06 PROCEDURE — 3017F COLORECTAL CA SCREEN DOC REV: CPT | Performed by: INTERNAL MEDICINE

## 2025-08-06 PROCEDURE — 4004F PT TOBACCO SCREEN RCVD TLK: CPT | Performed by: INTERNAL MEDICINE

## 2025-08-06 PROCEDURE — 3075F SYST BP GE 130 - 139MM HG: CPT | Performed by: INTERNAL MEDICINE

## 2025-08-06 PROCEDURE — G8420 CALC BMI NORM PARAMETERS: HCPCS | Performed by: INTERNAL MEDICINE

## 2025-08-06 PROCEDURE — G8428 CUR MEDS NOT DOCUMENT: HCPCS | Performed by: INTERNAL MEDICINE

## 2025-08-06 PROCEDURE — 1123F ACP DISCUSS/DSCN MKR DOCD: CPT | Performed by: INTERNAL MEDICINE

## 2025-08-06 PROCEDURE — 3078F DIAST BP <80 MM HG: CPT | Performed by: INTERNAL MEDICINE

## 2025-08-06 PROCEDURE — 1125F AMNT PAIN NOTED PAIN PRSNT: CPT | Performed by: INTERNAL MEDICINE

## 2025-08-06 PROCEDURE — 99214 OFFICE O/P EST MOD 30 MIN: CPT | Performed by: INTERNAL MEDICINE

## 2025-08-06 RX ORDER — AMLODIPINE BESYLATE 5 MG/1
5 TABLET ORAL DAILY
Qty: 90 TABLET | Refills: 3 | Status: SHIPPED | OUTPATIENT
Start: 2025-08-06

## 2025-08-06 RX ORDER — ATORVASTATIN CALCIUM 40 MG/1
TABLET, FILM COATED ORAL
Qty: 90 TABLET | Refills: 3 | Status: SHIPPED | OUTPATIENT
Start: 2025-08-06

## 2025-08-06 RX ORDER — METOPROLOL TARTRATE 50 MG
TABLET ORAL
Qty: 90 TABLET | Refills: 3 | Status: SHIPPED | OUTPATIENT
Start: 2025-08-06

## 2025-08-07 DIAGNOSIS — F41.8 DEPRESSION WITH ANXIETY: ICD-10-CM

## 2025-08-07 DIAGNOSIS — F33.2 SEVERE EPISODE OF RECURRENT MAJOR DEPRESSIVE DISORDER, WITHOUT PSYCHOTIC FEATURES (HCC): ICD-10-CM

## 2025-08-07 RX ORDER — MIRTAZAPINE 15 MG/1
30 TABLET, FILM COATED ORAL NIGHTLY
Qty: 180 TABLET | Refills: 1 | Status: SHIPPED | OUTPATIENT
Start: 2025-08-07

## (undated) DEVICE — Device

## (undated) DEVICE — SYR LR LCK 1ML GRAD NSAF 30ML --

## (undated) DEVICE — SUT SLK 4-0 18IN TIE MP BLK --

## (undated) DEVICE — CLIP INT SM TI EZ LD LIG SYS WECK HORZ

## (undated) DEVICE — CATHETER ETER TY TEMP SENS F MBO W URIN M FOLLOWS CDC GUIDELINES TO

## (undated) DEVICE — TRAY CATH 16F URIN MTR LTX -- CONVERT TO ITEM 363111

## (undated) DEVICE — SUTURE PERMAHAND SZ 2-0 L12X18IN NONABSORBABLE BLK SILK A185H

## (undated) DEVICE — CANNULA PERF L1.8MM TIP L1MM S STL SHFT BLB SHP TIP FEM

## (undated) DEVICE — MAGNETIC DRAPE: Brand: DEVON

## (undated) DEVICE — DRAPE IRRIG FLD WRM W44XL44IN W/ AORN STD PRTBL INTRATEMP

## (undated) DEVICE — SUT SLK 0 30IN CT1 BLK --

## (undated) DEVICE — AMD ANTIMICROBIAL GAUZE SPONGES,12 PLY USP TYPE VII, 0.2% POLYHEXAMETHYLENE BIGUANIDE HCI (PHMB): Brand: CURITY

## (undated) DEVICE — Device: Brand: JELCO

## (undated) DEVICE — SKIN MARKER,REGULAR TIP WITH RULER AND LABELS: Brand: DEVON

## (undated) DEVICE — REM POLYHESIVE ADULT PATIENT RETURN ELECTRODE: Brand: VALLEYLAB

## (undated) DEVICE — 3M™ IOBAN™ 2 ANTIMICROBIAL INCISE DRAPE 6648EZ: Brand: IOBAN™ 2

## (undated) DEVICE — SS SUTURE, 3 PER SLEEVE: Brand: MYO/WIRE II

## (undated) DEVICE — APPLIER CLP L9.38IN M LIG TI DISP STR RNG HNDL LIGACLP

## (undated) DEVICE — GUIDEWIRE URO L145CM DIA0.035IN TIP L7CM S STL PTFE BENT

## (undated) DEVICE — CARDINAL HEALTH FLEXIBLE LIGHT HANDLE COVER: Brand: CARDINAL HEALTH

## (undated) DEVICE — AMD ANTIMICROBIAL NON-ADHERENT PAD,0.2% POLYHEXAMETHYLENE BIGUANIDE HCI (PHMB): Brand: TELFA

## (undated) DEVICE — COVER,TABLE,44X76,STERILE: Brand: MEDLINE

## (undated) DEVICE — SYR IRR CATH TIP LR ADPT 70ML -- CONVERT TO ITEM 363120

## (undated) DEVICE — INFLATION DEVICE: Brand: ENCORE™ 26

## (undated) DEVICE — CLAMP INSERT: Brand: STEALTH® CLAMP INSERT

## (undated) DEVICE — ENDOSCOPIC VALVE WITH ADAPTER.: Brand: SURSEAL® II

## (undated) DEVICE — 3M™ IOBAN™ 2 ANTIMICROBIAL INCISE DRAPE 6650EZ: Brand: IOBAN™ 2

## (undated) DEVICE — (D)STRIP SKN CLSR 0.5X4IN WHT --

## (undated) DEVICE — SYRINGE CATH TIP 50ML

## (undated) DEVICE — JELLY LUBRICATING 10GM PREFIL SYR LUBE

## (undated) DEVICE — APPLICATOR BNDG 1MM ADH PREMIERPRO EXOFIN

## (undated) DEVICE — GLOVE SURG SZ 7 L11.2IN THK9.8MIL STRW LTX POLYMER BEAD CUF

## (undated) DEVICE — CATH BLLN STENT GRAF 12FRX46MM -- RELIANT

## (undated) DEVICE — SUTURE PROL SZ 6-0 L24IN NONABSORBABLE BLU BV-1 L9.3MM 3/8 M8805

## (undated) DEVICE — APPLIER CLP L9.375IN APER 2.1MM CLS L3.8MM 20 SM TI CLP

## (undated) DEVICE — INTENDED FOR TISSUE SEPARATION, AND OTHER PROCEDURES THAT REQUIRE A SHARP SURGICAL BLADE TO PUNCTURE OR CUT.: Brand: BARD-PARKER ® STAINLESS STEEL BLADES

## (undated) DEVICE — CATHETER THOR 24FR L22IN PVC 5 EYELET STR ATRAUM

## (undated) DEVICE — SUTURE ETHBND EXCEL SZ 0 L18IN NONABSORBABLE GRN L36MM CT-1 CX21D

## (undated) DEVICE — SUT PROL 6-0 24IN BV1 DA BLU --

## (undated) DEVICE — INTENDED FOR TISSUE SEPARATION, AND OTHER PROCEDURES THAT REQUIRE A SHARP SURGICAL BLADE TO PUNCTURE OR CUT.: Brand: BARD-PARKER SAFETY BLADES SIZE 15, STERILE

## (undated) DEVICE — 1/4 FORCE SURGICAL SPRING CLIP: Brand: STEALTH® SPRING CLIP

## (undated) DEVICE — 3000CC GUARDIAN II: Brand: GUARDIAN

## (undated) DEVICE — DRAPE XR C ARM 41X74IN LF --

## (undated) DEVICE — SUT PROL 4-0 30IN SH1 DA BLU --

## (undated) DEVICE — CATH URETH INTMIT ROB 14FR FUN -- USE ITEM 179521

## (undated) DEVICE — SINGLE BASIN: Brand: CARDINAL HEALTH

## (undated) DEVICE — PLEDGET VASC W3/16XL3/8IN THK1/16IN PTFE SFT

## (undated) DEVICE — SUTURE VCRL SZ 3-0 L27IN ABSRB UD L26MM SH 1/2 CIR J416H

## (undated) DEVICE — CATH URETH FOL 2W SH 20FRX5ML --

## (undated) DEVICE — 2000CC GUARDIAN II: Brand: GUARDIAN

## (undated) DEVICE — CAROTID ARTERY SHUNT KIT,RADIOPAQUE LINE, STRAIGHT: Brand: ARGYLE

## (undated) DEVICE — CANNULA INJ L2.5IN BLNT TIP 3MM CLR BODY W/ 1 W VLV DLP

## (undated) DEVICE — KENDALL SCD EXPRESS SLEEVES, KNEE LENGTH, MEDIUM: Brand: KENDALL SCD

## (undated) DEVICE — SYR 10ML LUER LOK 1/5ML GRAD --

## (undated) DEVICE — Y-TYPE TUR/BLADDER IRRIGATION SET, REGULATING CLAMP

## (undated) DEVICE — CONNECTOR IV 3/8X3/8X3/8 Y

## (undated) DEVICE — SUTURE TEMP PACE WIRE SZ 2-0 L24IN NONABSORBABLE LIGHT/DARK

## (undated) DEVICE — SUTURE PROL DBL ARMED 8 0 BV130 5 24IN N ABSRB MFIL BLU M8739

## (undated) DEVICE — SUTURE ETHBND EXCEL 2-0 L30IN NONABSORBABLE GRN L26MM SH MX563

## (undated) DEVICE — SUTURE MCRYL SZ 4-0 L27IN ABSRB UD L19MM PS-2 1/2 CIR PRIM Y426H

## (undated) DEVICE — SUTURE VCRL SZ 3-0 L36IN ABSRB UD L36MM CT-1 1/2 CIR J944H

## (undated) DEVICE — APPLIER CLP LIG SM TI PREM SURGCLP SUPER INTLOK 20 DISP

## (undated) DEVICE — GEL US 20GM NONIRRITATING OVERWRAPPED FILE PCH TRNSMIT

## (undated) DEVICE — (D)PREP SKN CHLRAPRP APPL 26ML -- CONVERT TO ITEM 371833

## (undated) DEVICE — SUT PROL 6-0 30IN C1 DA BLU --

## (undated) DEVICE — BUTTON SWITCH PENCIL BLADE ELECTRODE, HOLSTER: Brand: EDGE

## (undated) DEVICE — INTENDED TO BE USED TO OCCLUDE, RETRACT AND IDENTIFY ARTERIES, VEINS, TENDONS AND NERVES IN SURGICAL PROCEDURES: Brand: STERION®  VESSEL LOOP

## (undated) DEVICE — STANDARD HYPODERMIC NEEDLE,POLYPROPYLENE HUB: Brand: MONOJECT

## (undated) DEVICE — GOWN,PREVENTION PLUS,2XL,ST,22/CS: Brand: MEDLINE

## (undated) DEVICE — SUTURE VCRL SZ 4-0 L27IN ABSRB UD L19MM PS-2 3/8 CIR PRIM J426H

## (undated) DEVICE — GOWN,REINFORCED,POLY,AURORA,XXLARGE,STR: Brand: MEDLINE

## (undated) DEVICE — SURGICAL PROCEDURE PACK BASIC ST FRANCIS

## (undated) DEVICE — SOLUTION IV 1000ML 0.9% SOD CHL

## (undated) DEVICE — BASIC SINGLE BASIN-LF: Brand: MEDLINE INDUSTRIES, INC.

## (undated) DEVICE — DISH PTRI STRL --

## (undated) DEVICE — SUTURE PDS II SZ 1 L36IN ABSRB VLT L48MM CTX 1/2 CIR Z371T

## (undated) DEVICE — 6 FOOT DISPOSABLE EXTENSION CABLE WITH SAFE CONNECT / SCREW-DOWN

## (undated) DEVICE — LUNDERQUIST,-RING TORQUE WIRE GUIDE: Brand: LUNDERQUIST-RING

## (undated) DEVICE — AORTIC PUNCHES ARE USED TO CREATE A UNIFORM OPENING IN BLOOD VESSELS DURING CARDIOVASCULAR SURGERY. THE VESSEL GRAFT IS INSERTED INTO THE CREATED OPENING AND SUTURED TO THE VESSEL WALL. AORTIC LANCETS ARE USED TO MAKE A SMALL UNIFORM CUT IN A BLOOD VESSEL TO FACILITATE INSERTION OF AN AORTIC PUNCH.  PUNCHES COME IN VARIOUS LENGTHS, DIAMETERS AND TIP CONFIGURATIONS.: Brand: CLEANCUT ROTATING AORTIC PUNCH

## (undated) DEVICE — BLADE OPHTH MINI BEAV SHRP --

## (undated) DEVICE — SUTURE PROL SZ 6-0 L30IN NONABSORBABLE BLU L9.3MM BV-1 3/8 M8709

## (undated) DEVICE — SUTURE VCRL SZ 2-0 L36IN ABSRB UD L36MM CT-1 1/2 CIR J945H

## (undated) DEVICE — DILATOR/SHEATH SET: Brand: 8/10 DILATOR/SHEATH SET

## (undated) DEVICE — TAPE UMB 1/8X30IN MP COT WHT --

## (undated) DEVICE — DRAPE SLUSH DISC W44XL66IN ST FOR RND BSIN HUSH SLUSH SYS

## (undated) DEVICE — SUTURE PERMAHAND SZ 3-0 L18IN NONABSORBABLE BLK SILK BRAID A184H

## (undated) DEVICE — NEEDLE HYPO 25GA L1.5IN BLU POLYPR HUB S STL REG BVL STR

## (undated) DEVICE — SUT PROL 3-0 36IN SH DA BLU --

## (undated) DEVICE — BLADE SAW W10XL54MM FOR PRI REPEAT STRNOTMY

## (undated) DEVICE — SUTURE NONABSORBABLE L24IN SZ 7-0 M0-5 BV175-8 EP 24 BLU M8745

## (undated) DEVICE — 6617 IOBAN II PATIENT ISOLATION DRAPE 5/BX,4BX/CS: Brand: STERI-DRAPE™ IOBAN™ 2

## (undated) DEVICE — DRAPE TWL SURG 16X26IN BLU ORB04] ALLCARE INC]

## (undated) DEVICE — PVC URETHRAL CATHETER: Brand: DOVER

## (undated) DEVICE — SOLUTION IRRIG 3000ML 0.9% SOD CHL FLX CONT 0797208] ICU MEDICAL INC]

## (undated) DEVICE — AMD ANTIMICROBIAL BANDAGE ROLL,6 PLY: Brand: KERLIX

## (undated) DEVICE — 48" PROBE COVER W/GEL, ULTRASOUND, STERILE: Brand: SITE-RITE

## (undated) DEVICE — CATHETER THOR 32FR L23IN PVC 6 EYELET STR ATRAUM

## (undated) DEVICE — AGENT HEMSTAT W4XL4IN OXIDIZED REGENERATED CELOS ABSRB SFT

## (undated) DEVICE — GUIDEWIRE ENDOSCP L150CM DIA0.038IN TIP L3CM PTFE FLX STR

## (undated) DEVICE — WIPE INSTR HIGH ABSORBENT FAST WICKING LINT FREE COUNT 20

## (undated) DEVICE — HIGH PRESSURE NEPHROSTOMY BALLOON CATHETER: Brand: NEPHROMAX

## (undated) DEVICE — SUTURE PERMAHAND SZ 2-0 L18IN NONABSORBABLE BLK L26MM PS 1588H

## (undated) DEVICE — DRAPE SHT 3 QTR PROXIMA 53X77 --

## (undated) DEVICE — STERILE HOOK LOCK LATEX FREE ELASTIC BANDAGE 4INX5YD: Brand: HOOK LOCK™

## (undated) DEVICE — MEDI-VAC NON-CONDUCTIVE SUCTION TUBING: Brand: CARDINAL HEALTH

## (undated) DEVICE — SUTURE SILK PERMAHAND PRECUT 6 X 30 IN SZ 1 BLK BRAID A307H

## (undated) DEVICE — BAG DRNGE 4000ML CONT IRRIG ROUNDED TEARDROP SHP DISP

## (undated) DEVICE — UNIVERSAL DRAPES: Brand: MEDLINE INDUSTRIES, INC.

## (undated) DEVICE — GARMENT,MEDLINE,DVT,INT,CALF,MED, GEN2: Brand: MEDLINE

## (undated) DEVICE — STERILE HOOK LOCK LATEX FREE ELASTIC BANDAGE 6INX5YD: Brand: HOOK LOCK™

## (undated) DEVICE — SUTURE PROL SZ 6-0 L30IN NONABSORBABLE BLU L13MM CC-1 3/8 M8707

## (undated) DEVICE — MEDI-TRACE CADENCE ADULT, DEFIBRILLATION ELECTRODE -RTS  (10 PR/PK) - ZOLL: Brand: MEDI-TRACE CADENCE

## (undated) DEVICE — SUTURE S STL SZ 6 L18IN NONABSORBABLE SIL L48MM CCS 1/2 CIR M654G

## (undated) DEVICE — SUTURE NONABSORBABLE MONOFILAMENT 5-0 C-1 1X24 IN PROLENE 8725H

## (undated) DEVICE — COVER LT HNDL FOR OR SURG LAMP

## (undated) DEVICE — SURGIFOAM SPNG SZ 100